# Patient Record
Sex: FEMALE | Race: WHITE | NOT HISPANIC OR LATINO | Employment: OTHER | URBAN - METROPOLITAN AREA
[De-identification: names, ages, dates, MRNs, and addresses within clinical notes are randomized per-mention and may not be internally consistent; named-entity substitution may affect disease eponyms.]

---

## 2017-01-17 ENCOUNTER — ALLSCRIPTS OFFICE VISIT (OUTPATIENT)
Dept: OTHER | Facility: OTHER | Age: 82
End: 2017-01-17

## 2017-05-03 ENCOUNTER — ALLSCRIPTS OFFICE VISIT (OUTPATIENT)
Dept: OTHER | Facility: OTHER | Age: 82
End: 2017-05-03

## 2018-01-14 VITALS
TEMPERATURE: 99.1 F | DIASTOLIC BLOOD PRESSURE: 62 MMHG | BODY MASS INDEX: 27.7 KG/M2 | SYSTOLIC BLOOD PRESSURE: 120 MMHG | WEIGHT: 123.13 LBS | HEIGHT: 56 IN

## 2018-01-14 VITALS
BODY MASS INDEX: 26.85 KG/M2 | OXYGEN SATURATION: 97 % | DIASTOLIC BLOOD PRESSURE: 70 MMHG | WEIGHT: 119.38 LBS | RESPIRATION RATE: 20 BRPM | HEART RATE: 97 BPM | HEIGHT: 56 IN | SYSTOLIC BLOOD PRESSURE: 138 MMHG

## 2018-01-14 NOTE — MISCELLANEOUS
Assessment    1  Gallstone pancreatitis (577 0,574 20) (K85 1)   2  Hypertension (401 9) (I10)   3  Type 2 diabetes mellitus (250 00) (E11 9)   4  Hyperlipidemia (272 4) (E78 5)    Plan  Gallstone pancreatitis    · 1 - Kira Alvarez MD  (General Surgery) Physician Referral  Consult  Status: Active   Requested for: 44TRB7357   Ordered; For: Gallstone pancreatitis; Ordered By: Adiel Smith Performed:  Due: 36INB2850  Care Summary provided  : Yes  Hyperlipidemia    · Atorvastatin Calcium 40 MG Oral Tablet; TAKE 1 TABLET DAILY   Rx By: Adiel Smith; Dispense: 90 Days ; #:90 Tablet; Refill: 1; For: Hyperlipidemia; [place] = N; Verified Transmission to [address] [place] [address]; Last Updated By: System, SureScripts; 3/7/2016 5:26:51 PM  Hyperlipidemia, Hypertension, Type 2 diabetes mellitus, Vitamin D deficiency    · Follow-up visit in 2 months Evaluation and Treatment  Follow-up  Status: Hold For -  Scheduling  Requested for: 66MSK9802   Ordered; For: Hyperlipidemia, Hypertension, Type 2 diabetes mellitus, Vitamin D deficiency; Ordered By: Adiel Smith Performed:  Due: 29YFQ3779  Type 2 diabetes mellitus    · MetFORMIN HCl - 500 MG Oral Tablet; ONE TABLET TWO TIMES DAILY   Rx By: Adiel Smith; Dispense: 30 Days ; #:60 Tablet; Refill: 5; For: Type 2 diabetes mellitus; [place] = N; Verified Transmission to [address] [place] [address]; Last Updated By: System, SureScripts; 3/7/2016 5:26:52 PM    Discussion/Summary  Discussion Summary:   1  Gallstone pancreatitis - status post MRCP - currently asymptomatic - the patient will follow-up with Dr [place] Gregory as above  Call if any problems or concerns  The patient wishes to go to [facility] for short-term rehabilitation as her  must return to work in a week  I feel that this would be appropriate and beneficial in returning the patient to baseline  We will contact them to see what records are needed       2  Hypertension - well-controlled - continue lisinopril/hydrochlorothiazide 20/12 5 mg daily  3  Diabetes mellitus type 2 - appears to be adequately controlled for her age - will check labs as previously ordered prior to next visit in 2 months with Dr Velazquez [place]  Continue metformin 500 mg twice daily for now  4  Hyperlipidemia - continue atorvastatin 40 mg daily  Will reassess with previously ordered labs  Chief Complaint  Chief Complaint Free Text Note Form: Hospital followup  History of Present Illness  General Hospital Follow-Up: The patient is being seen for follow-up after hospitalization  Hospital records were reviewed  She was hospitalized at [address] [place]  The date of admission: 2/22/16, date of discharge: 2/26/16  Diagnosis: pancreatitis, cholecystitis, sepsis  Hospital course: The patient was admitted to the hospital and found to have gallstone pancreatitis  She underwent MRCP to remove the stone and her sepsis and pancreatitis resolved  Cholecystectomy was delayed due to pancreatic cysts that were found on imaging  It was determined that further evaluation of the cyst would be necessary prior to the cholecystectomy  She was discharged to home, has PT coming twice a week  She was discharged on the following medications: no changes  Follow-up appointments: here - no general surgeon follow-up yet but was supposed to see Dr Villaseñor Forward within 2 weeks  Symptoms:  anorexia and swelling in the legs but improving, but no fever, no cough, no shortness of breath, no chest pain, no nausea, no vomiting, no loose stools and no constipation    The patient presents with complaints of fatigue (but improving each day)  The patient is currently experiencing symptoms  Additional history: BP at home range from 128-145/59-81 - glucose ranges from 145-220  [facility] Communication [facility]: The patient is being contacted for follow-up after hospitalization  She was hospitalized at [address] [place]   The dates of hospitalization: 2/22/16-2/26/16, date of admission: 2/22/16, date of discharge: 2/26/16  Diagnosis: PANCREATITIS, CHOLECYSTITIS, SEPSIS  She was discharged to home, with home health services  Medications were not reviewed today  She scheduled a follow up appointment  Follow-up appointments with other specialists: SURGEON  Counseling was provided to patient's family  JEAN,-PETER  Communication performed and completed by BRYCE FLANAGAN [facility] RN      Review of Systems  Complete-Female:   Constitutional: feeling tired, but as noted in HPI, no fever and no chills  Cardiovascular: lower extremity edema, but no chest pain  Respiratory: no shortness of breath, no cough and no wheezing  Gastrointestinal: as noted in HPI, no abdominal pain, no nausea and no vomiting  Active Problems    1  Dehydration (276 51) (E86 0)   2  Gastroenteritis (558 9) (K52 9)   3  Hyperlipidemia (272 4) (E78 5)   4  Hypertension (401 9) (I10)   5  Hypotension (458 9) (I95 9)   6  Need for prophylactic vaccination and inoculation against influenza (V04 81) (Z23)   7  Need for vaccination with 13-polyvalent pneumococcal conjugate vaccine (V03 82) (Z23)   8  Type 2 diabetes mellitus (250 00) (E11 9)   9  Urinary incontinence (788 30) (R32)   10  Vitamin D deficiency (268 9) (E55 9)    Past Medical History    1  Need for prophylactic vaccination and inoculation against influenza (V04 81) (Z23)   2  History of Pain in wrist, unspecified laterality (719 43) (M25 539)    Family History    1  Family history of Diabetes Mellitus (V18 0)    2  Family history of Diabetes Mellitus (V18 0)    3  Family history of Adenocarcinoma Of The Pancreas    4  Family history of myocardial infarction (V17 3) (Z82 49)    Social History    · Never A Smoker   · Never Drank Alcohol    Current Meds   1  Atorvastatin Calcium 40 MG Oral Tablet; TAKE 1 TABLET DAILY; Therapy: 16VNY9729 to (Evaluate:22Pde9186)  Requested for: 97JIM9785; Last   Rx:19Nov2015 Ordered   2  Ergocalciferol 00593 UNIT Oral Capsule; TAKE 1 CAPSULE WEEKLY;    Therapy: 43VKU9481 to (Evaluate:67Dlg5656)  Requested for: 20Jun2013; Last   Rx:20Jun2013 Ordered   3  Lisinopril-Hydrochlorothiazide 20-12 5 MG Oral Tablet; TAKE 1 TAB QD;   Therapy: 82RDV2886 to (Evaluate:68Ejw3968)  Requested for: 85Kag1340; Last   Rx:68Uqf9377 Ordered   4  MetFORMIN HCl - 500 MG Oral Tablet; ONE TABLET TWO TIMES DAILY; Therapy: 29ZAN2587 to ([facility])  Requested for: 56TFM2984; Last   Rx:09Rsg9349 Ordered   5  OneTouch Ultra Blue In [employer]; USE 1 STRIP DAILY; Therapy: 55NHE5278 to ([facility])  Requested for: 69QXD9933; Last   Rx:48Oxz9585 Ordered   6  OneTouch UltraSoft Lancets Miscellaneous; use once daily; Therapy: 41LMB3935 to (Evaluate:28Ixl3479)  Requested for: 51JXY1805; Last   Rx:31Jan2013 Ordered    Allergies    1  No Known Drug Allergies    Vitals  Signs [Data Includes: Current Encounter]   Recorded: 31DAR6854 04:54PM   Temperature: 99 5 F  Heart Rate: 724  Systolic: 651  Diastolic: 78  Height: 4 ft 8 in  Weight: 103 lb 4 00 oz  BMI Calculated: 23 15  BSA Calculated: 1 34    Physical Exam    Constitutional   General appearance: No acute distress, well appearing and well nourished  Ears, Nose, Mouth, and Throat   Oropharynx: Normal with no erythema, edema, exudate or lesions  Oral mucosa was moist    Neck   Neck: Supple, symmetric, trachea midline, no masses  Thyroid: Normal, no thyromegaly  Pulmonary   Respiratory effort: No increased work of breathing or signs of respiratory distress  Auscultation of lungs: Clear to auscultation  Cardiovascular   Auscultation of heart: Normal rate and rhythm, normal S1 and S2, no murmurs  Peripheral vascular exam: Normal   Radial: right 2+ and left 2+  Examination of extremities for edema and/or varicosities: Abnormal   bilateral ankle 2+ pitting edema  Abdomen   Abdomen: Non-tender, no masses  Liver and spleen: No hepatomegaly or splenomegaly     Lymphatic   Palpation of lymph nodes in neck: No lymphadenopathy  Musculoskeletal   Gait and station: Abnormal   uses a rolling walker     Psychiatric   Mood and affect: Normal        Signatures   Electronically signed by : Gabo Adams, ; Feb 29 2016 10:29AM EST                       (Author)    Electronically signed by : Chaparrita Karimi, [facility]; Mar  7 2016 10:10PM EST                       (Author)    Electronically signed by : BAYRON Zamora ; Mar  7 2016 10:52PM EST

## 2018-01-15 NOTE — MISCELLANEOUS
Message   Recorded as Task   Date: 03/17/2016 04:23 PM, Created By: Hung Brown   Task Name: Med Renewal Request   Assigned To: Hung Brown   Regarding Patient: Amie Guillory, Status: Active   Comment:    Hung Brown - 17 Mar 2016 4:23 PM     TASK CREATED  Caller: Self; Renew Medication; [phone] (Home)  Per arnaldo the rozerem is $180 but pt has not met her deductible yet this year  Is there something else to try? Brenda Marie - 17 Mar 2016 4:39 PM     TASK REPLIED TO: Previously Assigned To Brenda Marie  Let's try doxepin 10 mg, 1 at bedtime nightly #30 RF x1    PeaceDahiana blanco - 17 Mar 2016 6:23 PM     TASK EDITED  Rx ready to forward to pharmacy  Active Problems    1  Gallstone pancreatitis (577 0,574 20) (K85 1)   2  Hyperlipidemia (272 4) (E78 5)   3  Hypertension (401 9) (I10)   4  Insomnia (780 52) (G47 00)   5  Need for prophylactic vaccination and inoculation against influenza (V04 81) (Z23)   6  Need for vaccination with 13-polyvalent pneumococcal conjugate vaccine (V03 82) (Z23)   7  Type 2 diabetes mellitus (250 00) (E11 9)   8  Urinary incontinence (788 30) (R32)   9  Vitamin D deficiency (268 9) (E55 9)    Current Meds   1  Atorvastatin Calcium 40 MG Oral Tablet; TAKE 1 TABLET DAILY; Therapy: 34KXQ1476 to (Evaluate:22Xlq3409)  Requested for: 44RPI9715; Last   Rx:07Mar2016 Ordered   2  Ergocalciferol 59417 UNIT Oral Capsule (Drisdol); TAKE 1 CAPSULE WEEKLY; Therapy: 41Wss2662 to (Evaluate:48Vxy9465)  Requested for: 20Jun2013; Last   Rx:20Jun2013 Ordered   3  Lisinopril-Hydrochlorothiazide 20-12 5 MG Oral Tablet; TAKE 1 TAB QD;   Therapy: 42UZG6480 to (Evaluate:09Hua0953)  Requested for: 79Nnl1453; Last   Rx:28Bet4077 Ordered   4  MetFORMIN HCl - 500 MG Oral Tablet; ONE TABLET TWO TIMES DAILY; Therapy: 68XOC3324 to (Evaluate:55Axo8515)  Requested for: 89TEB1052; Last   Rx:07Mar2016 Ordered   5  OneTouch Ultra Blue In [employer]; USE 1 STRIP DAILY;    Therapy: 79WEY7753 to ([employer])  Requested for: 94YLG4072; Last   Rx:16Mbe5815 Ordered   6  OneTouch UltraSoft Lancets Miscellaneous; use once daily; Therapy: 20EJK1372 to ([facility])  Requested for: 15YPX7747; Last   Rx:31Jan2013 Ordered   7  Rozerem 8 MG Oral Tablet; TAKE 1 TABLET AT BEDTIME AS NEEDED FOR SLEEP; Therapy: 10HHZ2602 to (Evaluate:92Fdy0824)  Requested for: 96IPY7083; Last   Rx:17Mar2016 Ordered    Allergies    1   No Known Drug Allergies    Signatures   Electronically signed by : Fazal Sifuentes, ; Mar 17 2016  6:24PM EST                       (Author)

## 2018-01-17 NOTE — RESULT NOTES
Verified Results  (1) MICROALBUMIN CREATININE RATIO, RANDOM URINE 20Apr2016 02:39PM Cristino Kevin     Test Name Result Flag Reference   MICROALBUMIN/ CREAT R 14 mg/g creatinine  0-30   MICROALBUMIN,URINE 25 9 mg/L H 0 0-20 0   CREATININE URINE 190 0 mg/dL       (1) COMPREHENSIVE METABOLIC PANEL 08JFC7225 12:86SH Marie, [facility] Kidney Disease Education Program recommendations are as follows:  GFR calculation is accurate only with a steady state creatinine  Chronic Kidney disease less than 60 ml/min/1 73 sq  meters  Kidney failure less than 15 ml/min/1 73 sq  meters  Test Name Result Flag Reference   GLUCOSE,RANDM 93 mg/dL     If the patient is fasting, the ADA then defines impaired fasting glucose as > 100 mg/dL and diabetes as > or equal to 123 mg/dL     SODIUM 133 mmol/L L 136-145   POTASSIUM 4 2 mmol/L  3 5-5 3   CHLORIDE 95 mmol/L L 100-108   CARBON DIOXIDE 28 mmol/L  21-32   ANION GAP (CALC) 10 mmol/L  4-13   BLOOD UREA NITROGEN 25 mg/dL  5-25   CREATININE 1 03 mg/dL  0 60-1 30   Standardized to IDMS reference method   CALCIUM 8 7 mg/dL  8 3-10 1   BILI, TOTAL 0 52 mg/dL  0 20-1 00   ALK PHOSPHATAS 100 U/L     ALT (SGPT) 15 U/L  12-78   AST(SGOT) 12 U/L  5-45   ALBUMIN 3 7 g/dL  3 5-5 0   TOTAL PROTEIN 7 7 g/dL  6 4-8 2   eGFR Non-African American 51 2 ml/min/1 73sq m       (1) LIPID PANEL FASTING W DIRECT LDL REFLEX 20Apr2016 01:25PM Cristino Kevin   Triglyceride:         Normal              <150 mg/dl       Borderline High    150-199 mg/dl       High               200-499 mg/dl       Very High          >499 mg/dl  Cholesterol:         Desirable        <200 mg/dl      Borderline High  200-239 mg/dl      High             >239 mg/dl  HDL Cholesterol:        High    >59 mg/dL      Low     <41 mg/dL  LDL Cholesterol:        Optimal          <100 mg/dl         Near Optimal     100-129 mg/dl        Above Optimal          Borderline High   130-159 mg/dl          High              160-189 mg/dl          Very High        >189 mg/dl  LDL CALCULATED:    This screening LDL is a calculated result  It does not have the accuracy of the Direct Measured LDL in the monitoring of patients with hyperlipidemia and/or statin therapy  Direct Measure LDL (APR178) must be ordered separately in these patients  Test Name Result Flag Reference   CHOLESTEROL 186 mg/dL     LDL CHOLESTEROL CALCULATED 112 mg/dL H 0-100   TRIGLYCERIDES 144 mg/dL  <=150   Specimen collection should occur prior to N-Acetylcysteine or Metamizole administration due to the potential for falsely depressed results  HDL,DIRECT 45 mg/dL  40-60   Specimen collection should occur prior to Metamizole administration due to the potential for falsely depressed results

## 2018-01-18 NOTE — RESULT NOTES
Verified Results  (1) HEMOGLOBIN A1C 20Apr2016 01:25PM aHnnah Josh   5 7-6 4% impaired fasting glucose  >=6 5% diagnosis of diabetes    Falsely low levels are seen in conditions linked to short RBC life span-  hemolytic anemia, and splenomegaly  Falsely elevated levels are seen in situations where there is an increased production of RBC- receipt of erythropoietin or blood transfusions  Adopted from ADA-Clinical Practice Recommendations     Test Name Result Flag Reference   HEMOGLOBIN A1C 7 4 % H 4 0-5 6   EST  AVG   GLUCOSE 166 mg/dl

## 2018-02-26 DIAGNOSIS — G47.00 INSOMNIA, UNSPECIFIED TYPE: Primary | ICD-10-CM

## 2018-02-27 RX ORDER — DOXEPIN HYDROCHLORIDE 10 MG/1
CAPSULE ORAL
Qty: 90 CAPSULE | Refills: 0 | OUTPATIENT
Start: 2018-02-27

## 2018-02-28 ENCOUNTER — TELEPHONE (OUTPATIENT)
Dept: FAMILY MEDICINE CLINIC | Facility: CLINIC | Age: 83
End: 2018-02-28

## 2018-02-28 NOTE — TELEPHONE ENCOUNTER
Spoke with spouse joby   Patient has been receiving this rx from Dr Raquel Hernandez he will call there to request the refill

## 2018-02-28 NOTE — TELEPHONE ENCOUNTER
Satya Bahena called today saying she needs a refill on her sleeping medication that begins with a D  I do not see what that medication could be   If you find it, can you please call it into [facility] on [address] [facility]

## 2018-03-01 RX ORDER — DOXEPIN HYDROCHLORIDE 10 MG/1
10 CAPSULE ORAL
Qty: 30 CAPSULE | Refills: 0 | Status: SHIPPED | OUTPATIENT
Start: 2018-03-01 | End: 2018-04-05 | Stop reason: SDUPTHER

## 2018-03-01 RX ORDER — DOXEPIN HYDROCHLORIDE 10 MG/1
1 CAPSULE ORAL
COMMUNITY
Start: 2016-03-17 | End: 2018-03-01 | Stop reason: SDUPTHER

## 2018-03-01 NOTE — TELEPHONE ENCOUNTER
Spoke with pt's spouse today, Erika Gonzalez needs a refill for her Doxepin 10 mg QHS  Spouse mention that you are prescribing this for her, I did check Allscripts and yes first RX was done back on 05/09/2016  Can you please approve 30 days supply, pt will  Schedule an appointment

## 2018-03-08 RX ORDER — RAMELTEON 8 MG/1
1 TABLET ORAL
COMMUNITY
Start: 2016-03-17 | End: 2020-02-26 | Stop reason: ALTCHOICE

## 2018-03-08 RX ORDER — BENZONATATE 100 MG/1
CAPSULE ORAL
COMMUNITY
Start: 2017-05-03 | End: 2018-12-04 | Stop reason: ALTCHOICE

## 2018-03-08 RX ORDER — LANCETS
EACH MISCELLANEOUS DAILY
COMMUNITY
Start: 2013-01-31

## 2018-03-13 ENCOUNTER — OFFICE VISIT (OUTPATIENT)
Dept: FAMILY MEDICINE CLINIC | Facility: CLINIC | Age: 83
End: 2018-03-13
Payer: COMMERCIAL

## 2018-03-13 VITALS
DIASTOLIC BLOOD PRESSURE: 70 MMHG | OXYGEN SATURATION: 94 % | RESPIRATION RATE: 16 BRPM | HEART RATE: 92 BPM | SYSTOLIC BLOOD PRESSURE: 138 MMHG | HEIGHT: 60 IN | WEIGHT: 147 LBS | BODY MASS INDEX: 28.86 KG/M2

## 2018-03-13 DIAGNOSIS — E11.8 TYPE 2 DIABETES MELLITUS WITH COMPLICATION, UNSPECIFIED LONG TERM INSULIN USE STATUS: ICD-10-CM

## 2018-03-13 DIAGNOSIS — I10 ESSENTIAL HYPERTENSION: ICD-10-CM

## 2018-03-13 DIAGNOSIS — R29.890 HEIGHT LOSS: ICD-10-CM

## 2018-03-13 DIAGNOSIS — Z23 NEED FOR TETANUS BOOSTER: Primary | ICD-10-CM

## 2018-03-13 LAB — SL AMB POCT HEMOGLOBIN AIC: 7

## 2018-03-13 PROCEDURE — 99214 OFFICE O/P EST MOD 30 MIN: CPT | Performed by: FAMILY MEDICINE

## 2018-03-13 PROCEDURE — 3078F DIAST BP <80 MM HG: CPT | Performed by: FAMILY MEDICINE

## 2018-03-13 PROCEDURE — 83036 HEMOGLOBIN GLYCOSYLATED A1C: CPT | Performed by: FAMILY MEDICINE

## 2018-03-13 PROCEDURE — G0439 PPPS, SUBSEQ VISIT: HCPCS | Performed by: FAMILY MEDICINE

## 2018-03-13 PROCEDURE — 3725F SCREEN DEPRESSION PERFORMED: CPT | Performed by: FAMILY MEDICINE

## 2018-03-13 PROCEDURE — 3075F SYST BP GE 130 - 139MM HG: CPT | Performed by: FAMILY MEDICINE

## 2018-03-13 NOTE — PROGRESS NOTES
Assessment/Plan:    Type 2 diabetes mellitus ([facility] [address] )    Diabetes is stable with HGB A1c of 7 0  Will continue same dose metformin  Hyperlipidemia    Will check fasting lipid profile  Hypertension    Blood pressure is well controlled on current medications  Diagnoses and all orders for this visit:    Need for tetanus booster  -     tetanus-diphtheria-acellular pertussis (BOOSTRIX) injection; Inject 0 5 mL into the shoulder, thigh, or buttocks once for 1 dose    Type 2 diabetes mellitus with complication, unspecified long term insulin use status (HCC)  -     POCT hemoglobin A1c    Essential hypertension    Other orders  -     benzonatate (TESSALON PERLES) 100 mg capsule; Take by mouth  -     glucose blood test strip; by In Vitro route daily  -     Lancets (ONETOUCH ULTRASOFT) lancets; by Does not apply route daily  -     ramelteon (ROZEREM) 8 mg tablet; Take 1 tablet by mouth          Patient's shoes and socks removed  Right Foot/Ankle   Right Foot Inspection  Skin Exam: skin normal, skin intact and dry skin no warmth, no callus, no erythema, no maceration, no abnormal color, no pre-ulcer, no ulcer and no callus                          Toe Exam: ROM and strength within normal limits  Sensory   Vibration: intact  Proprioception: intact   Monofilament testing: absent  Vascular    The right DP pulse is 1+  The right PT pulse is 1+  Left Foot/Ankle  Left Foot Inspection  Skin Exam: skin normal, skin intact and dry skinno warmth, no erythema, no maceration, normal color, no pre-ulcer, no ulcer and no callus                         Toe Exam: ROM and strength within normal limits                   Sensory   Vibration: intact  Proprioception: intact  Monofilament: intact  Vascular    The left DP pulse is 1+  The left PT pulse is 1+  Assign Risk Category:  No deformity present; Loss of protective sensation; No weak pulses       Risk: 1     foot  SUBJECTIVE:  80 y o  female for follow up of diabetes   Diabetic Review of Systems - medication compliance: compliant all of the time  Other symptoms and concerns:   Current Outpatient Prescriptions   Medication Sig Dispense Refill    atorvastatin (LIPITOR) 40 mg tablet Take 40 mg by mouth daily   doxepin (SINEquan) 10 mg capsule Take 1 capsule (10 mg total) by mouth daily at bedtime 30 capsule 0    ergocalciferol (ERGOCALCIFEROL) 44409 UNITS capsule Take 1,000 Units by mouth once a week        glucose blood test strip by In Vitro route daily      Lancets (ONETOUCH ULTRASOFT) lancets by Does not apply route daily      lisinopril-hydrochlorothiazide (PRINZIDE,ZESTORETIC) 20-12 5 MG per tablet Take 1 tablet by mouth daily   metFORMIN (GLUCOPHAGE) 500 mg tablet Take 500 mg by mouth 2 (two) times a day with meals   ramelteon (ROZEREM) 8 mg tablet Take 1 tablet by mouth      benzonatate (TESSALON PERLES) 100 mg capsule Take by mouth      tetanus-diphtheria-acellular pertussis (BOOSTRIX) injection Inject 0 5 mL into the shoulder, thigh, or buttocks once for 1 dose 0 5 mL 0     No current facility-administered medications for this visit  OBJECTIVE:  /70   Pulse 92   Resp 16   Ht 5' (1 524 m)   Wt 66 7 kg (147 lb)   SpO2 94%   BMI 28 71 kg/m²        Patient ID: Claudia Small is a 80 y o  female  Patient is here with her  for annual Wellness visit and follow-up of chronic problems  She denies any recent illness  She denies any problem with chest pain or breathing problems  Her diabetes is under very good control on metformin  Fingerstick hemoglobin A1c today was 7 0  She denies any dizziness or headaches or neurological symptoms  She tries to eat a well-balanced diet and does at least a 0 5 hour walking daily around her apartment building  She uses her cane for support          Procedures  The following portions of the patient's history were reviewed and updated as appropriate: allergies, current medications, past family history, past medical history, past social history, past surgical history and problem list     Review of Systems   Constitutional: Negative for activity change, chills, fatigue and fever  HENT: Negative for congestion, ear pain, sinus pressure and sore throat  Eyes: Negative for pain and visual disturbance  Respiratory: Negative for cough, chest tightness, shortness of breath and wheezing  Cardiovascular: Negative for chest pain, palpitations and leg swelling  Gastrointestinal: Negative for abdominal pain, blood in stool, constipation, diarrhea, nausea and vomiting  Endocrine: Negative for polydipsia and polyuria  Genitourinary: Negative for difficulty urinating, dysuria, frequency and urgency  Musculoskeletal: Negative for arthralgias, joint swelling and myalgias  Skin: Negative for rash  Neurological: Negative for dizziness, weakness, numbness and headaches  Hematological: Negative for adenopathy  Does not bruise/bleed easily  Psychiatric/Behavioral: Negative for dysphoric mood  The patient is not nervous/anxious  Objective:      /70   Pulse 92   Resp 16   Ht 5' (1 524 m)   Wt 66 7 kg (147 lb)   SpO2 94%   BMI 28 71 kg/m²          Physical Exam   Constitutional: She is oriented to person, place, and time  She appears well-developed and well-nourished  HENT:   Head: Normocephalic and atraumatic  Right Ear: External ear normal    Left Ear: External ear normal    Mouth/Throat: Oropharynx is clear and moist    edentulous   Eyes: Conjunctivae and EOM are normal  Pupils are equal, round, and reactive to light  Neck: Normal range of motion  Neck supple  No thyromegaly present  Cardiovascular: Normal rate, regular rhythm and normal heart sounds  Pulses are no weak pulses  Pulses:       Dorsalis pedis pulses are 1+ on the right side, and 1+ on the left side  Posterior tibial pulses are 1+ on the right side, and 1+ on the left side     Pulmonary/Chest: Effort normal and breath sounds normal    Abdominal: Soft  Bowel sounds are normal    Musculoskeletal: Normal range of motion  Feet:    Feet:   Right Foot:   Skin Integrity: Positive for dry skin  Negative for ulcer, skin breakdown, erythema, warmth or callus  Left Foot:   Skin Integrity: Positive for dry skin  Negative for ulcer, skin breakdown, erythema, warmth or callus  Lymphadenopathy:     She has no cervical adenopathy  Neurological: She is alert and oriented to person, place, and time  Skin: Skin is warm and dry

## 2018-04-05 DIAGNOSIS — G47.00 INSOMNIA, UNSPECIFIED TYPE: ICD-10-CM

## 2018-04-05 RX ORDER — DOXEPIN HYDROCHLORIDE 10 MG/1
10 CAPSULE ORAL
Qty: 30 CAPSULE | Refills: 2 | OUTPATIENT
Start: 2018-04-05 | End: 2018-06-26 | Stop reason: SDUPTHER

## 2018-04-09 DIAGNOSIS — E11.9 TYPE 2 DIABETES MELLITUS WITHOUT COMPLICATION, WITHOUT LONG-TERM CURRENT USE OF INSULIN (HCC): Primary | ICD-10-CM

## 2018-04-10 DIAGNOSIS — E11.9 TYPE 2 DIABETES MELLITUS WITHOUT COMPLICATION, WITHOUT LONG-TERM CURRENT USE OF INSULIN (HCC): ICD-10-CM

## 2018-04-11 DIAGNOSIS — E11.8 TYPE 2 DIABETES MELLITUS WITH COMPLICATION, UNSPECIFIED WHETHER LONG TERM INSULIN USE: Primary | ICD-10-CM

## 2018-06-25 ENCOUNTER — APPOINTMENT (OUTPATIENT)
Dept: LAB | Facility: CLINIC | Age: 83
End: 2018-06-25
Payer: COMMERCIAL

## 2018-06-25 DIAGNOSIS — I10 ESSENTIAL HYPERTENSION: ICD-10-CM

## 2018-06-25 DIAGNOSIS — E11.8 TYPE 2 DIABETES MELLITUS WITH COMPLICATION (HCC): ICD-10-CM

## 2018-06-25 LAB
ALBUMIN SERPL BCP-MCNC: 4 G/DL (ref 3.5–5)
ALP SERPL-CCNC: 69 U/L (ref 46–116)
ALT SERPL W P-5'-P-CCNC: 18 U/L (ref 12–78)
ANION GAP SERPL CALCULATED.3IONS-SCNC: 6 MMOL/L (ref 4–13)
AST SERPL W P-5'-P-CCNC: 13 U/L (ref 5–45)
BILIRUB SERPL-MCNC: 0.44 MG/DL (ref 0.2–1)
BUN SERPL-MCNC: 23 MG/DL (ref 5–25)
CALCIUM SERPL-MCNC: 9.4 MG/DL (ref 8.3–10.1)
CHLORIDE SERPL-SCNC: 98 MMOL/L (ref 100–108)
CHOLEST SERPL-MCNC: 192 MG/DL (ref 50–200)
CO2 SERPL-SCNC: 27 MMOL/L (ref 21–32)
CREAT SERPL-MCNC: 1.12 MG/DL (ref 0.6–1.3)
GFR SERPL CREATININE-BSD FRML MDRD: 45 ML/MIN/1.73SQ M
GLUCOSE P FAST SERPL-MCNC: 103 MG/DL (ref 65–99)
HDLC SERPL-MCNC: 57 MG/DL (ref 40–60)
LDLC SERPL CALC-MCNC: 112 MG/DL (ref 0–100)
NONHDLC SERPL-MCNC: 135 MG/DL
POTASSIUM SERPL-SCNC: 4.5 MMOL/L (ref 3.5–5.3)
PROT SERPL-MCNC: 8.4 G/DL (ref 6.4–8.2)
SODIUM SERPL-SCNC: 131 MMOL/L (ref 136–145)
TRIGL SERPL-MCNC: 114 MG/DL

## 2018-06-25 PROCEDURE — 80053 COMPREHEN METABOLIC PANEL: CPT

## 2018-06-25 PROCEDURE — 80061 LIPID PANEL: CPT

## 2018-06-25 PROCEDURE — 36415 COLL VENOUS BLD VENIPUNCTURE: CPT

## 2018-06-26 DIAGNOSIS — G47.00 INSOMNIA, UNSPECIFIED TYPE: ICD-10-CM

## 2018-06-26 RX ORDER — DOXEPIN HYDROCHLORIDE 10 MG/1
10 CAPSULE ORAL
Qty: 30 CAPSULE | Refills: 0 | OUTPATIENT
Start: 2018-06-26 | End: 2018-07-24 | Stop reason: SDUPTHER

## 2018-07-12 DIAGNOSIS — E11.9 TYPE 2 DIABETES MELLITUS WITHOUT COMPLICATION, WITHOUT LONG-TERM CURRENT USE OF INSULIN (HCC): Primary | ICD-10-CM

## 2018-07-13 DIAGNOSIS — E11.8 TYPE 2 DIABETES MELLITUS WITH COMPLICATION, UNSPECIFIED WHETHER LONG TERM INSULIN USE: ICD-10-CM

## 2018-07-24 DIAGNOSIS — E11.8 TYPE 2 DIABETES MELLITUS WITH COMPLICATION, UNSPECIFIED WHETHER LONG TERM INSULIN USE: ICD-10-CM

## 2018-07-24 DIAGNOSIS — G47.00 INSOMNIA, UNSPECIFIED TYPE: ICD-10-CM

## 2018-07-24 RX ORDER — DOXEPIN HYDROCHLORIDE 10 MG/1
10 CAPSULE ORAL
Qty: 30 CAPSULE | Refills: 5 | Status: SHIPPED | OUTPATIENT
Start: 2018-07-24 | End: 2019-01-24 | Stop reason: SDUPTHER

## 2018-07-31 ENCOUNTER — TELEPHONE (OUTPATIENT)
Dept: FAMILY MEDICINE CLINIC | Facility: CLINIC | Age: 83
End: 2018-07-31

## 2018-07-31 DIAGNOSIS — E87.1 HYPONATREMIA: Primary | ICD-10-CM

## 2018-07-31 NOTE — TELEPHONE ENCOUNTER
She could have a basic metabolic profile which does not have to be fasting  All the other labs were done recently

## 2018-08-15 ENCOUNTER — APPOINTMENT (OUTPATIENT)
Dept: LAB | Facility: CLINIC | Age: 83
End: 2018-08-15
Payer: COMMERCIAL

## 2018-08-15 DIAGNOSIS — E87.1 HYPONATREMIA: ICD-10-CM

## 2018-08-15 LAB
ANION GAP SERPL CALCULATED.3IONS-SCNC: 7 MMOL/L (ref 4–13)
BUN SERPL-MCNC: 23 MG/DL (ref 5–25)
CALCIUM SERPL-MCNC: 9.2 MG/DL (ref 8.3–10.1)
CHLORIDE SERPL-SCNC: 97 MMOL/L (ref 100–108)
CO2 SERPL-SCNC: 28 MMOL/L (ref 21–32)
CREAT SERPL-MCNC: 1.1 MG/DL (ref 0.6–1.3)
GFR SERPL CREATININE-BSD FRML MDRD: 46 ML/MIN/1.73SQ M
GLUCOSE P FAST SERPL-MCNC: 119 MG/DL (ref 65–99)
POTASSIUM SERPL-SCNC: 4.4 MMOL/L (ref 3.5–5.3)
SODIUM SERPL-SCNC: 132 MMOL/L (ref 136–145)

## 2018-08-15 PROCEDURE — 80048 BASIC METABOLIC PNL TOTAL CA: CPT

## 2018-08-15 PROCEDURE — 36415 COLL VENOUS BLD VENIPUNCTURE: CPT

## 2018-09-12 ENCOUNTER — OFFICE VISIT (OUTPATIENT)
Dept: FAMILY MEDICINE CLINIC | Facility: CLINIC | Age: 83
End: 2018-09-12
Payer: COMMERCIAL

## 2018-09-12 VITALS
OXYGEN SATURATION: 99 % | HEART RATE: 71 BPM | SYSTOLIC BLOOD PRESSURE: 140 MMHG | WEIGHT: 121 LBS | HEIGHT: 55 IN | BODY MASS INDEX: 28 KG/M2 | DIASTOLIC BLOOD PRESSURE: 62 MMHG

## 2018-09-12 DIAGNOSIS — I10 HYPERTENSION, UNSPECIFIED TYPE: ICD-10-CM

## 2018-09-12 DIAGNOSIS — Z23 FLU VACCINE NEED: ICD-10-CM

## 2018-09-12 DIAGNOSIS — E11.9 TYPE 2 DIABETES MELLITUS WITHOUT COMPLICATION, WITHOUT LONG-TERM CURRENT USE OF INSULIN (HCC): ICD-10-CM

## 2018-09-12 DIAGNOSIS — E78.5 HYPERLIPIDEMIA, UNSPECIFIED HYPERLIPIDEMIA TYPE: Primary | ICD-10-CM

## 2018-09-12 LAB — SL AMB POCT HEMOGLOBIN AIC: 7.3

## 2018-09-12 PROCEDURE — 83036 HEMOGLOBIN GLYCOSYLATED A1C: CPT | Performed by: FAMILY MEDICINE

## 2018-09-12 PROCEDURE — 90662 IIV NO PRSV INCREASED AG IM: CPT

## 2018-09-12 PROCEDURE — 1036F TOBACCO NON-USER: CPT | Performed by: FAMILY MEDICINE

## 2018-09-12 PROCEDURE — 1160F RVW MEDS BY RX/DR IN RCRD: CPT | Performed by: FAMILY MEDICINE

## 2018-09-12 PROCEDURE — 1160F RVW MEDS BY RX/DR IN RCRD: CPT

## 2018-09-12 PROCEDURE — 99214 OFFICE O/P EST MOD 30 MIN: CPT | Performed by: FAMILY MEDICINE

## 2018-09-12 PROCEDURE — G0008 ADMIN INFLUENZA VIRUS VAC: HCPCS

## 2018-09-12 RX ORDER — LISINOPRIL AND HYDROCHLOROTHIAZIDE 20; 12.5 MG/1; MG/1
1 TABLET ORAL DAILY
Qty: 90 TABLET | Refills: 5 | Status: SHIPPED | OUTPATIENT
Start: 2018-09-12 | End: 2020-01-28 | Stop reason: HOSPADM

## 2018-09-12 RX ORDER — ATORVASTATIN CALCIUM 40 MG/1
40 TABLET, FILM COATED ORAL DAILY
Qty: 90 TABLET | Refills: 5 | Status: SHIPPED | OUTPATIENT
Start: 2018-09-12 | End: 2020-05-08 | Stop reason: SDUPTHER

## 2018-09-12 NOTE — PROGRESS NOTES
Assessment/Plan:    Type 2 diabetes mellitus ([facility] [address] )  Lab Results   Component Value Date    HGBA1C 7 0 03/13/2018     Her diabetes has been very stable  Today's hemoglobin A1c was 7 3   will continue metformin 500 mg b i d     No results for input(s): POCGLU in the last 72 hours  Blood Sugar Average: Last 72 hrs:      Hypertension    Blood pressure is stable  Will continue lisinopril/hydrochlorothiazide 20/12 5  Other insomnia    She has been sleeping very well with the doxepin 10 mg at bedtime  Diagnoses and all orders for this visit:    Hyperlipidemia, unspecified hyperlipidemia type  -     atorvastatin (LIPITOR) 40 mg tablet; Take 1 tablet (40 mg total) by mouth daily    Hypertension, unspecified type  -     lisinopril-hydrochlorothiazide (PRINZIDE,ZESTORETIC) 20-12 5 MG per tablet; Take 1 tablet by mouth daily    Type 2 diabetes mellitus without complication, without long-term current use of insulin ([facility])  -     POCT hemoglobin A1c    Flu vaccine need  -     influenza vaccine, [id number], high-dose, PF 0 5 mL, for patients 65 yr+ (FLUZONE HIGH-DOSE)          Subjective:      Patient ID: Carina Perez is a 80 y o  female  She is here for follow-up of diabetes along with blood pressure and elevated cholesterol  She denies any problems recently  No chest pain or shortness of breath  No headaches or dizziness  She has been checking her fingerstick blood sugar once per day  The following portions of the patient's history were reviewed and updated as appropriate: allergies, current medications, past family history, past medical history, past social history, past surgical history and problem list     Review of Systems   Constitutional: Negative for activity change, chills, fatigue and fever  HENT: Negative for congestion, ear pain, sinus pressure and sore throat  Eyes: Negative for pain and visual disturbance     Respiratory: Negative for cough, chest tightness, shortness of breath and wheezing  Cardiovascular: Negative for chest pain, palpitations and leg swelling  Gastrointestinal: Negative for abdominal pain, blood in stool, constipation, diarrhea, nausea and vomiting  Endocrine: Negative for polydipsia and polyuria  Genitourinary: Negative for difficulty urinating, dysuria, frequency and urgency  Musculoskeletal: Negative for arthralgias, joint swelling and myalgias  Skin: Negative for rash  Neurological: Negative for dizziness, weakness, numbness and headaches  Hematological: Negative for adenopathy  Does not bruise/bleed easily  Psychiatric/Behavioral: Negative for dysphoric mood  The patient is not nervous/anxious  Objective:      /62   Pulse 71   Ht 4' 7" (1 397 m)   Wt 54 9 kg (121 lb)   SpO2 99%   BMI 28 12 kg/m²          Physical Exam   Constitutional: She is oriented to person, place, and time  She appears well-developed and well-nourished  HENT:   Head: Normocephalic and atraumatic  Eyes: EOM are normal  Pupils are equal, round, and reactive to light  Neck: Normal range of motion  Neck supple  No JVD present  No thyromegaly present  Carotid pulses 2+ bilaterally without bruit   Cardiovascular: Normal rate, regular rhythm and normal heart sounds  Pulmonary/Chest: Effort normal and breath sounds normal    Lymphadenopathy:     She has no cervical adenopathy  Neurological: She is alert and oriented to person, place, and time  Skin: Skin is warm and dry  Psychiatric: She has a normal mood and affect  Her behavior is normal    Nursing note and vitals reviewed

## 2018-12-04 ENCOUNTER — OFFICE VISIT (OUTPATIENT)
Dept: FAMILY MEDICINE CLINIC | Facility: CLINIC | Age: 83
End: 2018-12-04
Payer: COMMERCIAL

## 2018-12-04 VITALS
HEART RATE: 66 BPM | SYSTOLIC BLOOD PRESSURE: 130 MMHG | TEMPERATURE: 99 F | HEIGHT: 55 IN | OXYGEN SATURATION: 98 % | BODY MASS INDEX: 27.72 KG/M2 | DIASTOLIC BLOOD PRESSURE: 60 MMHG | WEIGHT: 119.8 LBS

## 2018-12-04 DIAGNOSIS — J06.9 VIRAL UPPER RESPIRATORY TRACT INFECTION: Primary | ICD-10-CM

## 2018-12-04 PROCEDURE — 4040F PNEUMOC VAC/ADMIN/RCVD: CPT | Performed by: FAMILY MEDICINE

## 2018-12-04 PROCEDURE — 1160F RVW MEDS BY RX/DR IN RCRD: CPT | Performed by: FAMILY MEDICINE

## 2018-12-04 PROCEDURE — 99213 OFFICE O/P EST LOW 20 MIN: CPT | Performed by: FAMILY MEDICINE

## 2018-12-04 RX ORDER — BENZONATATE 100 MG/1
CAPSULE ORAL
Qty: 30 CAPSULE | Refills: 0 | Status: SHIPPED | OUTPATIENT
Start: 2018-12-04 | End: 2019-04-30

## 2018-12-04 NOTE — PROGRESS NOTES
Assessment/Plan:    She appears to have upper respiratory infection which is likely viral   Will treat symptomatically with increased water intake and Tessalon Perles as needed for coughing  She should recheck if symptoms are not resolving  Diagnoses and all orders for this visit:    Viral upper respiratory tract infection  -     benzonatate (TESSALON PERLES) 100 mg capsule; Take 1-2 po tid prn cough          Subjective:      Patient ID: Suzanne Singh is a 80 y o  female  She has been fighting cold symptoms for about a week  She was exposed to her grandchildren who are coughing over the Thanksgiving holiday  She complains of nasal congestion and nonproductive cough  She denies fever or chills  She denies abdominal pain, diarrhea or nausea or vomiting  She gets a little relief with Vicks Vaporub and vaporizer  Cough   Pertinent negatives include no headaches  The following portions of the patient's history were reviewed and updated as appropriate: allergies, current medications, past family history, past medical history, past social history, past surgical history and problem list     Review of Systems   Constitutional: Positive for fatigue  HENT: Positive for congestion  Respiratory: Positive for cough  Gastrointestinal: Negative for abdominal pain, diarrhea, nausea and vomiting  Genitourinary: Negative for difficulty urinating  Neurological: Negative for dizziness and headaches  Objective:      /60   Pulse 66   Temp 99 °F (37 2 °C) (Tympanic)   Ht 4' 7" (1 397 m)   Wt 54 3 kg (119 lb 12 8 oz)   SpO2 98%   BMI 27 84 kg/m²          Physical Exam   Constitutional: She is oriented to person, place, and time  She appears well-developed and well-nourished  HENT:   Head: Normocephalic and atraumatic  Mouth/Throat: Oropharynx is clear and moist    TMs are partially obscured by cerumen bilaterally  Mild mucosal edema     Eyes: Pupils are equal, round, and reactive to light  Neck: Normal range of motion  Neck supple  Cardiovascular: Normal rate, regular rhythm and normal heart sounds  Pulmonary/Chest: Effort normal and breath sounds normal  No respiratory distress  She has no wheezes  She has no rales  Occasional cough   Lymphadenopathy:     She has no cervical adenopathy  Neurological: She is alert and oriented to person, place, and time  Skin: Skin is warm and dry  Nursing note and vitals reviewed

## 2019-01-24 DIAGNOSIS — G47.00 INSOMNIA, UNSPECIFIED TYPE: ICD-10-CM

## 2019-01-24 RX ORDER — DOXEPIN HYDROCHLORIDE 10 MG/1
10 CAPSULE ORAL
Qty: 30 CAPSULE | Refills: 5 | OUTPATIENT
Start: 2019-01-24

## 2019-01-24 RX ORDER — DOXEPIN HYDROCHLORIDE 10 MG/1
CAPSULE ORAL
Qty: 30 CAPSULE | Refills: 4 | Status: SHIPPED | OUTPATIENT
Start: 2019-01-24 | End: 2019-04-30 | Stop reason: SDUPTHER

## 2019-01-24 NOTE — TELEPHONE ENCOUNTER
Pt's  is asking if we can please fill it ASAP before he goes in for hernia repair surgery on 1/31/19 at [facility]

## 2019-03-22 DIAGNOSIS — E11.9 TYPE 2 DIABETES MELLITUS WITHOUT COMPLICATION, WITHOUT LONG-TERM CURRENT USE OF INSULIN (HCC): ICD-10-CM

## 2019-03-29 ENCOUNTER — TELEPHONE (OUTPATIENT)
Dept: FAMILY MEDICINE CLINIC | Facility: CLINIC | Age: 84
End: 2019-03-29

## 2019-03-29 NOTE — TELEPHONE ENCOUNTER
[place] called from [facility] stating pt was "out of adherence with meds and has not refilled Atorvastatin since September 15th"  Asked if pt is taking her meds      Called pt and was informed pt is currently taking Atorvastatin regularly and getting it filled at [facility]

## 2019-04-30 ENCOUNTER — OFFICE VISIT (OUTPATIENT)
Dept: FAMILY MEDICINE CLINIC | Facility: CLINIC | Age: 84
End: 2019-04-30
Payer: COMMERCIAL

## 2019-04-30 VITALS
TEMPERATURE: 98.5 F | BODY MASS INDEX: 28.46 KG/M2 | WEIGHT: 123 LBS | HEART RATE: 82 BPM | SYSTOLIC BLOOD PRESSURE: 148 MMHG | OXYGEN SATURATION: 98 % | HEIGHT: 55 IN | DIASTOLIC BLOOD PRESSURE: 72 MMHG

## 2019-04-30 DIAGNOSIS — Z23 NEED FOR TETANUS BOOSTER: ICD-10-CM

## 2019-04-30 DIAGNOSIS — E11.9 TYPE 2 DIABETES MELLITUS WITHOUT COMPLICATION, WITHOUT LONG-TERM CURRENT USE OF INSULIN (HCC): ICD-10-CM

## 2019-04-30 DIAGNOSIS — G47.00 INSOMNIA, UNSPECIFIED TYPE: ICD-10-CM

## 2019-04-30 DIAGNOSIS — I10 ESSENTIAL HYPERTENSION: ICD-10-CM

## 2019-04-30 DIAGNOSIS — E78.5 HYPERLIPIDEMIA, UNSPECIFIED HYPERLIPIDEMIA TYPE: ICD-10-CM

## 2019-04-30 DIAGNOSIS — G47.09 OTHER INSOMNIA: Primary | ICD-10-CM

## 2019-04-30 LAB — SL AMB POCT HEMOGLOBIN AIC: 8.5 (ref ?–6.5)

## 2019-04-30 PROCEDURE — 83036 HEMOGLOBIN GLYCOSYLATED A1C: CPT | Performed by: FAMILY MEDICINE

## 2019-04-30 PROCEDURE — 99214 OFFICE O/P EST MOD 30 MIN: CPT | Performed by: FAMILY MEDICINE

## 2019-04-30 PROCEDURE — 1036F TOBACCO NON-USER: CPT | Performed by: FAMILY MEDICINE

## 2019-04-30 PROCEDURE — 1101F PT FALLS ASSESS-DOCD LE1/YR: CPT | Performed by: FAMILY MEDICINE

## 2019-04-30 PROCEDURE — 1160F RVW MEDS BY RX/DR IN RCRD: CPT | Performed by: FAMILY MEDICINE

## 2019-04-30 PROCEDURE — 3725F SCREEN DEPRESSION PERFORMED: CPT | Performed by: FAMILY MEDICINE

## 2019-04-30 RX ORDER — DOXEPIN HYDROCHLORIDE 10 MG/1
10 CAPSULE ORAL
Qty: 30 CAPSULE | Refills: 4 | Status: SHIPPED | OUTPATIENT
Start: 2019-04-30 | End: 2019-06-04 | Stop reason: SDUPTHER

## 2019-05-15 ENCOUNTER — APPOINTMENT (OUTPATIENT)
Dept: LAB | Facility: CLINIC | Age: 84
End: 2019-05-15
Payer: COMMERCIAL

## 2019-05-15 DIAGNOSIS — E78.5 HYPERLIPIDEMIA, UNSPECIFIED HYPERLIPIDEMIA TYPE: ICD-10-CM

## 2019-05-15 LAB
ALBUMIN SERPL BCP-MCNC: 4.1 G/DL (ref 3.5–5)
ALP SERPL-CCNC: 74 U/L (ref 46–116)
ALT SERPL W P-5'-P-CCNC: 19 U/L (ref 12–78)
ANION GAP SERPL CALCULATED.3IONS-SCNC: 5 MMOL/L (ref 4–13)
AST SERPL W P-5'-P-CCNC: 11 U/L (ref 5–45)
BILIRUB SERPL-MCNC: 0.5 MG/DL (ref 0.2–1)
BUN SERPL-MCNC: 24 MG/DL (ref 5–25)
CALCIUM SERPL-MCNC: 8.8 MG/DL (ref 8.3–10.1)
CHLORIDE SERPL-SCNC: 98 MMOL/L (ref 100–108)
CHOLEST SERPL-MCNC: 229 MG/DL (ref 50–200)
CO2 SERPL-SCNC: 29 MMOL/L (ref 21–32)
CREAT SERPL-MCNC: 1.15 MG/DL (ref 0.6–1.3)
GFR SERPL CREATININE-BSD FRML MDRD: 43 ML/MIN/1.73SQ M
GLUCOSE P FAST SERPL-MCNC: 107 MG/DL (ref 65–99)
HDLC SERPL-MCNC: 58 MG/DL (ref 40–60)
LDLC SERPL CALC-MCNC: 146 MG/DL (ref 0–100)
NONHDLC SERPL-MCNC: 171 MG/DL
POTASSIUM SERPL-SCNC: 4.1 MMOL/L (ref 3.5–5.3)
PROT SERPL-MCNC: 8.3 G/DL (ref 6.4–8.2)
SODIUM SERPL-SCNC: 132 MMOL/L (ref 136–145)
TRIGL SERPL-MCNC: 126 MG/DL

## 2019-05-15 PROCEDURE — 80061 LIPID PANEL: CPT

## 2019-05-15 PROCEDURE — 36415 COLL VENOUS BLD VENIPUNCTURE: CPT

## 2019-05-15 PROCEDURE — 80053 COMPREHEN METABOLIC PANEL: CPT

## 2019-05-24 DIAGNOSIS — Z23 NEED FOR TDAP VACCINATION: Primary | ICD-10-CM

## 2019-06-04 DIAGNOSIS — G47.00 INSOMNIA, UNSPECIFIED TYPE: ICD-10-CM

## 2019-06-04 RX ORDER — DOXEPIN HYDROCHLORIDE 10 MG/1
10 CAPSULE ORAL
Qty: 30 CAPSULE | Refills: 4 | Status: SHIPPED | OUTPATIENT
Start: 2019-06-04 | End: 2019-07-31 | Stop reason: SDUPTHER

## 2019-07-31 ENCOUNTER — OFFICE VISIT (OUTPATIENT)
Dept: FAMILY MEDICINE CLINIC | Facility: CLINIC | Age: 84
End: 2019-07-31
Payer: COMMERCIAL

## 2019-07-31 VITALS
BODY MASS INDEX: 28.82 KG/M2 | OXYGEN SATURATION: 97 % | HEART RATE: 86 BPM | SYSTOLIC BLOOD PRESSURE: 138 MMHG | WEIGHT: 124 LBS | TEMPERATURE: 98.3 F | DIASTOLIC BLOOD PRESSURE: 80 MMHG

## 2019-07-31 DIAGNOSIS — G47.09 OTHER INSOMNIA: ICD-10-CM

## 2019-07-31 DIAGNOSIS — I10 ESSENTIAL HYPERTENSION: ICD-10-CM

## 2019-07-31 DIAGNOSIS — E55.9 VITAMIN D DEFICIENCY: ICD-10-CM

## 2019-07-31 DIAGNOSIS — E11.9 TYPE 2 DIABETES MELLITUS WITHOUT COMPLICATION, WITHOUT LONG-TERM CURRENT USE OF INSULIN (HCC): Primary | ICD-10-CM

## 2019-07-31 DIAGNOSIS — G47.00 INSOMNIA, UNSPECIFIED TYPE: ICD-10-CM

## 2019-07-31 DIAGNOSIS — E78.5 HYPERLIPIDEMIA, UNSPECIFIED HYPERLIPIDEMIA TYPE: ICD-10-CM

## 2019-07-31 PROCEDURE — 1036F TOBACCO NON-USER: CPT | Performed by: FAMILY MEDICINE

## 2019-07-31 PROCEDURE — 1160F RVW MEDS BY RX/DR IN RCRD: CPT | Performed by: FAMILY MEDICINE

## 2019-07-31 PROCEDURE — 99214 OFFICE O/P EST MOD 30 MIN: CPT | Performed by: FAMILY MEDICINE

## 2019-07-31 RX ORDER — DOXEPIN HYDROCHLORIDE 10 MG/1
10 CAPSULE ORAL
Qty: 90 CAPSULE | Refills: 1 | Status: SHIPPED | OUTPATIENT
Start: 2019-07-31 | End: 2020-02-18 | Stop reason: DRUGHIGH

## 2019-07-31 RX ORDER — ERGOCALCIFEROL 1.25 MG/1
50000 CAPSULE ORAL WEEKLY
Qty: 12 CAPSULE | Refills: 0
Start: 2019-07-31 | End: 2020-04-23 | Stop reason: HOSPADM

## 2019-07-31 NOTE — ASSESSMENT & PLAN NOTE
LDL cholesterol had been running at 112, but recently it increased to  146  Stressed need to take her atorvastatin 40 mg daily

## 2019-07-31 NOTE — PROGRESS NOTES
Assessment/Plan:    Type 2 diabetes mellitus ([facility] [address] )  Lab Results   Component Value Date    HGBA1C 8 5 (A) 04/30/2019     She had increase in hemoglobin A1c in the spring  Urged her to try to watch diet  We will repeat A1c at next visit  No results for input(s): POCGLU in the last 72 hours  Blood Sugar Average: Last 72 hrs:      Hypertension  Blood pressure is stable  Continue lisinopril hydrochlorothiazide, 20-12 5 mg  Other insomnia  She does very well with doxepin 10 mg daily  I just changed prescription to a 90 day supply  Hyperlipidemia  LDL cholesterol had been running at 112, but recently it increased to  146  Stressed need to take her atorvastatin 40 mg daily  Diagnoses and all orders for this visit:    Type 2 diabetes mellitus without complication, without long-term current use of insulin ([facility])    Insomnia, unspecified type  -     doxepin (SINEquan) 10 mg capsule; Take 1 capsule (10 mg total) by mouth daily at bedtime    Essential hypertension    Other insomnia    Hyperlipidemia, unspecified hyperlipidemia type    Vitamin D deficiency  -     ergocalciferol (VITAMIN D2) 50,000 units; Take 1 capsule (50,000 Units total) by mouth once a week          Subjective:      Patient ID: Ted Stubbs is a 80 y o  female  She is here with her  for follow-up  She has been feeling well in general   She denies any chest pain or shortness of breath or abdominal pain  She denies headaches or dizziness  Her  has been checking her blood sugars on a daily basis and they are usually under 150  Slight tickle cough which she thinks is allergic       The following portions of the patient's history were reviewed and updated as appropriate: allergies, current medications, past family history, past medical history, past social history, past surgical history and problem list     Review of Systems   Constitutional: Negative for activity change, chills, fatigue and fever     HENT: Negative for congestion, ear pain, sinus pressure and sore throat  Eyes: Negative for pain and visual disturbance  Respiratory: Positive for cough  Negative for chest tightness, shortness of breath and wheezing  Cardiovascular: Negative for chest pain, palpitations and leg swelling  Gastrointestinal: Negative for abdominal pain, blood in stool, constipation, diarrhea, nausea and vomiting  Endocrine: Negative for polydipsia and polyuria  Genitourinary: Negative for difficulty urinating, dysuria, frequency and urgency  Musculoskeletal: Negative for arthralgias, joint swelling and myalgias  Skin: Negative for rash  Neurological: Negative for dizziness, weakness, numbness and headaches  Hematological: Negative for adenopathy  Does not bruise/bleed easily  Psychiatric/Behavioral: Negative for dysphoric mood  The patient is not nervous/anxious  Objective:      /80 (BP Location: Left arm, Patient Position: Sitting, Cuff Size: Standard)   Pulse 86   Temp 98 3 °F (36 8 °C) (Tympanic)   Wt 56 2 kg (124 lb)   SpO2 97%   BMI 28 82 kg/m²          Physical Exam   Constitutional: She appears well-developed and well-nourished  HENT:   Head: Normocephalic and atraumatic  Eyes: Pupils are equal, round, and reactive to light  EOM are normal    Neck: Normal range of motion  Neck supple  No thyromegaly present  Cardiovascular: Normal rate  Pulmonary/Chest: Effort normal and breath sounds normal    Musculoskeletal: She exhibits deformity  She exhibits no edema  Great toes overlapping bilaterally  Lymphadenopathy:     She has no cervical adenopathy  Neurological: She is alert  Skin: Skin is warm and dry  No erythema  No pallor  Very thickened great toenails bilaterally with fungal changes  Psychiatric: She has a normal mood and affect  Nursing note and vitals reviewed

## 2019-07-31 NOTE — PROGRESS NOTES
Patient's shoes and socks removed  Right Foot/Ankle   Right Foot Inspection  Skin Exam: skin normal and skin intact no dry skin, no warmth, no callus, no erythema, no maceration, no abnormal color, no pre-ulcer, no ulcer and no callus                            Sensory       Monofilament testing: intact  Vascular  Capillary refills: < 3 seconds    Right Toe  - Comprehensive Exam  Ecchymosis: none  Arch: normal  Hammertoes: absent  Claw Toes: absent  Swelling: none   Tenderness: none         Left Foot/Ankle  Left Foot Inspection  Skin Exam: skin normal and skin intactno dry skin, no warmth, no erythema, no maceration, normal color, no pre-ulcer, no ulcer and no callus                                         Sensory       Monofilament: intact  Vascular  Capillary refills: < 3 seconds    Left Toe  - Comprehensive Exam  Ecchymosis: none  Arch: normal  Hammertoes: absent  Claw toes: absent  Swelling: none   Tenderness: none       Assign Risk Category:  Deformity present;  No loss of protective sensation;        Risk: 1

## 2019-07-31 NOTE — ASSESSMENT & PLAN NOTE
Lab Results   Component Value Date    HGBA1C 8 5 (A) 04/30/2019     She had increase in hemoglobin A1c in the spring  Urged her to try to watch diet  We will repeat A1c at next visit  No results for input(s): POCGLU in the last 72 hours      Blood Sugar Average: Last 72 hrs:

## 2019-08-06 NOTE — ASSESSMENT & PLAN NOTE
Blood pressure is stable  Will continue lisinopril/hydrochlorothiazide 20/12  5 
Lab Results   Component Value Date    HGBA1C 7 0 03/13/2018     Her diabetes has been very stable  Today's hemoglobin A1c was 7 3   will continue metformin 500 mg b i d     No results for input(s): POCGLU in the last 72 hours      Blood Sugar Average: Last 72 hrs:
She has been sleeping very well with the doxepin 10 mg at bedtime 
pain/decreased ROM/decreased strength/impaired balance

## 2019-10-09 ENCOUNTER — OFFICE VISIT (OUTPATIENT)
Dept: FAMILY MEDICINE CLINIC | Facility: CLINIC | Age: 84
End: 2019-10-09
Payer: COMMERCIAL

## 2019-10-09 VITALS
RESPIRATION RATE: 20 BRPM | BODY MASS INDEX: 27.95 KG/M2 | HEART RATE: 78 BPM | SYSTOLIC BLOOD PRESSURE: 144 MMHG | HEIGHT: 55 IN | OXYGEN SATURATION: 88 % | DIASTOLIC BLOOD PRESSURE: 82 MMHG | WEIGHT: 120.8 LBS

## 2019-10-09 DIAGNOSIS — J20.9 ACUTE BRONCHITIS, UNSPECIFIED ORGANISM: Primary | ICD-10-CM

## 2019-10-09 PROCEDURE — 99213 OFFICE O/P EST LOW 20 MIN: CPT | Performed by: INTERNAL MEDICINE

## 2019-10-09 RX ORDER — AZITHROMYCIN 250 MG/1
TABLET, FILM COATED ORAL
Qty: 6 TABLET | Refills: 0 | Status: SHIPPED | OUTPATIENT
Start: 2019-10-09 | End: 2019-10-14

## 2019-10-09 RX ORDER — BENZONATATE 100 MG/1
100 CAPSULE ORAL 3 TIMES DAILY PRN
Qty: 20 CAPSULE | Refills: 0 | Status: SHIPPED | OUTPATIENT
Start: 2019-10-09 | End: 2020-02-18 | Stop reason: SDUPTHER

## 2019-10-09 NOTE — PROGRESS NOTES
Assessment/Plan:     Diagnoses and all orders for this visit:    Acute bronchitis, unspecified organism  -     benzonatate (TESSALON PERLES) 100 mg capsule; Take 1 capsule (100 mg total) by mouth 3 (three) times a day as needed for cough  -     azithromycin (ZITHROMAX) 250 mg tablet; Take 2 tablets (500 mg total) by mouth daily for 1 day, THEN 1 tablet (250 mg total) daily for 4 days  We discussed that symptoms may be viral in nature  I would like her to try and treat symptomatically first  If no improvement, she will take the prescribed abx  Subjective:      Patient ID: José Miguel Hansen is a 80 y o  female  Rob Mcdonald is here today for a sick visit  Symptoms started last Tuesday with a cough  She noted a slight sore throat but that resolved  Denies fevers, chills, or other related complaints  See below  URI    This is a new problem  The current episode started 1 to 4 weeks ago  The problem has been unchanged  There has been no fever  Associated symptoms include coughing  Pertinent negatives include no congestion, diarrhea, ear pain, headaches, plugged ear sensation, sinus pain, sore throat or vomiting  Treatments tried: Vicks  The treatment provided no relief  The following portions of the patient's history were reviewed and updated as appropriate: allergies, current medications, past family history, past medical history, past social history, past surgical history and problem list     Review of Systems   HENT: Negative for congestion, ear pain, sinus pain and sore throat  Respiratory: Positive for cough  Gastrointestinal: Negative for diarrhea and vomiting  Neurological: Negative for headaches  Objective:      /82   Pulse 78   Resp 20   Ht 4' 7" (1 397 m)   Wt 54 8 kg (120 lb 12 8 oz)   SpO2 (!) 88%   BMI 28 08 kg/m²          Physical Exam   Constitutional: She is oriented to person, place, and time  She appears well-developed and well-nourished  No distress  HENT:   Head: Normocephalic  Right Ear: External ear normal    Left Ear: External ear normal    Eyes: Conjunctivae are normal    Cardiovascular: Normal rate  Irregular at times    Pulmonary/Chest: Effort normal    Coarse breath sounds    Lymphadenopathy:     She has no cervical adenopathy  Neurological: She is alert and oriented to person, place, and time  Skin: She is not diaphoretic  Psychiatric: She has a normal mood and affect   Her behavior is normal  Judgment and thought content normal

## 2019-10-30 ENCOUNTER — OFFICE VISIT (OUTPATIENT)
Dept: FAMILY MEDICINE CLINIC | Facility: CLINIC | Age: 84
End: 2019-10-30
Payer: COMMERCIAL

## 2019-10-30 VITALS
BODY MASS INDEX: 27.54 KG/M2 | OXYGEN SATURATION: 97 % | HEART RATE: 82 BPM | WEIGHT: 119 LBS | DIASTOLIC BLOOD PRESSURE: 80 MMHG | HEIGHT: 55 IN | SYSTOLIC BLOOD PRESSURE: 128 MMHG | TEMPERATURE: 98.7 F

## 2019-10-30 DIAGNOSIS — G47.00 INSOMNIA, UNSPECIFIED TYPE: ICD-10-CM

## 2019-10-30 DIAGNOSIS — Z00.00 MEDICARE ANNUAL WELLNESS VISIT, SUBSEQUENT: Primary | ICD-10-CM

## 2019-10-30 DIAGNOSIS — E11.9 TYPE 2 DIABETES MELLITUS WITHOUT COMPLICATION, WITHOUT LONG-TERM CURRENT USE OF INSULIN (HCC): ICD-10-CM

## 2019-10-30 DIAGNOSIS — I10 ESSENTIAL HYPERTENSION: ICD-10-CM

## 2019-10-30 DIAGNOSIS — Z23 NEEDS FLU SHOT: ICD-10-CM

## 2019-10-30 DIAGNOSIS — G47.09 OTHER INSOMNIA: ICD-10-CM

## 2019-10-30 DIAGNOSIS — E55.9 VITAMIN D DEFICIENCY: ICD-10-CM

## 2019-10-30 DIAGNOSIS — E11.8 TYPE 2 DIABETES MELLITUS WITH COMPLICATION (HCC): ICD-10-CM

## 2019-10-30 DIAGNOSIS — H61.23 BILATERAL IMPACTED CERUMEN: ICD-10-CM

## 2019-10-30 LAB — SL AMB POCT HEMOGLOBIN AIC: 7.6 (ref ?–6.5)

## 2019-10-30 PROCEDURE — 1170F FXNL STATUS ASSESSED: CPT | Performed by: FAMILY MEDICINE

## 2019-10-30 PROCEDURE — 99214 OFFICE O/P EST MOD 30 MIN: CPT | Performed by: FAMILY MEDICINE

## 2019-10-30 PROCEDURE — 1125F AMNT PAIN NOTED PAIN PRSNT: CPT | Performed by: FAMILY MEDICINE

## 2019-10-30 PROCEDURE — 90662 IIV NO PRSV INCREASED AG IM: CPT | Performed by: FAMILY MEDICINE

## 2019-10-30 PROCEDURE — 1101F PT FALLS ASSESS-DOCD LE1/YR: CPT | Performed by: FAMILY MEDICINE

## 2019-10-30 PROCEDURE — G0008 ADMIN INFLUENZA VIRUS VAC: HCPCS | Performed by: FAMILY MEDICINE

## 2019-10-30 PROCEDURE — G0439 PPPS, SUBSEQ VISIT: HCPCS | Performed by: FAMILY MEDICINE

## 2019-10-30 PROCEDURE — 83036 HEMOGLOBIN GLYCOSYLATED A1C: CPT | Performed by: FAMILY MEDICINE

## 2019-10-30 RX ORDER — DOXEPIN HYDROCHLORIDE 10 MG/1
10 CAPSULE ORAL
Qty: 90 CAPSULE | Refills: 3 | Status: CANCELLED | OUTPATIENT
Start: 2019-10-30

## 2019-10-30 NOTE — PATIENT INSTRUCTIONS
Medicare Preventive Visit Patient Instructions  Thank you for completing your Welcome to Medicare Visit or Medicare Annual Wellness Visit today  Your next wellness visit will be due in one year (10/30/2020)  The screening/preventive services that you may require over the next 5-10 years are detailed below  Some tests may not apply to you based off risk factors and/or age  Screening tests ordered at today's visit but not completed yet may show as past due  Also, please note that scanned in results may not display below  Preventive Screenings:  Service Recommendations Previous Testing/Comments   Colorectal Cancer Screening  * Colonoscopy    * Fecal Occult Blood Test (FOBT)/Fecal Immunochemical Test (FIT)  * Fecal DNA/Cologuard Test  * Flexible Sigmoidoscopy Age: 54-65 years old   Colonoscopy: every 10 years (may be performed more frequently if at higher risk)  OR  FOBT/FIT: every 1 year  OR  Cologuard: every 3 years  OR  Sigmoidoscopy: every 5 years  Screening may be recommended earlier than age 48 if at higher risk for colorectal cancer  Also, an individualized decision between you and your healthcare provider will decide whether screening between the ages of 74-80 would be appropriate  Colonoscopy: Not on file  FOBT/FIT: Not on file  Cologuard: Not on file  Sigmoidoscopy: Not on file    Screening Not Indicated     Breast Cancer Screening Age: 36 years old  Frequency: every 1-2 years  Not required if history of left and right mastectomy Mammogram: Not on file       Cervical Cancer Screening Between the ages of 21-29, pap smear recommended once every 3 years  Between the ages of 33-67, can perform pap smear with HPV co-testing every 5 years     Recommendations may differ for women with a history of total hysterectomy, cervical cancer, or abnormal pap smears in past  Pap Smear: Not on file    Screening Not Indicated   Hepatitis C Screening Once for adults born between 1945 and 1965  More frequently in patients at high risk for Hepatitis C Hep C Antibody: Not on file       Diabetes Screening 1-2 times per year if you're at risk for diabetes or have pre-diabetes Fasting glucose: 107 mg/dL   A1C: 8 5    Screening Not Indicated  History Diabetes   Cholesterol Screening Once every 5 years if you don't have a lipid disorder  May order more often based on risk factors  Lipid panel: 05/15/2019    Screening Not Indicated  History Lipid Disorder     Other Preventive Screenings Covered by Medicare:  1  Abdominal Aortic Aneurysm (AAA) Screening: covered once if your at risk  You're considered to be at risk if you have a family history of AAA  2  Lung Cancer Screening: covers low dose CT scan once per year if you meet all of the following conditions: (1) Age 50-69; (2) No signs or symptoms of lung cancer; (3) Current smoker or have quit smoking within the last 15 years; (4) You have a tobacco smoking history of at least 30 pack years (packs per day multiplied by number of years you smoked); (5) You get a written order from a healthcare provider  3  Glaucoma Screening: covered annually if you're considered high risk: (1) You have diabetes OR (2) Family history of glaucoma OR (3)  aged 48 and older OR (3)  American aged 72 and older  3  Osteoporosis Screening: covered every 2 years if you meet one of the following conditions: (1) You're estrogen deficient and at risk for osteoporosis based off medical history and other findings; (2) Have a vertebral abnormality; (3) On glucocorticoid therapy for more than 3 months; (4) Have primary hyperparathyroidism; (5) On osteoporosis medications and need to assess response to drug therapy  · Last bone density test (DXA Scan): Not on file  5  HIV Screening: covered annually if you're between the age of 12-76  Also covered annually if you are younger than 13 and older than 72 with risk factors for HIV infection   For pregnant patients, it is covered up to 3 times per pregnancy  Immunizations:  Immunization Recommendations   Influenza Vaccine Annual influenza vaccination during flu season is recommended for all persons aged >= 6 months who do not have contraindications   Pneumococcal Vaccine (Prevnar and Pneumovax)  * Prevnar = PCV13  * Pneumovax = PPSV23   Adults 25-60 years old: 1-3 doses may be recommended based on certain risk factors  Adults 72 years old: Prevnar (PCV13) vaccine recommended followed by Pneumovax (PPSV23) vaccine  If already received PPSV23 since turning 65, then PCV13 recommended at least one year after PPSV23 dose  Hepatitis B Vaccine 3 dose series if at intermediate or high risk (ex: diabetes, end stage renal disease, liver disease)   Tetanus (Td) Vaccine - COST NOT COVERED BY MEDICARE PART B Following completion of primary series, a booster dose should be given every 10 years to maintain immunity against tetanus  Td may also be given as tetanus wound prophylaxis  Tdap Vaccine - COST NOT COVERED BY MEDICARE PART B Recommended at least once for all adults  For pregnant patients, recommended with each pregnancy  Shingles Vaccine (Shingrix) - COST NOT COVERED BY MEDICARE PART B  2 shot series recommended in those aged 48 and above     Health Maintenance Due:      Topic Date Due    DXA SCAN  1933     Immunizations Due:      Topic Date Due    HEPATITIS B VACCINES (1 of 3 - Risk 3-dose series) 01/31/1952    DTaP,Tdap,and Td Vaccines (1 - Tdap) 01/31/1954    INFLUENZA VACCINE  07/01/2019     Advance Directives   What are advance directives? Advance directives are legal documents that state your wishes and plans for medical care  These plans are made ahead of time in case you lose your ability to make decisions for yourself  Advance directives can apply to any medical decision, such as the treatments you want, and if you want to donate organs  What are the types of advance directives?   There are many types of advance directives, and each state has rules about how to use them  You may choose a combination of any of the following:  · Living will: This is a written record of the treatment you want  You can also choose which treatments you do not want, which to limit, and which to stop at a certain time  This includes surgery, medicine, IV fluid, and tube feedings  · Durable power of  for healthcare [place] [facility]): This is a written record that states who you want to make healthcare choices for you when you are unable to make them for yourself  This person, called a proxy, is usually a family member or a friend  You may choose more than 1 proxy  · Do not resuscitate (DNR) order:  A DNR order is used in case your heart stops beating or you stop breathing  It is a request not to have certain forms of treatment, such as CPR  A DNR order may be included in other types of advance directives  · Medical directive: This covers the care that you want if you are in a coma, near death, or unable to make decisions for yourself  You can list the treatments you want for each condition  Treatment may include pain medicine, surgery, blood transfusions, dialysis, IV or tube feedings, and a ventilator (breathing machine)  · Values history: This document has questions about your views, beliefs, and how you feel and think about life  This information can help others choose the care that you would choose  Why are advance directives important? An advance directive helps you control your care  Although spoken wishes may be used, it is better to have your wishes written down  Spoken wishes can be misunderstood, or not followed  Treatments may be given even if you do not want them  An advance directive may make it easier for your family to make difficult choices about your care     Weight Management   Why it is important to manage your weight:  Being overweight increases your risk of health conditions such as heart disease, high blood pressure, type 2 diabetes, and certain types of cancer  It can also increase your risk for osteoarthritis, sleep apnea, and other respiratory problems  Aim for a slow, steady weight loss  Even a small amount of weight loss can lower your risk of health problems  How to lose weight safely:  A safe and healthy way to lose weight is to eat fewer calories and get regular exercise  You can lose up about 1 pound a week by decreasing the number of calories you eat by 500 calories each day  Healthy meal plan for weight management:  A healthy meal plan includes a variety of foods, contains fewer calories, and helps you stay healthy  A healthy meal plan includes the following:  · Eat whole-grain foods more often  A healthy meal plan should contain fiber  Fiber is the part of grains, fruits, and vegetables that is not broken down by your body  Whole-grain foods are healthy and provide extra fiber in your diet  Some examples of whole-grain foods are whole-wheat breads and pastas, oatmeal, brown rice, and bulgur  · Eat a variety of vegetables every day  Include dark, leafy greens such as spinach, kale, daniel greens, and mustard greens  Eat yellow and orange vegetables such as carrots, sweet potatoes, and winter squash  · Eat a variety of fruits every day  Choose fresh or canned fruit (canned in its own juice or light syrup) instead of juice  Fruit juice has very little or no fiber  · Eat low-fat dairy foods  Drink fat-free (skim) milk or 1% milk  Eat fat-free yogurt and low-fat cottage cheese  Try low-fat cheeses such as mozzarella and other reduced-fat cheeses  · Choose meat and other protein foods that are low in fat  Choose beans or other legumes such as split peas or lentils  Choose fish, skinless poultry (chicken or turkey), or lean cuts of red meat (beef or pork)  Before you cook meat or poultry, cut off any visible fat  · Use less fat and oil  Try baking foods instead of frying them   Add less fat, such as margarine, sour cream, regular salad dressing and mayonnaise to foods  Eat fewer high-fat foods  Some examples of high-fat foods include french fries, doughnuts, ice cream, and cakes  · Eat fewer sweets  Limit foods and drinks that are high in sugar  This includes candy, cookies, regular soda, and sweetened drinks  Exercise:  Exercise at least 30 minutes per day on most days of the week  Some examples of exercise include walking, biking, dancing, and swimming  You can also fit in more physical activity by taking the stairs instead of the elevator or parking farther away from stores  Ask your healthcare provider about the best exercise plan for you  © Copyright [employer] 2018 Information is for End User's use only and may not be sold, redistributed or otherwise used for commercial purposes   All illustrations and images included in CareNotes® are the copyrighted property of A D A M , Inc  or [address] [place] [address]

## 2019-10-30 NOTE — PROGRESS NOTES
Assessment/Plan:    Type 2 diabetes mellitus ([facility] [address] )    Lab Results   Component Value Date    HGBA1C 7 6 (A) 10/30/2019    Diabetes has been stable with hemoglobin A1c today of 7 6  Will have her continue the current dose of metformin and low carbohydrate diet  Vitamin D deficiency  She has been treated with prescription vitamin-D for quite some time  Will check a level with next set of blood work  Other insomnia  She has been sleeping well  Will continue the doxepin 10 mg p o  Q h s  Hypertension  BP under excellent control on lisinopril-HCTZ    Bilateral impacted cerumen  Recommended use of Debrox drops for a few nights followed by office visit to clean the ears  Diagnoses and all orders for this visit:    Medicare annual wellness visit, subsequent    Insomnia, unspecified type    Type 2 diabetes mellitus without complication, without long-term current use of insulin ([facility])  -     POCT hemoglobin A1c    Bilateral impacted cerumen    Vitamin D deficiency    Other insomnia    Essential hypertension    Other orders  -     Cancel: doxepin (SINEquan) 10 mg capsule; Take 1 capsule (10 mg total) by mouth daily at bedtime          Subjective:      Patient ID: Inder Barbosa is a 80 y o  female  She is here with her  for annual wellness visit and follow-up of chronic problems  She walks with a cane  She and her  have a caretaker who comes to the house twice a week  to help with bathing and household activities  She has been feeling okay in general, but she did have a head cold recently and still has residual coughing  She denies fever or chills  She denies any symptoms of hyper or hypoglycemia  No headaches or dizziness        The following portions of the patient's history were reviewed and updated as appropriate: allergies, current medications, past family history, past medical history, past social history, past surgical history and problem list     Review of Systems Constitutional: Negative for activity change, chills, fatigue and fever  HENT: Positive for hearing loss  Negative for congestion, ear pain, sinus pressure and sore throat  Eyes: Negative for pain and visual disturbance  Respiratory: Positive for cough  Negative for chest tightness, shortness of breath and wheezing  Cardiovascular: Negative for chest pain, palpitations and leg swelling  Gastrointestinal: Negative for abdominal pain, blood in stool, constipation, diarrhea, nausea and vomiting  Endocrine: Negative for polydipsia and polyuria  Genitourinary: Negative for difficulty urinating, dysuria, frequency and urgency  Musculoskeletal: Negative for arthralgias, joint swelling and myalgias  Skin: Negative for rash  Neurological: Negative for dizziness, weakness, numbness and headaches  Hematological: Negative for adenopathy  Does not bruise/bleed easily  Psychiatric/Behavioral: Negative for dysphoric mood  The patient is not nervous/anxious  Objective:      /80 (BP Location: Left arm, Patient Position: Sitting, Cuff Size: Standard)   Pulse 82   Temp 98 7 °F (37 1 °C) (Tympanic)   Ht 4' 6 6" (1 387 m)   Wt 54 kg (119 lb)   SpO2 97%   BMI 28 06 kg/m²          Physical Exam   Constitutional: She appears well-developed and well-nourished  HENT:   Head: Normocephalic and atraumatic  Edentulous  Both TMs obscured by cerumen   Neck: Normal range of motion  Neck supple  Cardiovascular: Normal rate, regular rhythm and normal heart sounds  Occasional ectopic beat   Pulmonary/Chest: Effort normal and breath sounds normal    Abdominal: Soft  Bowel sounds are normal    Musculoskeletal: She exhibits no edema or deformity  Neurological: She is alert  Skin: Skin is warm and dry  No erythema  No pallor  Psychiatric: She has a normal mood and affect  Her behavior is normal    Nursing note and vitals reviewed

## 2019-10-30 NOTE — PROGRESS NOTES
Assessment and Plan:     Problem List Items Addressed This Visit     None        BMI Counseling: Body mass index is 28 06 kg/m²  The BMI is above normal  Nutrition recommendations include encouraging healthy choices of fruits and vegetables and moderation in carbohydrate intake  Exercise recommendations include exercising 3-5 times per week  No pharmacotherapy was ordered  Preventive health issues were discussed with patient, and age appropriate screening tests were ordered as noted in patient's After Visit Summary  Personalized health advice and appropriate referrals for health education or preventive services given if needed, as noted in patient's After Visit Summary  History of Present Illness:     Patient presents for Medicare Annual Wellness visit    Patient Care Team:  Lashonda Doshi MD as PCP - General     Problem List:     Patient Active Problem List   Diagnosis    Calculus of bile duct with acute cholangitis with obstruction    Gallstone pancreatitis    Type 2 diabetes mellitus ([facility] [address] )    Hyperlipidemia    Hypertension    Other insomnia    Pancreatic cyst    Urinary incontinence    Vitamin D deficiency      Past Medical and Surgical History:     Past Medical History:   Diagnosis Date    Dehydration     Last assessed - 2/22/16    Diabetes mellitus ([facility] [address] )     Hyperlipidemia     Hypertension     Hypotension     Last assessed - 2/22/16    Obstructive jaundice     Last assessed - 3/25/16    Sepsis ([facility] )      Past Surgical History:   Procedure Laterality Date    CYSTOSCOPY W/ STONE MANIPULATION N/A 2/23/2016    Procedure: Priscilla Low;  Surgeon: Idania Atkinson MD;  Location: [facility]; Service:     ERCP W/ SPHICTEROTOMY N/A 2/23/2016    Procedure: ENDOSCOPIC RETROGRADE CHOLANGIOPANCREATOGRAPHY (ERCP) W/ SPHINCTEROTOMY;  Surgeon: Idania Atkinson MD;  Location: [facility];   Service:     TUBAL LIGATION        Family History:     Family History   Problem Relation Age of Onset    Diabetes Mother     Diabetes Father     Heart attack Sister     Pancreatic cancer Son         Adenocarcinoma      Social History:     Social History     Socioeconomic History    Marital status: /Civil Union     Spouse name: Not on file    Number of children: Not on file    Years of education: Not on file    Highest education level: Not on file   Occupational History    Not on file   Social Needs    Financial resource strain: Not on file    Food insecurity:     Worry: Not on file     Inability: Not on file    Transportation needs:     Medical: Not on file     Non-medical: Not on file   Tobacco Use    Smoking status: Never Smoker    Smokeless tobacco: Never Used   Substance and Sexual Activity    Alcohol use: No    Drug use: No    Sexual activity: Not on file   Lifestyle    Physical activity:     Days per week: Not on file     Minutes per session: Not on file    Stress: Not on file   Relationships    Social connections:     Talks on phone: Not on file     Gets together: Not on file     Attends Islam service: Not on file     Active member of club or organization: Not on file     Attends meetings of clubs or organizations: Not on file     Relationship status: Not on file    Intimate partner violence:     Fear of current or ex partner: Not on file     Emotionally abused: Not on file     Physically abused: Not on file     Forced sexual activity: Not on file   Other Topics Concern    Not on file   Social History Narrative    Not on file       Medications and Allergies:     Current Outpatient Medications   Medication Sig Dispense Refill    atorvastatin (LIPITOR) 40 mg tablet Take 1 tablet (40 mg total) by mouth daily 90 tablet 5    benzonatate (TESSALON PERLES) 100 mg capsule Take 1 capsule (100 mg total) by mouth 3 (three) times a day as needed for cough 20 capsule 0    doxepin (SINEquan) 10 mg capsule Take 1 capsule (10 mg total) by mouth daily at bedtime 90 capsule 1    ergocalciferol (VITAMIN D2) 50,000 units Take 1 capsule (50,000 Units total) by mouth once a week 12 capsule 0    glucose blood (ONE TOUCH ULTRA TEST) test strip Test once daily E11 9 100 each 3    Lancets (ONETOUCH ULTRASOFT) lancets by Does not apply route daily      lisinopril-hydrochlorothiazide (PRINZIDE,ZESTORETIC) 20-12 5 MG per tablet Take 1 tablet by mouth daily 90 tablet 5    metFORMIN (GLUCOPHAGE) 500 mg tablet TAKE ONE TABLET BY MOUTH TWICE DAILY WITH MEALS  60 tablet 3    ramelteon (ROZEREM) 8 mg tablet Take 1 tablet by mouth       No current facility-administered medications for this visit  No Known Allergies   Immunizations:     Immunization History   Administered Date(s) Administered    Influenza Split High Dose Preservative Free IM 10/10/2012, 12/16/2013, 11/19/2015, 11/03/2016, 09/14/2017    Influenza, high dose seasonal 0 5 mL 09/12/2018    Pneumococcal Conjugate 13-Valent 11/19/2015    Pneumococcal Polysaccharide PPV23 09/29/2011      Health Maintenance:         Topic Date Due    DXA SCAN  1933         Topic Date Due    HEPATITIS B VACCINES (1 of 3 - Risk 3-dose series) 01/31/1952    DTaP,Tdap,and Td Vaccines (1 - Tdap) 01/31/1954    INFLUENZA VACCINE  07/01/2019      Medicare Health Risk Assessment:     There were no vitals taken for this visit  Julienne Peacock is here for her Subsequent Wellness visit  Health Risk Assessment:   Patient rates overall health as good  Patient feels that their physical health rating is same  Eyesight was rated as same  Hearing was rated as same  Patient feels that their emotional and mental health rating is same  Pain experienced in the last 7 days has been none  Patient states that she has experienced no weight loss or gain in last 6 months  Fall Risk Screening:    In the past year, patient has experienced: no history of falling in past year      Urinary Incontinence Screening:   Patient has not leaked urine accidently in the last six months  Home Safety:  Patient has trouble with stairs inside or outside of their home  Patient has working smoke alarms and has working carbon monoxide detector  Home safety hazards include: none  Nutrition:   Current diet is Diabetic and Frequent junk food  Medications:   Patient is currently taking over-the-counter supplements  OTC medications include: see medication list  Patient is able to manage medications  Activities of Daily Living (ADLs)/Instrumental Activities of Daily Living (IADLs):   Walk and transfer into and out of bed and chair?: Yes  Dress and groom yourself?: Yes    Bathe or shower yourself?: No    Feed yourself?  Yes  Do your laundry/housekeeping?: Yes  Manage your money, pay your bills and track your expenses?: No  Make your own meals?: Yes    Do your own shopping?: No    Previous Hospitalizations:   Any hospitalizations or ED visits within the last 12 months?: No      Advance Care Planning:   Living will: Yes    Advanced directive: Yes      PREVENTIVE SCREENINGS      Cardiovascular Screening:    General: Screening Not Indicated and History Lipid Disorder      Diabetes Screening:     General: Screening Not Indicated and History Diabetes      Colorectal Cancer Screening:     General: Screening Not Indicated      Breast Cancer Screening:     General: Screening Not Indicated      Cervical Cancer Screening:    General: Screening Not Rajesh Walls MD

## 2019-10-30 NOTE — ASSESSMENT & PLAN NOTE
She has been treated with prescription vitamin-D for quite some time  Will check a level with next set of blood work

## 2019-10-30 NOTE — ASSESSMENT & PLAN NOTE
Lab Results   Component Value Date    HGBA1C 7 6 (A) 10/30/2019    Diabetes has been stable with hemoglobin A1c today of 7 6  Will have her continue the current dose of metformin and low carbohydrate diet

## 2020-01-22 ENCOUNTER — APPOINTMENT (EMERGENCY)
Dept: CT IMAGING | Facility: HOSPITAL | Age: 85
DRG: 683 | End: 2020-01-22
Payer: COMMERCIAL

## 2020-01-22 ENCOUNTER — APPOINTMENT (EMERGENCY)
Dept: RADIOLOGY | Facility: HOSPITAL | Age: 85
DRG: 683 | End: 2020-01-22
Payer: COMMERCIAL

## 2020-01-22 ENCOUNTER — HOSPITAL ENCOUNTER (INPATIENT)
Facility: HOSPITAL | Age: 85
LOS: 5 days | Discharge: NURSING, REHAB OR LONG-TERM CARE | DRG: 683 | End: 2020-01-28
Attending: EMERGENCY MEDICINE | Admitting: HOSPITALIST
Payer: COMMERCIAL

## 2020-01-22 DIAGNOSIS — J10.1 INFLUENZA A: ICD-10-CM

## 2020-01-22 DIAGNOSIS — S00.81XA ABRASION, FACE W/O INFECTION: ICD-10-CM

## 2020-01-22 DIAGNOSIS — M25.531 RIGHT WRIST PAIN: ICD-10-CM

## 2020-01-22 DIAGNOSIS — S09.90XA INJURY OF HEAD, INITIAL ENCOUNTER: ICD-10-CM

## 2020-01-22 DIAGNOSIS — E87.1 ACUTE HYPONATREMIA: ICD-10-CM

## 2020-01-22 DIAGNOSIS — N17.9 AKI (ACUTE KIDNEY INJURY) (HCC): Primary | ICD-10-CM

## 2020-01-22 DIAGNOSIS — I44.0 FIRST DEGREE AV BLOCK: ICD-10-CM

## 2020-01-22 DIAGNOSIS — M79.601 RIGHT ARM PAIN: ICD-10-CM

## 2020-01-22 DIAGNOSIS — M25.561 RIGHT KNEE PAIN: ICD-10-CM

## 2020-01-22 DIAGNOSIS — E86.0 DEHYDRATION: ICD-10-CM

## 2020-01-22 DIAGNOSIS — I10 ESSENTIAL HYPERTENSION: ICD-10-CM

## 2020-01-22 PROBLEM — S00.10XA PERIORBITAL ECCHYMOSIS: Status: ACTIVE | Noted: 2020-01-22
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
## 2020-01-22 LAB
ANION GAP SERPL CALCULATED.3IONS-SCNC: 12 MMOL/L (ref 4–13)
APTT PPP: 33 SECONDS (ref 23–37)
ATRIAL RATE: 85 BPM
BASOPHILS # BLD AUTO: 0.01 THOUSANDS/ΜL (ref 0–0.1)
BASOPHILS NFR BLD AUTO: 0 % (ref 0–1)
BUN SERPL-MCNC: 19 MG/DL (ref 5–25)
CALCIUM SERPL-MCNC: 8.9 MG/DL (ref 8.3–10.1)
CHLORIDE SERPL-SCNC: 90 MMOL/L (ref 100–108)
CO2 SERPL-SCNC: 25 MMOL/L (ref 21–32)
CREAT SERPL-MCNC: 1.64 MG/DL (ref 0.6–1.3)
EOSINOPHIL # BLD AUTO: 0.04 THOUSAND/ΜL (ref 0–0.61)
EOSINOPHIL NFR BLD AUTO: 1 % (ref 0–6)
ERYTHROCYTE [DISTWIDTH] IN BLOOD BY AUTOMATED COUNT: 12.7 % (ref 11.6–15.1)
GFR SERPL CREATININE-BSD FRML MDRD: 28 ML/MIN/1.73SQ M
GLUCOSE SERPL-MCNC: 108 MG/DL (ref 65–140)
GLUCOSE SERPL-MCNC: 144 MG/DL (ref 65–140)
GLUCOSE SERPL-MCNC: 195 MG/DL (ref 65–140)
HCT VFR BLD AUTO: 34.7 % (ref 34.8–46.1)
HGB BLD-MCNC: 11.6 G/DL (ref 11.5–15.4)
IMM GRANULOCYTES # BLD AUTO: 0.02 THOUSAND/UL (ref 0–0.2)
IMM GRANULOCYTES NFR BLD AUTO: 1 % (ref 0–2)
INR PPP: 1.01 (ref 0.84–1.19)
LYMPHOCYTES # BLD AUTO: 0.89 THOUSANDS/ΜL (ref 0.6–4.47)
LYMPHOCYTES NFR BLD AUTO: 22 % (ref 14–44)
MCH RBC QN AUTO: 28.8 PG (ref 26.8–34.3)
MCHC RBC AUTO-ENTMCNC: 33.4 G/DL (ref 31.4–37.4)
MCV RBC AUTO: 86 FL (ref 82–98)
MONOCYTES # BLD AUTO: 0.41 THOUSAND/ΜL (ref 0.17–1.22)
MONOCYTES NFR BLD AUTO: 10 % (ref 4–12)
NEUTROPHILS # BLD AUTO: 2.76 THOUSANDS/ΜL (ref 1.85–7.62)
NEUTS SEG NFR BLD AUTO: 66 % (ref 43–75)
NRBC BLD AUTO-RTO: 0 /100 WBCS
NT-PROBNP SERPL-MCNC: 1000 PG/ML
P AXIS: 90 DEGREES
PLATELET # BLD AUTO: 200 THOUSANDS/UL (ref 149–390)
PMV BLD AUTO: 8.9 FL (ref 8.9–12.7)
POTASSIUM SERPL-SCNC: 3.4 MMOL/L (ref 3.5–5.3)
PR INTERVAL: 278 MS
PROTHROMBIN TIME: 13.4 SECONDS (ref 11.6–14.5)
QRS AXIS: 46 DEGREES
QRSD INTERVAL: 88 MS
QT INTERVAL: 354 MS
QTC INTERVAL: 421 MS
RBC # BLD AUTO: 4.03 MILLION/UL (ref 3.81–5.12)
SODIUM SERPL-SCNC: 127 MMOL/L (ref 136–145)
T WAVE AXIS: 94 DEGREES
TROPONIN I SERPL-MCNC: <0.02 NG/ML
VENTRICULAR RATE: 85 BPM
WBC # BLD AUTO: 4.13 THOUSAND/UL (ref 4.31–10.16)

## 2020-01-22 PROCEDURE — 99220 PR INITIAL OBSERVATION CARE/DAY 70 MINUTES: CPT | Performed by: INTERNAL MEDICINE

## 2020-01-22 PROCEDURE — 36415 COLL VENOUS BLD VENIPUNCTURE: CPT | Performed by: EMERGENCY MEDICINE

## 2020-01-22 PROCEDURE — 73090 X-RAY EXAM OF FOREARM: CPT

## 2020-01-22 PROCEDURE — 85025 COMPLETE CBC W/AUTO DIFF WBC: CPT | Performed by: EMERGENCY MEDICINE

## 2020-01-22 PROCEDURE — 71046 X-RAY EXAM CHEST 2 VIEWS: CPT

## 2020-01-22 PROCEDURE — 99285 EMERGENCY DEPT VISIT HI MDM: CPT | Performed by: EMERGENCY MEDICINE

## 2020-01-22 PROCEDURE — 80048 BASIC METABOLIC PNL TOTAL CA: CPT | Performed by: EMERGENCY MEDICINE

## 2020-01-22 PROCEDURE — 72125 CT NECK SPINE W/O DYE: CPT

## 2020-01-22 PROCEDURE — 85610 PROTHROMBIN TIME: CPT | Performed by: EMERGENCY MEDICINE

## 2020-01-22 PROCEDURE — 73564 X-RAY EXAM KNEE 4 OR MORE: CPT

## 2020-01-22 PROCEDURE — 93010 ELECTROCARDIOGRAM REPORT: CPT | Performed by: INTERNAL MEDICINE

## 2020-01-22 PROCEDURE — 84484 ASSAY OF TROPONIN QUANT: CPT | Performed by: EMERGENCY MEDICINE

## 2020-01-22 PROCEDURE — 99285 EMERGENCY DEPT VISIT HI MDM: CPT

## 2020-01-22 PROCEDURE — 73130 X-RAY EXAM OF HAND: CPT

## 2020-01-22 PROCEDURE — 90471 IMMUNIZATION ADMIN: CPT

## 2020-01-22 PROCEDURE — 82948 REAGENT STRIP/BLOOD GLUCOSE: CPT

## 2020-01-22 PROCEDURE — 90715 TDAP VACCINE 7 YRS/> IM: CPT | Performed by: EMERGENCY MEDICINE

## 2020-01-22 PROCEDURE — 70450 CT HEAD/BRAIN W/O DYE: CPT

## 2020-01-22 PROCEDURE — 93005 ELECTROCARDIOGRAM TRACING: CPT

## 2020-01-22 PROCEDURE — 85730 THROMBOPLASTIN TIME PARTIAL: CPT | Performed by: EMERGENCY MEDICINE

## 2020-01-22 PROCEDURE — 83880 ASSAY OF NATRIURETIC PEPTIDE: CPT | Performed by: EMERGENCY MEDICINE

## 2020-01-22 RX ORDER — ATORVASTATIN CALCIUM 40 MG/1
40 TABLET, FILM COATED ORAL
Status: DISCONTINUED | OUTPATIENT
Start: 2020-01-23 | End: 2020-01-28 | Stop reason: HOSPADM

## 2020-01-22 RX ORDER — HEPARIN SODIUM 5000 [USP'U]/ML
5000 INJECTION, SOLUTION INTRAVENOUS; SUBCUTANEOUS EVERY 8 HOURS SCHEDULED
Status: DISCONTINUED | OUTPATIENT
Start: 2020-01-22 | End: 2020-01-28 | Stop reason: HOSPADM

## 2020-01-22 RX ORDER — DOXEPIN HYDROCHLORIDE 10 MG/1
CAPSULE ORAL
COMMUNITY
Start: 2016-03-17 | End: 2020-01-28 | Stop reason: HOSPADM

## 2020-01-22 RX ORDER — ACETAMINOPHEN 325 MG/1
650 TABLET ORAL EVERY 6 HOURS PRN
Status: DISCONTINUED | OUTPATIENT
Start: 2020-01-22 | End: 2020-01-28 | Stop reason: HOSPADM

## 2020-01-22 RX ORDER — BENZONATATE 100 MG/1
100 CAPSULE ORAL 3 TIMES DAILY PRN
Status: DISCONTINUED | OUTPATIENT
Start: 2020-01-22 | End: 2020-01-28 | Stop reason: HOSPADM

## 2020-01-22 RX ORDER — SODIUM CHLORIDE 9 MG/ML
75 INJECTION, SOLUTION INTRAVENOUS CONTINUOUS
Status: DISCONTINUED | OUTPATIENT
Start: 2020-01-22 | End: 2020-01-25

## 2020-01-22 RX ADMIN — ACETAMINOPHEN 650 MG: 325 TABLET ORAL at 18:09

## 2020-01-22 RX ADMIN — SODIUM CHLORIDE 500 ML: 0.9 INJECTION, SOLUTION INTRAVENOUS at 12:26

## 2020-01-22 RX ADMIN — SODIUM CHLORIDE 75 ML/HR: 0.9 INJECTION, SOLUTION INTRAVENOUS at 17:48

## 2020-01-22 RX ADMIN — INSULIN LISPRO 1 UNITS: 100 INJECTION, SOLUTION INTRAVENOUS; SUBCUTANEOUS at 18:10

## 2020-01-22 RX ADMIN — TETANUS TOXOID, REDUCED DIPHTHERIA TOXOID AND ACELLULAR PERTUSSIS VACCINE, ADSORBED 0.5 ML: 5; 2.5; 8; 8; 2.5 SUSPENSION INTRAMUSCULAR at 10:10

## 2020-01-22 RX ADMIN — BENZONATATE 100 MG: 100 CAPSULE ORAL at 22:10

## 2020-01-22 RX ADMIN — HEPARIN SODIUM 5000 UNITS: 5000 INJECTION INTRAVENOUS; SUBCUTANEOUS at 22:10

## 2020-01-22 NOTE — PLAN OF CARE
Problem: Potential for Falls  Goal: Patient will remain free of falls  Description  INTERVENTIONS:  - Assess patient frequently for physical needs  -  Identify cognitive and physical deficits and behaviors that affect risk of falls    -  [place] fall precautions as indicated by assessment   - Educate patient/family on patient safety including physical limitations  - Instruct patient to call for assistance with activity based on assessment  - Modify environment to reduce risk of injury  - Consider OT/PT consult to assist with strengthening/mobility  Outcome: Progressing     Problem: INFECTION - ADULT  Goal: Absence or prevention of progression during hospitalization  Description  INTERVENTIONS:  - Assess and monitor for signs and symptoms of infection  - Monitor lab/diagnostic results  - Monitor all insertion sites, i e  indwelling lines, tubes, and drains  - Monitor endotracheal if appropriate and nasal secretions for changes in amount and color  - [place] appropriate cooling/warming therapies per order  - Administer medications as ordered  - Instruct and encourage patient and family to use good hand hygiene technique  - Identify and instruct in appropriate isolation precautions for identified infection/condition  Outcome: Progressing     Problem: CARDIOVASCULAR - ADULT  Goal: Maintains optimal cardiac output and hemodynamic stability  Description  INTERVENTIONS:  - Monitor I/O, vital signs and rhythm  - Monitor for S/S and trends of decreased cardiac output  - Administer and titrate ordered vasoactive medications to optimize hemodynamic stability  - Assess quality of pulses, skin color and temperature  - Assess for signs of decreased coronary artery perfusion  - Instruct patient to report change in severity of symptoms  Outcome: Progressing  Goal: Absence of cardiac dysrhythmias or at baseline rhythm  Description  INTERVENTIONS:  - Continuous cardiac monitoring, vital signs, obtain 12 lead EKG if ordered  - Administer antiarrhythmic and heart rate control medications as ordered  - Monitor electrolytes and administer replacement therapy as ordered  Outcome: Progressing     Problem: METABOLIC, FLUID AND ELECTROLYTES - ADULT  Goal: Electrolytes maintained within normal limits  Description  INTERVENTIONS:  - Monitor labs and assess patient for signs and symptoms of electrolyte imbalances  - Administer electrolyte replacement as ordered  - Monitor response to electrolyte replacements, including repeat lab results as appropriate  - Instruct patient on fluid and nutrition as appropriate  Outcome: Progressing  Goal: Fluid balance maintained  Description  INTERVENTIONS:  - Monitor labs   - Monitor I/O and WT  - Instruct patient on fluid and nutrition as appropriate  - Assess for signs & symptoms of volume excess or deficit  Outcome: Progressing

## 2020-01-22 NOTE — ASSESSMENT & PLAN NOTE
· With syncope due to dehydration versus vasovagal in the setting of coughing r/o valvular heart disease  · Check Echo r/o severe AS  · Hydrate with NS due to EMILY  · Place on telemetry for 24 hours  · Consult PT OT for balance and ambulation

## 2020-01-22 NOTE — ED PROVIDER NOTES
History  Chief Complaint   Patient presents with   [place] Fall     pt arrives via ems per report pt bend over to grab paper, got dizzy, fell face forward  laceration to bridge of nose  no thinners  no loc  pt also c/o right arm/hand pain      19-year-old female presents for evaluation status post fall  Patient states that she felt lightheaded when she went to bend over to pick something up in the hallway and fell forward landing on her right knee and striking her face on the ground  She denies loss of consciousness  She developed describes a severe throbbing pain that started suddenly in her head which is constant unchanged and nonradiating  She complains of sharp throbbing severe pain in her right forearm into her hand in her right knee  She states this pains are constant worse with using her arms or legs  She denies other traumatic injuries, focal neuro deficits or weakness  History provided by:  Patient      Prior to Admission Medications   Prescriptions Last Dose Informant Patient Reported? Taking?    Lancets (ONETOUCH ULTRASOFT) lancets   Yes No   Sig: by Does not apply route daily   atorvastatin (LIPITOR) 40 mg tablet   No No   Sig: Take 1 tablet (40 mg total) by mouth daily   benzonatate (TESSALON PERLES) 100 mg capsule   No No   Sig: Take 1 capsule (100 mg total) by mouth 3 (three) times a day as needed for cough   doxepin (SINEquan) 10 mg capsule   No No   Sig: Take 1 capsule (10 mg total) by mouth daily at bedtime   ergocalciferol (VITAMIN D2) 50,000 units   No No   Sig: Take 1 capsule (50,000 Units total) by mouth once a week   glucose blood (ONE TOUCH ULTRA TEST) test strip   No No   Sig: Test once daily E11 9   lisinopril-hydrochlorothiazide (PRINZIDE,ZESTORETIC) 20-12 5 MG per tablet   No No   Sig: Take 1 tablet by mouth daily   metFORMIN (GLUCOPHAGE) 500 mg tablet   No No   Sig: TAKE ONE TABLET BY MOUTH TWICE DAILY WITH MEALS    ramelteon (ROZEREM) 8 mg tablet   Yes No   Sig: Take 1 tablet by mouth      Facility-Administered Medications: None       Past Medical History:   Diagnosis Date    Dehydration     Last assessed - 2/22/16    Diabetes mellitus ([facility] [address] )     Hyperlipidemia     Hypertension     Hypotension     Last assessed - 2/22/16    Obstructive jaundice     Last assessed - 3/25/16    Sepsis [facility])        Past Surgical History:   Procedure Laterality Date    CYSTOSCOPY W/ STONE MANIPULATION N/A 2/23/2016    Procedure: STONE MANIPULATION;  Surgeon: Lissette Salinas MD;  Location: [facility]; Service:     ERCP W/ SPHICTEROTOMY N/A 2/23/2016    Procedure: ENDOSCOPIC RETROGRADE CHOLANGIOPANCREATOGRAPHY (ERCP) W/ SPHINCTEROTOMY;  Surgeon: Lissette Salinas MD;  Location: [facility]; Service:     TUBAL LIGATION         Family History   Problem Relation Age of Onset    Diabetes Mother     Diabetes Father     Heart attack Sister     Pancreatic cancer Son         Adenocarcinoma     I have reviewed and agree with the history as documented  Social History     Tobacco Use    Smoking status: Never Smoker    Smokeless tobacco: Never Used   Substance Use Topics    Alcohol use: No    Drug use: No        Review of Systems   Constitutional: Negative for activity change, appetite change, fatigue and fever  HENT: Positive for nosebleeds  Negative for congestion, dental problem, ear pain, rhinorrhea and sore throat  Eyes: Negative for pain and redness  Respiratory: Positive for cough  Negative for chest tightness, shortness of breath and wheezing  Cardiovascular: Negative for chest pain and palpitations  Gastrointestinal: Negative for abdominal pain, blood in stool, constipation, diarrhea, nausea and vomiting  Endocrine: Negative for cold intolerance and heat intolerance  Genitourinary: Negative for dysuria, frequency and hematuria  Musculoskeletal: Negative for arthralgias and myalgias  Skin: Positive for wound  Negative for color change, pallor and rash     Neurological: Positive for light-headedness and headaches  Negative for weakness and numbness  Hematological: Does not bruise/bleed easily  Psychiatric/Behavioral: Negative for agitation, hallucinations and suicidal ideas  Physical Exam  Physical Exam   Constitutional: She appears well-developed and well-nourished  HENT:   Normocephalic/atraumatic  There is no facial bone tenderness palpation  No facial bone tenderness  No funes sign, no raccoon eyes, no hemotympanum  Nose is abrasion on bridge of nose  evidence of L epistaxis  No nasal septal hematoma   Eyes:   Patient has painless full range of motion in both her eyes  Normal visual fields  No hyphema noted  Neck: No tracheal deviation present  Patient is nontender palpation over her cervical, thoracic, lumbar spines  There is no step-offs, no deformities  Cardiovascular:   No JVD noted  Heart sounds are normal  Regular rate rate and rhythm  Symmetric strong distal pulses  Pulmonary/Chest:   Chest wall is stable and nontender palpation  There is no crepitus noted  Patient has symmetric chest wall expansion  No flail segment noted clear and equal breath sounds bilateral    Abdominal:   No external signs of trauma noted on the abdomen/pelvis  Patient is nontender palpation of the abdomen  There is no rebound, guarding, CVA tenderness  Abdomen is nondistended  Pelvis stable, nttp  Musculoskeletal:   Right upper extremity has full range of motion without pain  There is no tenderness palpation noted except R forearm and hand  Patient has no external signs of trauma  Patient is neurovascularly intact in this extremity  Left upper extremity has full range of motion without pain  There is no tenderness palpation noted  Patient has no external signs of trauma  Patient is neurovascularly intact in this extremity  Right lower extremity has full range of motion without pain  There is no tenderness palpation noted  Patient has no external signs of trauma   Patient is neurovascularly intact in this extremity  Left Lower  extremity has full range of motion without pain  There is no tenderness palpation noted  Patient has no external signs of trauma  Patient is neurovascularly intact in this extremity  Neurological:   Alert and oriented ×3  Normal mental status exam  Normal cranial nerves II through XII  Normal sensation and strength throughout  Normal cerebellar exam  GCS 15  Skin:   No external signs of trauma  Psychiatric: She has a normal mood and affect  Her behavior is normal  Judgment normal    Nursing note and vitals reviewed        Vital Signs  ED Triage Vitals [01/22/20 0911]   Temperature Pulse Respirations Blood Pressure SpO2   97 7 °F (36 5 °C) -- 16 (!) 187/89 100 %      Temp Source Heart Rate Source Patient Position - Orthostatic VS BP Location FiO2 (%)   Temporal Monitor Sitting Right arm --      Pain Score       5           Vitals:    01/22/20 0911   BP: (!) 187/89   Patient Position - Orthostatic VS: Sitting         Visual Acuity      ED Medications  Medications   sodium chloride 0 9 % bolus 500 mL (has no administration in time range)   tetanus-diphtheria-acellular pertussis (BOOSTRIX) IM injection 0 5 mL (0 5 mL Intramuscular Given 1/22/20 1010)       Diagnostic Studies  Results Reviewed     Procedure Component Value Units Date/Time    Basic metabolic panel [[id number]]  (Abnormal) Collected:  01/22/20 0940    Lab Status:  Final result Specimen:  Blood from Arm, Left Updated:  01/22/20 1018     Sodium 127 mmol/L      Potassium 3 4 mmol/L      Chloride 90 mmol/L      CO2 25 mmol/L      ANION GAP 12 mmol/L      BUN 19 mg/dL      Creatinine 1 64 mg/dL      Glucose 144 mg/dL      Calcium 8 9 mg/dL      eGFR 28 ml/min/1 73sq m     Narrative:       [place] guidelines for Chronic Kidney Disease (CKD):     Stage 1 with normal or high GFR (GFR > 90 mL/min/1 73 square meters)    Stage 2 Mild CKD (GFR = 60-89 mL/min/1 73 square meters)   Stage 3A Moderate CKD (GFR = 45-59 mL/min/1 73 square meters)    Stage 3B Moderate CKD (GFR = 30-44 mL/min/1 73 square meters)    Stage 4 Severe CKD (GFR = 15-29 mL/min/1 73 square meters)    Stage 5 End Stage CKD (GFR <15 mL/min/1 73 square meters)  Note: GFR calculation is accurate only with a steady state creatinine    NT-BNP PRO [[id number]]  (Abnormal) Collected:  01/22/20 0940    Lab Status:  Final result Specimen:  Blood from Arm, Left Updated:  01/22/20 1018     NT-proBNP 1,000 pg/mL     Troponin I [[id number]]  (Normal) Collected:  01/22/20 0940    Lab Status:  Final result Specimen:  Blood from Arm, Left Updated:  01/22/20 1010     Troponin I <0 02 ng/mL     Protime-INR [[id number]]  (Normal) Collected:  01/22/20 0940    Lab Status:  Final result Specimen:  Blood from Arm, Left Updated:  01/22/20 1008     Protime 13 4 seconds      INR 1 01    APTT [[id number]]  (Normal) Collected:  01/22/20 0940    Lab Status:  Final result Specimen:  Blood from Arm, Left Updated:  01/22/20 1008     PTT 33 seconds     CBC and differential [[id number]]  (Abnormal) Collected:  01/22/20 0940    Lab Status:  Final result Specimen:  Blood from Arm, Left Updated:  01/22/20 0946     WBC 4 13 Thousand/uL      RBC 4 03 Million/uL      Hemoglobin 11 6 g/dL      Hematocrit 34 7 %      MCV 86 fL      MCH 28 8 pg      MCHC 33 4 g/dL      RDW 12 7 %      MPV 8 9 fL      Platelets 280 Thousands/uL      nRBC 0 /100 WBCs      Neutrophils Relative 66 %      Immat GRANS % 1 %      Lymphocytes Relative 22 %      Monocytes Relative 10 %      Eosinophils Relative 1 %      Basophils Relative 0 %      Neutrophils Absolute 2 76 Thousands/µL      Immature Grans Absolute 0 02 Thousand/uL      Lymphocytes Absolute 0 89 Thousands/µL      Monocytes Absolute 0 41 Thousand/µL      Eosinophils Absolute 0 04 Thousand/µL      Basophils Absolute 0 01 Thousands/µL                  XR knee 4+ views Right injury   ED Interpretation by Katelyn Estrada MD (01/22 1136)   brian reviewed: no acute abnormality      XR hand 3+ views RIGHT   ED Interpretation by Inna Antoine MD (01/22 1136)   brian reviewed: no acute abnormality      XR forearm 2 views RIGHT   ED Interpretation by Inna Antoine MD (01/22 1136)   brian reviewed: no acute abnormality      XR chest 2 views   ED Interpretation by Inna Antoine MD (01/22 1139)   brian reviewed: no acute abnormality      CT head without contrast   Final Result by Ranjana Kent MD (01/22 1020)      No acute intracranial abnormality  Workstation performed: [id number]         CT cervical spine without contrast   Final Result by Ranjana Kent MD (01/22 1022)      No cervical spine fracture or traumatic malalignment  Workstation performed: [id number]                    Procedures  ECG 12 Lead Documentation Only  Date/Time: 1/22/2020 11:00 AM  Performed by: Inna Antoine MD  Authorized by: Inna Antoine MD     ECG reviewed by me, the ED Provider: yes    Patient location:  ED  Rate:     ECG rate:  85    ECG rate assessment: normal    Rhythm:     Rhythm: sinus rhythm    Ectopy:     Ectopy: PAC    QRS:     QRS axis:  Normal    QRS intervals:  Normal  Conduction:     Conduction: abnormal      Abnormal conduction: 1st degree    ST segments:     ST segments:  Normal  T waves:     T waves: normal               ED Course  ED Course as of Jan 22 1158 Wed Jan 22, 2020   1041 Creatinine(!): 1 64                               MDM  Number of Diagnoses or Management Options  Diagnosis management comments: Lightheadedness and fall-will do cardiac workup for lightheadedness  Will do CT head rule out acute traumatic pathology, CT C-spine rule out acute traumatic pathology, CT humerus, hand, right knee to rule out acute traumatic pathology          Disposition  Final diagnoses:   EMILY (acute kidney injury) ([facility] [address] )   Acute hyponatremia   Dehydration   Abrasion, face w/o infection   Injury of head, initial encounter Right knee pain   Right arm pain   First degree AV block     Time reflects when diagnosis was documented in both MDM as applicable and the Disposition within this note     Time User Action Codes Description Comment    1/22/2020 11:54 AM Albaklaus Aquino J Add [N17 9] EMILY (acute kidney injury) ([facility] [address] )     1/22/2020 11:54 AM Alba Millet J Add [E87 1] Acute hyponatremia     1/22/2020 11:55 AM Alba Millet J Add [E86 0] Dehydration     1/22/2020 11:55 AM Mackenzie Swain Add [S00 81XA] Abrasion, face w/o infection     1/22/2020 11:55 AM Reggie Swain Add [S09 90XA] Injury of head, initial encounter     1/22/2020 11:55 AM Abby Sadler Add [M25 561] Right knee pain     1/22/2020 11:56 AM Abby Sadler Add [M79 601] Right arm pain     1/22/2020 11:58 AM Abby Sadler Add [I44 0] First degree AV block       ED Disposition     ED Disposition Condition Date/Time Comment    Admit Stable Wed Jan 22, 2020 11:54 AM Case was discussed with Dr Telly Nguyen and the patient's admission status was agreed to be Admission Status: observation status to the service of Dr Telly Nguyen   Follow-up Information    None         Patient's Medications   Discharge Prescriptions    No medications on file     No discharge procedures on file      ED Provider  Electronically Signed by           Frances Esposito MD  01/22/20 1995

## 2020-01-22 NOTE — H&P
H&P- Saloni Yung 1933, 80 y o  female MRN: [id number]    Unit/Bed#: [facility] [address] [id number] Encounter: [id number]    Primary Care Provider: Yadira Hendrix MD   Date and time admitted to hospital: 1/22/2020  9:07 AM        Fall from standing  Assessment & Plan  · With syncope due to dehydration versus vasovagal in the setting of coughing r/o valvular heart disease  · Check Echo r/o severe AS  · Hydrate with NS due to EMILY  · Place on telemetry for 24 hours  · Consult PT OT for balance and ambulation    Periorbital ecchymosis  Assessment & Plan  · Due to facial soft tissue trauma  · CT head negative for fracture  · Watch for persistent clear nasal discharge/fever/headache/change in mental status  · Neuro check Q4  · Low threshold for repeat CT if any neurologic change occurs    EMILY (acute kidney injury) ([facility] [address] )  Assessment & Plan  · Unclear etiology  · Probably from un noticed poor po intake on len of lisinopril HCTZ  · Hold both meds  · Gentle hydration with NSS at 75 ml/hr    Essential hypertension  Assessment & Plan  · Hold lisinopril and hctz  · Avoid hypotension    Type 2 diabetes mellitus with complication [facility])  Assessment & Plan  Lab Results   Component Value Date    HGBA1C 7 6 (A) 10/30/2019       No results for input(s): POCGLU in the last 72 hours  Blood Sugar Average: Last 72 hrs:    A1C acceptable   Hold metformin  ADA diet  Start SSI      VTE Prophylaxis: Heparin  / sequential compression device   Code Status: full code  POLST: There is no POLST form on file for this patient (pre-hospital)  Discussion with family: yes    Total Time for Visit, including Counseling / Coordination of Care: 45 minutes  Greater than 50% of this total time spent on direct patient counseling and coordination of care  Chief Complaint:   fell    History of Present Illness:    Saloni Yung is a 80 y o  female who presented to the hospital due to a fall    She was at her usual state of health until this morning when she bent down to reach for the newspaper  While bending down, she suddenly felt dizzy and lost consciousness  She fell down hitting her face on the floor  When she woke up she complained of right hand and arm pain, localized without radiation to the shoulder, not worsened by movement  This was an unusual occurrence for her since she has been doing this every day without any problems  She did notice that she is developing a dry cough for the past 2 days  She had no new medications or recent illness  At the time of my examination she is aware of the discoloration of her face  However she denied any headache, dizziness or lightheadedness  She did have mild pain of her right wrist which she developed after falling  She may have tried to brace her fall using her right hand  She denied weakness of 1 arm or leg  She had no chest pain or shortness of breath  Review of Systems:    Review of Systems   Constitutional: Negative for chills, fever and unexpected weight change  HENT:        Facial discoloration   Eyes: Negative  He denied pain with moving her eyes left right and up and down   Respiratory: Negative  Dry cough   Cardiovascular: Negative  Gastrointestinal: Negative  Genitourinary: Negative  Musculoskeletal:        See HPI   Skin: Positive for color change  Neurological: Positive for syncope  Psychiatric/Behavioral: Negative  All other systems reviewed and are negative        Past Medical and Surgical History:     Past Medical History:   Diagnosis Date    Dehydration     Last assessed - 2/22/16    Diabetes mellitus ([facility] [address] )     Hyperlipidemia     Hypertension     Hypotension     Last assessed - 2/22/16    Obstructive jaundice     Last assessed - 3/25/16    Sepsis [facility])        Past Surgical History:   Procedure Laterality Date    CYSTOSCOPY W/ STONE MANIPULATION N/A 2/23/2016    Procedure: STONE MANIPULATION;  Surgeon: Michele Hill MD; Location: [facility]; Service:     ERCP W/ SPHICTEROTOMY N/A 2/23/2016    Procedure: ENDOSCOPIC RETROGRADE CHOLANGIOPANCREATOGRAPHY (ERCP) W/ SPHINCTEROTOMY;  Surgeon: Lissette Salinas MD;  Location: [facility]; Service:     TUBAL LIGATION         Meds/Allergies:    Prior to Admission medications    Medication Sig Start Date End Date Taking? Authorizing Provider   atorvastatin (LIPITOR) 40 mg tablet Take 1 tablet (40 mg total) by mouth daily 9/12/18  Yes Eladia Wilson MD   doxepin (SINEquan) 10 mg capsule Take by mouth 3/17/16  Yes Historical Provider, MD   lisinopril-hydrochlorothiazide (PRINZIDE,ZESTORETIC) 20-12 5 MG per tablet Take 1 tablet by mouth daily 9/12/18  Yes Eladia Wilson MD   metFORMIN (GLUCOPHAGE) 500 mg tablet TAKE ONE TABLET BY MOUTH TWICE DAILY WITH MEALS  3/23/19  Yes Eladia Wilson MD   ramelteon (ROZEREM) 8 mg tablet Take 1 tablet by mouth 3/17/16  Yes Historical Provider, MD   benzonatate (TESSALON PERLES) 100 mg capsule Take 1 capsule (100 mg total) by mouth 3 (three) times a day as needed for cough 10/9/19   Charly Han,    doxepin (SINEquan) 10 mg capsule Take 1 capsule (10 mg total) by mouth daily at bedtime 7/31/19   Eladia Wilson MD   ergocalciferol (VITAMIN D2) 50,000 units Take 1 capsule (50,000 Units total) by mouth once a week 7/31/19   Eladia Wilson MD   glucose blood (ONE TOUCH ULTRA TEST) test strip Test once daily E11 9 7/24/18   Eladia Wilson MD   Lancets [facility] ULTRASOFT) lancets by Does not apply route daily 1/31/13   Historical Provider, MD     I have reviewed home medications with a medical source (PCP, Pharmacy, other)      Allergies: No Known Allergies    Social History:     Marital Status: /Civil Union   Occupation:  None  Patient Pre-hospital Living Situation:  Lives with family  Patient Pre-hospital Level of Mobility:  Independent  Patient Pre-hospital Diet Restrictions:  Diabetic  Substance Use History:   Social History     Substance and Sexual Activity   Alcohol Use No     Social History     Tobacco Use   Smoking Status Never Smoker   Smokeless Tobacco Never Used     Social History     Substance and Sexual Activity   Drug Use No       Family History:    Family History   Problem Relation Age of Onset    Diabetes Mother     Diabetes Father     Heart attack Sister     Pancreatic cancer Son         Adenocarcinoma       Physical Exam:     Vitals:   Blood Pressure: (!) 171/73 (01/22/20 1528)  Pulse: 84 (01/22/20 1528)  Temperature: 97 7 °F (36 5 °C) (01/22/20 1328)  Temp Source: Oral (01/22/20 1328)  Respirations: 16 (01/22/20 1528)  Weight - Scale: 54 8 kg (120 lb 13 oz) (01/22/20 1328)  SpO2: 95 % (01/22/20 1528)    Physical Exam   Constitutional: She is oriented to person, place, and time  She appears well-developed  No distress  HENT:   Head: Normocephalic  Eyes: No scleral icterus  Neck: No JVD present  Cardiovascular: Regular rhythm  Could not appreciate loud murmur   Pulmonary/Chest: Effort normal  No stridor  No respiratory distress  Dry coughing spells   Abdominal: Soft  She exhibits no distension  There is no tenderness  There is no guarding  Musculoskeletal: She exhibits no edema or deformity  Neurological: She is alert and oriented to person, place, and time  Skin: Skin is warm and dry  She is not diaphoretic  Psychiatric: She has a normal mood and affect  Her behavior is normal        Additional Data:     Lab Results: I have personally reviewed pertinent reports        Results from last 7 days   Lab Units 01/22/20  0940   WBC Thousand/uL 4 13*   HEMOGLOBIN g/dL 11 6   HEMATOCRIT % 34 7*   PLATELETS Thousands/uL 200   NEUTROS PCT % 66   LYMPHS PCT % 22   MONOS PCT % 10   EOS PCT % 1     Results from last 7 days   Lab Units 01/22/20  0940   SODIUM mmol/L 127*   POTASSIUM mmol/L 3 4*   CHLORIDE mmol/L 90*   CO2 mmol/L 25   BUN mg/dL 19   CREATININE mg/dL 1 64*   ANION GAP mmol/L 12   CALCIUM mg/dL 8 9   GLUCOSE RANDOM mg/dL 144*     Results from last 7 days   Lab Units 01/22/20  0940   INR  1 01                   Imaging: I have personally reviewed pertinent reports  XR knee 4+ views Right injury   ED Interpretation by Jo Ann Yang MD (01/22 1136)   brian reviewed: no acute abnormality      Final Result by Diana Valdivia MD (01/22 1421)      No acute osseous abnormality  Degenerative changes as described  Workstation performed: [id number]         XR hand 3+ views RIGHT   ED Interpretation by Jo Ann Yang MD (01/22 1136)   brian reviewed: no acute abnormality      Final Result by Diana Valdivia MD (01/22 1430)      No acute osseous abnormality  Degenerative changes as described  Workstation performed: [id number]         XR forearm 2 views RIGHT   ED Interpretation by Jo Ann Yang MD (01/22 1136)   brian reviewed: no acute abnormality      Final Result by Diana Valdivia MD (01/22 1423)      No acute osseous abnormality  Workstation performed: [id number]         XR chest 2 views   ED Interpretation by Jo Ann Yang MD (01/22 1139)   brian reviewed: no acute abnormality      Final Result by Zhane Cespedes MD (01/22 1445)      No acute cardiopulmonary disease  Workstation performed: [id number]         CT head without contrast   Final Result by Hailee Moncada MD (01/22 1020)      No acute intracranial abnormality  Workstation performed: [id number]         CT cervical spine without contrast   Final Result by Hailee Moncada MD (01/22 1022)      No cervical spine fracture or traumatic malalignment  Workstation performed: [id number]             EKG, Pathology, and Other Studies Reviewed on Admission:   · EKG:  Sinus rhythm with PVC    Allscripts / Epic Records Reviewed: Yes     ** Please Note: This note has been constructed using a voice recognition system   **

## 2020-01-22 NOTE — ASSESSMENT & PLAN NOTE
Lab Results   Component Value Date    HGBA1C 7 6 (A) 10/30/2019       No results for input(s): POCGLU in the last 72 hours      Blood Sugar Average: Last 72 hrs:    A1C acceptable   Hold metformin  ADA diet  Start SSI

## 2020-01-22 NOTE — ASSESSMENT & PLAN NOTE
· Due to facial soft tissue trauma  · CT head negative for fracture  · Watch for persistent clear nasal discharge/fever/headache/change in mental status  · Neuro check Q4  · Low threshold for repeat CT if any neurologic change occurs

## 2020-01-22 NOTE — ASSESSMENT & PLAN NOTE
· Unclear etiology  · Probably from un noticed poor po intake on len of lisinopril HCTZ  · Hold both meds  · Gentle hydration with NSS at 75 ml/hr

## 2020-01-23 ENCOUNTER — APPOINTMENT (INPATIENT)
Dept: NON INVASIVE DIAGNOSTICS | Facility: HOSPITAL | Age: 85
DRG: 683 | End: 2020-01-23
Payer: COMMERCIAL

## 2020-01-23 PROBLEM — M25.531 RIGHT WRIST PAIN: Status: ACTIVE | Noted: 2020-01-23

## 2020-01-23 PROBLEM — E87.1 HYPONATREMIA: Status: ACTIVE | Noted: 2020-01-23

## 2020-01-23 LAB
ANION GAP SERPL CALCULATED.3IONS-SCNC: 13 MMOL/L (ref 4–13)
BASOPHILS # BLD AUTO: 0.02 THOUSANDS/ΜL (ref 0–0.1)
BASOPHILS NFR BLD AUTO: 1 % (ref 0–1)
BUN SERPL-MCNC: 19 MG/DL (ref 5–25)
CALCIUM SERPL-MCNC: 8 MG/DL (ref 8.3–10.1)
CHLORIDE SERPL-SCNC: 93 MMOL/L (ref 100–108)
CO2 SERPL-SCNC: 24 MMOL/L (ref 21–32)
CREAT SERPL-MCNC: 1.43 MG/DL (ref 0.6–1.3)
EOSINOPHIL # BLD AUTO: 0.04 THOUSAND/ΜL (ref 0–0.61)
EOSINOPHIL NFR BLD AUTO: 1 % (ref 0–6)
ERYTHROCYTE [DISTWIDTH] IN BLOOD BY AUTOMATED COUNT: 12.8 % (ref 11.6–15.1)
GFR SERPL CREATININE-BSD FRML MDRD: 33 ML/MIN/1.73SQ M
GLUCOSE P FAST SERPL-MCNC: 99 MG/DL (ref 65–99)
GLUCOSE SERPL-MCNC: 132 MG/DL (ref 65–140)
GLUCOSE SERPL-MCNC: 139 MG/DL (ref 65–140)
GLUCOSE SERPL-MCNC: 140 MG/DL (ref 65–140)
GLUCOSE SERPL-MCNC: 96 MG/DL (ref 65–140)
GLUCOSE SERPL-MCNC: 99 MG/DL (ref 65–140)
HCT VFR BLD AUTO: 31.1 % (ref 34.8–46.1)
HGB BLD-MCNC: 10.1 G/DL (ref 11.5–15.4)
IMM GRANULOCYTES # BLD AUTO: 0.01 THOUSAND/UL (ref 0–0.2)
IMM GRANULOCYTES NFR BLD AUTO: 0 % (ref 0–2)
LYMPHOCYTES # BLD AUTO: 0.76 THOUSANDS/ΜL (ref 0.6–4.47)
LYMPHOCYTES NFR BLD AUTO: 22 % (ref 14–44)
MCH RBC QN AUTO: 28.2 PG (ref 26.8–34.3)
MCHC RBC AUTO-ENTMCNC: 32.5 G/DL (ref 31.4–37.4)
MCV RBC AUTO: 87 FL (ref 82–98)
MONOCYTES # BLD AUTO: 0.38 THOUSAND/ΜL (ref 0.17–1.22)
MONOCYTES NFR BLD AUTO: 11 % (ref 4–12)
NEUTROPHILS # BLD AUTO: 2.18 THOUSANDS/ΜL (ref 1.85–7.62)
NEUTS SEG NFR BLD AUTO: 65 % (ref 43–75)
NRBC BLD AUTO-RTO: 0 /100 WBCS
PLATELET # BLD AUTO: 179 THOUSANDS/UL (ref 149–390)
PMV BLD AUTO: 9.4 FL (ref 8.9–12.7)
POTASSIUM SERPL-SCNC: 3.3 MMOL/L (ref 3.5–5.3)
RBC # BLD AUTO: 3.58 MILLION/UL (ref 3.81–5.12)
SODIUM SERPL-SCNC: 130 MMOL/L (ref 136–145)
WBC # BLD AUTO: 3.39 THOUSAND/UL (ref 4.31–10.16)

## 2020-01-23 PROCEDURE — 97163 PT EVAL HIGH COMPLEX 45 MIN: CPT

## 2020-01-23 PROCEDURE — 93306 TTE W/DOPPLER COMPLETE: CPT

## 2020-01-23 PROCEDURE — 80048 BASIC METABOLIC PNL TOTAL CA: CPT | Performed by: INTERNAL MEDICINE

## 2020-01-23 PROCEDURE — 93306 TTE W/DOPPLER COMPLETE: CPT | Performed by: INTERNAL MEDICINE

## 2020-01-23 PROCEDURE — 85025 COMPLETE CBC W/AUTO DIFF WBC: CPT | Performed by: INTERNAL MEDICINE

## 2020-01-23 PROCEDURE — 82948 REAGENT STRIP/BLOOD GLUCOSE: CPT

## 2020-01-23 PROCEDURE — 99232 SBSQ HOSP IP/OBS MODERATE 35: CPT | Performed by: INTERNAL MEDICINE

## 2020-01-23 PROCEDURE — 97167 OT EVAL HIGH COMPLEX 60 MIN: CPT

## 2020-01-23 RX ORDER — OXYCODONE HYDROCHLORIDE 5 MG/1
2.5 TABLET ORAL EVERY 4 HOURS PRN
Status: DISCONTINUED | OUTPATIENT
Start: 2020-01-23 | End: 2020-01-28 | Stop reason: HOSPADM

## 2020-01-23 RX ADMIN — ACETAMINOPHEN 650 MG: 325 TABLET ORAL at 05:16

## 2020-01-23 RX ADMIN — HEPARIN SODIUM 5000 UNITS: 5000 INJECTION INTRAVENOUS; SUBCUTANEOUS at 13:37

## 2020-01-23 RX ADMIN — ATORVASTATIN CALCIUM 40 MG: 40 TABLET, FILM COATED ORAL at 17:29

## 2020-01-23 RX ADMIN — SODIUM CHLORIDE 75 ML/HR: 0.9 INJECTION, SOLUTION INTRAVENOUS at 22:18

## 2020-01-23 RX ADMIN — HEPARIN SODIUM 5000 UNITS: 5000 INJECTION INTRAVENOUS; SUBCUTANEOUS at 22:19

## 2020-01-23 RX ADMIN — SODIUM CHLORIDE 75 ML/HR: 0.9 INJECTION, SOLUTION INTRAVENOUS at 06:39

## 2020-01-23 RX ADMIN — OXYCODONE HYDROCHLORIDE 2.5 MG: 5 TABLET ORAL at 08:11

## 2020-01-23 RX ADMIN — HEPARIN SODIUM 5000 UNITS: 5000 INJECTION INTRAVENOUS; SUBCUTANEOUS at 05:16

## 2020-01-23 NOTE — SOCIAL WORK
Pt admitted as observation being changed to IP this day with hyponatremia, Dehydration w/ syncope  Pt lives with  in a 1st floor apartment having 4STE- states her son and D-I-L currently staying with them  She is independent with ADL's having a HHA 2hrs 2x/wk to assist with showering  She ambulates within the home without an AD using a SPC in community  DME Available:  Rw; SPC; Shower chair; BSC; Grab Bars  Denies  Hx, D&A, legal issues, current home skilled services  Pt's daughter Wendie Booker is her POA however she is in process of moving [place]- doesn't recall her phone number at present- informed should have POA brought to hospital or to her PCP to be scanned into EHR- understanding of same  PCP is Dr Gracie Townsend and she uses [facility] for prescriptions  A post acute care recommendation was made by your care team for STR  Discussed Freedom of Choice with patient  List of facilities given to patient via in person  patient aware the list is custom filtered for them by insurance and that [facility] post acute providers are designated  Pt wishes for referrals to [facility] and [facility] (choices in that order)  Referrals made awaiting determinations  No further questions/concerns voiced at present  Will continue to follow to assist with dc poc

## 2020-01-23 NOTE — ASSESSMENT & PLAN NOTE
Lab Results   Component Value Date    HGBA1C 7 6 (A) 10/30/2019     Recent Labs     01/22/20  1729 01/22/20  2103 01/23/20  0737   POCGLU 195* 108 96     Blood Sugar Average: Last 72 hrs:  A1C acceptable   Hold metformin  ADA diet  Start SSI  (P) 133

## 2020-01-23 NOTE — ASSESSMENT & PLAN NOTE
· Unclear etiology  · Probably from unnoticed poor po intake on top of lisinopril and HCTZ  · Lisinopril and HCTZ on hold  · Gentle hydration with NSS at 75 ml/hr  · Presenting creatinine 1 6  Creatinine today 1 4    · Baseline appears 1 0 -1 1

## 2020-01-23 NOTE — PLAN OF CARE
Problem: PHYSICAL THERAPY ADULT  Goal: Performs mobility at highest level of function for planned discharge setting  See evaluation for individualized goals  Description  Treatment/Interventions: Functional transfer training, LE strengthening/ROM, Elevations, Therapeutic exercise, Endurance training, Patient/family training, Equipment eval/education, Bed mobility, Gait training, Compensatory technique education, Continued evaluation, Spoke to nursing, OT  Equipment Recommended: Micaela Bautista       See flowsheet documentation for full assessment, interventions and recommendations  Note:   Prognosis: Good  Problem List: Decreased strength, Decreased range of motion, Decreased endurance, Impaired balance, Decreased mobility, Decreased safety awareness, Impaired hearing, Pain  Assessment: Pt is 81 y/o female presents to hospital with fall at home  Dizziness precipitating fall while bending forward  ? Syncopal 2* dehydration v vasovagal   + EMILY  All imaging negative for fx but with increased R wrist/hand pain and edema limiting functional use at present  Prior to admission resides with spouse in apt with 4 MITCH  Ambulates without AD in apt, but use of cane in community  Home aide to assist with showering twice weekly, otherwise reporting independence and good support from spouse  Hx of one fall reported leading to admission  Currently presents with functional limitations related to impairments in strength, balance, activity tolerance and overall mobility  Derek needed with increased time to complete transitions from supine to sit, sit to stand and to ambulate short distances with more restrictive AD of RW at this time  Gait excessively slowed with decreased foot clearance, forward flexion and short stride  Cues to remain inside DIANA of RW  VSS and orthostatic vitals negative with transitions supine/sit and standing  125/55 supine, 146/64 sitting and 141/63 standing     Given impairments and functional decline from baseline with need for more assistance, use of RW and increased risk for falls will need skilled PT in order to progress and optimize outcomes  AT this time STR is recommended  PT will follow and progress  Barriers to Discharge Comments: MITCH  Recommendation: Short-term skilled PT     PT - OK to Discharge: (yes to rhb, No to home)    See flowsheet documentation for full assessment

## 2020-01-23 NOTE — ASSESSMENT & PLAN NOTE
· Unclear etiology  · Probably from unnoticed poor po intake on top of lisinopril and HCTZ  · Lisinopril and HCTZ on hold  · Gentle hydration with NSS at 75 ml/hr  · Presenting creatinine 1 6    · Baseline appears 1 0 -1 1  · Avoid nephrotoxins / hypotension  Lab Results   Component Value Date    CREATININE 1 31 (H) 01/24/2020    CREATININE 1 43 (H) 01/23/2020    CREATININE 1 64 (H) 01/22/2020

## 2020-01-23 NOTE — ASSESSMENT & PLAN NOTE
Edematous right wrist status post fall  Imaging from admission reviewed-right forearm and right hand  No acute osseous abnormality, degenerative changes present    Continue pain control

## 2020-01-23 NOTE — ASSESSMENT & PLAN NOTE
· Presenting with a sodium of 127  · Received IV fluid hydration with normal saline  · Sodium improved to 130 today    · Will continue  Lab Results   Component Value Date    SODIUM 131 (L) 01/24/2020    SODIUM 130 (L) 01/23/2020    SODIUM 127 (L) 01/22/2020

## 2020-01-23 NOTE — OCCUPATIONAL THERAPY NOTE
[address]  Evaluation     Patient Name: Margarita Garcia  PWUMV'Y Date: 1/23/2020  Problem List  Principal Problem:    Fall from standing  Active Problems:    Type 2 diabetes mellitus with complication ([place] [address] )    Essential hypertension    Periorbital ecchymosis    EMILY (acute kidney injury) [facility])    Past Medical History  Past Medical History:   Diagnosis Date    Dehydration     Last assessed - 2/22/16    Diabetes mellitus ([place] [address] )     Hyperlipidemia     Hypertension     Hypotension     Last assessed - 2/22/16    Obstructive jaundice     Last assessed - 3/25/16    Sepsis ([place] [address] )      Past Surgical History  Past Surgical History:   Procedure Laterality Date    CYSTOSCOPY W/ STONE MANIPULATION N/A 2/23/2016    Procedure: STONE MANIPULATION;  Surgeon: Rosales Hess MD;  [facility]; Service:     ERCP W/ SPHICTEROTOMY N/A 2/23/2016    Procedure: ENDOSCOPIC RETROGRADE CHOLANGIOPANCREATOGRAPHY (ERCP) W/ SPHINCTEROTOMY;  Surgeon: Rosales Hess MD;  [facility]; Service:     TUBAL LIGATION             01/23/20 0919   Note Type   Note type Eval only   Restrictions/Precautions   Weight Bearing Precautions Per Order No   Other Precautions Chair Alarm;Cognitive; Bed Alarm;Multiple lines; Fall Risk   Pain Assessment   Pain Assessment 0-10   Pain Score Worst Possible Pain   Pain Type Acute pain   Pain Location Hand;Wrist   Pain Orientation Right   Home Living   Type of Home Apartment   Home Layout Stairs to enter with rails; Performs ADLs on one level  (4 MITCH)   Bathroom Shower/Tub Tub/shower unit   [employer] Raised   Bathroom Equipment Grab bars in shower; Tub transfer bench;Grab bars around toilet   [address] Walker;Cane   Additional Comments Pt states not using AD in the home, cane for community mobility   Prior Function   Level of [place] Needs assistance with ADLs and functional mobility; Needs assistance with IADLs   Lives With Spouse Receives Help From Personal care attendant; Family   ADL Assistance Needs assistance  (A for socks and shoes, A for showers)   IADLs Needs assistance   Falls in the last 6 months 1 to 4  (reason for admission)   Vocational Retired   Comments PTA pt living with  in a 1st floor apt, 4 MITCH  Pt needs A for showering and LB dressing, A for IADLs  Pt states that HHA comes in 2x/wk for 2 hrs to A with bathing, on other days pt completes sponge bathing at the sink  (-)drives, (+)fall    Lifestyle   Autonomy PTA pt living with  in a 1st floor apt, 4 MITCH  Pt needs A for showering and LB dressing, A for IADLs  Pt states that HHA comes in 2x/wk for 2 hrs to A with bathing, on other days pt completes sponge bathing at the sink  (-)drives, (+)fall    Reciprocal Relationships    Service to Others retired   Intrinsic Gratification being with family   Psychosocial   Psychosocial (WDL) WDL   Subjective   Subjective "My wrist really hurts"   ADL   Where Assessed Chair   Eating Assistance 5  Supervision/Setup   Eating Deficit Setup   Grooming Assistance 4  Minimal Assistance   [address] [address] 4  Minimal Assistance   LB Bathing Assistance 2  Maximal [address] 4  [address] 2  Maximal [address]  3  Moderate Assistance   Bed Mobility   Supine to Sit 4  Minimal assistance   Additional items Assist x 1; Increased time required;Verbal cues; Bedrails   Sit to Supine 4  Minimal assistance   Additional items Assist x 1;Verbal cues; Increased time required; Bedrails   Additional Comments c RW   Transfers   Sit to Stand 4  Minimal assistance   Additional items Assist x 1; Increased time required;Verbal cues   Stand to Sit 4  Minimal assistance   Additional items Assist x 1; Increased time required;Verbal cues   Stand pivot 4  Minimal assistance   Additional items Assist x 1; Increased time required;Verbal cues   Functional Mobility   Functional Mobility 4  Minimal assistance   Additional Comments x1   Additional items Rolling walker   Balance   Static Sitting Fair +   Dynamic Sitting Fair   Static Standing Fair -   Dynamic Standing Poor +   Ambulatory Poor +   Activity Tolerance   Activity Tolerance Patient limited by fatigue;Patient limited by pain   RUE Assessment   RUE Assessment X   RUE Strength   RUE Overall Strength Deficits  (3/5 strength throughout 2* wrist pain)   Edema   RUE Edema Non-pitting   LUE Assessment   LUE Assessment WFL  (3+/5 sterngth throughout)   Hand Function   Gross Motor Coordination Impaired  (2* pain and swelling in RUE)   Fine Motor Coordination Impaired  (2* pain and swelling in RUE)   Sensation   Light Touch Partial deficits in the RLE;Partial deficits in the LLE   Sharp/Dull No apparent deficits   Cognition   Overall Cognitive Status [facility]   Arousal/Participation Alert; Cooperative   Attention Within functional limits   Orientation Level Oriented X4   Memory Within functional limits   Following Commands Follows one step commands with increased time or repetition   Comments Delayed responses to questions, and misinterpretations to questions   Assessment   Limitation Decreased ADL status; Decreased Safe judgement during ADL;Decreased UE strength;Decreased UE ROM; Decreased endurance;Decreased cognition;Decreased self-care trans;Decreased high-level ADLs   Prognosis Good   Assessment Patient is a 80 y o  female admitted to [facility] on 1/22/2020 due to Fall from standing  Pt fell and hit her face and wrist, no wrist fx, and CT (-)  Comorbidities affecting pt's physical performance at time of assessment include DM, HTN and periorbital echymosis  Patient has active OT orders and activity orders for Activity as tolerated  PTA pt living with  in a 1st floor apt, 4 MITCH  Pt needs A for showering and LB dressing, A for IADLs   Pt states that HHA comes in 2x/wk for 2 hrs to A with bathing, on other days pt completes sponge bathing at the sink  (-)drives, (+)fall  At the time of evaluation patient currently requires min A for UB ADLs, and max A for LB ADLs and min A for functional mobility  The following deficits affected patient's occupational performance weakness, decreased ROM, decreased functional strength, decreased functional balance, decreased activity tolerance, decreased safety awareness, impaired attention, impaired memory, impaired problem solving and increased pain  Patient would benefit from skilled OT services while in the hospital to address above deficits  Occupational performance areas to be addressed include ADL retraining, bed mobility, functional transfer training, endurance training, cognitive reorientation, patient/family training, equipment evaluation/education and compensatory technique education in order to maximize patient's level of function  From OT standpoint recommend Short term rehab upon D/C  OT continue to follow pt 3-5x/week to address the following goals  Goals   Long Term Goal see goals written below   Plan   Treatment Interventions ADL retraining;Functional transfer training;UE strengthening/ROM; Endurance training;Cognitive reorientation;Patient/family training;Equipment evaluation/education; Compensatory technique education   Goal Expiration Date 02/02/20   OT Treatment Day 0   OT Frequency 3-5x/wk   Recommendation   OT Discharge Recommendation Short Term Rehab   OT - OK to Discharge   (to rehab when medically cleared)   Barthel Index   Feeding 5   Bathing 0   Grooming Score 0   Dressing Score 5   Bladder Score 10   Bowels Score 10   Toilet Use Score 5   Transfers (Bed/Chair) Score 10   Mobility (Level Surface) Score 0   Stairs Score 0   Barthel Index Score 45           01/23/20 0859 01/23/20 0905 01/23/20 0908   Vital Signs   Pulse 76 78 86   Blood Pressure 125/55 146/64 141/63   BP Location Right arm Right arm Right arm   BP Method Automatic Automatic Automatic   Patient Position - Orthostatic VS Lying Sitting Standing     Goals    1) Patient will complete UB ADLs w/ mod I using AE and AD as needed    2) Patient will complete LB ADLs w/ min A using AE and AD as needed    3) Patient will complete toileting w/ mod I w/ G hygiene/thoroughness    4) Patient will complete bed mobility with Mod I without use of bed rails    5) Patient will tolerate therapeutic activities for greater than 15 min, in order to increase tolerance for functional activities  6) Patient will perform functional transfers with Mod I to/from all surfaces using DME as needed    7) Patient will complete functional mobility during ADL/IADL/leisure tasks with Mod I     8) Patient will increase BUE strength by 1MM grade via AROM/AAROM/PROM exercises to increase independence in ADLs and transfers    Pt will benefit from continued OT services in order to maximize independence with ADL performance, functional mobility with RW, and improve overall endurance/strength required to complete functional tasks in preparation for discharge      At the end of the session, all needs met and pt seated in bedside chair, chair alarm activated, SCDs on BLE and turned on and Call bell within reach    Theotis [place], OTR/L        Theotis [place], OTR/L

## 2020-01-23 NOTE — PHYSICAL THERAPY NOTE
PT EVALUATION    80 y o     [id number]    Dehydration [E86 0]  Acute hyponatremia [E87 1]  First degree AV block [I44 0]  Fall [W19  XXXA]  Abrasion, face w/o infection [S00 81XA]  Right arm pain [M79 601]  Right knee pain [M25 561]  EMILY (acute kidney injury) ([facility] [address] ) [N17 9]  Injury of head, initial encounter [S09 90XA]    Past Medical History:   Diagnosis Date    Dehydration     Last assessed - 2/22/16    Diabetes mellitus ([place] [address] )     Hyperlipidemia     Hypertension     Hypotension     Last assessed - 2/22/16    Obstructive jaundice     Last assessed - 3/25/16    Sepsis ([place] [address] )          Past Surgical History:   Procedure Laterality Date    CYSTOSCOPY W/ STONE MANIPULATION N/A 2/23/2016    Procedure: STONE MANIPULATION;  Surgeon: Kevin Hanna MD;  [facility]; Service:     ERCP W/ SPHICTEROTOMY N/A 2/23/2016    Procedure: ENDOSCOPIC RETROGRADE CHOLANGIOPANCREATOGRAPHY (ERCP) W/ SPHINCTEROTOMY;  Surgeon: Kevin Hanna MD;  [facility]; Service:     TUBAL LIGATION          01/23/20 0920   Note Type   Note type Eval only   Pain Assessment   Pain Type Acute pain   Pain Location Hand;Wrist   Pain Orientation Right   Hospital Pain Intervention(s) Repositioned; Ambulation/increased activity; Emotional support   Response to Interventions tolerated   Pain Rating: FLACC (Rest) - Face 0   Pain Rating: FLACC (Rest) - Legs 0   Pain Rating: FLACC (Rest) - Activity 0   Pain Rating: FLACC (Rest) - Cry 0   Pain Rating: FLACC (Rest) - Consolability 0   Score: FLACC (Rest) 0   Pain Rating: FLACC (Activity) - Face 1   Pain Rating: FLACC (Activity) - Legs 0   Pain Rating: FLACC (Activity) - Activity 0   Pain Rating: FLACC (Activity) - Cry 1   Pain Rating: FLACC (Activity) - Consolability 1   Score: FLACC (Activity) 3   Home Living   Type of Home Apartment   Home Layout One level;Stairs to enter with rails  (4 MITCH with rail)   Bathroom Shower/Tub Tub/shower unit   Bathroom Toilet Raised   Bathroom Equipment Grab bars in shower; Tub transfer bench;Grab bars around toilet   [address] Walker;Cane   Additional Comments resides with spouse  He drives  Reports he is independent and able to provide assistance to her   5 grown children  Spouse drives  Home aides BIW x 2 hours to assist with showering pt  Prior Function   Level of [place] Independent with ADLs and functional mobility   Lives With Spouse   Receives Help From Family   ADL Assistance Independent  (needs assist to shower and for socks/shoes )   IADLs Needs assistance   Falls in the last 6 months 1 to 4   Vocational Retired  (homemaker)   Comments Reports independence without AD in apt, outside of home using cane for community distances  Restrictions/Precautions   Weight Bearing Precautions Per Order No   Other Precautions Fall Risk;Pain;Multiple lines;Telemetry; Chair Alarm; Bed Alarm;Hard of hearing  (R hand pain/edema  [place])   General   Additional Pertinent History Pt is 81 y/o female admitted p fall at home   + dizziness  EMILY  PT consulted  Activity as tolerated  Family/Caregiver Present No   Cognition   Overall Cognitive Status WFL   Arousal/Participation Cooperative   Orientation Level Oriented X4   Following Commands Follows one step commands with increased time or repetition  ([place])   Comments Pleasant  RUE Assessment   RUE Assessment X  (see OT notes  limitations R hand/wrist with pain/edema)   LUE Assessment   LUE Assessment   (see OT notes  groslsy at least observed with function )   RLE Assessment   RLE Assessment WFL  (grossly 4-/5 except DF 3-/5, PF 3/5)   LLE Assessment   LLE Assessment WFL  (grossly 4-/5 except DF 3-/5 and PF 3/5)   Light Touch   RLE Light Touch Impaired   RLE Light Touch Comments difficulty with discrimination   LLE Light Touch Impaired   LLE Light Touch Comments difficulty with discrimination     Bed Mobility   Supine to Sit 4  Minimal assistance   Additional items Assist x 1;Increased time required;Verbal cues;LE management; Bedrails;HOB elevated   Additional Comments Increased time to complete transitions supine to sit  Difficulty using RUE to assist to EOB  Transfers   Sit to Stand 4  Minimal assistance   Additional items Assist x 1; Increased time required;Verbal cues  (cues for hand placement  Increased time to acheive upright )   Stand to Sit 4  Minimal assistance   Additional items Assist x 1; Increased time required;Verbal cues;Armrests   Additional Comments cues for hand placement  Increased time to complete transitions needed  Ambulation/Elevation   Gait pattern Improper Weight shift;Decreased foot clearance; Forward Flexion; Inconsistent emeli; Short stride; Excessively slow   Gait Assistance 4  Minimal assist   Additional items Assist x 1;Verbal cues; Tactile cues  (2nd person for line management )   Assistive Device Rolling walker   Distance Amb with RW 15'x1  Increased time to complete distances  Balance   Static Sitting Fair +   Dynamic Sitting Fair   Static Standing Fair -   Dynamic Standing Poor +   Ambulatory Poor +   Endurance Deficit   Endurance Deficit Yes   Endurance Deficit Description fatigue, weakness, pain   Activity Tolerance   Activity Tolerance Patient limited by fatigue;Patient limited by pain   Medical Staff Made Aware NurseJosefina  Nurse Made Aware yes   Assessment   Prognosis Good   Problem List Decreased strength;Decreased range of motion;Decreased endurance; Impaired balance;Decreased mobility; Decreased safety awareness; Impaired hearing;Pain   Assessment Pt is 79 y/o female presents to hospital with fall at home  Dizziness precipitating fall while bending forward  ? Syncopal 2* dehydration v vasovagal   + EMILY  All imaging negative for fx but with increased R wrist/hand pain and edema limiting functional use at present  Prior to admission resides with spouse in apt with 4 MITCH    Ambulates without AD in apt, but use of cane in community  Home aide to assist with showering twice weekly, otherwise reporting independence and good support from spouse  Hx of one fall reported leading to admission  Currently presents with functional limitations related to impairments in strength, balance, activity tolerance and overall mobility  Derek needed with increased time to complete transitions from supine to sit, sit to stand and to ambulate short distances with more restrictive AD of RW at this time  Gait excessively slowed with decreased foot clearance, forward flexion and short stride  Cues to remain inside DIANA of RW  VSS and orthostatic vitals negative with transitions supine/sit and standing  125/55 supine, 146/64 sitting and 141/63 standing  Given impairments and functional decline from baseline with need for more assistance, use of RW and increased risk for falls will need skilled PT in order to progress and optimize outcomes  AT this time STR is recommended  PT will follow and progress  Barriers to Discharge Comments MITCH   Goals   Patient Goals go home   STG Expiration Date 02/02/20   Short Term Goal #1 10 days: 1)  Pt will perform bed mobility with Kenneth demonstrating appropriate technique 100% of the time in order to improve function  2)  Perform all transfers with Kenneth demonstrating safe and appropriate technique 100% of the time in order to improve ability to negotiate safely in home environment  3) Amb with least restrictive AD > 150'x1 with mod I in order to demonstrate ability to negotiate in home environment  4)  Improve overall strength and balance 1/2 grade in order to optimize ability to perform functional tasks and reduce fall risk  5) Increase activity tolerance to 45 minutes in order to improve endurance to functional tasks  6)  Negotiate stairs using most appropriate technique and S in order to be able to negotiate safely in home environment   7) PT for ongoing patient and family/caregiver education, DME needs and d/c planning in order to promote highest level of function in least restrictive environment  Plan   Treatment/Interventions Functional transfer training;LE strengthening/ROM; Elevations; Therapeutic exercise; Endurance training;Patient/family training;Equipment eval/education; Bed mobility;Gait training; Compensatory technique education;Continued evaluation;Spoke to nursing;OT   PT Frequency Other (Comment)  (3-5x/wk)   Recommendation   Recommendation Short-term skilled PT   Equipment Recommended Walker   PT - OK to Discharge   (yes to rhb, No to home)   Barthel Index   Feeding 5   Bathing 0   Grooming Score 0   Dressing Score 5   Bladder Score 10   Bowels Score 10   Toilet Use Score 5   Transfers (Bed/Chair) Score 10   Mobility (Level Surface) Score 0   Stairs Score 0   Barthel Index Score 45     History: co - morbidities, fall risk, use of assistive device, assist for adl's, multiple lines, Jude  Exam: impairments in locomotion, musculoskeletal, balance,posture, joint integrity, skin integrity, cardiac, barthel 45  Clinical: unstable/unpredictable (f all risk, pain, more restrictive AD use, trending labs)  Complexity:high    Bao Duck, PT

## 2020-01-23 NOTE — PROGRESS NOTES
Progress Note - Saloni Yung 1933, 80 y o  female MRN: [id number]    Unit/Bed#: [place] [address] 223-01 Encounter: [id number]    Primary Care Provider: Yadira Hendrix MD   Date and time admitted to hospital: 1/22/2020  9:07 AM        * Fall from standing  Assessment & Plan  · With syncope due to dehydration versus vasovagal in the setting of coughing r/o valvular heart disease  · Check Echo r/o severe AS- echo pending  · Continue IV fluid hydration with NS due to EMILY  · Place on telemetry for 24 hours  · Consult PT OT for balance and ambulation- she likely will need rehab    EMILY (acute kidney injury) ([facility] [address] )  Assessment & Plan  · Unclear etiology  · Probably from unnoticed poor po intake on top of lisinopril and HCTZ  · Lisinopril and HCTZ on hold  · Gentle hydration with NSS at 75 ml/hr  · Presenting creatinine 1 6  Creatinine today 1 4  · Baseline appears 1 0 -1 1    Hyponatremia  Assessment & Plan  Presenting with a sodium of 127  Received IV fluid hydration with normal saline  Sodium improved to 130 today  Will continue    Right wrist pain  Assessment & Plan  Edematous right wrist status post fall  Imaging from admission reviewed-right forearm and right hand  No acute osseous abnormality, degenerative changes present    Continue pain control    Periorbital ecchymosis  Assessment & Plan  · Due to facial soft tissue trauma  · CT head negative for fracture  · Watch for persistent clear nasal discharge/fever/headache/change in mental status  · No new changes in neurologic status since admission-confirmed with nursing  · Low threshold for repeat CT if any neurologic change occurs    Essential hypertension  Assessment & Plan  · Hold lisinopril-HCTZ 20-12 5 mg daily  · Avoid hypotension  · BP stable 141/63    Type 2 diabetes mellitus with complication [facility])  Assessment & Plan  Lab Results   Component Value Date    HGBA1C 7 6 (A) 10/30/2019     Recent Labs     01/22/20  1729 01/22/20  2103 01/23/20  4692 POCGLU 195* 108 96     Blood Sugar Average: Last 72 hrs:  A1C acceptable   Hold metformin  ADA diet  Start SSI  (P) 133        VTE Pharmacologic Prophylaxis:   Pharmacologic: Heparin  Mechanical VTE Prophylaxis in Place: Yes    Discharge Plan: With need for continued inpatient hospitalization and need for short-term rehab as well as EMILY with need for IV fluids    Discussions with Specialists or Other Care Team Provider:  Dr Herve Munoz, nursing    Education and Discussions with Family / Patient:  Patient, offered to update has been-patient declined    Time Spent for Care: 20 minutes  More than 50% of total time spent on counseling and coordination of care as described above  Current Length of Stay: 0 day(s)  Current Patient Status: Inpatient   Code Status: Level 1 - Full Code    Subjective:   Patient resting in chair at bedside  She dry cough  She complains of right-sided wrist pain  She did have difficulty with PT today  Agreeable to rehab  Lives at home with  and uses a cane or walker to ambulate at baseline  Objective:     Vitals:   Temp (24hrs), Av 7 °F (37 6 °C), Min:97 7 °F (36 5 °C), Max:100 9 °F (38 3 °C)    Temp:  [97 7 °F (36 5 °C)-100 9 °F (38 3 °C)] 98 8 °F (37 1 °C)  HR:  [67-86] 86  Resp:  [16-22] 22  BP: (125-171)/(54-73) 141/63  SpO2:  [90 %-100 %] 96 %  Body mass index is 28 49 kg/m²  Input and Output Summary (last 24 hours): Intake/Output Summary (Last 24 hours) at 2020 0955  Last data filed at 2020 7849  Gross per 24 hour   Intake 1702 5 ml   Output    Net 1702 5 ml       Physical Exam:     Physical Exam   Constitutional: She is oriented to person, place, and time  She appears well-developed and well-nourished  HENT:   Head: Normocephalic and atraumatic  Eyes:   Ecchymosis surrounding eyes bilaterally   Cardiovascular: Normal rate, regular rhythm and normal heart sounds  Pulmonary/Chest: Effort normal and breath sounds normal  No stridor   No respiratory distress  She has no wheezes  She has no rales  She exhibits no tenderness  Abdominal: Soft  Bowel sounds are normal  She exhibits no distension and no mass  There is no tenderness  There is no rebound and no guarding  No hernia  Neurological: She is alert and oriented to person, place, and time  Psychiatric: She has a normal mood and affect  Her behavior is normal  Judgment and thought content normal    Nursing note and vitals reviewed  Additional Data:     Labs:    Results from last 7 days   Lab Units 01/23/20  0522   WBC Thousand/uL 3 39*   HEMOGLOBIN g/dL 10 1*   HEMATOCRIT % 31 1*   PLATELETS Thousands/uL 179   NEUTROS PCT % 65   LYMPHS PCT % 22   MONOS PCT % 11   EOS PCT % 1     Results from last 7 days   Lab Units 01/23/20  0522   POTASSIUM mmol/L 3 3*   CHLORIDE mmol/L 93*   CO2 mmol/L 24   BUN mg/dL 19   CREATININE mg/dL 1 43*   CALCIUM mg/dL 8 0*     Results from last 7 days   Lab Units 01/22/20  0940   INR  1 01       * I Have Reviewed All Lab Data Listed Above  * Additional Pertinent Lab Tests Reviewed: [address] 66 Admission Reviewed    Imaging:    Imaging Reports Reviewed Today Include:   Imaging Personally Reviewed by Myself Includes:      Recent Cultures (last 7 days):           Last 24 Hours Medication List:     Current Facility-Administered Medications:  acetaminophen 650 mg Oral Q6H PRN Sarah Valdes MD    atorvastatin 40 mg Oral Daily With Damian Rondon MD    benzonatate 100 mg Oral TID PRN Sarah Valdes MD    heparin (porcine) 5,000 Units Subcutaneous [facility] Sarah Valdes MD    insulin lispro 1-5 Units Subcutaneous TID [facility] Sarah Valdes MD    oxyCODONE 2 5 mg Oral Q4H PRN JORDIN Rivera    sodium chloride 75 mL/hr Intravenous Continuous Sarah Valdes MD Last Rate: 75 mL/hr (01/23/20 7217)        Today, Patient Was Seen By: Kelsea Musa PA-C    ** Please Note: This note has been constructed using a voice recognition system  **

## 2020-01-23 NOTE — PROGRESS NOTES
Patient's temp is 100 8F orally  Tylenol administered  Patient is c/o severe 10/10 pain on R hand  No additional PRN pain meds ordered  Paged Dr Marisel Almendarez  No response yet  Will cont  to monitor patient

## 2020-01-23 NOTE — ASSESSMENT & PLAN NOTE
· With syncope due to dehydration versus vasovagal in the setting of coughing r/o valvular heart disease  · Echo performed, EF 60%, poor study  · Continue IV fluid hydration with NS due to EMILY  · Monitored on telemetry  · Consult PT / OT for balance and ambulation, recommending rehab

## 2020-01-23 NOTE — ASSESSMENT & PLAN NOTE
· Due to facial soft tissue trauma  · CT head negative for fracture  · Watch for persistent clear nasal discharge/fever/headache/change in mental status  · No new changes in neurologic status since admission-confirmed with nursing  · Low threshold for repeat CT if any neurologic change occurs

## 2020-01-23 NOTE — ASSESSMENT & PLAN NOTE
Lab Results   Component Value Date    HGBA1C 7 6 (A) 10/30/2019     Recent Labs     01/22/20  1729 01/22/20  2103 01/23/20  0737   POCGLU 195* 108 96     Blood Sugar Average: Last 72 hrs:  · A1C acceptable   · Hold metformin  · ADA diet  · Start SSI  (P) 133

## 2020-01-23 NOTE — PLAN OF CARE
Problem: OCCUPATIONAL THERAPY ADULT  Goal: Performs self-care activities at highest level of function for planned discharge setting  See evaluation for individualized goals  Description  Treatment Interventions: ADL retraining, Functional transfer training, UE strengthening/ROM, Endurance training, Cognitive reorientation, Patient/family training, Equipment evaluation/education, Compensatory technique education          See flowsheet documentation for full assessment, interventions and recommendations  Note:   Limitation: Decreased ADL status, Decreased Safe judgement during ADL, Decreased UE strength, Decreased UE ROM, Decreased endurance, Decreased cognition, Decreased self-care trans, Decreased high-level ADLs  Prognosis: Good  Assessment: Patient is a 80 y o  female admitted to [email or web address] on 1/22/2020 due to Fall from standing  Pt fell and hit her face and wrist, no wrist fx, and CT (-)  Comorbidities affecting pt's physical performance at time of assessment include DM, HTN and periorbital echymosis  Patient has active OT orders and activity orders for Activity as tolerated  PTA pt living with  in a 1st floor apt, 4 MITCH  Pt needs A for showering and LB dressing, A for IADLs  Pt states that HHA comes in 2x/wk for 2 hrs to A with bathing, on other days pt completes sponge bathing at the sink  (-)drives, (+)fall  At the time of evaluation patient currently requires min A for UB ADLs, and max A for LB ADLs and min A for functional mobility  The following deficits affected patient's occupational performance weakness, decreased ROM, decreased functional strength, decreased functional balance, decreased activity tolerance, decreased safety awareness, impaired attention, impaired memory, impaired problem solving and increased pain  Patient would benefit from skilled OT services while in the hospital to address above deficits   Occupational performance areas to be addressed include ADL retraining, bed mobility, functional transfer training, endurance training, cognitive reorientation, patient/family training, equipment evaluation/education and compensatory technique education in order to maximize patient's level of function  From OT standpoint recommend Short term rehab upon D/C  OT continue to follow pt 3-5x/week to address the following goals       OT Discharge Recommendation: Short Term Rehab  OT - OK to Discharge: (to rehab when medically cleared)

## 2020-01-23 NOTE — PROGRESS NOTES
Patient requested daily bedtime medication Doxepin  Paged Dr Hawkins Shed about medication at 2200 hrs  No new orders from provider  Will continue to monitor patient

## 2020-01-23 NOTE — UTILIZATION REVIEW
Initial Clinical Review    OBSERVATION 11-22-20 1154 CHANGED TO INPATIENT FOR CONTINUED TREATMENT OF FALL AND UNSAFE DC PLAN TO HOME, EKG ABNORMALITY AND FEVER   Admission: Date/Time/Statement: Inpatient Admission Orders (From admission, onward)     Ordered        01/23/20 0953  Inpatient Admission  Once                    Inpatient Admission     Standing Status:   Standing     Number of Occurrences:   1     Order Specific Question:   Admitting Physician     Answer:   Cristina Saleh [[id number]]     Order Specific Question:   Level of Care     Answer:   Med Surg [[id number]]     Order Specific Question:   Estimated length of stay     Answer:   More than 2 Midnights     Order Specific Question:   Certification     Answer:   I certify that inpatient services are medically necessary for this patient for a duration of greater than two midnights  See H&P and MD Progress Notes for additional information about the patient's course of treatment  ED Arrival Information     Expected Arrival Acuity Means of Arrival Escorted By Service Admission Type    - 1/22/2020 09:07 [facility] ([address]) Hospitalist Urgent    Arrival Complaint    fall        Chief Complaint   Patient presents with   Roanna Kugel Fall     pt arrives via ems per report pt bend over to grab paper, got dizzy, fell face forward  laceration to bridge of nose  no thinners  no loc  pt also c/o right arm/hand pain      Assessment/Plan:    80YEAR OLD female presents to ed via ems from home for evaluation and treatment of injury from fall which occurred when she was bending over to  a piece of paper  She struck her face on the floor and her right knee  ON arrival se reports constant non radiating headache  She also reports right forearm and right knee pain  PMHX   DM, HTN   Physical examination demonstrates no obvious acute injury,  Sodium 127, potassium 134,  Creatinine 1 64, hypertension  And temp 100 9     Ekg  shows first degree av block, non specific ST and T wave abnormality,  Pac  Treated in ed with tetanus im and iv 9% ns 500 ml bolus  Admit to observation for fall from standing  Plan to check echo, continue iv ns hydration, monitor on telemetry, PT/OT evaluations, neuro checks q4 hr  Addendum    PT/OT evaluations completed and inpatient rehab recommended  Temp spike overnight to 100 9  Plan to obtain orthostatic blood pressure  Continue to monitor on telemetry  Patient reports acute hand and rodriguez pain on the right this am 10/10  Echo results pending         ED Triage Vitals   01/22/20 0911 01/22/20 1229 01/22/20 0911 01/22/20 0911 01/22/20 0911   97 7 °F (36 5 °C) 85 16 (!) 187/89 100 %      Temporal Monitor           Pain Score       5       01/22/20 54 8 kg (120 lb 13 oz)     Additional Vital Signs:      Date/Time  Temp  Pulse  Resp  BP  MAP (mmHg)  SpO2  O2 Device   01/23/20 09:08:16    86    141/63  89  96 %     01/23/20 09:05:39    78    146/64  91  94 %     01/23/20 08:59:18    76    125/55  78  94 %     01/23/20 07:34:41  98 8 °F (37 1 °C)  71  22  126/54  78  92 %     01/23/20 06:36:46  99 2 °F (37 3 °C)  78        90 %     01/23/20 05:12:14  100 8 °F (38 2 °C)  Abnormal   80        96 %     01/22/20 22:21:24  99 9 °F (37 7 °C)  79  18  129/55  80  95 %     01/22/20 20:40:52  99 5 °F (37 5 °C)  81        96 %     01/22/20 1900  100 9 °F (38 3 °C)  Abnormal                01/22/20 17:10:48  100 9 °F (38 3 °C)  Abnormal   74  20  166/73  104  97 %  None (Room air)   01/22/20 1528    84  16  171/73  Abnormal     95 %  None (Room air)   01/22/20 1328  97 7 °F (36 5 °C)  67  16  141/60    100 %  None (Room air)   01/22/20 1243    67  16  141/60    100 %                 Pertinent Labs/Diagnostic Test Results:     Collection Time Result Time Vent R Atrial R NE Int  QRSD Int  QT Int  QTC Int   P Axis QRS Axis T Wave Ax    01/22/20 10:22:46 01/22/20 10:44:03 85 85 278 88 354 421 90 46 94     Sinus rhythm with 1st degree A-V block with occasional Possible Premature atrial complexes with Aberrant conduction  Nonspecific ST and T wave abnormality  Abnormal ECG  When compared with ECG of 10-OCT-2016 15:05,  Premature atrial complexes are now Present  Aberrant conduction is now Present  Nonspecific T wave abnormality now evident in Anterolateral leads      XR knee 4+ views Right injury   ED I (01/22 1136)   [facility] reviewed: no acute abnormality      Final  (01/22 1421)      No acute osseous abnormality  Degenerative changes as described  XR hand 3+ views RIGHT   ED (01/22 1136)   [facility]      Final (01/22 1430)      No acute osseous abnormality  Degenerative changes as described  XR forearm 2 views RIGHT   ED I (01/22 1136)   [facility] reviewed: no acute abnormality      Final (01/22 1423)      No acute osseous abnormality  XR chest 2 views   ED (01/22 1139)   [facility] reviewed: no acute abnormality      Final(01/22 1445)      No acute cardiopulmonary disease  CT head without contrast   Final (01/22 1020)      No acute intracranial abnormality  CT cervical spine without contrast   Final (01/22 1022)      No cervical spine fracture or traumatic malalignment             Results from last 7 days   Lab Units 01/23/20  0522 01/22/20  0940   WBC Thousand/uL 3 39* 4 13*   HEMOGLOBIN g/dL 10 1* 11 6   HEMATOCRIT % 31 1* 34 7*   PLATELETS Thousands/uL 179 200   NEUTROS ABS Thousands/µL 2 18 2 76         Results from last 7 days   Lab Units 01/23/20  0522 01/22/20  0940   SODIUM mmol/L 130* 127*   POTASSIUM mmol/L 3 3* 3 4*   CHLORIDE mmol/L 93* 90*   CO2 mmol/L 24 25   ANION GAP mmol/L 13 12   BUN mg/dL 19 19   CREATININE mg/dL 1 43* 1 64*   EGFR ml/min/1 73sq m 33 28   CALCIUM mg/dL 8 0* 8 9         Results from last 7 days   Lab Units 01/23/20  1100 01/23/20  0737 01/22/20  2103 01/22/20  1729   POC GLUCOSE mg/dl 139 96 108 195*     Results from last 7 days   Lab Units 01/23/20  0522 01/22/20  0940   GLUCOSE RANDOM mg/dL 99 144*         Results from last 7 days   Lab Units 01/22/20  0940   TROPONIN I ng/mL <0 02         Results from last 7 days   Lab Units 01/22/20  0940   PROTIME seconds 13 4   INR  1 01   PTT seconds 33         Results from last 7 days   Lab Units 01/22/20  0940   NT-PRO BNP pg/mL 1,000*       ED Treatment:   Medication Administration from 01/22/2020 0907 to 01/22/2020 1708       Date/Time Order Dose Route Action     01/22/2020 1010 tetanus-diphtheria-acellular pertussis (BOOSTRIX) IM injection 0 5 mL 0 5 mL Intramuscular Given     01/22/2020 1226 sodium chloride 0 9 % bolus 500 mL 500 mL Intravenous New Bag        Past Medical History:   Diagnosis    Dehydration    Last assessed - 2/22/16    Diabetes mellitus ([facility] )    Hyperlipidemia    Hypertension    Hypotension    Last assessed - 2/22/16    Obstructive jaundice    Last assessed - 3/25/16    Sepsis ([facility] )     Present on Admission:     Periorbital ecchymosis   Fall from standing   EMILY (acute kidney injury) ([facility] )   Essential hypertension   Type 2 diabetes mellitus with complication (HCC)   Right wrist pain   Hyponatremia      Admitting Diagnosis:     Dehydration [E86 0]  Acute hyponatremia [E87 1]  First degree AV block [I44 0]  Abrasion, face w/o infection [S00 81XA]  Right arm pain [M79 601]  Right knee pain [M25 561]  EMILY (acute kidney injury) ([facility] ) [N17 9]  Injury of head, initial encounter [S09 90XA]    Age/Sex: 80 y o  female     Admission Orders:    Scheduled Medications:    Medications:  atorvastatin 40 mg Oral Daily With Dinner   heparin (porcine) 5,000 Units Subcutaneous Q8H [facility]   insulin lispro 1-5 Units Subcutaneous TID AC     Continuous IV Infusions:    sodium chloride 75 mL/hr Intravenous Continuous     PRN Meds:    acetaminophen 650 mg Oral Q6H PRN   benzonatate 100 mg Oral TID PRN   oxyCODONE 2 5 mg Oral Q4H PRN       None    Network Utilization Review Department  [email or web address]  ATTENTION: Please call with any questions or concerns to [phone] and carefully listen to the prompts so that you are directed to the right person  All voicemails are confidential   Ayad Fuller all requests for admission clinical reviews, approved or denied determinations and any other requests to dedicated fax number below belonging to the campus where the patient is receiving treatment   List of dedicated fax numbers for the Facilities:  [address] DENIALS (Administrative/Medical Necessity) [phone]   [address] (Maternity/NICU/Pediatrics) [phone]   Calvin Ortiz [phone]   [facility] [phone]   [address] [place] [address] [phone]   [address] [place] [address]  [phone]   [address] [place], [place] [phone]   [facility]  [phone]   [address] [place] [address], [address]  [address]01 [facility] [address] [facility] [phone]

## 2020-01-23 NOTE — PLAN OF CARE
Problem: Potential for Falls  Goal: Patient will remain free of falls  Description  INTERVENTIONS:  - Assess patient frequently for physical needs  -  Identify cognitive and physical deficits and behaviors that affect risk of falls    -  [place] fall precautions as indicated by assessment   - Educate patient/family on patient safety including physical limitations  - Instruct patient to call for assistance with activity based on assessment  - Modify environment to reduce risk of injury  - Consider OT/PT consult to assist with strengthening/mobility  Outcome: Progressing     Problem: PAIN - ADULT  Goal: Verbalizes/displays adequate comfort level or baseline comfort level  Description  Interventions:  - Encourage patient to monitor pain and request assistance  - Assess pain using appropriate pain scale  - Administer analgesics based on type and severity of pain and evaluate response  - Implement non-pharmacological measures as appropriate and evaluate response  - Consider cultural and social influences on pain and pain management  - Notify physician/advanced practitioner if interventions unsuccessful or patient reports new pain  Outcome: Progressing     Problem: INFECTION - ADULT  Goal: Absence or prevention of progression during hospitalization  Description  INTERVENTIONS:  - Assess and monitor for signs and symptoms of infection  - Monitor lab/diagnostic results  - Monitor all insertion sites, i e  indwelling lines, tubes, and drains  - Monitor endotracheal if appropriate and nasal secretions for changes in amount and color  - [place] appropriate cooling/warming therapies per order  - Administer medications as ordered  - Instruct and encourage patient and family to use good hand hygiene technique  - Identify and instruct in appropriate isolation precautions for identified infection/condition  Outcome: Progressing     Problem: CARDIOVASCULAR - ADULT  Goal: Maintains optimal cardiac output and hemodynamic stability  Description  INTERVENTIONS:  - Monitor I/O, vital signs and rhythm  - Monitor for S/S and trends of decreased cardiac output  - Administer and titrate ordered vasoactive medications to optimize hemodynamic stability  - Assess quality of pulses, skin color and temperature  - Assess for signs of decreased coronary artery perfusion  - Instruct patient to report change in severity of symptoms  Outcome: Progressing  Goal: Absence of cardiac dysrhythmias or at baseline rhythm  Description  INTERVENTIONS:  - Continuous cardiac monitoring, vital signs, obtain 12 lead EKG if ordered  - Administer antiarrhythmic and heart rate control medications as ordered  - Monitor electrolytes and administer replacement therapy as ordered  Outcome: Progressing     Problem: METABOLIC, FLUID AND ELECTROLYTES - ADULT  Goal: Electrolytes maintained within normal limits  Description  INTERVENTIONS:  - Monitor labs and assess patient for signs and symptoms of electrolyte imbalances  - Administer electrolyte replacement as ordered  - Monitor response to electrolyte replacements, including repeat lab results as appropriate  - Instruct patient on fluid and nutrition as appropriate  Outcome: Progressing  Goal: Fluid balance maintained  Description  INTERVENTIONS:  - Monitor labs   - Monitor I/O and WT  - Instruct patient on fluid and nutrition as appropriate  - Assess for signs & symptoms of volume excess or deficit  Outcome: Progressing

## 2020-01-23 NOTE — PLAN OF CARE
Problem: Potential for Falls  Goal: Patient will remain free of falls  Description  INTERVENTIONS:  - Assess patient frequently for physical needs  -  Identify cognitive and physical deficits and behaviors that affect risk of falls    -  [place] fall precautions as indicated by assessment   - Educate patient/family on patient safety including physical limitations  - Instruct patient to call for assistance with activity based on assessment  - Modify environment to reduce risk of injury  - Consider OT/PT consult to assist with strengthening/mobility  Outcome: Progressing     Problem: PAIN - ADULT  Goal: Verbalizes/displays adequate comfort level or baseline comfort level  Description  Interventions:  - Encourage patient to monitor pain and request assistance  - Assess pain using appropriate pain scale  - Administer analgesics based on type and severity of pain and evaluate response  - Implement non-pharmacological measures as appropriate and evaluate response  - Consider cultural and social influences on pain and pain management  - Notify physician/advanced practitioner if interventions unsuccessful or patient reports new pain  Outcome: Progressing     Problem: INFECTION - ADULT  Goal: Absence or prevention of progression during hospitalization  Description  INTERVENTIONS:  - Assess and monitor for signs and symptoms of infection  - Monitor lab/diagnostic results  - Monitor all insertion sites, i e  indwelling lines, tubes, and drains  - Monitor endotracheal if appropriate and nasal secretions for changes in amount and color  - [place] appropriate cooling/warming therapies per order  - Administer medications as ordered  - Instruct and encourage patient and family to use good hand hygiene technique  - Identify and instruct in appropriate isolation precautions for identified infection/condition  Outcome: Progressing     Problem: CARDIOVASCULAR - ADULT  Goal: Maintains optimal cardiac output and hemodynamic stability  Description  INTERVENTIONS:  - Monitor I/O, vital signs and rhythm  - Monitor for S/S and trends of decreased cardiac output  - Administer and titrate ordered vasoactive medications to optimize hemodynamic stability  - Assess quality of pulses, skin color and temperature  - Assess for signs of decreased coronary artery perfusion  - Instruct patient to report change in severity of symptoms  Outcome: Progressing  Goal: Absence of cardiac dysrhythmias or at baseline rhythm  Description  INTERVENTIONS:  - Continuous cardiac monitoring, vital signs, obtain 12 lead EKG if ordered  - Administer antiarrhythmic and heart rate control medications as ordered  - Monitor electrolytes and administer replacement therapy as ordered  Outcome: Progressing     Problem: METABOLIC, FLUID AND ELECTROLYTES - ADULT  Goal: Electrolytes maintained within normal limits  Description  INTERVENTIONS:  - Monitor labs and assess patient for signs and symptoms of electrolyte imbalances  - Administer electrolyte replacement as ordered  - Monitor response to electrolyte replacements, including repeat lab results as appropriate  - Instruct patient on fluid and nutrition as appropriate  Outcome: Progressing  Goal: Fluid balance maintained  Description  INTERVENTIONS:  - Monitor labs   - Monitor I/O and WT  - Instruct patient on fluid and nutrition as appropriate  - Assess for signs & symptoms of volume excess or deficit  Outcome: Progressing

## 2020-01-24 PROBLEM — J10.1 INFLUENZA A: Status: ACTIVE | Noted: 2020-01-24

## 2020-01-24 LAB
ANION GAP SERPL CALCULATED.3IONS-SCNC: 13 MMOL/L (ref 4–13)
BUN SERPL-MCNC: 22 MG/DL (ref 5–25)
CALCIUM SERPL-MCNC: 8 MG/DL (ref 8.3–10.1)
CHLORIDE SERPL-SCNC: 96 MMOL/L (ref 100–108)
CO2 SERPL-SCNC: 22 MMOL/L (ref 21–32)
CREAT SERPL-MCNC: 1.31 MG/DL (ref 0.6–1.3)
ERYTHROCYTE [DISTWIDTH] IN BLOOD BY AUTOMATED COUNT: 12.8 % (ref 11.6–15.1)
FLUAV RNA NPH QL NAA+PROBE: DETECTED
FLUBV RNA NPH QL NAA+PROBE: ABNORMAL
GFR SERPL CREATININE-BSD FRML MDRD: 37 ML/MIN/1.73SQ M
GLUCOSE SERPL-MCNC: 114 MG/DL (ref 65–140)
GLUCOSE SERPL-MCNC: 125 MG/DL (ref 65–140)
GLUCOSE SERPL-MCNC: 170 MG/DL (ref 65–140)
GLUCOSE SERPL-MCNC: 84 MG/DL (ref 65–140)
GLUCOSE SERPL-MCNC: 95 MG/DL (ref 65–140)
HCT VFR BLD AUTO: 31.6 % (ref 34.8–46.1)
HGB BLD-MCNC: 10.3 G/DL (ref 11.5–15.4)
MCH RBC QN AUTO: 28.2 PG (ref 26.8–34.3)
MCHC RBC AUTO-ENTMCNC: 32.6 G/DL (ref 31.4–37.4)
MCV RBC AUTO: 87 FL (ref 82–98)
PLATELET # BLD AUTO: 188 THOUSANDS/UL (ref 149–390)
PMV BLD AUTO: 9.3 FL (ref 8.9–12.7)
POTASSIUM SERPL-SCNC: 3.4 MMOL/L (ref 3.5–5.3)
RBC # BLD AUTO: 3.65 MILLION/UL (ref 3.81–5.12)
RSV RNA NPH QL NAA+PROBE: ABNORMAL
SODIUM SERPL-SCNC: 131 MMOL/L (ref 136–145)
WBC # BLD AUTO: 3.96 THOUSAND/UL (ref 4.31–10.16)

## 2020-01-24 PROCEDURE — 85027 COMPLETE CBC AUTOMATED: CPT | Performed by: PHYSICIAN ASSISTANT

## 2020-01-24 PROCEDURE — 87631 RESP VIRUS 3-5 TARGETS: CPT | Performed by: INTERNAL MEDICINE

## 2020-01-24 PROCEDURE — 99233 SBSQ HOSP IP/OBS HIGH 50: CPT | Performed by: NURSE PRACTITIONER

## 2020-01-24 PROCEDURE — 80048 BASIC METABOLIC PNL TOTAL CA: CPT | Performed by: PHYSICIAN ASSISTANT

## 2020-01-24 PROCEDURE — 82948 REAGENT STRIP/BLOOD GLUCOSE: CPT

## 2020-01-24 RX ORDER — DOXEPIN HYDROCHLORIDE 10 MG/1
10 CAPSULE ORAL
Status: DISCONTINUED | OUTPATIENT
Start: 2020-01-24 | End: 2020-01-28 | Stop reason: HOSPADM

## 2020-01-24 RX ORDER — OSELTAMIVIR PHOSPHATE 30 MG/1
30 CAPSULE ORAL EVERY 24 HOURS
Status: DISCONTINUED | OUTPATIENT
Start: 2020-01-24 | End: 2020-01-28 | Stop reason: HOSPADM

## 2020-01-24 RX ADMIN — HEPARIN SODIUM 5000 UNITS: 5000 INJECTION INTRAVENOUS; SUBCUTANEOUS at 13:20

## 2020-01-24 RX ADMIN — ATORVASTATIN CALCIUM 40 MG: 40 TABLET, FILM COATED ORAL at 17:52

## 2020-01-24 RX ADMIN — ACETAMINOPHEN 650 MG: 325 TABLET ORAL at 08:28

## 2020-01-24 RX ADMIN — HEPARIN SODIUM 5000 UNITS: 5000 INJECTION INTRAVENOUS; SUBCUTANEOUS at 23:16

## 2020-01-24 RX ADMIN — HEPARIN SODIUM 5000 UNITS: 5000 INJECTION INTRAVENOUS; SUBCUTANEOUS at 05:02

## 2020-01-24 RX ADMIN — DOXEPIN HYDROCHLORIDE 10 MG: 10 CAPSULE ORAL at 23:42

## 2020-01-24 RX ADMIN — OSELTAMIVIR PHOSPHATE 30 MG: 30 CAPSULE ORAL at 10:19

## 2020-01-24 NOTE — PLAN OF CARE
Problem: Potential for Falls  Goal: Patient will remain free of falls  Description  INTERVENTIONS:  - Assess patient frequently for physical needs  -  Identify cognitive and physical deficits and behaviors that affect risk of falls    -  [place] fall precautions as indicated by assessment   - Educate patient/family on patient safety including physical limitations  - Instruct patient to call for assistance with activity based on assessment  - Modify environment to reduce risk of injury  - Consider OT/PT consult to assist with strengthening/mobility  Outcome: Progressing     Problem: PAIN - ADULT  Goal: Verbalizes/displays adequate comfort level or baseline comfort level  Description  Interventions:  - Encourage patient to monitor pain and request assistance  - Assess pain using appropriate pain scale  - Administer analgesics based on type and severity of pain and evaluate response  - Implement non-pharmacological measures as appropriate and evaluate response  - Consider cultural and social influences on pain and pain management  - Notify physician/advanced practitioner if interventions unsuccessful or patient reports new pain  Outcome: Progressing     Problem: INFECTION - ADULT  Goal: Absence or prevention of progression during hospitalization  Description  INTERVENTIONS:  - Assess and monitor for signs and symptoms of infection  - Monitor lab/diagnostic results  - Monitor all insertion sites, i e  indwelling lines, tubes, and drains  - Monitor endotracheal if appropriate and nasal secretions for changes in amount and color  - [place] appropriate cooling/warming therapies per order  - Administer medications as ordered  - Instruct and encourage patient and family to use good hand hygiene technique  - Identify and instruct in appropriate isolation precautions for identified infection/condition  Outcome: Progressing     Problem: CARDIOVASCULAR - ADULT  Goal: Maintains optimal cardiac output and hemodynamic stability  Description  INTERVENTIONS:  - Monitor I/O, vital signs and rhythm  - Monitor for S/S and trends of decreased cardiac output  - Administer and titrate ordered vasoactive medications to optimize hemodynamic stability  - Assess quality of pulses, skin color and temperature  - Assess for signs of decreased coronary artery perfusion  - Instruct patient to report change in severity of symptoms  Outcome: Progressing  Goal: Absence of cardiac dysrhythmias or at baseline rhythm  Description  INTERVENTIONS:  - Continuous cardiac monitoring, vital signs, obtain 12 lead EKG if ordered  - Administer antiarrhythmic and heart rate control medications as ordered  - Monitor electrolytes and administer replacement therapy as ordered  Outcome: Progressing     Problem: METABOLIC, FLUID AND ELECTROLYTES - ADULT  Goal: Electrolytes maintained within normal limits  Description  INTERVENTIONS:  - Monitor labs and assess patient for signs and symptoms of electrolyte imbalances  - Administer electrolyte replacement as ordered  - Monitor response to electrolyte replacements, including repeat lab results as appropriate  - Instruct patient on fluid and nutrition as appropriate  Outcome: Progressing  Goal: Fluid balance maintained  Description  INTERVENTIONS:  - Monitor labs   - Monitor I/O and WT  - Instruct patient on fluid and nutrition as appropriate  - Assess for signs & symptoms of volume excess or deficit  Outcome: Progressing

## 2020-01-24 NOTE — PLAN OF CARE
Problem: Potential for Falls  Goal: Patient will remain free of falls  Description  INTERVENTIONS:  - Assess patient frequently for physical needs  -  Identify cognitive and physical deficits and behaviors that affect risk of falls    -  [place] fall precautions as indicated by assessment   - Educate patient/family on patient safety including physical limitations  - Instruct patient to call for assistance with activity based on assessment  - Modify environment to reduce risk of injury  - Consider OT/PT consult to assist with strengthening/mobility  Outcome: Progressing     Problem: PAIN - ADULT  Goal: Verbalizes/displays adequate comfort level or baseline comfort level  Description  Interventions:  - Encourage patient to monitor pain and request assistance  - Assess pain using appropriate pain scale  - Administer analgesics based on type and severity of pain and evaluate response  - Implement non-pharmacological measures as appropriate and evaluate response  - Consider cultural and social influences on pain and pain management  - Notify physician/advanced practitioner if interventions unsuccessful or patient reports new pain  Outcome: Progressing     Problem: INFECTION - ADULT  Goal: Absence or prevention of progression during hospitalization  Description  INTERVENTIONS:  - Assess and monitor for signs and symptoms of infection  - Monitor lab/diagnostic results  - Monitor all insertion sites, i e  indwelling lines, tubes, and drains  - Monitor endotracheal if appropriate and nasal secretions for changes in amount and color  - [place] appropriate cooling/warming therapies per order  - Administer medications as ordered  - Instruct and encourage patient and family to use good hand hygiene technique  - Identify and instruct in appropriate isolation precautions for identified infection/condition  Outcome: Progressing     Problem: CARDIOVASCULAR - ADULT  Goal: Maintains optimal cardiac output and hemodynamic stability  Description  INTERVENTIONS:  - Monitor I/O, vital signs and rhythm  - Monitor for S/S and trends of decreased cardiac output  - Administer and titrate ordered vasoactive medications to optimize hemodynamic stability  - Assess quality of pulses, skin color and temperature  - Assess for signs of decreased coronary artery perfusion  - Instruct patient to report change in severity of symptoms  Outcome: Progressing  Goal: Absence of cardiac dysrhythmias or at baseline rhythm  Description  INTERVENTIONS:  - Continuous cardiac monitoring, vital signs, obtain 12 lead EKG if ordered  - Administer antiarrhythmic and heart rate control medications as ordered  - Monitor electrolytes and administer replacement therapy as ordered  Outcome: Progressing     Problem: METABOLIC, FLUID AND ELECTROLYTES - ADULT  Goal: Electrolytes maintained within normal limits  Description  INTERVENTIONS:  - Monitor labs and assess patient for signs and symptoms of electrolyte imbalances  - Administer electrolyte replacement as ordered  - Monitor response to electrolyte replacements, including repeat lab results as appropriate  - Instruct patient on fluid and nutrition as appropriate  Outcome: Progressing  Goal: Fluid balance maintained  Description  INTERVENTIONS:  - Monitor labs   - Monitor I/O and WT  - Instruct patient on fluid and nutrition as appropriate  - Assess for signs & symptoms of volume excess or deficit  Outcome: Progressing

## 2020-01-24 NOTE — PROGRESS NOTES
[facility]  Progress Note - Jen Wilder 1933, 80 y o  female MRN: [id number]    [facility] [id number] Encounter: [id number]    Primary Care Provider: Giancarlo Brown MD   Date and time admitted to hospital: 1/22/2020  9:07 AM        * Fall from standing  Assessment & Plan  · With syncope due to dehydration versus vasovagal in the setting of coughing r/o valvular heart disease  · Echo performed, EF 60%, poor study  · Continue IV fluid hydration with NS due to EMILY  · Monitored on telemetry  · Consult PT / OT for balance and ambulation, recommending rehab    Influenza A  Assessment & Plan  · With 2 day history of cough  Spouse with same  · Influenza screen (+) A  · Tamiflu initiated  · Supportive care    Hyponatremia  Assessment & Plan  · Presenting with a sodium of 127  · Received IV fluid hydration with normal saline  · Sodium improved to 130 today  · Will continue  Lab Results   Component Value Date    SODIUM 131 (L) 01/24/2020    SODIUM 130 (L) 01/23/2020    SODIUM 127 (L) 01/22/2020         Right wrist pain  Assessment & Plan  · Edematous right wrist status post fall  · Imaging from admission reviewed-right forearm and right hand  · No acute osseous abnormality, degenerative changes present  · Continue pain control  · Ice / elevate    EMILY (acute kidney injury) ([facility] [address] )  Assessment & Plan  · Unclear etiology  · Probably from unnoticed poor po intake on top of lisinopril and HCTZ  · Lisinopril and HCTZ on hold  · Gentle hydration with NSS at 75 ml/hr  · Presenting creatinine 1 6    · Baseline appears 1 0 -1 1  · Avoid nephrotoxins / hypotension  Lab Results   Component Value Date    CREATININE 1 31 (H) 01/24/2020    CREATININE 1 43 (H) 01/23/2020    CREATININE 1 64 (H) 01/22/2020         Periorbital ecchymosis  Assessment & Plan  · Due to facial soft tissue trauma  · CT head negative for fracture  · Watch for persistent clear nasal discharge/fever/headache/change in mental status  · No new changes in neurologic status since admission-confirmed with nursing  · Low threshold for repeat CT if any neurologic change occurs    Essential hypertension  Assessment & Plan  · Hold lisinopril-HCTZ 20-12 5 mg daily  · Avoid hypotension  · BP stable  · Monitor VS    Type 2 diabetes mellitus with complication [facility])  Assessment & Plan  Lab Results   Component Value Date    HGBA1C 7 6 (A) 10/30/2019     Recent Labs     20  1729 20  2103 20  0737   POCGLU 195* 108 96     Blood Sugar Average: Last 72 hrs:  · A1C acceptable   · Hold metformin  · ADA diet  · Start SSI  (P) 133        VTE Pharmacologic Prophylaxis:   Pharmacologic: Heparin  Mechanical VTE Prophylaxis in Place: Yes    Patient Centered Rounds: I have performed bedside rounds with nursing staff today  Discussions with Specialists or Other Care Team Provider: Provider notes reviewed    Education and Discussions with Family / Patient: Plan of care with patient    Time Spent for Care: 30 minutes  More than 50% of total time spent on counseling and coordination of care as described above  Current Length of Stay: 1 day(s)    Current Patient Status: Inpatient   Certification Statement: The patient will continue to require additional inpatient hospital stay due to supportive care / discharge arrangements / monitoring of electrolytes    Discharge Plan: Pending    Code Status: Level 1 - Full Code      Subjective:   Patient has continued cough  She has pain in her right wrist / hand  Objective:     Vitals:   Temp (24hrs), Av 3 °F (37 4 °C), Min:98 6 °F (37 °C), Max:100 4 °F (38 °C)    Temp:  [98 6 °F (37 °C)-100 4 °F (38 °C)] 98 6 °F (37 °C)  HR:  [46-75] 75  Resp:  [20-22] 22  BP: (133-136)/(74-78) 136/74  SpO2:  [95 %-98 %] 95 %  Body mass index is 28 49 kg/m²  Input and Output Summary (last 24 hours):        Intake/Output Summary (Last 24 hours) at 2020 1714  Last data filed at 2020 1123  Gross per 24 hour   Intake 2155 ml   Output    Net 2155 ml       Physical Exam:     Physical Exam   Constitutional: She is oriented to person, place, and time  She appears well-developed and well-nourished  No distress  HENT:   Head: Normocephalic  Head is with raccoon's eyes  Eyes: Conjunctivae are normal  No scleral icterus  Cardiovascular: Normal rate, regular rhythm and normal heart sounds  No murmur heard  Pulmonary/Chest: Effort normal and breath sounds normal  No respiratory distress  She has no wheezes  She has no rales  + cough, on room air   Abdominal: Soft  Bowel sounds are normal  She exhibits no distension  There is no tenderness  Musculoskeletal: She exhibits no edema  Right wrist: She exhibits decreased range of motion, tenderness and swelling  Right hand: She exhibits decreased range of motion, tenderness and swelling  Neurological: She is alert and oriented to person, place, and time  Skin: Skin is warm and dry  She is not diaphoretic  Psychiatric: She has a normal mood and affect  Her behavior is normal    Nursing note and vitals reviewed          Additional Data:     Labs:    Results from last 7 days   Lab Units 01/24/20  0507 01/23/20  0522   WBC Thousand/uL 3 96* 3 39*   HEMOGLOBIN g/dL 10 3* 10 1*   HEMATOCRIT % 31 6* 31 1*   PLATELETS Thousands/uL 188 179   NEUTROS PCT %  --  65   LYMPHS PCT %  --  22   MONOS PCT %  --  11   EOS PCT %  --  1     Results from last 7 days   Lab Units 01/24/20  0507   SODIUM mmol/L 131*   POTASSIUM mmol/L 3 4*   CHLORIDE mmol/L 96*   CO2 mmol/L 22   BUN mg/dL 22   CREATININE mg/dL 1 31*   ANION GAP mmol/L 13   CALCIUM mg/dL 8 0*   GLUCOSE RANDOM mg/dL 95     Results from last 7 days   Lab Units 01/22/20  0940   INR  1 01     Results from last 7 days   Lab Units 01/24/20  1610 01/24/20  1110 01/24/20  0752 01/23/20  2057 01/23/20  1601 01/23/20  1100 01/23/20  0737 01/22/20  2103 01/22/20  1729   POC GLUCOSE mg/dl 114 125 84 132 140 139 96 108 195*                   * I Have Reviewed All Lab Data Listed Above  * Additional Pertinent Lab Tests Reviewed: [facility] Admission Reviewed    Imaging:    Imaging Reports Reviewed Today Include: XR right forearm / hand / knee / chest / CT head / C-spine 1/23  Imaging Personally Reviewed by Myself Includes:  None    Recent Cultures (last 7 days):           Last 24 Hours Medication List:     Current Facility-Administered Medications:  acetaminophen 650 mg Oral Q6H PRN Rony Peguero MD    atorvastatin 40 mg Oral Daily With Preet Jaimes MD    benzonatate 100 mg Oral TID PRN Rony Peguero MD    heparin (porcine) 5,000 Units Subcutaneous [facility] Rony Peguero MD    insulin lispro 1-5 Units Subcutaneous TID [facility] Rony Peguero MD    oseltamivir 30 mg Oral Q24H Rony Peguero MD    oxyCODONE 2 5 mg Oral Q4H PRN JORDIN Chung    sodium chloride 75 mL/hr Intravenous Continuous Rony Peguero MD Last Rate: 75 mL/hr (01/24/20 1123)        Today, Patient Was Seen By: JORDIN Tate    ** Please Note: Dictation voice to text software may have been used in the creation of this document   **

## 2020-01-24 NOTE — SOCIAL WORK
Pt found to be (+) Flu- because of this, while pt is in need of STR with accepting facilities giving various dates of admission due to new findings- Pt will need to be afebrile for 24 hrs and complete 2-5 days of Tamiflu  Will continue to follow to assist with d/c plan of care

## 2020-01-24 NOTE — ASSESSMENT & PLAN NOTE
· With 2 day history of cough  Spouse with same    · Influenza screen (+) A  · Tamiflu initiated  · Supportive care

## 2020-01-25 LAB
ANION GAP SERPL CALCULATED.3IONS-SCNC: 15 MMOL/L (ref 4–13)
BUN SERPL-MCNC: 21 MG/DL (ref 5–25)
CALCIUM SERPL-MCNC: 7.8 MG/DL (ref 8.3–10.1)
CHLORIDE SERPL-SCNC: 95 MMOL/L (ref 100–108)
CO2 SERPL-SCNC: 22 MMOL/L (ref 21–32)
CREAT SERPL-MCNC: 1.19 MG/DL (ref 0.6–1.3)
GFR SERPL CREATININE-BSD FRML MDRD: 41 ML/MIN/1.73SQ M
GLUCOSE SERPL-MCNC: 101 MG/DL (ref 65–140)
GLUCOSE SERPL-MCNC: 118 MG/DL (ref 65–140)
GLUCOSE SERPL-MCNC: 170 MG/DL (ref 65–140)
GLUCOSE SERPL-MCNC: 79 MG/DL (ref 65–140)
GLUCOSE SERPL-MCNC: 99 MG/DL (ref 65–140)
POTASSIUM SERPL-SCNC: 3.3 MMOL/L (ref 3.5–5.3)
SODIUM 24H UR-SCNC: 116 MOL/L
SODIUM SERPL-SCNC: 132 MMOL/L (ref 136–145)

## 2020-01-25 PROCEDURE — 84300 ASSAY OF URINE SODIUM: CPT | Performed by: NURSE PRACTITIONER

## 2020-01-25 PROCEDURE — 82948 REAGENT STRIP/BLOOD GLUCOSE: CPT

## 2020-01-25 PROCEDURE — 99232 SBSQ HOSP IP/OBS MODERATE 35: CPT | Performed by: NURSE PRACTITIONER

## 2020-01-25 PROCEDURE — 80048 BASIC METABOLIC PNL TOTAL CA: CPT | Performed by: NURSE PRACTITIONER

## 2020-01-25 RX ORDER — POTASSIUM CHLORIDE 20MEQ/15ML
40 LIQUID (ML) ORAL ONCE
Status: COMPLETED | OUTPATIENT
Start: 2020-01-25 | End: 2020-01-25

## 2020-01-25 RX ORDER — POTASSIUM CHLORIDE 20 MEQ/1
40 TABLET, EXTENDED RELEASE ORAL ONCE
Status: DISCONTINUED | OUTPATIENT
Start: 2020-01-25 | End: 2020-01-25 | Stop reason: CLARIF

## 2020-01-25 RX ADMIN — DOXEPIN HYDROCHLORIDE 10 MG: 10 CAPSULE ORAL at 23:03

## 2020-01-25 RX ADMIN — HEPARIN SODIUM 5000 UNITS: 5000 INJECTION INTRAVENOUS; SUBCUTANEOUS at 05:06

## 2020-01-25 RX ADMIN — POTASSIUM CHLORIDE 40 MEQ: 20 SOLUTION ORAL at 14:39

## 2020-01-25 RX ADMIN — OSELTAMIVIR PHOSPHATE 30 MG: 30 CAPSULE ORAL at 09:30

## 2020-01-25 RX ADMIN — INSULIN LISPRO 1 UNITS: 100 INJECTION, SOLUTION INTRAVENOUS; SUBCUTANEOUS at 11:34

## 2020-01-25 RX ADMIN — HEPARIN SODIUM 5000 UNITS: 5000 INJECTION INTRAVENOUS; SUBCUTANEOUS at 22:24

## 2020-01-25 RX ADMIN — HEPARIN SODIUM 5000 UNITS: 5000 INJECTION INTRAVENOUS; SUBCUTANEOUS at 14:36

## 2020-01-25 RX ADMIN — ATORVASTATIN CALCIUM 40 MG: 40 TABLET, FILM COATED ORAL at 15:57

## 2020-01-25 NOTE — ASSESSMENT & PLAN NOTE
· Unclear etiology  · Probably from unnoticed poor po intake on top of lisinopril and HCTZ  · Lisinopril and HCTZ held  · Gentle hydration with NSS at 75 ml/hr given, will discontinue with adequate oral intake  · Presenting creatinine 1 6    · Baseline appears 1 0 -1 1  · Avoid nephrotoxins / hypotension  Lab Results   Component Value Date    CREATININE 1 18 01/26/2020    CREATININE 1 19 01/25/2020    CREATININE 1 31 (H) 01/24/2020

## 2020-01-25 NOTE — ASSESSMENT & PLAN NOTE
· With 2 day history of cough  Spouse with same    · Influenza screen (+) A  · Tamiflu initiated  · Supportive care  · Cough improving

## 2020-01-25 NOTE — ASSESSMENT & PLAN NOTE
· With syncope due to dehydration versus vasovagal in the setting of coughing r/o valvular heart disease  When bending over to pick something up, she became dizzy and fell forward    · Echo performed, EF 60%, poor study  · Given IV fluid hydration for EMILY  · Monitored on telemetry  · Consult PT / OT for balance and ambulation, recommending rehab

## 2020-01-25 NOTE — ASSESSMENT & PLAN NOTE
Lab Results   Component Value Date    HGBA1C 7 6 (A) 10/30/2019     Recent Labs     01/24/20  1610 01/24/20  2056 01/25/20  0718 01/25/20  1118   POCGLU 114 170* 99 170*     Blood Sugar Average: Last 72 hrs:  · A1C acceptable   · Hold metformin  · ADA diet  · Start SSI  (P) 131

## 2020-01-25 NOTE — ASSESSMENT & PLAN NOTE
· Edematous right wrist status post fall  Attempted to brace her fall with her right hand  · Imaging from admission reviewed-right forearm and right hand, no acute osseous abnormality, degenerative changes present  · Continue pain control  · Ice / elevate  · With limited mobility of wrist, continued swelling of hand / wrist, will request consultation with Ortho for further recommendations  Concern for her ability to use walker

## 2020-01-25 NOTE — PLAN OF CARE
Problem: Potential for Falls  Goal: Patient will remain free of falls  Description  INTERVENTIONS:  - Assess patient frequently for physical needs  -  Identify cognitive and physical deficits and behaviors that affect risk of falls    -  [place] fall precautions as indicated by assessment   - Educate patient/family on patient safety including physical limitations  - Instruct patient to call for assistance with activity based on assessment  - Modify environment to reduce risk of injury  - Consider OT/PT consult to assist with strengthening/mobility  Outcome: Progressing     Problem: PAIN - ADULT  Goal: Verbalizes/displays adequate comfort level or baseline comfort level  Description  Interventions:  - Encourage patient to monitor pain and request assistance  - Assess pain using appropriate pain scale  - Administer analgesics based on type and severity of pain and evaluate response  - Implement non-pharmacological measures as appropriate and evaluate response  - Consider cultural and social influences on pain and pain management  - Notify physician/advanced practitioner if interventions unsuccessful or patient reports new pain  Outcome: Progressing     Problem: INFECTION - ADULT  Goal: Absence or prevention of progression during hospitalization  Description  INTERVENTIONS:  - Assess and monitor for signs and symptoms of infection  - Monitor lab/diagnostic results  - Monitor all insertion sites, i e  indwelling lines, tubes, and drains  - Monitor endotracheal if appropriate and nasal secretions for changes in amount and color  - [place] appropriate cooling/warming therapies per order  - Administer medications as ordered  - Instruct and encourage patient and family to use good hand hygiene technique  - Identify and instruct in appropriate isolation precautions for identified infection/condition  Outcome: Progressing     Problem: CARDIOVASCULAR - ADULT  Goal: Maintains optimal cardiac output and hemodynamic stability  Description  INTERVENTIONS:  - Monitor I/O, vital signs and rhythm  - Monitor for S/S and trends of decreased cardiac output  - Administer and titrate ordered vasoactive medications to optimize hemodynamic stability  - Assess quality of pulses, skin color and temperature  - Assess for signs of decreased coronary artery perfusion  - Instruct patient to report change in severity of symptoms  Outcome: Progressing  Goal: Absence of cardiac dysrhythmias or at baseline rhythm  Description  INTERVENTIONS:  - Continuous cardiac monitoring, vital signs, obtain 12 lead EKG if ordered  - Administer antiarrhythmic and heart rate control medications as ordered  - Monitor electrolytes and administer replacement therapy as ordered  Outcome: Progressing     Problem: METABOLIC, FLUID AND ELECTROLYTES - ADULT  Goal: Electrolytes maintained within normal limits  Description  INTERVENTIONS:  - Monitor labs and assess patient for signs and symptoms of electrolyte imbalances  - Administer electrolyte replacement as ordered  - Monitor response to electrolyte replacements, including repeat lab results as appropriate  - Instruct patient on fluid and nutrition as appropriate  Outcome: Progressing  Goal: Fluid balance maintained  Description  INTERVENTIONS:  - Monitor labs   - Monitor I/O and WT  - Instruct patient on fluid and nutrition as appropriate  - Assess for signs & symptoms of volume excess or deficit  Outcome: Progressing

## 2020-01-25 NOTE — PROGRESS NOTES
[facility]  Progress Note - Hebr Sole 1933, 80 y o  female MRN: [id number]    Unit/Bed#: [facility] [id number] Encounter: [id number]    Primary Care Provider: Lashonda Doshi MD   Date and time admitted to hospital: 1/22/2020  9:07 AM        * Fall from standing  Assessment & Plan  · With syncope due to dehydration versus vasovagal in the setting of coughing r/o valvular heart disease  When bending over to pick something up, she became dizzy and fell forward  · Echo performed, EF 60%, poor study  · Given IV fluid hydration for EMILY  · Monitored on telemetry  · Consult PT / OT for balance and ambulation, recommending rehab    Influenza A  Assessment & Plan  · With 2 day history of cough  Spouse with same  · Influenza screen (+) A  · Tamiflu initiated  · Supportive care  · Cough improving    Hyponatremia  Assessment & Plan  · Presenting with a sodium of 127, may have contributed to her dizziness  · Received IV fluid hydration with normal saline  · Sodium trending up  · Continue to monitor  Lab Results   Component Value Date    SODIUM 132 (L) 01/25/2020    SODIUM 131 (L) 01/24/2020    SODIUM 130 (L) 01/23/2020         Right wrist pain  Assessment & Plan  · Edematous right wrist status post fall  Attempted to brace her fall with her right hand  · Imaging from admission reviewed-right forearm and right hand, no acute osseous abnormality, degenerative changes present  · Continue pain control  · Ice / elevate  · With limited mobility of wrist, continued swelling of hand / wrist, will request consultation with Ortho for further recommendations  Concern for her ability to use walker  EMILY (acute kidney injury) ([facility] [address] )  Assessment & Plan  · Unclear etiology  · Probably from unnoticed poor po intake on top of lisinopril and HCTZ  · Lisinopril and HCTZ on hold  · Gentle hydration with NSS at 75 ml/hr given, will discontinue with adequate oral intake  · Presenting creatinine 1 6    · Baseline appears 1 0 -1 1  · Avoid nephrotoxins / hypotension  Lab Results   Component Value Date    CREATININE 1 19 01/25/2020    CREATININE 1 31 (H) 01/24/2020    CREATININE 1 43 (H) 01/23/2020         Periorbital ecchymosis  Assessment & Plan  · Due to facial soft tissue trauma  · CT head negative for fracture  · Watch for persistent clear nasal discharge/fever/headache/change in mental status  · No new changes in neurologic status since admission-confirmed with nursing  · Low threshold for repeat CT if any neurologic change occurs    Essential hypertension  Assessment & Plan  · Hold lisinopril-HCTZ 20-12 5 mg daily  · Avoid hypotension  · BP stable  · Monitor VS    Type 2 diabetes mellitus with complication [facility])  Assessment & Plan  Lab Results   Component Value Date    HGBA1C 7 6 (A) 10/30/2019     Recent Labs     01/24/20  1610 01/24/20  2056 01/25/20  0718 01/25/20  1118   POCGLU 114 170* 99 170*     Blood Sugar Average: Last 72 hrs:  · A1C acceptable   · Hold metformin  · ADA diet  · Start SSI  (P) 131        VTE Pharmacologic Prophylaxis:   Pharmacologic: Heparin  Mechanical VTE Prophylaxis in Place: Yes    Patient Centered Rounds: I have performed bedside rounds with nursing staff today  Discussions with Specialists or Other Care Team Provider: Provider notes reviewed    Education and Discussions with Family / Patient: Plan of care with patient    Time Spent for Care: 30 minutes  More than 50% of total time spent on counseling and coordination of care as described above  Current Length of Stay: 2 day(s)    Current Patient Status: Inpatient   Certification Statement: The patient will continue to require additional inpatient hospital stay due to discharge arrangements / Ortho evaluation / supportive care for influenza    Discharge Plan: To rehab    Code Status: Level 1 - Full Code      Subjective:   Patient feels weak  Her cough is improved    She has continued pain in her right hand / wrist with limited mobility  Objective:     Vitals:   Temp (24hrs), Av 4 °F (36 9 °C), Min:97 3 °F (36 3 °C), Max:99 3 °F (37 4 °C)    Temp:  [97 3 °F (36 3 °C)-99 3 °F (37 4 °C)] 97 3 °F (36 3 °C)  HR:  [63-75] 64  Resp:  [18-22] 18  BP: (136-155)/(74-95) 155/85  SpO2:  [95 %-96 %] 96 %  Body mass index is 28 49 kg/m²  Input and Output Summary (last 24 hours):     No intake or output data in the 24 hours ending 20 1245    Physical Exam:     Physical Exam   Constitutional: She is oriented to person, place, and time  She appears well-developed and well-nourished  No distress  HENT:   Head: Normocephalic  Head is with raccoon's eyes  Eyes: Conjunctivae are normal  No scleral icterus  Cardiovascular: Normal rate, regular rhythm and normal heart sounds  No murmur heard  Pulmonary/Chest: Effort normal and breath sounds normal  No respiratory distress  She has no wheezes  She has no rales  + cough  Abdominal: Soft  Bowel sounds are normal  She exhibits no distension  There is no tenderness  Musculoskeletal:        Right wrist: She exhibits decreased range of motion, tenderness and swelling  Right hand: She exhibits decreased range of motion, tenderness and swelling  Neurological: She is alert and oriented to person, place, and time  Skin: Skin is warm and dry  Ecchymosis noted  She is not diaphoretic  Psychiatric: She has a normal mood and affect  Her behavior is normal    Nursing note and vitals reviewed          Additional Data:     Labs:    Results from last 7 days   Lab Units 20  0507 20  0522   WBC Thousand/uL 3 96* 3 39*   HEMOGLOBIN g/dL 10 3* 10 1*   HEMATOCRIT % 31 6* 31 1*   PLATELETS Thousands/uL 188 179   NEUTROS PCT %  --  65   LYMPHS PCT %  --  22   MONOS PCT %  --  11   EOS PCT %  --  1     Results from last 7 days   Lab Units 20  0523   SODIUM mmol/L 132*   POTASSIUM mmol/L 3 3*   CHLORIDE mmol/L 95*   CO2 mmol/L 22   BUN mg/dL 21   CREATININE mg/dL 1 19 ANION GAP mmol/L 15*   CALCIUM mg/dL 7 8*   GLUCOSE RANDOM mg/dL 101     Results from last 7 days   Lab Units 01/22/20  0940   INR  1 01     Results from last 7 days   Lab Units 01/25/20  1118 01/25/20  0718 01/24/20  2056 01/24/20  1610 01/24/20  1110 01/24/20  0752 01/23/20  2057 01/23/20  1601 01/23/20  1100 01/23/20  0737 01/22/20  2103 01/22/20  1729   POC GLUCOSE mg/dl 170* 99 170* 114 125 84 132 140 139 96 108 195*                   * I Have Reviewed All Lab Data Listed Above  * Additional Pertinent Lab Tests Reviewed: [facility] Admission Reviewed    Imaging:    Imaging Reports Reviewed Today Include: None  Imaging Personally Reviewed by Myself Includes:  None    Recent Cultures (last 7 days):           Last 24 Hours Medication List:     Current Facility-Administered Medications:  acetaminophen 650 mg Oral Q6H PRN Kathy Ramesh MD   atorvastatin 40 mg Oral Daily With Leela Singh MD   benzonatate 100 mg Oral TID PRN Kathy Ramesh MD   doxepin 10 mg Oral HS PRN JORDIN Moon   heparin (porcine) 5,000 Units Subcutaneous [facility] Kathy Ramesh MD   insulin lispro 1-5 Units Subcutaneous TID [facility] Kathy Ramesh MD   oseltamivir 30 mg Oral Q24H Kathy Ramesh MD   oxyCODONE 2 5 mg Oral Q4H PRN JORDIN Serna        Today, Patient Was Seen By: JORDIN Dee    ** Please Note: Dictation voice to text software may have been used in the creation of this document   **

## 2020-01-25 NOTE — ASSESSMENT & PLAN NOTE
· Presenting with a sodium of 127, may have contributed to her dizziness  · Received IV fluid hydration with normal saline    · Sodium trending up  · Continue to monitor  Lab Results   Component Value Date    SODIUM 132 (L) 01/25/2020    SODIUM 131 (L) 01/24/2020    SODIUM 130 (L) 01/23/2020

## 2020-01-25 NOTE — ASSESSMENT & PLAN NOTE
· With syncope due to dehydration versus vasovagal in the setting of coughing r/o valvular heart disease  When bending over to pick something up, she became dizzy and fell forward  · Echo performed, EF 60%, poor study  · Given IV fluid hydration for EMILY  · Monitored on telemetry  · Consult PT / OT for balance and ambulation, recommending rehab  Case management aware and patient will need to be treated for influenza prior to being admitted to rehab

## 2020-01-26 LAB
ANION GAP SERPL CALCULATED.3IONS-SCNC: 10 MMOL/L (ref 4–13)
BUN SERPL-MCNC: 14 MG/DL (ref 5–25)
CALCIUM SERPL-MCNC: 8.2 MG/DL (ref 8.3–10.1)
CHLORIDE SERPL-SCNC: 98 MMOL/L (ref 100–108)
CO2 SERPL-SCNC: 24 MMOL/L (ref 21–32)
CREAT SERPL-MCNC: 1.18 MG/DL (ref 0.6–1.3)
GFR SERPL CREATININE-BSD FRML MDRD: 42 ML/MIN/1.73SQ M
GLUCOSE SERPL-MCNC: 129 MG/DL (ref 65–140)
GLUCOSE SERPL-MCNC: 189 MG/DL (ref 65–140)
GLUCOSE SERPL-MCNC: 252 MG/DL (ref 65–140)
GLUCOSE SERPL-MCNC: 52 MG/DL (ref 65–140)
GLUCOSE SERPL-MCNC: 64 MG/DL (ref 65–140)
GLUCOSE SERPL-MCNC: 72 MG/DL (ref 65–140)
GLUCOSE SERPL-MCNC: 85 MG/DL (ref 65–140)
GLUCOSE SERPL-MCNC: 92 MG/DL (ref 65–140)
POTASSIUM SERPL-SCNC: 3.7 MMOL/L (ref 3.5–5.3)
SODIUM SERPL-SCNC: 132 MMOL/L (ref 136–145)

## 2020-01-26 PROCEDURE — 80048 BASIC METABOLIC PNL TOTAL CA: CPT | Performed by: NURSE PRACTITIONER

## 2020-01-26 PROCEDURE — 2W38X1Z IMMOBILIZATION OF RIGHT UPPER EXTREMITY USING SPLINT: ICD-10-PCS | Performed by: HOSPITALIST

## 2020-01-26 PROCEDURE — 99221 1ST HOSP IP/OBS SF/LOW 40: CPT | Performed by: PHYSICIAN ASSISTANT

## 2020-01-26 PROCEDURE — 99232 SBSQ HOSP IP/OBS MODERATE 35: CPT | Performed by: NURSE PRACTITIONER

## 2020-01-26 PROCEDURE — 82948 REAGENT STRIP/BLOOD GLUCOSE: CPT

## 2020-01-26 RX ORDER — DEXTROSE MONOHYDRATE 25 G/50ML
INJECTION, SOLUTION INTRAVENOUS
Status: DISCONTINUED
Start: 2020-01-26 | End: 2020-01-26 | Stop reason: WASHOUT

## 2020-01-26 RX ORDER — LISINOPRIL 20 MG/1
20 TABLET ORAL DAILY
Status: DISCONTINUED | OUTPATIENT
Start: 2020-01-26 | End: 2020-01-28 | Stop reason: HOSPADM

## 2020-01-26 RX ADMIN — ATORVASTATIN CALCIUM 40 MG: 40 TABLET, FILM COATED ORAL at 17:18

## 2020-01-26 RX ADMIN — LISINOPRIL 20 MG: 20 TABLET ORAL at 10:33

## 2020-01-26 RX ADMIN — HEPARIN SODIUM 5000 UNITS: 5000 INJECTION INTRAVENOUS; SUBCUTANEOUS at 23:23

## 2020-01-26 RX ADMIN — INSULIN LISPRO 2 UNITS: 100 INJECTION, SOLUTION INTRAVENOUS; SUBCUTANEOUS at 17:18

## 2020-01-26 RX ADMIN — HEPARIN SODIUM 5000 UNITS: 5000 INJECTION INTRAVENOUS; SUBCUTANEOUS at 04:50

## 2020-01-26 RX ADMIN — HEPARIN SODIUM 5000 UNITS: 5000 INJECTION INTRAVENOUS; SUBCUTANEOUS at 14:59

## 2020-01-26 RX ADMIN — OSELTAMIVIR PHOSPHATE 30 MG: 30 CAPSULE ORAL at 08:52

## 2020-01-26 NOTE — ASSESSMENT & PLAN NOTE
· Due to facial soft tissue trauma  · CT head negative for fracture  · No new changes in neurologic status since admission

## 2020-01-26 NOTE — PLAN OF CARE
Problem: Potential for Falls  Goal: Patient will remain free of falls  Description  INTERVENTIONS:  - Assess patient frequently for physical needs  -  Identify cognitive and physical deficits and behaviors that affect risk of falls    -  [place] fall precautions as indicated by assessment   - Educate patient/family on patient safety including physical limitations  - Instruct patient to call for assistance with activity based on assessment  - Modify environment to reduce risk of injury  - Consider OT/PT consult to assist with strengthening/mobility  Outcome: Progressing     Problem: PAIN - ADULT  Goal: Verbalizes/displays adequate comfort level or baseline comfort level  Description  Interventions:  - Encourage patient to monitor pain and request assistance  - Assess pain using appropriate pain scale  - Administer analgesics based on type and severity of pain and evaluate response  - Implement non-pharmacological measures as appropriate and evaluate response  - Consider cultural and social influences on pain and pain management  - Notify physician/advanced practitioner if interventions unsuccessful or patient reports new pain  Outcome: Progressing     Problem: INFECTION - ADULT  Goal: Absence or prevention of progression during hospitalization  Description  INTERVENTIONS:  - Assess and monitor for signs and symptoms of infection  - Monitor lab/diagnostic results  - Monitor all insertion sites, i e  indwelling lines, tubes, and drains  - Monitor endotracheal if appropriate and nasal secretions for changes in amount and color  - [place] appropriate cooling/warming therapies per order  - Administer medications as ordered  - Instruct and encourage patient and family to use good hand hygiene technique  - Identify and instruct in appropriate isolation precautions for identified infection/condition  Outcome: Progressing     Problem: CARDIOVASCULAR - ADULT  Goal: Maintains optimal cardiac output and hemodynamic stability  Description  INTERVENTIONS:  - Monitor I/O, vital signs and rhythm  - Monitor for S/S and trends of decreased cardiac output  - Administer and titrate ordered vasoactive medications to optimize hemodynamic stability  - Assess quality of pulses, skin color and temperature  - Assess for signs of decreased coronary artery perfusion  - Instruct patient to report change in severity of symptoms  Outcome: Progressing  Goal: Absence of cardiac dysrhythmias or at baseline rhythm  Description  INTERVENTIONS:  - Continuous cardiac monitoring, vital signs, obtain 12 lead EKG if ordered  - Administer antiarrhythmic and heart rate control medications as ordered  - Monitor electrolytes and administer replacement therapy as ordered  Outcome: Progressing     Problem: METABOLIC, FLUID AND ELECTROLYTES - ADULT  Goal: Electrolytes maintained within normal limits  Description  INTERVENTIONS:  - Monitor labs and assess patient for signs and symptoms of electrolyte imbalances  - Administer electrolyte replacement as ordered  - Monitor response to electrolyte replacements, including repeat lab results as appropriate  - Instruct patient on fluid and nutrition as appropriate  Outcome: Progressing  Goal: Fluid balance maintained  Description  INTERVENTIONS:  - Monitor labs   - Monitor I/O and WT  - Instruct patient on fluid and nutrition as appropriate  - Assess for signs & symptoms of volume excess or deficit  Outcome: Progressing

## 2020-01-26 NOTE — CONSULTS
Orthopaedic Surgery - Consultation  Juanita Dobson (89 y o  female)   : 1933   MRN: [id number]  Date: 2020   Encounter: [id number]   Unit/Bed#: [facility] 87 223-01    Consulting Physician:  Lloyd Valderrama PA-C  Requesting Physician: Azul Chavez MD  Reason for Consult / Principal Problem:  Right wrist pain and swelling    Assessment / Plan  Right wrist sprain    · Currently the patient has good function of the right hand with a large amount of swelling and ecchymosis  X-rays not concerning for acute fracture at this time  · Continue with ice and analgesics as needed for the pain  · Patient can continue activity as tolerated we will provide the patient with a wrist cock-up splint to use as needed at this time  · Patient can use platform walker if needed for the right arm  We will ask physical therapy to try this with the patient  · Plan outpatient follow-up in 2 weeks with Dr Tonny Curiel or Hand surgery if the symptoms have not resolved  History of Present Illness  Juanita Dobson is a 80 y o  female who presented to [facility] after a fall from syncope 2020  Initially she had x-rays in the emergency room of her right hand and forearm which were negative for fracture  The patient has had continued pain and swelling while being hospitalized the have affected her ability to use a walker  When asked where the pain is the patient points to the lateral aspect of her hand over the area of the 1st metacarpal, snuffbox and proximal wrist   This is also where she has a large amount of ecchymosis and swelling  She states that she can move her hand and wrist though it is stiff from the swelling  Most of her pain comes from pressure over the area or trying to use a walker with the hand  She states that she also uses a cane in her right hand and has difficulty functioning with it in her left  She has been using the walker for a while due to some balance issues    She denies any distal numbness or tingling in the right upper extremity  Review of Systems  Negative for chest pain and shortness of breath    Medical, Surgical, Family, and Social History    Past Medical History:   Diagnosis Date    Dehydration     Last assessed - 2/22/16    Diabetes mellitus ([facility] )     Hyperlipidemia     Hypertension     Hypotension     Last assessed - 2/22/16    Obstructive jaundice     Last assessed - 3/25/16    Sepsis ([facility] )        Past Surgical History:   Procedure Laterality Date    CYSTOSCOPY W/ STONE MANIPULATION N/A 2/23/2016    Procedure: STONE MANIPULATION;  Surgeon: Rhiannon Alvarado MD;  Location: [facility]; Service:     ERCP W/ SPHICTEROTOMY N/A 2/23/2016    Procedure: ENDOSCOPIC RETROGRADE CHOLANGIOPANCREATOGRAPHY (ERCP) W/ SPHINCTEROTOMY;  Surgeon: Rhiannon Alvarado MD;  Location: [facility];   Service:     TUBAL LIGATION         Family History   Problem Relation Age of Onset    Diabetes Mother     Diabetes Father     Heart attack Sister     Pancreatic cancer Son         Adenocarcinoma       Social History     Occupational History    Not on file   Tobacco Use    Smoking status: Never Smoker    Smokeless tobacco: Never Used   Substance and Sexual Activity    Alcohol use: No     Frequency: Never     Binge frequency: Never    Drug use: No    Sexual activity: Not on file       No Known Allergies    Current Facility-Administered Medications   Medication Dose Route Frequency    acetaminophen (TYLENOL) tablet 650 mg  650 mg Oral Q6H PRN    atorvastatin (LIPITOR) tablet 40 mg  40 mg Oral Daily With Dinner    benzonatate (TESSALON PERLES) capsule 100 mg  100 mg Oral TID PRN    doxepin (SINEquan) capsule 10 mg  10 mg Oral HS PRN    heparin (porcine) subcutaneous injection 5,000 Units  5,000 Units Subcutaneous Q8H [address]    insulin lispro (HumaLOG) 100 units/mL subcutaneous injection 1-5 Units  1-5 Units Subcutaneous TID AC    oseltamivir (TAMIFLU) capsule 30 mg  30 mg Oral Q24H    oxyCODONE (ROXICODONE) IR tablet 2 5 mg  2 5 mg Oral Q4H PRN     Medications Prior to Admission   Medication    atorvastatin (LIPITOR) 40 mg tablet    doxepin (SINEquan) 10 mg capsule    lisinopril-hydrochlorothiazide (PRINZIDE,ZESTORETIC) 20-12 5 MG per tablet    metFORMIN (GLUCOPHAGE) 500 mg tablet    ramelteon (ROZEREM) 8 mg tablet    benzonatate (TESSALON PERLES) 100 mg capsule    doxepin (SINEquan) 10 mg capsule    ergocalciferol (VITAMIN D2) 50,000 units    glucose blood (ONE TOUCH ULTRA TEST) test strip    Lancets (ONETOUCH ULTRASOFT) lancets       Vitals  Temp:  [98 1 °F (36 7 °C)-99 1 °F (37 3 °C)] 98 1 °F (36 7 °C)  HR:  [61-64] 61  Resp:  [18-22] 22  BP: (127-180)/(74-84) 180/74  Body mass index is 28 49 kg/m²  I/O last 24 hours: In: -   Out: 800 [Urine:800]    Physical Exam  General:  Alert & oriented x3, no distress, appears stated age  HEENT:  EOMI, eyes clear, hearing intact, mucous membranes moist  Neck:  Supple, non-tender, trachea midline, no lymphadenopathy  Cardiovascular:  Regular rate, no discernable arrhythmia  Pulmonary:  Regular rate, respirations non-labored   Abdominal:  Soft, non-tender    Right Hand & Wrist Exam  Alignment:  Normal elbow alignment and carrying angle  Normal resting hand posture  Inspection:  Patient does have a large amount of ecchymosis and swelling over mostly the lateral aspect of the wrist and hand, but this does go into the palmar aspect of the hand around the base of the thumb  Palpation:  Moderate tenderness at Proximal aspect of the 1st metacarpal and anatomic snuffbox  No pain with palpation of the wrist or forearm at this time     ROM:  Patient is able to approximate the thumb to all fingers though is limited when trying to make a fist due to swelling  She does have a just slight limitation due to swelling and wrist flexion and extension and step in a shin and pronation  These movements do not cause much pain though    Strength:  Able to actively flex & extend elbow  Able to actively dorsiflex & volarflex wrist   4+/5  Pinch 4+/5  Stability:  No objective hand or wrist instability  Tests:  No pertinent positive or negative tests  Neurovascular:  Sensation intact in Ax/R/M/U nerve distributions  Sensation intact in all digital nerve distributions  Fingers warm and perfused  Imaging  I have personally reviewed pertinent films in PACS  XR of right wrist, hand and forearm - From 01/22/2020 show no obvious fracture dislocation at this time with some degenerative changes seen throughout the hand  Lab Results  (I have personally reviewed pertinent lab results )  Results from last 7 days   Lab Units 01/24/20  0507 01/23/20  0522 01/22/20  0940   WBC Thousand/uL 3 96* 3 39* 4 13*   HEMOGLOBIN g/dL 10 3* 10 1* 11 6   HEMATOCRIT % 31 6* 31 1* 34 7*   PLATELETS Thousands/uL 188 179 200   RBC Million/uL 3 65* 3 58* 4 03   MCV fL 87 87 86   MCH pg 28 2 28 2 28 8   MCHC g/dL 32 6 32 5 33 4   RDW % 12 8 12 8 12 7   MPV fL 9 3 9 4 8 9   NRBC AUTO /100 WBCs  --  0 0     Results from last 7 days   Lab Units 01/22/20  0940   PTT seconds 33   INR  1 01     Results from last 7 days   Lab Units 01/26/20  0617 01/25/20  0523 01/24/20  0507 01/23/20  0522 01/22/20  0940   POTASSIUM mmol/L 3 7 3 3* 3 4* 3 3* 3 4*   CHLORIDE mmol/L 98* 95* 96* 93* 90*   CO2 mmol/L 24 22 22 24 25   BUN mg/dL 14 21 22 19 19   CREATININE mg/dL 1 18 1 19 1 31* 1 43* 1 64*   EGFR ml/min/1 73sq m 42 41 37 33 28   CALCIUM mg/dL 8 2* 7 8* 8 0* 8 0* 8 9                 Code Status  Level 1 - Full Code    Counseling / Coordination of Care  Total floor / unit time spent today 20 minutes  Greater than 50% of total time was spent with the patient and / or family counseling and / or coordination of care      Amauri Estrada PA-C

## 2020-01-26 NOTE — ASSESSMENT & PLAN NOTE
· Presenting with a sodium of 127, may have contributed to her dizziness  · Received IV fluid hydration with normal saline    · Sodium trending up  · Continue to monitor  Lab Results   Component Value Date    SODIUM 132 (L) 01/26/2020    SODIUM 132 (L) 01/25/2020    SODIUM 131 (L) 01/24/2020

## 2020-01-26 NOTE — ASSESSMENT & PLAN NOTE
Lab Results   Component Value Date    HGBA1C 7 6 (A) 10/30/2019     Recent Labs     01/25/20  1538 01/25/20  2056 01/26/20  0711 01/26/20  1102   POCGLU 79 118 85 129     Blood Sugar Average: Last 72 hrs:  · A1C acceptable   · Hold metformin  · ADA diet  · Start SSI  (P) 120

## 2020-01-26 NOTE — ASSESSMENT & PLAN NOTE
· With syncope due to dehydration versus vasovagal in the setting of coughing in setting of positive flu A  · When bending over to pick something up, she became dizzy and fell forward    · Echo performed, EF 60%, poor study  · Given IV fluid hydration for EMILY with resolved EMILY   · Treated for flu A as below   · PT/OT recommending rehab, discharge pending placement to rehab

## 2020-01-26 NOTE — ASSESSMENT & PLAN NOTE
· Held lisinopril-HCTZ 20-12 5 mg daily  · BP's trending up, restart Lisinopril 20 mg daily  · Monitor VS

## 2020-01-26 NOTE — SOCIAL WORK
W/E CM spoke to [facility], [facility]  Still holding bed for her pt; waiting for pt to be afebrile for 24 hrs and completion of her 2-5 days of Tamiflu       SHANTEL Gotti  1/26/2020  2133

## 2020-01-26 NOTE — PROGRESS NOTES
[facility] Internal Medicine  Progress Note - Malina Holiday 1933, 80 y o  female MRN: [id number]    [facility] Encounter: [id number]    Primary Care Provider: Lazarus Barton, MD   Date and time admitted to hospital: 1/22/2020  9:07 AM        * Fall from standing  Assessment & Plan  · With syncope due to dehydration versus vasovagal in the setting of coughing r/o valvular heart disease  When bending over to pick something up, she became dizzy and fell forward  · Echo performed, EF 60%, poor study  · Given IV fluid hydration for EMILY  · Monitored on telemetry  · Consult PT / OT for balance and ambulation, recommending rehab  Case management aware and patient will need to be treated for influenza prior to being admitted to rehab  Patient will go to [place] upon discharge  Influenza A  Assessment & Plan  · With 2 day history of cough  Spouse with same  · Influenza screen (+) A  · Tamiflu initiated, 3/5 days  · Supportive care  · Cough improving    Hyponatremia  Assessment & Plan  · Presenting with a sodium of 127, may have contributed to her dizziness  · Received IV fluid hydration with normal saline  · Sodium trending up  · Continue to monitor  Lab Results   Component Value Date    SODIUM 132 (L) 01/26/2020    SODIUM 132 (L) 01/25/2020    SODIUM 131 (L) 01/24/2020         Right wrist pain  Assessment & Plan  · Edematous right wrist status post fall  Attempted to brace her fall with her right hand  · Imaging from admission reviewed-right forearm and right hand, no acute osseous abnormality, degenerative changes present  · Continue pain control  · Ice / elevate  · With limited mobility of wrist, continued swelling of hand / wrist, will request consultation with Ortho for further recommendations  Concern for her ability to use walker  Recommend platform walker and wrist splint      EMILY (acute kidney injury) ([facility] )  Assessment & Plan  · Unclear etiology  · Probably from unnoticed poor po intake on top of lisinopril and HCTZ  · Lisinopril and HCTZ held  · Gentle hydration with NSS at 75 ml/hr given, will discontinue with adequate oral intake  · Presenting creatinine 1 6  · Baseline appears 1 0 -1 1  · Avoid nephrotoxins / hypotension  Lab Results   Component Value Date    CREATININE 1 18 01/26/2020    CREATININE 1 19 01/25/2020    CREATININE 1 31 (H) 01/24/2020         Periorbital ecchymosis  Assessment & Plan  · Due to facial soft tissue trauma  · CT head negative for fracture  · No new changes in neurologic status since admission    Essential hypertension  Assessment & Plan  · Held lisinopril-HCTZ 20-12 5 mg daily  · BP's trending up, restart Lisinopril 20 mg daily  · Monitor VS    Type 2 diabetes mellitus with complication [facility])  Assessment & Plan  Lab Results   Component Value Date    HGBA1C 7 6 (A) 10/30/2019     Recent Labs     01/25/20  1538 01/25/20  2056 01/26/20  0711 01/26/20  1102   POCGLU 79 118 85 129     Blood Sugar Average: Last 72 hrs:  · A1C acceptable   · Hold metformin  · ADA diet  · Start SSI  (P) 120    VTE Pharmacologic Prophylaxis:   Pharmacologic: Heparin  Mechanical VTE Prophylaxis in Place: Yes    Patient Centered Rounds: I have performed bedside rounds with nursing staff today  Discussions with Specialists or Other Care Team Provider:  Provider notes reviewed  Discussed with case management  Education and Discussions with Family / Patient:  Plan of care with patient    Time Spent for Care: 30 minutes  More than 50% of total time spent on counseling and coordination of care as described above  Current Length of Stay: 3 day(s)    Current Patient Status: Inpatient   Certification Statement: The patient will continue to require additional inpatient hospital stay due to Treatment for influenza/discharge arrangements    Discharge Plan:  Pending discharge arrangements    Code Status: Level 1 - Full Code      Subjective:   Patient reports improved cough  Tolerating her diet  Objective:     Vitals:   Temp (24hrs), Av 6 °F (37 °C), Min:98 1 °F (36 7 °C), Max:99 1 °F (37 3 °C)    Temp:  [98 1 °F (36 7 °C)-99 1 °F (37 3 °C)] 98 1 °F (36 7 °C)  HR:  [61-64] 62  Resp:  [18-22] 22  BP: (127-180)/(74-84) 178/77  SpO2:  [94 %-98 %] 96 %  Body mass index is 28 49 kg/m²  Input and Output Summary (last 24 hours): Intake/Output Summary (Last 24 hours) at 2020 1157  Last data filed at 2020 1016  Gross per 24 hour   Intake 160 ml   Output 950 ml   Net -790 ml       Physical Exam:     Physical Exam   Constitutional: She is oriented to person, place, and time  She appears well-developed and well-nourished  No distress  HENT:   Head: Normocephalic  Head is with raccoon's eyes  Eyes: Conjunctivae are normal  No scleral icterus  Cardiovascular: Normal rate, regular rhythm, normal heart sounds and intact distal pulses  Pulmonary/Chest: Effort normal and breath sounds normal  No respiratory distress  She has no wheezes  She has no rhonchi  She has no rales  + cough   Abdominal: Soft  Bowel sounds are normal  She exhibits no distension  There is no tenderness  Musculoskeletal:        Right wrist: She exhibits decreased range of motion, tenderness and swelling  Right hand: She exhibits decreased range of motion, tenderness and swelling  Neurological: She is alert and oriented to person, place, and time  Skin: Skin is warm and dry  Ecchymosis noted  She is not diaphoretic  Psychiatric: She has a normal mood and affect  Her behavior is normal  She exhibits abnormal recent memory  Nursing note and vitals reviewed          Additional Data:     Labs:    Results from last 7 days   Lab Units 20  0507 20  0522   WBC Thousand/uL 3 96* 3 39*   HEMOGLOBIN g/dL 10 3* 10 1*   HEMATOCRIT % 31 6* 31 1*   PLATELETS Thousands/uL 188 179   NEUTROS PCT %  --  65   LYMPHS PCT %  --  22   MONOS PCT %  --  11   EOS PCT %  --  1     Results from last 7 days   Lab Units 01/26/20  0617   SODIUM mmol/L 132*   POTASSIUM mmol/L 3 7   CHLORIDE mmol/L 98*   CO2 mmol/L 24   BUN mg/dL 14   CREATININE mg/dL 1 18   ANION GAP mmol/L 10   CALCIUM mg/dL 8 2*   GLUCOSE RANDOM mg/dL 92     Results from last 7 days   Lab Units 01/22/20  0940   INR  1 01     Results from last 7 days   Lab Units 01/26/20  1102 01/26/20  0711 01/25/20 2056 01/25/20  1538 01/25/20  1118 01/25/20  0718 01/24/20 2056 01/24/20  1610 01/24/20  1110 01/24/20  0752 01/23/20 2057 01/23/20  1601   POC GLUCOSE mg/dl 129 85 118 79 170* 99 170* 114 125 84 132 140                   * I Have Reviewed All Lab Data Listed Above  * Additional Pertinent Lab Tests Reviewed: [facility] Admission Reviewed    Imaging:    Imaging Reports Reviewed Today Include:  None  Imaging Personally Reviewed by Myself Includes:  None    Recent Cultures (last 7 days):           Last 24 Hours Medication List:     Current Facility-Administered Medications:  acetaminophen 650 mg Oral Q6H PRN Basilia Orellana MD   atorvastatin 40 mg Oral Daily With Dayday Mayes MD   benzonatate 100 mg Oral TID PRN Basilia Orellana MD   doxepin 10 mg Oral HS PRN JORDIN Eisenberg   heparin (porcine) 5,000 Units Subcutaneous [facility] Basilia Orellana MD   insulin lispro 1-5 Units Subcutaneous TID [facility] Basilia Orellana MD   lisinopril 20 mg Oral Daily JORDIN Guerra   oseltamivir 30 mg Oral Q24H Basilia Orellana MD   oxyCODONE 2 5 mg Oral Q4H PRN JORDIN Allison        Today, Patient Was Seen By: JORDIN Guerra    ** Please Note: Dictation voice to text software may have been used in the creation of this document   **

## 2020-01-26 NOTE — ASSESSMENT & PLAN NOTE
· With 2 day history of cough  Spouse with same    · Influenza screen (+) A  · Tamiflu initiated, 3/5 days  · Supportive care  · Cough improving

## 2020-01-26 NOTE — ASSESSMENT & PLAN NOTE
· Edematous right wrist status post fall  Attempted to brace her fall with her right hand  · Imaging from admission reviewed-right forearm and right hand, no acute osseous abnormality, degenerative changes present  · Continue pain control  · Ice / elevate  · With limited mobility of wrist, continued swelling of hand / wrist, will request consultation with Ortho for further recommendations  Concern for her ability to use walker  Recommend platform walker and wrist splint

## 2020-01-27 PROBLEM — E87.1 HYPONATREMIA: Status: RESOLVED | Noted: 2020-01-23 | Resolved: 2020-01-27

## 2020-01-27 PROBLEM — N17.9 AKI (ACUTE KIDNEY INJURY) (HCC): Status: RESOLVED | Noted: 2020-01-22 | Resolved: 2020-01-27

## 2020-01-27 LAB
GLUCOSE SERPL-MCNC: 129 MG/DL (ref 65–140)
GLUCOSE SERPL-MCNC: 130 MG/DL (ref 65–140)
GLUCOSE SERPL-MCNC: 211 MG/DL (ref 65–140)
GLUCOSE SERPL-MCNC: 99 MG/DL (ref 65–140)

## 2020-01-27 PROCEDURE — 97535 SELF CARE MNGMENT TRAINING: CPT

## 2020-01-27 PROCEDURE — 97530 THERAPEUTIC ACTIVITIES: CPT

## 2020-01-27 PROCEDURE — 97116 GAIT TRAINING THERAPY: CPT

## 2020-01-27 PROCEDURE — 97110 THERAPEUTIC EXERCISES: CPT

## 2020-01-27 PROCEDURE — 99232 SBSQ HOSP IP/OBS MODERATE 35: CPT | Performed by: PHYSICIAN ASSISTANT

## 2020-01-27 PROCEDURE — 82948 REAGENT STRIP/BLOOD GLUCOSE: CPT

## 2020-01-27 RX ORDER — HYDROCHLOROTHIAZIDE 12.5 MG/1
12.5 TABLET ORAL DAILY
Status: DISCONTINUED | OUTPATIENT
Start: 2020-01-27 | End: 2020-01-27

## 2020-01-27 RX ORDER — AMLODIPINE BESYLATE 2.5 MG/1
2.5 TABLET ORAL DAILY
Status: DISCONTINUED | OUTPATIENT
Start: 2020-01-28 | End: 2020-01-28

## 2020-01-27 RX ORDER — HYDRALAZINE HYDROCHLORIDE 20 MG/ML
5 INJECTION INTRAMUSCULAR; INTRAVENOUS ONCE
Status: COMPLETED | OUTPATIENT
Start: 2020-01-27 | End: 2020-01-27

## 2020-01-27 RX ORDER — AMLODIPINE BESYLATE 2.5 MG/1
2.5 TABLET ORAL DAILY
Qty: 30 TABLET | Refills: 0 | Status: SHIPPED | OUTPATIENT
Start: 2020-01-27 | End: 2020-01-28 | Stop reason: SDUPTHER

## 2020-01-27 RX ORDER — LISINOPRIL 20 MG/1
20 TABLET ORAL DAILY
Qty: 30 TABLET | Refills: 0 | Status: ON HOLD | OUTPATIENT
Start: 2020-01-28 | End: 2020-04-22 | Stop reason: SDUPTHER

## 2020-01-27 RX ORDER — OSELTAMIVIR PHOSPHATE 30 MG/1
30 CAPSULE ORAL EVERY 24 HOURS
Qty: 1 CAPSULE | Refills: 0 | Status: SHIPPED | OUTPATIENT
Start: 2020-01-28 | End: 2020-01-28 | Stop reason: HOSPADM

## 2020-01-27 RX ADMIN — HYDRALAZINE HYDROCHLORIDE 5 MG: 20 INJECTION INTRAMUSCULAR; INTRAVENOUS at 23:10

## 2020-01-27 RX ADMIN — INSULIN LISPRO 1 UNITS: 100 INJECTION, SOLUTION INTRAVENOUS; SUBCUTANEOUS at 12:37

## 2020-01-27 RX ADMIN — LISINOPRIL 20 MG: 20 TABLET ORAL at 09:30

## 2020-01-27 RX ADMIN — HEPARIN SODIUM 5000 UNITS: 5000 INJECTION INTRAVENOUS; SUBCUTANEOUS at 05:39

## 2020-01-27 RX ADMIN — HEPARIN SODIUM 5000 UNITS: 5000 INJECTION INTRAVENOUS; SUBCUTANEOUS at 13:05

## 2020-01-27 RX ADMIN — HEPARIN SODIUM 5000 UNITS: 5000 INJECTION INTRAVENOUS; SUBCUTANEOUS at 21:50

## 2020-01-27 RX ADMIN — OSELTAMIVIR PHOSPHATE 30 MG: 30 CAPSULE ORAL at 09:31

## 2020-01-27 RX ADMIN — DOXEPIN HYDROCHLORIDE 10 MG: 10 CAPSULE ORAL at 23:10

## 2020-01-27 RX ADMIN — HYDROCHLOROTHIAZIDE 12.5 MG: 12.5 TABLET ORAL at 13:05

## 2020-01-27 RX ADMIN — DOXEPIN HYDROCHLORIDE 10 MG: 10 CAPSULE ORAL at 01:01

## 2020-01-27 RX ADMIN — ATORVASTATIN CALCIUM 40 MG: 40 TABLET, FILM COATED ORAL at 17:18

## 2020-01-27 RX ADMIN — BENZONATATE 100 MG: 100 CAPSULE ORAL at 23:10

## 2020-01-27 NOTE — PLAN OF CARE
Problem: Potential for Falls  Goal: Patient will remain free of falls  Description  INTERVENTIONS:  - Assess patient frequently for physical needs  -  Identify cognitive and physical deficits and behaviors that affect risk of falls    -  [place] fall precautions as indicated by assessment   - Educate patient/family on patient safety including physical limitations  - Instruct patient to call for assistance with activity based on assessment  - Modify environment to reduce risk of injury  - Consider OT/PT consult to assist with strengthening/mobility  Outcome: Progressing     Problem: PAIN - ADULT  Goal: Verbalizes/displays adequate comfort level or baseline comfort level  Description  Interventions:  - Encourage patient to monitor pain and request assistance  - Assess pain using appropriate pain scale  - Administer analgesics based on type and severity of pain and evaluate response  - Implement non-pharmacological measures as appropriate and evaluate response  - Consider cultural and social influences on pain and pain management  - Notify physician/advanced practitioner if interventions unsuccessful or patient reports new pain  Outcome: Progressing     Problem: INFECTION - ADULT  Goal: Absence or prevention of progression during hospitalization  Description  INTERVENTIONS:  - Assess and monitor for signs and symptoms of infection  - Monitor lab/diagnostic results  - Monitor all insertion sites, i e  indwelling lines, tubes, and drains  - Monitor endotracheal if appropriate and nasal secretions for changes in amount and color  - [place] appropriate cooling/warming therapies per order  - Administer medications as ordered  - Instruct and encourage patient and family to use good hand hygiene technique  - Identify and instruct in appropriate isolation precautions for identified infection/condition  Outcome: Progressing     Problem: CARDIOVASCULAR - ADULT  Goal: Maintains optimal cardiac output and hemodynamic stability  Description  INTERVENTIONS:  - Monitor I/O, vital signs and rhythm  - Monitor for S/S and trends of decreased cardiac output  - Administer and titrate ordered vasoactive medications to optimize hemodynamic stability  - Assess quality of pulses, skin color and temperature  - Assess for signs of decreased coronary artery perfusion  - Instruct patient to report change in severity of symptoms  Outcome: Progressing  Goal: Absence of cardiac dysrhythmias or at baseline rhythm  Description  INTERVENTIONS:  - Continuous cardiac monitoring, vital signs, obtain 12 lead EKG if ordered  - Administer antiarrhythmic and heart rate control medications as ordered  - Monitor electrolytes and administer replacement therapy as ordered  Outcome: Progressing     Problem: METABOLIC, FLUID AND ELECTROLYTES - ADULT  Goal: Electrolytes maintained within normal limits  Description  INTERVENTIONS:  - Monitor labs and assess patient for signs and symptoms of electrolyte imbalances  - Administer electrolyte replacement as ordered  - Monitor response to electrolyte replacements, including repeat lab results as appropriate  - Instruct patient on fluid and nutrition as appropriate  Outcome: Progressing  Goal: Fluid balance maintained  Description  INTERVENTIONS:  - Monitor labs   - Monitor I/O and WT  - Instruct patient on fluid and nutrition as appropriate  - Assess for signs & symptoms of volume excess or deficit  Outcome: Progressing

## 2020-01-27 NOTE — PROGRESS NOTES
Progress Note - Bonnie Number 1933, 80 y o  female MRN: [id number]  Unit/Bed#: [facility] [address] 223-01 Encounter: [id number]  Primary Care Provider: Minesh Page MD   Date and time admitted to hospital: 1/22/2020  9:07 AM    * Fall from standing  Assessment & Plan  · With syncope due to dehydration versus vasovagal in the setting of coughing in setting of positive flu A  · When bending over to pick something up, she became dizzy and fell forward  · Echo performed, EF 60%, poor study  · Given IV fluid hydration for EMILY with resolved EMILY   · Treated for flu A as below   · PT/OT recommending rehab, discharge pending placement to rehab       EMILY (acute kidney injury) (HCC)resolved as of 1/27/2020  Assessment & Plan  · Probably from unnoticed poor po intake on top of lisinopril and HCTZ  · Lisinopril and HCTZ held on admission   · Improved with IVF back to baseline   · Restarted lisniopril   · D/c HCTZ due to hyponatremia   · Avoid nephrotoxins / hypotension  Lab Results   Component Value Date    CREATININE 1 18 01/26/2020    CREATININE 1 19 01/25/2020    CREATININE 1 31 (H) 01/24/2020         Influenza A  Assessment & Plan  · With 2 day history of cough  Spouse with same    · Influenza screen (+) A  · Tamiflu initiated, 4/5 days  · Supportive care      Type 2 diabetes mellitus with complication [facility])  Assessment & Plan  Lab Results   Component Value Date    HGBA1C 7 6 (A) 10/30/2019     Recent Labs     01/26/20  2153 01/26/20  2248 01/27/20  0742 01/27/20  1148   POCGLU 72 189* 99 211*     Blood Sugar Average: Last 72 hrs:  · Resume metformin upon discharge     (P) 124 [id number]    Essential hypertension  Assessment & Plan  · Held lisinopril-HCTZ 20-12 5 mg daily initially with BP trending up restarted lisinopril   · Will d/c HCTZ due to hyponatremia  · Started on low dose amlodipine 2 5 mg daily for better BP control       Periorbital ecchymosis  Assessment & Plan  · Due to facial soft tissue trauma  · CT head negative for fracture  · No new changes in neurologic status since admission    Right wrist pain  Assessment & Plan  · Edematous right wrist status post fall  Attempted to brace her fall with her right hand  · Imaging from admission reviewed-right forearm and right hand, no acute osseous abnormality, degenerative changes present  · Continue pain control  · Ice / elevate  · Seen by Ortho   · Per ortho continue activity as tolerated  with a wrist cock-up splint to use as needed at this time  · Follow up Dr Monroe Church outpatient     Hyponatremiaresolved as of 2020  Assessment & Plan  · Presenting with a sodium of 127, may have contributed to her dizziness  · Received IV fluid hydration with normal saline  · Sodium trending up closer to patient baseline   · Will d/c HCTZ moving forward to prevent hyponatremia   Lab Results   Component Value Date    SODIUM 132 (L) 2020    SODIUM 132 (L) 2020    SODIUM 131 (L) 2020             VTE Pharmacologic Prophylaxis:   Pharmacologic: Heparin  Mechanical VTE Prophylaxis in Place: Yes    Patient Centered Rounds: I have performed bedside rounds with nursing staff today  Discussions with Specialists or Other Care Team Provider: CM     Education and Discussions with Family / Patient: patient     Time Spent for Care: 20 minutes  More than 50% of total time spent on counseling and coordination of care as described above  Current Length of Stay: 4 day(s)    Current Patient Status: Inpatient   Certification Statement: The patient will continue to require additional inpatient hospital stay due to fall, flu     Discharge Plan: pending rehab     Code Status: Level 1 - Full Code      Subjective:   Patient doing well today  No acute complaints  rigth wrist pain controlled       Objective:     Vitals:   Temp (24hrs), Av 2 °F (37 3 °C), Min:99 1 °F (37 3 °C), Max:99 4 °F (37 4 °C)    Temp:  [99 1 °F (37 3 °C)-99 4 °F (37 4 °C)] 99 1 °F (37 3 °C)  HR:  [73-96] 73  Resp:  [18-20] 18  BP: (158-181)/(77-93) 180/80  SpO2:  [92 %-98 %] 95 %  Body mass index is 28 49 kg/m²  Input and Output Summary (last 24 hours): Intake/Output Summary (Last 24 hours) at 1/27/2020 1527  Last data filed at 1/27/2020 0100  Gross per 24 hour   Intake    Output 250 ml   Net -250 ml       Physical Exam:     Physical Exam   Constitutional: No distress  Neck: Neck supple  Cardiovascular: Normal rate and regular rhythm  Pulmonary/Chest: Effort normal and breath sounds normal    Abdominal: Soft  Bowel sounds are normal    Musculoskeletal: She exhibits deformity (wrist splint present )  Neurological: She is alert  Skin:   Ecchymosis over eyes bilaterally    Psychiatric: She has a normal mood and affect  Nursing note and vitals reviewed  Additional Data:     Labs:    Results from last 7 days   Lab Units 01/24/20  0507 01/23/20  0522   WBC Thousand/uL 3 96* 3 39*   HEMOGLOBIN g/dL 10 3* 10 1*   HEMATOCRIT % 31 6* 31 1*   PLATELETS Thousands/uL 188 179   NEUTROS PCT %  --  65   LYMPHS PCT %  --  22   MONOS PCT %  --  11   EOS PCT %  --  1     Results from last 7 days   Lab Units 01/26/20  0617   SODIUM mmol/L 132*   POTASSIUM mmol/L 3 7   CHLORIDE mmol/L 98*   CO2 mmol/L 24   BUN mg/dL 14   CREATININE mg/dL 1 18   ANION GAP mmol/L 10   CALCIUM mg/dL 8 2*   GLUCOSE RANDOM mg/dL 92     Results from last 7 days   Lab Units 01/22/20  0940   INR  1 01     Results from last 7 days   Lab Units 01/27/20  1148 01/27/20  0742 01/26/20  2248 01/26/20  2153 01/26/20  2137 01/26/20  2114 01/26/20  1551 01/26/20  1102 01/26/20  0711 01/25/20  2056 01/25/20  1538 01/25/20  1118   POC GLUCOSE mg/dl 211* 99 189* 72 52* 64* 252* 129 85 118 79 170*                   * I Have Reviewed All Lab Data Listed Above    * Additional Pertinent Lab Tests Reviewed: No New Labs Available For Today    Imaging:    Imaging Reports Reviewed Today Include: [email or web address]  Imaging Personally Reviewed by Myself Includes:  none    Recent Cultures (last 7 days):           Last 24 Hours Medication List:     Current Facility-Administered Medications:  acetaminophen 650 mg Oral Q6H PRN Julieta Hill MD   [START ON 1/28/2020] amLODIPine 2 5 mg Oral Daily Tiffanie Martínez PA-C   atorvastatin 40 mg Oral Daily With Lavonia Gosselin, MD   benzonatate 100 mg Oral TID PRN Julieta Hill MD   doxepin 10 mg Oral HS PRN JORDIN Pfeiffer   heparin (porcine) 5,000 Units Subcutaneous [facility] Julieta Hill MD   insulin lispro 1-5 Units Subcutaneous TID [facility] Julieta Hill MD   lisinopril 20 mg Oral Daily JORDIN Crain   oseltamivir 30 mg Oral Q24H Julieta Hill MD   oxyCODONE 2 5 mg Oral Q4H PRN JORDIN Ferraro        Today, Patient Was Seen By: Bruno Banks PA-C    ** Please Note: Dictation voice to text software may have been used in the creation of this document   **

## 2020-01-27 NOTE — ASSESSMENT & PLAN NOTE
· Held lisinopril-HCTZ 20-12 5 mg daily initially with BP trending up restarted lisinopril   · Will d/c HCTZ due to hyponatremia  · Started on low dose amlodipine 2 5 mg daily for better BP control

## 2020-01-27 NOTE — PLAN OF CARE
Problem: PHYSICAL THERAPY ADULT  Goal: Performs mobility at highest level of function for planned discharge setting  See evaluation for individualized goals  Description  Treatment/Interventions: Functional transfer training, LE strengthening/ROM, Elevations, Therapeutic exercise, Endurance training, Patient/family training, Equipment eval/education, Bed mobility, Gait training, Compensatory technique education, Continued evaluation, Spoke to nursing, OT  Equipment Recommended: Yvette Sandhu       See flowsheet documentation for full assessment, interventions and recommendations  Outcome: Progressing  Note:   Prognosis: Fair  Problem List: Decreased strength, Decreased range of motion, Decreased endurance, Impaired balance, Decreased mobility, Decreased safety awareness, Decreased cognition, Impaired judgement, Decreased skin integrity, Orthopedic restrictions, Pain  Assessment: Pt  supine in bed upon my arrival  Pt  reporting fatigue, however agreeable to therapeutic intervention  Pt  requested to use br, female LARSON present for A with pericare  Applied R wrist brace prior to therapeutic intervention  Trial of amb  trials x 2 one performed with RW, one with R platform attachment applied  Noted pt  at increased risk for falls with R platform attachment, not appropriate for use at this time  Recommend use of RW without platform attachment at this time  Progressed to stair training, requiring A of therapist with cues for proper completion  Pt  returned to room and repositioned seated in bedside chair with alarm active at end of treatment session  PT will continue to recommend d/c to rehab when medically stable for continued improvement of noted impairments above  However, if pt  refusing rehab upon d/c, would recommend HHPT and family support with all functional mobility when medically stable     Barriers to Discharge: Inaccessible home environment, Decreased caregiver support  Barriers to Discharge Comments: MITCH, level of support at home  Recommendation: Short-term skilled PT, Home PT, 24 hour supervision/assist, Home with family support(Pt  refusing rehab at this time )     PT - OK to Discharge: Yes(if d/c to rehab when medically stable )    See flowsheet documentation for full assessment

## 2020-01-27 NOTE — PLAN OF CARE
Problem: OCCUPATIONAL THERAPY ADULT  Goal: Performs self-care activities at highest level of function for planned discharge setting  See evaluation for individualized goals  Description  Treatment Interventions: ADL retraining, Functional transfer training, UE strengthening/ROM, Endurance training, Cognitive reorientation, Patient/family training, Equipment evaluation/education, Compensatory technique education          See flowsheet documentation for full assessment, interventions and recommendations  Outcome: Progressing  Note:   Limitation: Decreased ADL status, Decreased Safe judgement during ADL, Decreased UE strength, Decreased UE ROM, Decreased endurance, Decreased cognition, Decreased self-care trans, Decreased high-level ADLs  Prognosis: Good  Assessment: Pt was seen for skilled OT with focus on completion of self toileting, LE dressing activity, review of RW safety, OTR supervisory visit with focus on application and removal of wrist cock up splint to affected RUE  Pt reports having difficulty with ezekiel hygiene following application of wrist cock up splint  Educated Pt on wearing schedule of wrist cock up splint with episodes of increased pain  Pt reports having pain with flexion, "bending", unable to provided pain scale number  Pt scored 4 on FLACC scale with functional tasks without support of cock up splint  See above levels of A required for all functional tasks  Min A x1 required due to inconsistent use of RW and impulsive movements noted  Pt may benefit from further rehab with focus on achieving optimal performance levels with all functional tasks  Pt prefers to go directly home  If returning directly home Pt will require 24 hr supervision/assistsance, home therapies and a home safety evaluation due to noted deficits with safety awareness functional balance to promote optimal safety        OT Discharge Recommendation: Short Term Rehab  OT - OK to Discharge: Yes(when medically cleared  )

## 2020-01-27 NOTE — ASSESSMENT & PLAN NOTE
· Edematous right wrist status post fall  Attempted to brace her fall with her right hand  · Imaging from admission reviewed-right forearm and right hand, no acute osseous abnormality, degenerative changes present  · Continue pain control  · Ice / elevate  · Seen by Ortho   · Per ortho continue activity as tolerated  with a wrist cock-up splint to use as needed at this time    · Follow up Dr Jose R Alegre outpatient

## 2020-01-27 NOTE — DISCHARGE SUMMARY
Discharge- Lazarus Piano 1933, 80 y o  female MRN: [id number]  [facility] [address] 223-01 Encounter: [id number]  Primary Care Provider: Kerri Gabriel MD   Date and time admitted to hospital: 1/22/2020  9:07 AM    * Fall from standing  Assessment & Plan  · With syncope due to dehydration versus vasovagal in the setting of coughing in setting of positive flu A  · When bending over to pick something up, she became dizzy and fell forward  · Echo performed, EF 60%, poor study  · Given IV fluid hydration for EMILY with resolved EMILY   · Treated for flu A as below   · PT/OT recommending rehab , discharged to [address] [place] [address]      EMILY (acute kidney injury) (HCC)resolved as of 1/27/2020  Assessment & Plan  · Probably from unnoticed poor po intake on top of lisinopril and HCTZ  · Lisinopril and HCTZ held on admission   · Improved with IVF back to baseline   · Restarted lisniopril   · D/c HCTZ due to hyponatremia   · Avoid nephrotoxins / hypotension  Lab Results   Component Value Date    CREATININE 1 18 01/26/2020    CREATININE 1 19 01/25/2020    CREATININE 1 31 (H) 01/24/2020         Influenza A  Assessment & Plan  · With 2 day history of cough  Spouse with same    · Influenza screen (+) A  · Tamiflu initiated, completed 5 day course of Tamiflu  · Supportive care      Type 2 diabetes mellitus with complication [facility])  Assessment & Plan  Lab Results   Component Value Date    HGBA1C 7 6 (A) 10/30/2019     Recent Labs     01/26/20  2153 01/26/20  2248 01/27/20  0742 01/27/20  1148   POCGLU 72 189* 99 211*     Blood Sugar Average: Last 72 hrs:  · Resume metformin upon discharge     (P) 124 [id number]    Essential hypertension  Assessment & Plan  · Held lisinopril-HCTZ 20-12 5 mg daily initially with BP trending up restarted lisinopril   · Will d/c HCTZ due to hyponatremia  · Started on  amlodipine 5 mg daily for better BP control which can be uptitrated outpatient with PCP for blood pressure control  · Low-sodium diet      Periorbital ecchymosis  Assessment & Plan  · Due to facial soft tissue trauma  · CT head negative for fracture  · No new changes in neurologic status since admission    Right wrist pain  Assessment & Plan  · Edematous right wrist status post fall  Attempted to brace her fall with her right hand  · Imaging from admission reviewed-right forearm and right hand, no acute osseous abnormality, degenerative changes present  · Continue pain control  · Ice / elevate  · Seen by Ortho   · Per ortho continue activity as tolerated  with a wrist cock-up splint to use as needed at this time  · Follow up Dr Mariam Montilla outpatient     Hyponatremiaresolved as of 1/27/2020  Assessment & Plan  · Presenting with a sodium of 127, may have contributed to her dizziness  · Received IV fluid hydration with normal saline    · Sodium trending up closer to patient baseline   · Will d/c HCTZ moving forward to prevent hyponatremia   Lab Results   Component Value Date    SODIUM 132 (L) 01/26/2020    SODIUM 132 (L) 01/25/2020    SODIUM 131 (L) 01/24/2020           Discharging Physician / Practitioner: Augustine Tiwari PA-C  PCP: Kaz Jha MD  Admission Date:   Admission Orders (From admission, onward)     Ordered        01/23/20 0953  Inpatient Admission  Once         01/22/20 1154  Place in Observation (expected length of stay for this patient is less than two midnights)  Once                   Discharge Date: 01/28/20    Resolved Problems  Date Reviewed: 1/27/2020          Resolved    EMILY (acute kidney injury) ([facility] [address] ) 1/27/2020     Resolved by  Augustine Tiwari PA-C    Hyponatremia 1/27/2020     Resolved by  Augustine Tiwari PA-C          Consultations During Hospital Stay:  · Orthopedics     Procedures Performed:   · X-ray right forearm:  No acute osseous abnormality  · X-ray right hand:  No acute osseous abnormality  · X-ray right knee:  No acute osseous abnormality  · Chest x-ray:  No acute cardiopulmonary disease  · CT cervical spine:  No cervical spine fracture or traumatic malalignment  · CT head:  No acute intracranial abnormality  · Echocardiogram:  Ejection fraction 60%, trace mitral regurgitation, trace tricuspid regurgitation, small concentric pericardial effusion of no hemodynamic significance  · Positive influenza a    Significant Findings / Test Results:   · See above    Incidental Findings:   · none    Test Results Pending at Discharge (will require follow up):   · none     Outpatient Tests Requested:  · Follow-up with PCP in 1 week for post hospital follow-up  · Follow-up with orthopedics outpatient    Complications: EMILY resolved, influenza a, right wrist pain, periorbital ecchymosis    Reason for Admission:  Fall from standing    Hospital Course:     Kay Joy is a 80 y o  female patient who originally presented to the hospital on 1/22/2020 due to fall from standing after bending down to reach for the newspaper  She fell down hitting her face on the floor  She woke up and complained of right hand and arm pain  Suspect fall from standing was due to dehydration with a KI present on admission versus vasovagal in the setting of coughing and patient testing positive for influenza A on admission  She exhibited periorbital ecchymosis due to facial soft tissue trauma on admission was CT head negative for any fracture  She was also seen in consultation with orthopedics for right wrist pain  X-rays revealed no acute osseous abnormality  Patient can continue activity as tolerated of the right wrist with wrist cocked up in splint as needed  She will follow-up with orthopedics outpatient in 2 weeks with Dr Dmitriy Dang  She was treated 5 day course of Tamiflu for influenza  In regards to patient's acute kidney injury her renal function returned back to baseline with IV fluids and withholding blood pressure medications        Patient was previously maintained on lisinopril hydrochlorothiazide combination pill for  essential hypertension  Due to ongoing hyponatremia hydrochlorothiazide was discontinued and she was started on amlodipine 5 mg p o  Once daily which can be uptitrated as needed outpatient for blood pressure control  She will continue lisinopril 20 mg once daily  She should continue low-sodium diet and close follow-up with PCP outpatient for blood pressure monitoring  [place] [facility] [place]  Please see above list of diagnoses and related plan for additional information  Condition at Discharge: stable     Discharge Day Visit / Exam:     Subjective:  Patient feels well today without any acute complaints  No fevers, chills, abdominal pain, chest pain, shortness of breath  Pain of right wrist is controlled  No pain on face or eyes  Vitals: Blood Pressure: 168/84 01/28/20   Pulse:71  Temperature: 97 7 °F   Temp Source: Oral (01/25/20 1430)  Respirations: 18 (01/27/20 0716)  Weight - Scale: 54 8 kg (120 lb 13 oz) (01/22/20 1328)  SpO2: 98 % (01/27/20 1236)  Exam:   Physical Exam   Constitutional: No distress  HENT:   Ecchymosis around eyes bilaterally, improving    Eyes: Pupils are equal, round, and reactive to light  Neck: Neck supple  Cardiovascular: Normal rate and regular rhythm  Pulmonary/Chest: Effort normal and breath sounds normal    Abdominal: Soft  Bowel sounds are normal    Musculoskeletal: She exhibits deformity (right wrist brace present )  She exhibits no edema  Neurological: She is alert  Skin: Skin is warm  Psychiatric: She has a normal mood and affect  Nursing note and vitals reviewed  Discussion with Family:  on the phone     Discharge instructions/Information to patient and family:   See after visit summary for information provided to patient and family  Provisions for Follow-Up Care:  See after visit summary for information related to follow-up care and any pertinent home health orders        Disposition: [facility] [id number] (see below)    For Discharges to [facility]:   · [facility]  - Not Applicable to this Patient    Planned Readmission: none     Discharge Statement:  I spent 45 minutes discharging the patient  This time was spent on the day of discharge  I had direct contact with the patient on the day of discharge  Greater than 50% of the total time was spent examining patient, answering all patient questions, arranging and discussing plan of care with patient as well as directly providing post-discharge instructions  Additional time then spent on discharge activities  Discharge Medications:  See after visit summary for reconciled discharge medications provided to patient and family        ** Please Note: This note has been constructed using a voice recognition system **

## 2020-01-27 NOTE — UTILIZATION REVIEW
Continued Stay Review    Date:   1/27                           Initially admitted OBS  Status on  1/22  @  1154    CHANGED to INPT status on 1/23  @  0953,  [facility]    Current Patient Class:  IP  Current Level of Care: [facility]     HPI:86 y o  female initially admitted    after a fall from syncope 01/22/2020 and  Influenza Screen POS type A - tamiflu initiated  Presenting with a sodium of 127, may have contributed to her dizziness  ECHO performed, EF 60%  Given IVF for EMILY, monitored on telemetry  xrays of her right hand and forearm negative for fracture  1/26/2020  · Currently the patient has good function of the right hand with a large amount of swelling and ecchymosis  X-rays not concerning for acute fracture at this time  · Continue with ice and analgesics as needed for the pain  · Patient can continue activity as tolerated we will provide the patient with a wrist cock-up splint to use as needed at this time  · Patient can use platform walker if needed for the right arm  We will ask physical therapy to try this with the patient  · Cough improving      1/27/2020   Ongoing hyponatremia   · Received IV fluid hydration with normal saline    · Sodium trending up  · Continue to monitor  Cough 2/2 influenza improving  HTN -  Initially Held lisinopril (d/t EMILY)  Will restart now as bp's are trending up     Pertinent Labs/Diagnostic Results:   Results from last 7 days   Lab Units 01/24/20  0507 01/23/20  0522 01/22/20  0940   WBC Thousand/uL 3 96* 3 39* 4 13*   HEMOGLOBIN g/dL 10 3* 10 1* 11 6   HEMATOCRIT % 31 6* 31 1* 34 7*   PLATELETS Thousands/uL 188 179 200   NEUTROS ABS Thousands/µL  --  2 18 2 76         Results from last 7 days   Lab Units 01/26/20  0617 01/25/20  0523 01/24/20  0507 01/23/20  0522 01/22/20  0940   SODIUM mmol/L 132* 132* 131* 130* 127*   POTASSIUM mmol/L 3 7 3 3* 3 4* 3 3* 3 4*   CHLORIDE mmol/L 98* 95* 96* 93* 90*   CO2 mmol/L 24 22 22 24 25   ANION GAP mmol/L 10 15* 13 13 12   BUN mg/dL 14 21 22 19 19   CREATININE mg/dL 1 18 1 19 1 31* 1 43* 1 64*   EGFR ml/min/1 73sq m 42 41 37 33 28   CALCIUM mg/dL 8 2* 7 8* 8 0* 8 0* 8 9         Results from last 7 days   Lab Units 01/27/20  1148 01/27/20  0742 01/26/20  2248 01/26/20  2153 01/26/20  2137 01/26/20  2114 01/26/20  1551 01/26/20  1102 01/26/20  0711 01/25/20  2056   POC GLUCOSE mg/dl 211* 99 189* 72 52* 64* 252* 129 85 118     Results from last 7 days   Lab Units 01/26/20  0617 01/25/20  0523 01/24/20  0507 01/23/20  0522 01/22/20  0940   GLUCOSE RANDOM mg/dL 92 101 95 99 144*     Results from last 7 days   Lab Units 01/22/20  0940   TROPONIN I ng/mL <0 02         Results from last 7 days   Lab Units 01/22/20  0940   PROTIME seconds 13 4   INR  1 01   PTT seconds 33     Results from last 7 days   Lab Units 01/22/20  0940   NT-PRO BNP pg/mL 1,000*     Results from last 7 days   Lab Units 01/25/20  1357   SODIUM UR  116     Results from last 7 days   Lab Units 01/24/20  0115   INFLUENZA A PCR  Detected*   RSV PCR  None Detected         Vital Signs:   01/27/20 15:09:47  99 1 °F (37 3 °C)  73  18  180/80Abnormal   113  95 %       01/27/20 12:36:19    96    181/80Abnormal   114  98 %       01/27/20 0800              None (Room air)     01/27/20 07:16:06  99 2 °F (37 3 °C)  74  18  168/77  107  92 %       01/27/20 0027        158/86           01/26/20 22:47:28  99 4 °F (37 4 °C)  78  20  173/93Abnormal   120  95 %         Medications:   Scheduled Medications:    Medications:  [START ON 1/28/2020] amLODIPine 2 5 mg Oral Daily   atorvastatin 40 mg Oral Daily With Dinner   heparin (porcine) 5,000 Units Subcutaneous Q8H [address]   insulin lispro 1-5 Units Subcutaneous TID AC   lisinopril 20 mg Oral Daily   oseltamivir 30 mg Oral Q24H     Continuous IV Infusions:     PRN Meds:    acetaminophen 650 mg Oral Q6H PRN   benzonatate 100 mg Oral TID PRN   doxepin 10 mg Oral HS PRN   oxyCODONE 2 5 mg Oral Q4H PRN Discharge Plan: d    Network Utilization Review Department  [email or web address]  ATTENTION: Please call with any questions or concerns to [phone] and carefully listen to the prompts so that you are directed to the right person  All voicemails are confidential   Esha Trujillo all requests for admission clinical reviews, approved or denied determinations and any other requests to dedicated fax number below belonging to the campus where the patient is receiving treatment   List of dedicated fax numbers for the Facilities:  [address] DENIALS (Administrative/Medical Necessity) [phone]   [address] (Maternity/NICU/Pediatrics) [phone]   Lorena Crews [phone]   Adenike Fuller [phone]   [facility] [phone]   [place] [phone]   [address] [place] [address] [phone]   [facility]  [phone]   [address] [facility]  [address] [facility] [address] [facility] [phone]

## 2020-01-27 NOTE — OCCUPATIONAL THERAPY NOTE
Occupational Therapy         Patient Name: Manuel CHAU Date: 1/27/2020    Supervisory visit performed w/ NEO Milligan and provided pt w/ wrist brace and pt w/ increased time and cues to don/doff wrist brace and informed [facility] staff and patient to monitor skin integrity on wrist brace  Pt signed for wrist brace and reports "it feels better with it on"  Pt to wear brace as needed and can remove brace for toileting hygiene and then re-apply      Marcus Brewer MS, OTR/L

## 2020-01-27 NOTE — ASSESSMENT & PLAN NOTE
· With 2 day history of cough  Spouse with same    · Influenza screen (+) A  · Tamiflu initiated, 4/5 days  · Supportive care

## 2020-01-27 NOTE — PROGRESS NOTES
Patient's HS blood glucose was 64  Hypoglycemia protocol followed and blood glucose sherice to 189  Provider notified

## 2020-01-27 NOTE — PLAN OF CARE
Problem: Potential for Falls  Goal: Patient will remain free of falls  Description  INTERVENTIONS:  - Assess patient frequently for physical needs  -  Identify cognitive and physical deficits and behaviors that affect risk of falls    -  [place] fall precautions as indicated by assessment   - Educate patient/family on patient safety including physical limitations  - Instruct patient to call for assistance with activity based on assessment  - Modify environment to reduce risk of injury  - Consider OT/PT consult to assist with strengthening/mobility  Outcome: Progressing     Problem: PAIN - ADULT  Goal: Verbalizes/displays adequate comfort level or baseline comfort level  Description  Interventions:  - Encourage patient to monitor pain and request assistance  - Assess pain using appropriate pain scale  - Administer analgesics based on type and severity of pain and evaluate response  - Implement non-pharmacological measures as appropriate and evaluate response  - Consider cultural and social influences on pain and pain management  - Notify physician/advanced practitioner if interventions unsuccessful or patient reports new pain  Outcome: Progressing     Problem: INFECTION - ADULT  Goal: Absence or prevention of progression during hospitalization  Description  INTERVENTIONS:  - Assess and monitor for signs and symptoms of infection  - Monitor lab/diagnostic results  - Monitor all insertion sites, i e  indwelling lines, tubes, and drains  - Monitor endotracheal if appropriate and nasal secretions for changes in amount and color  - [place] appropriate cooling/warming therapies per order  - Administer medications as ordered  - Instruct and encourage patient and family to use good hand hygiene technique  - Identify and instruct in appropriate isolation precautions for identified infection/condition  Outcome: Progressing     Problem: CARDIOVASCULAR - ADULT  Goal: Maintains optimal cardiac output and hemodynamic stability  Description  INTERVENTIONS:  - Monitor I/O, vital signs and rhythm  - Monitor for S/S and trends of decreased cardiac output  - Administer and titrate ordered vasoactive medications to optimize hemodynamic stability  - Assess quality of pulses, skin color and temperature  - Assess for signs of decreased coronary artery perfusion  - Instruct patient to report change in severity of symptoms  Outcome: Progressing  Goal: Absence of cardiac dysrhythmias or at baseline rhythm  Description  INTERVENTIONS:  - Continuous cardiac monitoring, vital signs, obtain 12 lead EKG if ordered  - Administer antiarrhythmic and heart rate control medications as ordered  - Monitor electrolytes and administer replacement therapy as ordered  Outcome: Progressing     Problem: METABOLIC, FLUID AND ELECTROLYTES - ADULT  Goal: Electrolytes maintained within normal limits  Description  INTERVENTIONS:  - Monitor labs and assess patient for signs and symptoms of electrolyte imbalances  - Administer electrolyte replacement as ordered  - Monitor response to electrolyte replacements, including repeat lab results as appropriate  - Instruct patient on fluid and nutrition as appropriate  Outcome: Progressing  Goal: Fluid balance maintained  Description  INTERVENTIONS:  - Monitor labs   - Monitor I/O and WT  - Instruct patient on fluid and nutrition as appropriate  - Assess for signs & symptoms of volume excess or deficit  Outcome: Progressing

## 2020-01-27 NOTE — SOCIAL WORK
Pt/pt's  agreeable to [facility] made aware of same    Prior authorization submitted to [address] [place] [address]- will need [facility] transportation arranged

## 2020-01-27 NOTE — ASSESSMENT & PLAN NOTE
Lab Results   Component Value Date    HGBA1C 7 6 (A) 10/30/2019     Recent Labs     01/26/20  2153 01/26/20  2248 01/27/20  0742 01/27/20  1148   POCGLU 72 189* 99 211*     Blood Sugar Average: Last 72 hrs:  · Resume metformin upon discharge     (P) [id number]

## 2020-01-27 NOTE — OCCUPATIONAL THERAPY NOTE
Occupational Therapy Treatment Note:         01/27/20 8429   Restrictions/Precautions   Weight Bearing Precautions Per Order No   Other Precautions Pain; Fall Risk;Telemetry;Cognitive; Bed Alarm; Chair Alarm;Hard of hearing  (Wrist cock up splint ot RUE for comfort only )   Pain Assessment   Pain Assessment FLACC   Pain Score 4   Pain Type Acute pain   Pain Location Hand   Pain Orientation Right   Pain Frequency Intermittent   Hospital Pain Intervention(s)   (wrist cock up splint provided for comfort)   ADL   Where Assessed Chair   Eating Assistance 5  Supervision/Setup   Grooming Assistance 5  Supervision/Setup   Grooming Deficit Setup   UB Bathing Assistance 5  Supervision/Setup   UB Bathing Deficit Setup   LB Bathing Assistance 4  Minimal Assistance   LB Bathing Deficit Setup;Steadying;Verbal cueing;Supervision/safety; Increased time to complete;Perineal area; Buttocks;Right lower leg including foot; Left lower leg including foot   UB Dressing Assistance 5  Supervision/Setup   UB Dressing Deficit Setup   LB Dressing Assistance 4  Minimal Assistance   LB Dressing Deficit Steadying;Setup; Requires assistive device for steadying;Verbal cueing;Supervision/safety; Increased time to complete; Don/doff R sock; Don/doff L sock; Thread RLE into underwear; Thread LLE into underwear;Pull up over hips   LB Dressing Comments cues for safety required  Toileting Assistance  4  Minimal Assistance   Toileting Deficit Setup;Steadying;Supervison/safety;Verbal cueing; Increased time to complete;Clothing management up;Clothing management down;Perineal hygiene   Toileting Comments increased A required for ezekiel care  recommended removal of splint with self toileting if desired  Functional Standing Tolerance   Time 5 mins   Activity dynamic stand balance activity  Comments Pt with need for cues for RW safety  Transfers   Sit to Stand 4  Minimal assistance   Additional items Assist x 1; Armrests; Increased time required;Verbal cues   Stand to Sit 4  Minimal assistance   Additional items Assist x 1; Increased time required;Verbal cues;Armrests   Stand pivot 4  Minimal assistance   Additional items Assist x 1; Armrests; Increased time required;Verbal cues   Toilet transfer 4  Minimal assistance   Additional items Assist x 1; Armrests; Increased time required;Verbal cues;Standard toilet   Additional Comments cues for safety required with activities instance  Functional Mobility   Functional Mobility 4  Minimal assistance   Additional Comments x1   Additional items Rolling walker  (Impulsive movements noted with activities  )   Toilet Transfers   Toilet Transfer From Repunch Type To   Toilet Transfer to [employer] Transfer Technique Stand pivot; Ambulating   Toilet Transfers Verbal cues; Minimal assistance   Toilet Transfers Comments cues for safety required  Cognition   Overall Cognitive Status WFL   Arousal/Participation Cooperative;Responsive   Attention Attends with cues to redirect   Orientation Level Oriented X4   Memory Decreased recall of precautions   Following Commands Follows multistep commands with increased time or repetition   Additional Activities   Additional Activities Other (Comment)  (reviewed current plan of care  )   Additional Activities Comments redirection required to promote optimal safety with functional tasks  Activity Tolerance   Activity Tolerance Patient limited by pain; Patient limited by fatigue   Medical Staff Made Aware Reported all findings to nursing staff  Recommended masimo monitor be repositioned due to current application being at affected wrist area  Masimo repositioned and in place  Assessment   Assessment Pt was seen for skilled OT with focus on completion of self toileting, LE dressing activity, review of RW safety, OTR supervisory visit with focus on application and removal of wrist cock up splint to affected RUE   Pt reports having difficulty with ezekiel hygiene following application of wrist cock up splint  Educated Pt on wearing schedule of wrist cock up splint with episodes of increased pain  Pt reports having pain with flexion, "bending", unable to provided pain scale number  Pt scored 4 on FLACC scale with functional tasks without support of cock up splint  See above levels of A required for all functional tasks  Min A x1 required due to inconsistent use of RW and impulsive movements noted  Pt may benefit from further rehab with focus on achieving optimal performance levels with all functional tasks  Pt prefers to go directly home  If returning directly home Pt will require 24 hr supervision/assistsance, home therapies and a home safety evaluation due to noted deficits with safety awareness functional balance to promote optimal safety  Plan   Treatment Interventions ADL retraining;Functional transfer training; Endurance training;Patient/family training;Cognitive reorientation;UE strengthening/ROM   Goal Expiration Date 02/02/20   OT Treatment Day 1   OT Frequency 3-5x/wk   Recommendation   OT Discharge Recommendation Short Term Rehab   OT - OK to Discharge Yes  (when medically cleared  )   Barthel Index   Feeding 5   Bathing 0   Grooming Score 0   Dressing Score 5   Bladder Score 10   Bowels Score 10   Toilet Use Score 5   Transfers (Bed/Chair) Score 10   Mobility (Level Surface) Score 0   Stairs Score 0   Barthel Index Score 45   Zheng Fernandez, [address]

## 2020-01-27 NOTE — ASSESSMENT & PLAN NOTE
· Probably from unnoticed poor po intake on top of lisinopril and HCTZ  · Lisinopril and HCTZ held on admission   · Improved with IVF back to baseline   · Restarted lisniopril   · D/c HCTZ due to hyponatremia   · Avoid nephrotoxins / hypotension  Lab Results   Component Value Date    CREATININE 1 18 01/26/2020    CREATININE 1 19 01/25/2020    CREATININE 1 31 (H) 01/24/2020

## 2020-01-27 NOTE — ASSESSMENT & PLAN NOTE
· Presenting with a sodium of 127, may have contributed to her dizziness  · Received IV fluid hydration with normal saline    · Sodium trending up closer to patient baseline   · Will d/c HCTZ moving forward to prevent hyponatremia   Lab Results   Component Value Date    SODIUM 132 (L) 01/26/2020    SODIUM 132 (L) 01/25/2020    SODIUM 131 (L) 01/24/2020

## 2020-01-27 NOTE — PHYSICAL THERAPY NOTE
Physical Therapy Progress Note     01/27/20 4369   Pain Assessment   Pain Assessment 0-10   Pain Score 4   Pain Type Acute pain   Pain Location Hand   Pain Orientation [facility]   Hospital Pain Intervention(s) Ambulation/increased activity;Repositioned   Response to Interventions Tolerated  Restrictions/Precautions   Weight Bearing Precautions Per Order No   Braces or Orthoses Splint  (R wrist cock-up splint)   Other Precautions Fall Risk;Pain;Cognitive; Chair Alarm; Bed Alarm;Droplet precautions;Multiple lines   General   Chart Reviewed Yes   Response to Previous Treatment Patient reporting fatigue but able to participate  Family/Caregiver Present No   Subjective   Subjective Willing to participate in therapy this AM    Bed Mobility   Supine to Sit 4  Minimal assistance   Additional items Assist x 1;HOB elevated; Bedrails; Increased time required;Leg ;LE management;Verbal cues   Additional Comments Pt  remained seated in bedside chair at end of treatment session  Transfers   Sit to Stand 4  Minimal assistance   Additional items Assist x 1;Bedrails; Increased time required;Verbal cues   Stand to Sit 4  Minimal assistance   Additional items Assist x 1; Armrests; Increased time required;Verbal cues   Toilet transfer 4  Minimal assistance   Additional items Assist x 1; Armrests; Increased time required;Verbal cues;Standard toilet; Other  (use of grab bar)   Ambulation/Elevation   Gait pattern Decreased foot clearance; Forward Flexion; Short stride; Excessively slow; Inconsistent emeli; Shuffling   Gait Assistance 4  Minimal assist   Additional items Assist x 1;Verbal cues; Tactile cues   Assistive Device Rolling walker;R platform   Distance 60' with RW, [de-identified]' with platform RW attachment   Stair Management Assistance 4  Minimal assist   Additional items Assist x 1;Verbal cues; Tactile cues; Increased time required   Stair Management Technique Two rails; Step to pattern; Foreward;Nonreciprocal   Number of Stairs 2   Balance Static Sitting Fair +   Dynamic Sitting Fair   Static Standing Fair -   Dynamic Standing Poor +   Ambulatory Poor +   Endurance Deficit   Endurance Deficit Yes   Endurance Deficit Description fatigue/weakness   Activity Tolerance   Activity Tolerance Patient limited by fatigue   Medical Staff Made Aware Miriam Bone,  Brittaney Hernandez Yes   Exercises   TKR Sitting;10 reps;AAROM; Bilateral   Assessment   Prognosis Fair   Problem List Decreased strength;Decreased range of motion;Decreased endurance; Impaired balance;Decreased mobility; Decreased safety awareness;Decreased cognition; Impaired judgement;Decreased skin integrity;Orthopedic restrictions;Pain   Assessment Pt  supine in bed upon my arrival  Pt  reporting fatigue, however agreeable to therapeutic intervention  Pt  requested to use br, female LARSON present for A with pericare  Applied R wrist brace prior to therapeutic intervention  Trial of amb  trials x 2 one performed with RW, one with R platform attachment applied  Noted pt  at increased risk for falls with R platform attachment, not appropriate for use at this time  Recommend use of RW without platform attachment at this time  Progressed to stair training, requiring A of therapist with cues for proper completion  Pt  returned to room and repositioned seated in bedside chair with alarm active at end of treatment session  PT will continue to recommend d/c to rehab when medically stable for continued improvement of noted impairments above  However, if pt  refusing rehab upon d/c, would recommend HHPT and family support with all functional mobility when medically stable  Barriers to Discharge Inaccessible home environment;Decreased caregiver support   Barriers to Discharge Comments MITCH, level of support at home  Goals   Patient Goals To go home  STG Expiration Date 02/02/20   PT Treatment Day 1   Plan   Treatment/Interventions Functional transfer training;LE strengthening/ROM; Therapeutic exercise; Endurance training;Bed mobility;Gait training;Spoke to case management;Spoke to nursing;OT   Progress Slow progress, decreased activity tolerance   PT Frequency Other (Comment)  (3-5x/wk)   Recommendation   Recommendation Short-term skilled PT; Home PT;24 hour supervision/assist;Home with family support  (Pt  refusing rehab at this time )   Equipment Recommended Walker  (RW)   PT - OK to Discharge Yes  (if d/c to rehab when medically stable )     Jennifer Vee, PTA

## 2020-01-27 NOTE — SOCIAL WORK
[facility] and [facility] unable to accept until Wednesday (needs to have received 5 days total of Tamiflu)  [facility] Liaison to evaluate ability to be d/c to their facility today  Will follow to assist with dc poc

## 2020-01-27 NOTE — DISCHARGE INSTRUCTIONS
Influenza   WHAT YOU NEED TO KNOW:   Influenza (the flu) is an infection caused by the influenza virus  The flu is easily spread when an infected person coughs, sneezes, or has close contact with others  You may be able to spread the flu to others for 1 week or longer after signs or symptoms appear  DISCHARGE INSTRUCTIONS:   Call 911 for any of the following:   · You have trouble breathing, and your lips look purple or blue  · You have a seizure  Seek care immediately if:   · You are dizzy, or you are urinating less or not at all  · You have a headache with a stiff neck, and you feel tired or confused  · You have new pain or pressure in your chest     · Your symptoms, such as shortness of breath, vomiting, or diarrhea, get worse  · Your symptoms, such as fever and coughing, seem to get better, but then get worse  Contact your healthcare provider if:   · You have new muscle pain or weakness  · You have questions or concerns about your condition or care  Medicines: You may need any of the following:  · Acetaminophen  decreases pain and fever  It is available without a doctor's order  Ask how much to take and how often to take it  Follow directions  Acetaminophen can cause liver damage if not taken correctly  · NSAIDs , such as ibuprofen, help decrease swelling, pain, and fever  This medicine is available with or without a doctor's order  NSAIDs can cause stomach bleeding or kidney problems in certain people  If you take blood thinner medicine, always ask your healthcare provider if NSAIDs are safe for you  Always read the medicine label and follow directions  · Antivirals  help fight a viral infection  · Take your medicine as directed  Contact your healthcare provider if you think your medicine is not helping or if you have side effects  Tell him or her if you are allergic to any medicine  Keep a list of the medicines, vitamins, and herbs you take   Include the amounts, and when and why you take them  Bring the list or the pill bottles to follow-up visits  Carry your medicine list with you in case of an emergency  Rest  as much as you can to help you recover  Drink liquids as directed  to help prevent dehydration  Ask how much liquid to drink each day and which liquids are best for you  Prevent the spread of influenza:   · Wash your hands often  Use soap and water  Wash your hands after you use the bathroom, change a child's diapers, or sneeze  Wash your hands before you prepare or eat food  Use gel hand cleanser when soap and water are not available  Do not touch your eyes, nose, or mouth unless you have washed your hands first            · Cover your mouth when you sneeze or cough  Cough into a tissue or the bend of your arm  · Clean shared items with a germ-killing   Clean table surfaces, doorknobs, and light switches  Do not share towels, silverware, and dishes with people who are sick  Wash bed sheets, towels, silverware, and dishes with soap and water  · Wear a mask  over your mouth and nose if you are sick or are near anyone who is sick  · Stay away from others  if you are sick  · Influenza vaccine  helps prevent influenza (flu)  Everyone older than 6 months should get a yearly influenza vaccine  Get the vaccine as soon as it is available, usually in September or October each year  Follow up with your healthcare provider as directed:  Write down your questions so you remember to ask them during your visits  © 2017 [employer] Information is for End User's use only and may not be sold, redistributed or otherwise used for commercial purposes  All illustrations and images included in CareNotes® are the copyrighted property of [employer] , [employer]  or Moises Aguilar  The above information is an  only  It is not intended as medical advice for individual conditions or treatments   Talk to your doctor, nurse or pharmacist before following any medical regimen to see if it is safe and effective for you

## 2020-01-28 ENCOUNTER — TRANSITIONAL CARE MANAGEMENT (OUTPATIENT)
Dept: FAMILY MEDICINE CLINIC | Facility: CLINIC | Age: 85
End: 2020-01-28

## 2020-01-28 VITALS
TEMPERATURE: 97.7 F | SYSTOLIC BLOOD PRESSURE: 168 MMHG | HEART RATE: 71 BPM | BODY MASS INDEX: 28.49 KG/M2 | RESPIRATION RATE: 20 BRPM | WEIGHT: 120.81 LBS | OXYGEN SATURATION: 97 % | DIASTOLIC BLOOD PRESSURE: 84 MMHG

## 2020-01-28 LAB
GLUCOSE SERPL-MCNC: 131 MG/DL (ref 65–140)
GLUCOSE SERPL-MCNC: 244 MG/DL (ref 65–140)

## 2020-01-28 PROCEDURE — 99239 HOSP IP/OBS DSCHRG MGMT >30: CPT | Performed by: PHYSICIAN ASSISTANT

## 2020-01-28 PROCEDURE — 82948 REAGENT STRIP/BLOOD GLUCOSE: CPT

## 2020-01-28 RX ORDER — AMLODIPINE BESYLATE 2.5 MG/1
5 TABLET ORAL DAILY
Qty: 30 TABLET | Refills: 0 | Status: ON HOLD | OUTPATIENT
Start: 2020-01-28 | End: 2020-04-22 | Stop reason: SDUPTHER

## 2020-01-28 RX ORDER — AMLODIPINE BESYLATE 5 MG/1
5 TABLET ORAL DAILY
Status: DISCONTINUED | OUTPATIENT
Start: 2020-01-29 | End: 2020-01-28 | Stop reason: HOSPADM

## 2020-01-28 RX ADMIN — HEPARIN SODIUM 5000 UNITS: 5000 INJECTION INTRAVENOUS; SUBCUTANEOUS at 05:22

## 2020-01-28 RX ADMIN — OSELTAMIVIR PHOSPHATE 30 MG: 30 CAPSULE ORAL at 09:00

## 2020-01-28 RX ADMIN — LISINOPRIL 20 MG: 20 TABLET ORAL at 08:59

## 2020-01-28 RX ADMIN — AMLODIPINE BESYLATE 2.5 MG: 2.5 TABLET ORAL at 09:00

## 2020-01-28 NOTE — SOCIAL WORK
Pt approved per Ammon Ríos to go to [facility] with [facility] 1/28/2020 under [facility] [id number] RUG level RVB recommended 540 minutes therapy/wk  Transportation arranged via [facility] (as previously discussed with pt/pt's  and agreed upon cost)- to be picked up at 1200  Attending/Facility/RN/Pt and pt's  made aware of same  No further d/c needs identified

## 2020-01-28 NOTE — PLAN OF CARE
Problem: Potential for Falls  Goal: Patient will remain free of falls  Description  INTERVENTIONS:  - Assess patient frequently for physical needs  -  Identify cognitive and physical deficits and behaviors that affect risk of falls    -  [place] fall precautions as indicated by assessment   - Educate patient/family on patient safety including physical limitations  - Instruct patient to call for assistance with activity based on assessment  - Modify environment to reduce risk of injury  - Consider OT/PT consult to assist with strengthening/mobility  1/28/2020 1040 by John Oshea RN  Outcome: Adequate for Discharge  1/28/2020 0743 by John Oshea RN  Outcome: Progressing     Problem: PAIN - ADULT  Goal: Verbalizes/displays adequate comfort level or baseline comfort level  Description  Interventions:  - Encourage patient to monitor pain and request assistance  - Assess pain using appropriate pain scale  - Administer analgesics based on type and severity of pain and evaluate response  - Implement non-pharmacological measures as appropriate and evaluate response  - Consider cultural and social influences on pain and pain management  - Notify physician/advanced practitioner if interventions unsuccessful or patient reports new pain  1/28/2020 1040 by John Oshea RN  Outcome: Adequate for Discharge  1/28/2020 0743 by John Oshea RN  Outcome: Progressing     Problem: INFECTION - ADULT  Goal: Absence or prevention of progression during hospitalization  Description  INTERVENTIONS:  - Assess and monitor for signs and symptoms of infection  - Monitor lab/diagnostic results  - Monitor all insertion sites, i e  indwelling lines, tubes, and drains  - Monitor endotracheal if appropriate and nasal secretions for changes in amount and color  - [place] appropriate cooling/warming therapies per order  - Administer medications as ordered  - Instruct and encourage patient and family to use good hand hygiene technique  - Identify and instruct in appropriate isolation precautions for identified infection/condition  1/28/2020 1040 by Katarzyna Balderas RN  Outcome: Adequate for Discharge  1/28/2020 0743 by Katarzyna Balderas RN  Outcome: Progressing     Problem: CARDIOVASCULAR - ADULT  Goal: Maintains optimal cardiac output and hemodynamic stability  Description  INTERVENTIONS:  - Monitor I/O, vital signs and rhythm  - Monitor for S/S and trends of decreased cardiac output  - Administer and titrate ordered vasoactive medications to optimize hemodynamic stability  - Assess quality of pulses, skin color and temperature  - Assess for signs of decreased coronary artery perfusion  - Instruct patient to report change in severity of symptoms  1/28/2020 1040 by Katarzyna Balderas RN  Outcome: Adequate for Discharge  1/28/2020 0743 by Katarzyna Balderas RN  Outcome: Progressing  Goal: Absence of cardiac dysrhythmias or at baseline rhythm  Description  INTERVENTIONS:  - Continuous cardiac monitoring, vital signs, obtain 12 lead EKG if ordered  - Administer antiarrhythmic and heart rate control medications as ordered  - Monitor electrolytes and administer replacement therapy as ordered  1/28/2020 1040 by Katarzyna Balderas RN  Outcome: Adequate for Discharge  1/28/2020 0743 by Katarzyna Balderas RN  Outcome: Progressing     Problem: METABOLIC, FLUID AND ELECTROLYTES - ADULT  Goal: Electrolytes maintained within normal limits  Description  INTERVENTIONS:  - Monitor labs and assess patient for signs and symptoms of electrolyte imbalances  - Administer electrolyte replacement as ordered  - Monitor response to electrolyte replacements, including repeat lab results as appropriate  - Instruct patient on fluid and nutrition as appropriate  1/28/2020 1040 by Katarzyna Balderas RN  Outcome: Adequate for Discharge  1/28/2020 0743 by Katarzyna Balderas RN  Outcome: Progressing  Goal: Fluid balance maintained  Description  INTERVENTIONS:  - Monitor labs   - Monitor I/O and WT  - Instruct patient on fluid and nutrition as appropriate  - Assess for signs & symptoms of volume excess or deficit  1/28/2020 1040 by Felix Clement RN  Outcome: Adequate for Discharge  1/28/2020 0743 by Felix Clement RN  Outcome: Progressing     Problem: Prexisting or High Potential for Compromised Skin Integrity  Goal: Skin integrity is maintained or improved  Description  INTERVENTIONS:  - Identify patients at risk for skin breakdown  - Assess and monitor skin integrity  - Assess and monitor nutrition and hydration status  - Monitor labs   - Assess for incontinence   - Turn and reposition patient  - Assist with mobility/ambulation  - Relieve pressure over bony prominences  - Avoid friction and shearing  - Provide appropriate hygiene as needed including keeping skin clean and dry  - Evaluate need for skin moisturizer/barrier cream  - Collaborate with interdisciplinary team   - Patient/family teaching  - Consider wound care consult   1/28/2020 1040 by Felix Clement RN  Outcome: Adequate for Discharge  1/28/2020 0743 by Felix Clement RN  Outcome: Progressing

## 2020-01-28 NOTE — PLAN OF CARE
Problem: Potential for Falls  Goal: Patient will remain free of falls  Description  INTERVENTIONS:  - Assess patient frequently for physical needs  -  Identify cognitive and physical deficits and behaviors that affect risk of falls    -  [employer] fall precautions as indicated by assessment   - Educate patient/family on patient safety including physical limitations  - Instruct patient to call for assistance with activity based on assessment  - Modify environment to reduce risk of injury  - Consider OT/PT consult to assist with strengthening/mobility  Outcome: Progressing     Problem: PAIN - ADULT  Goal: Verbalizes/displays adequate comfort level or baseline comfort level  Description  Interventions:  - Encourage patient to monitor pain and request assistance  - Assess pain using appropriate pain scale  - Administer analgesics based on type and severity of pain and evaluate response  - Implement non-pharmacological measures as appropriate and evaluate response  - Consider cultural and social influences on pain and pain management  - Notify physician/advanced practitioner if interventions unsuccessful or patient reports new pain  Outcome: Progressing     Problem: INFECTION - ADULT  Goal: Absence or prevention of progression during hospitalization  Description  INTERVENTIONS:  - Assess and monitor for signs and symptoms of infection  - Monitor lab/diagnostic results  - Monitor all insertion sites, i e  indwelling lines, tubes, and drains  - Monitor endotracheal if appropriate and nasal secretions for changes in amount and color  - [employer] appropriate cooling/warming therapies per order  - Administer medications as ordered  - Instruct and encourage patient and family to use good hand hygiene technique  - Identify and instruct in appropriate isolation precautions for identified infection/condition  Outcome: Progressing     Problem: CARDIOVASCULAR - ADULT  Goal: Maintains optimal cardiac output and hemodynamic stability  Description  INTERVENTIONS:  - Monitor I/O, vital signs and rhythm  - Monitor for S/S and trends of decreased cardiac output  - Administer and titrate ordered vasoactive medications to optimize hemodynamic stability  - Assess quality of pulses, skin color and temperature  - Assess for signs of decreased coronary artery perfusion  - Instruct patient to report change in severity of symptoms  Outcome: Progressing  Goal: Absence of cardiac dysrhythmias or at baseline rhythm  Description  INTERVENTIONS:  - Continuous cardiac monitoring, vital signs, obtain 12 lead EKG if ordered  - Administer antiarrhythmic and heart rate control medications as ordered  - Monitor electrolytes and administer replacement therapy as ordered  Outcome: Progressing     Problem: METABOLIC, FLUID AND ELECTROLYTES - ADULT  Goal: Electrolytes maintained within normal limits  Description  INTERVENTIONS:  - Monitor labs and assess patient for signs and symptoms of electrolyte imbalances  - Administer electrolyte replacement as ordered  - Monitor response to electrolyte replacements, including repeat lab results as appropriate  - Instruct patient on fluid and nutrition as appropriate  Outcome: Progressing  Goal: Fluid balance maintained  Description  INTERVENTIONS:  - Monitor labs   - Monitor I/O and WT  - Instruct patient on fluid and nutrition as appropriate  - Assess for signs & symptoms of volume excess or deficit  Outcome: Progressing

## 2020-01-28 NOTE — PLAN OF CARE
Problem: Potential for Falls  Goal: Patient will remain free of falls  Description  INTERVENTIONS:  - Assess patient frequently for physical needs  -  Identify cognitive and physical deficits and behaviors that affect risk of falls    -  [place] fall precautions as indicated by assessment   - Educate patient/family on patient safety including physical limitations  - Instruct patient to call for assistance with activity based on assessment  - Modify environment to reduce risk of injury  - Consider OT/PT consult to assist with strengthening/mobility  Outcome: Progressing     Problem: PAIN - ADULT  Goal: Verbalizes/displays adequate comfort level or baseline comfort level  Description  Interventions:  - Encourage patient to monitor pain and request assistance  - Assess pain using appropriate pain scale  - Administer analgesics based on type and severity of pain and evaluate response  - Implement non-pharmacological measures as appropriate and evaluate response  - Consider cultural and social influences on pain and pain management  - Notify physician/advanced practitioner if interventions unsuccessful or patient reports new pain  Outcome: Progressing     Problem: INFECTION - ADULT  Goal: Absence or prevention of progression during hospitalization  Description  INTERVENTIONS:  - Assess and monitor for signs and symptoms of infection  - Monitor lab/diagnostic results  - Monitor all insertion sites, i e  indwelling lines, tubes, and drains  - Monitor endotracheal if appropriate and nasal secretions for changes in amount and color  - [place] appropriate cooling/warming therapies per order  - Administer medications as ordered  - Instruct and encourage patient and family to use good hand hygiene technique  - Identify and instruct in appropriate isolation precautions for identified infection/condition  Outcome: Progressing     Problem: CARDIOVASCULAR - ADULT  Goal: Maintains optimal cardiac output and hemodynamic stability  Description  INTERVENTIONS:  - Monitor I/O, vital signs and rhythm  - Monitor for S/S and trends of decreased cardiac output  - Administer and titrate ordered vasoactive medications to optimize hemodynamic stability  - Assess quality of pulses, skin color and temperature  - Assess for signs of decreased coronary artery perfusion  - Instruct patient to report change in severity of symptoms  Outcome: Progressing  Goal: Absence of cardiac dysrhythmias or at baseline rhythm  Description  INTERVENTIONS:  - Continuous cardiac monitoring, vital signs, obtain 12 lead EKG if ordered  - Administer antiarrhythmic and heart rate control medications as ordered  - Monitor electrolytes and administer replacement therapy as ordered  Outcome: Progressing     Problem: METABOLIC, FLUID AND ELECTROLYTES - ADULT  Goal: Electrolytes maintained within normal limits  Description  INTERVENTIONS:  - Monitor labs and assess patient for signs and symptoms of electrolyte imbalances  - Administer electrolyte replacement as ordered  - Monitor response to electrolyte replacements, including repeat lab results as appropriate  - Instruct patient on fluid and nutrition as appropriate  Outcome: Progressing  Goal: Fluid balance maintained  Description  INTERVENTIONS:  - Monitor labs   - Monitor I/O and WT  - Instruct patient on fluid and nutrition as appropriate  - Assess for signs & symptoms of volume excess or deficit  Outcome: Progressing     Problem: Prexisting or High Potential for Compromised Skin Integrity  Goal: Skin integrity is maintained or improved  Description  INTERVENTIONS:  - Identify patients at risk for skin breakdown  - Assess and monitor skin integrity  - Assess and monitor nutrition and hydration status  - Monitor labs   - Assess for incontinence   - Turn and reposition patient  - Assist with mobility/ambulation  - Relieve pressure over bony prominences  - Avoid friction and shearing  - Provide appropriate hygiene as needed including keeping skin clean and dry  - Evaluate need for skin moisturizer/barrier cream  - Collaborate with interdisciplinary team   - Patient/family teaching  - Consider wound care consult   Outcome: Progressing

## 2020-01-29 ENCOUNTER — PATIENT OUTREACH (OUTPATIENT)
Dept: CASE MANAGEMENT | Facility: OTHER | Age: 85
End: 2020-01-29

## 2020-01-29 DIAGNOSIS — Z71.89 COMPLEX CARE COORDINATION: Primary | ICD-10-CM

## 2020-02-07 DIAGNOSIS — E11.8 TYPE 2 DIABETES MELLITUS WITH COMPLICATION (HCC): Primary | ICD-10-CM

## 2020-02-10 ENCOUNTER — PATIENT OUTREACH (OUTPATIENT)
Dept: FAMILY MEDICINE CLINIC | Facility: CLINIC | Age: 85
End: 2020-02-10

## 2020-02-10 ENCOUNTER — TRANSITIONAL CARE MANAGEMENT (OUTPATIENT)
Dept: FAMILY MEDICINE CLINIC | Facility: CLINIC | Age: 85
End: 2020-02-10

## 2020-02-10 NOTE — PROGRESS NOTES
Contacted patient post hospitalization and rehab stay  She is now home and lives with   She has PCP f/u on 2/13  She is very hard of hearing and it was difficult to communicate with her on the phone  I asked to speak to her  and she hung up the phone  Will plan to meet with her at her PCP appointment

## 2020-02-17 ENCOUNTER — PATIENT OUTREACH (OUTPATIENT)
Dept: FAMILY MEDICINE CLINIC | Facility: CLINIC | Age: 85
End: 2020-02-17

## 2020-02-18 ENCOUNTER — OFFICE VISIT (OUTPATIENT)
Dept: FAMILY MEDICINE CLINIC | Facility: CLINIC | Age: 85
End: 2020-02-18
Payer: COMMERCIAL

## 2020-02-18 VITALS
WEIGHT: 110 LBS | DIASTOLIC BLOOD PRESSURE: 80 MMHG | BODY MASS INDEX: 25.46 KG/M2 | SYSTOLIC BLOOD PRESSURE: 150 MMHG | HEIGHT: 55 IN | HEART RATE: 80 BPM | RESPIRATION RATE: 18 BRPM | OXYGEN SATURATION: 95 %

## 2020-02-18 DIAGNOSIS — E11.8 TYPE 2 DIABETES MELLITUS WITH COMPLICATION (HCC): ICD-10-CM

## 2020-02-18 DIAGNOSIS — M25.531 RIGHT WRIST PAIN: ICD-10-CM

## 2020-02-18 DIAGNOSIS — I10 ESSENTIAL HYPERTENSION: ICD-10-CM

## 2020-02-18 DIAGNOSIS — J20.9 ACUTE BRONCHITIS, UNSPECIFIED ORGANISM: ICD-10-CM
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
## 2020-02-18 DIAGNOSIS — G47.09 OTHER INSOMNIA: ICD-10-CM

## 2020-02-18 DIAGNOSIS — N17.9 ACUTE KIDNEY INJURY (HCC): ICD-10-CM

## 2020-02-18 DIAGNOSIS — J10.1 INFLUENZA A: Primary | ICD-10-CM

## 2020-02-18 LAB — SL AMB POCT HEMOGLOBIN AIC: 7.2 (ref ?–6.5)

## 2020-02-18 PROCEDURE — 83036 HEMOGLOBIN GLYCOSYLATED A1C: CPT | Performed by: FAMILY MEDICINE

## 2020-02-18 PROCEDURE — 99214 OFFICE O/P EST MOD 30 MIN: CPT | Performed by: FAMILY MEDICINE

## 2020-02-18 PROCEDURE — 1160F RVW MEDS BY RX/DR IN RCRD: CPT | Performed by: FAMILY MEDICINE

## 2020-02-18 RX ORDER — BENZONATATE 100 MG/1
100 CAPSULE ORAL 3 TIMES DAILY PRN
Qty: 30 CAPSULE | Refills: 0 | Status: SHIPPED | OUTPATIENT
Start: 2020-02-18 | End: 2020-03-09

## 2020-02-18 RX ORDER — DOXEPIN HYDROCHLORIDE 6 MG/1
6 TABLET ORAL
Qty: 30 TABLET | Refills: 2 | Status: SHIPPED | OUTPATIENT
Start: 2020-02-18 | End: 2020-04-23 | Stop reason: HOSPADM

## 2020-02-18 RX ORDER — SODIUM CHLORIDE 1000 MG
2 TABLET, SOLUBLE MISCELLANEOUS 2 TIMES DAILY
COMMUNITY
End: 2020-04-23 | Stop reason: HOSPADM

## 2020-02-18 NOTE — ASSESSMENT & PLAN NOTE
She had a fall on January 22nd  In the emergency room a diagnosed influenza type a  Thankfully her symptoms have essentially resolved other than residual cough  She will continue with Berl Mings as needed

## 2020-02-18 NOTE — PROGRESS NOTES
Assessment/Plan:     Influenza A  She had a fall on January 22nd  In the emergency room a diagnosed influenza type a  Thankfully her symptoms have essentially resolved other than residual cough  She will continue with Lui Rinard as needed  Essential hypertension  Blood pressure appears to be well controlled on current regimen  She will continue amlodipine 5 mg daily and lisinopril 20 mg daily  The only change in the hospital was discontinuation of hydrochlorothiazide  Fall from standing  She had fall in January 22nd probably due to syncope and dehydration due to influenza A  She still has residual bruising on her face and mild pain in the right wrist   X-rays were negative, and she has good mobility of the right wrist status post physical therapy  Right wrist pain  Improved with PT    Type 2 diabetes mellitus with complication (HCC)    Lab Results   Component Value Date    HGBA1C 7 6 (A) 10/30/2019       She is stable with today's hemoglobin A1c of 7 2  We will continue current regimen  Other insomnia  She is continuing to have some sleep disturbance  She has been taking doxepin 10 mg q h s  For several years  We will try to take her down to 6 mg q h s  Which is the maximum recommended dose in the elderly  In addition I recommended starting melatonin 3 mg at bedtime  Acute kidney injury ([facility] [address] )  This resolved in the hospital with IV fluids  We will repeat a basic metabolic profile in the near future  Diagnoses and all orders for this visit:    Influenza A    Acute bronchitis, unspecified organism  -     benzonatate (TESSALON PERLES) 100 mg capsule;  Take 1 capsule (100 mg total) by mouth 3 (three) times a day as needed for cough    Essential hypertension    Fall from standing, initial encounter    Right wrist pain    Type 2 diabetes mellitus with complication (HCC)  -     POCT hemoglobin A1c    Other insomnia  -     Doxepin HCl 6 MG TABS; Take 1 tablet (6 mg total) by mouth daily at bedtime    Acute kidney injury [facility])  -     Basic metabolic panel; Future    Other orders  -     sodium chloride 1 g tablet; Take 2 g by mouth 2 (two) times a day         Subjective:     Patient ID: Krunal Jewell is a 80 y o  female  She is here with her  for transitional care management following hospitalization  She was in [address] from January 22nd to 28 after she had a fall  This was likely due to coughing and vasovagal reaction  She was diagnosed with the influenza type a  She had acute kidney injury which resolved with IV fluids  Her blood pressure medication was changed from lisinopril/HCTZ to plain lisinopril after kidney function returned  She also continues amlodipine 5 mg daily  She suffered facial bruising and pain in her right wrist   X-rays were negative for fractures  Her wrist is feeling much better after therapy  She has had some difficulty sleeping recently  Their son and daughter-in-law live with them  Their son gets up very early in the morning to go to work and she usually wakes up due to the noise and then cannot fall back asleep  She has been using doxepin 10 mg for the past several years  She has not tried melatonin for sleep  Review of Systems   Constitutional: Negative for activity change, chills, fatigue and fever  HENT: Positive for congestion  Negative for ear pain, sinus pressure and sore throat  Eyes: Negative for pain and visual disturbance  Respiratory: Positive for cough  Negative for chest tightness, shortness of breath and wheezing  Cardiovascular: Negative for chest pain, palpitations and leg swelling  Gastrointestinal: Negative for abdominal pain, blood in stool, constipation, diarrhea, nausea and vomiting  Endocrine: Negative for polydipsia and polyuria  Genitourinary: Negative for difficulty urinating, dysuria, frequency and urgency  Musculoskeletal: Positive for arthralgias   Negative for joint swelling and myalgias  Skin: Negative for rash  Neurological: Negative for dizziness, weakness, numbness and headaches  Hematological: Negative for adenopathy  Does not bruise/bleed easily  Psychiatric/Behavioral: Negative for dysphoric mood  The patient is not nervous/anxious  Objective:     Physical Exam   Constitutional: She appears well-developed and well-nourished  HENT:   Head: Normocephalic and atraumatic  Mouth/Throat: Oropharynx is clear and moist    TMs obscured by cerumen   Neck: Normal range of motion  Neck supple  No thyromegaly present  Cardiovascular: Normal rate, regular rhythm and normal heart sounds  Pulmonary/Chest: Effort normal and breath sounds normal    Musculoskeletal: She exhibits no edema  Mild tenderness radial aspect right wrist    Lymphadenopathy:     She has no cervical adenopathy  Skin: Skin is warm and dry  Psychiatric: She has a normal mood and affect  Her behavior is normal    Nursing note and vitals reviewed  Vitals:    02/18/20 0859   BP: 150/80   Pulse: 80   Resp: 18   SpO2: 95%   Weight: 49 9 kg (110 lb)   Height: 4' 6 6" (1 387 m)       Transitional Care Management Review:  Eve Castro is a 80 y o  female here for TCM follow up  During the TCM phone call patient stated:    TCM Call (since 1/18/2020)     Date and time call was made  2/10/2020  1:31 PM    Hospital care reviewed  Records not available    Patient was hospitialized at  Other (comment)    Comment  [facility]    Date of Admission  01/28/20    Date of discharge  02/07/20    Diagnosis  FALL, SYNCOPE, DEHYDRATION    Disposition  Home    Were the patients medications reviewed and updated  No    Current Symptoms  -- <img src='C:FILES (X86)      TCM Call (since 1/18/2020)     Post hospital issues  None    Should patient be enrolled in anticoag monitoring? No    Scheduled for follow up?   Yes    Not clinically warranted  rehab    Did you obtain your prescribed medications  Yes    Do you need help managing your prescriptions or medications  No    Is transportation to your appointment needed  No    I have advised the patient to call PCP with any new or worsening symptoms  JIM KAUR    Living Arrangements  Spouse or Significiant other    Are you recieving any outpatient services  Yes    What type of services  rehab    Comments  will call when discharged from rehab          Manjit Francois MD

## 2020-02-18 NOTE — ASSESSMENT & PLAN NOTE
This resolved in the hospital with IV fluids  We will repeat a basic metabolic profile in the near future

## 2020-02-18 NOTE — ASSESSMENT & PLAN NOTE
Lab Results   Component Value Date    HGBA1C 7 6 (A) 10/30/2019       She is stable with today's hemoglobin A1c of 7 2  We will continue current regimen

## 2020-02-18 NOTE — PROGRESS NOTES
[address] [facility] [place], PharmD, BCACP      The following is per review of patient's pertinent medical/medication history:    Patient's insurance coverage: Payor: [address] [place] [address] REP / Plan: [employer] PPO [address] REP / Product Type: Medicare PPO /     Findings/Recommendations:  · Doxepin: per BEERS criteria, not recommend to exceed >6mg/dose d/t increased risk for anticholinergic effects  Patient was started on doxepin 10mg/dose in 3/2016 for insomnia, no documented trial with mirtazapine or trazodone  Discussion with PCP on reducing dose vs trial with alternative agent; will defer to PCP  · Metformin: noted last A1c < 8% in 10/2019  If A1c remains below goal on metformin 1g/day, may consider trial off if patient has difficulty swallowing tablets  Will defer to PCP      Demographics  Interaction Method: Virtual    Topic(s) Addressed  Medication Management  Diabetes    Intervention(s) Made  Pharmacologic: Medication Discontinuation and Medication Adjustment - Dose or Frequency    Tool(s) Used  Not Applicable    Time Spent in Direct Patient Care Activities and Care Coordination: 0-15 Minutes    Recommendation(s) Accepted by the Patient/Caregiver: Not Applicable

## 2020-02-18 NOTE — ASSESSMENT & PLAN NOTE
She had fall in January 22nd probably due to syncope and dehydration due to influenza A  She still has residual bruising on her face and mild pain in the right wrist   X-rays were negative, and she has good mobility of the right wrist status post physical therapy

## 2020-02-18 NOTE — ASSESSMENT & PLAN NOTE
She is continuing to have some sleep disturbance  She has been taking doxepin 10 mg q h s  For several years  We will try to take her down to 6 mg q h s  Which is the maximum recommended dose in the elderly  In addition I recommended starting melatonin 3 mg at bedtime

## 2020-02-24 ENCOUNTER — PATIENT OUTREACH (OUTPATIENT)
Dept: FAMILY MEDICINE CLINIC | Facility: CLINIC | Age: 85
End: 2020-02-24

## 2020-02-24 ENCOUNTER — APPOINTMENT (OUTPATIENT)
Dept: LAB | Facility: CLINIC | Age: 85
End: 2020-02-24
Payer: COMMERCIAL

## 2020-02-24 DIAGNOSIS — N17.9 ACUTE KIDNEY INJURY (HCC): ICD-10-CM

## 2020-02-24 DIAGNOSIS — E55.9 VITAMIN D DEFICIENCY: ICD-10-CM

## 2020-02-24 LAB
25(OH)D3 SERPL-MCNC: 48.3 NG/ML (ref 30–100)
ANION GAP SERPL CALCULATED.3IONS-SCNC: 8 MMOL/L (ref 4–13)
BUN SERPL-MCNC: 14 MG/DL (ref 5–25)
CALCIUM SERPL-MCNC: 9.3 MG/DL (ref 8.3–10.1)
CHLORIDE SERPL-SCNC: 96 MMOL/L (ref 100–108)
CO2 SERPL-SCNC: 26 MMOL/L (ref 21–32)
CREAT SERPL-MCNC: 1.12 MG/DL (ref 0.6–1.3)
CREAT UR-MCNC: 74.6 MG/DL
GFR SERPL CREATININE-BSD FRML MDRD: 44 ML/MIN/1.73SQ M
GLUCOSE P FAST SERPL-MCNC: 111 MG/DL (ref 65–99)
MICROALBUMIN UR-MCNC: 110 MG/L (ref 0–20)
MICROALBUMIN/CREAT 24H UR: 147 MG/G CREATININE (ref 0–30)
POTASSIUM SERPL-SCNC: 3.9 MMOL/L (ref 3.5–5.3)
SODIUM SERPL-SCNC: 130 MMOL/L (ref 136–145)

## 2020-02-24 PROCEDURE — 36415 COLL VENOUS BLD VENIPUNCTURE: CPT

## 2020-02-24 PROCEDURE — 80048 BASIC METABOLIC PNL TOTAL CA: CPT

## 2020-02-24 PROCEDURE — 82306 VITAMIN D 25 HYDROXY: CPT

## 2020-02-24 PROCEDURE — 82570 ASSAY OF URINE CREATININE: CPT | Performed by: FAMILY MEDICINE

## 2020-02-24 PROCEDURE — 82043 UR ALBUMIN QUANTITATIVE: CPT | Performed by: FAMILY MEDICINE

## 2020-02-24 NOTE — PROGRESS NOTES
Spoke with  Kerri Tesfayeton  Louann Batres is managing well post hospitalization for influenza  She is not having any respiratory issues at present  She is very hard of hearing and difficult to communicate with on the phone  Louann Batres does receive caregiver services through the [facility] twice weekly  She is ambulatory with walker  Nutritional intake adequate  Kerri Hartmann performs glucometer checks and her blood sugars are usually less than 150  No hypoglycemic episodes noted  She was recently seen by PCP and BP was stable  No edema present  Annettenishant Hartmann drives her to her appointments, all follow up appointments scheduled  She is taking all medications as prescribed  He feels they are managing well at home with no needs at this time  He declined further outreach

## 2020-02-26 ENCOUNTER — DOCUMENTATION (OUTPATIENT)
Dept: FAMILY MEDICINE CLINIC | Facility: CLINIC | Age: 85
End: 2020-02-26

## 2020-02-26 RX ORDER — LANOLIN ALCOHOL/MO/W.PET/CERES
3 CREAM (GRAM) TOPICAL
COMMUNITY

## 2020-02-26 NOTE — PROGRESS NOTES
Bonita, PharmD, Karolina Morocho      The following is per review of patient's pertinent medical/medication history and phone call with patient's :    Reason for documentation: Per PCP request, patient's  contacted to assess for changes in sleep since doxepin dose was reduced   reports wife has started taking doxepin 6mg/dose and has not had any issues with sleep, reports patient has also started taking melatonin as PCP suggested  Reports patient had slight constipation but resolved after taking Miralax; reports patient is no longer experiencing constipation problems  Denies further questions and agrees to contact PCP if wife begins to have sleep issues  PCP: no further action required, medication list has been updated    Demographics  Interaction Method: Phone  Type of Intervention: Follow-Up    Topic(s) Addressed  Medication Management    Intervention(s) Made      Non-Pharmacologic: Other    Tool(s) Used  Not Applicable    Time Spent in Direct Patient Care Activities and Care Coordination: 0-15 Minutes    Recommendation(s) Accepted by the Patient/Caregiver:  All Accepted

## 2020-03-04 ENCOUNTER — OFFICE VISIT (OUTPATIENT)
Dept: FAMILY MEDICINE CLINIC | Facility: CLINIC | Age: 85
End: 2020-03-04
Payer: COMMERCIAL

## 2020-03-04 VITALS
HEART RATE: 82 BPM | SYSTOLIC BLOOD PRESSURE: 102 MMHG | OXYGEN SATURATION: 97 % | HEIGHT: 55 IN | WEIGHT: 110.25 LBS | BODY MASS INDEX: 25.52 KG/M2 | DIASTOLIC BLOOD PRESSURE: 80 MMHG

## 2020-03-04 DIAGNOSIS — Z78.0 POST-MENOPAUSE: ICD-10-CM

## 2020-03-04 DIAGNOSIS — G47.09 OTHER INSOMNIA: ICD-10-CM

## 2020-03-04 DIAGNOSIS — J10.1 INFLUENZA A: ICD-10-CM

## 2020-03-04 DIAGNOSIS — E11.8 TYPE 2 DIABETES MELLITUS WITH COMPLICATION (HCC): Primary | ICD-10-CM

## 2020-03-04 PROCEDURE — 3066F NEPHROPATHY DOC TX: CPT | Performed by: FAMILY MEDICINE

## 2020-03-04 PROCEDURE — 3008F BODY MASS INDEX DOCD: CPT | Performed by: FAMILY MEDICINE

## 2020-03-04 PROCEDURE — 3060F POS MICROALBUMINURIA REV: CPT | Performed by: FAMILY MEDICINE

## 2020-03-04 PROCEDURE — 3079F DIAST BP 80-89 MM HG: CPT | Performed by: FAMILY MEDICINE

## 2020-03-04 PROCEDURE — 3074F SYST BP LT 130 MM HG: CPT | Performed by: FAMILY MEDICINE

## 2020-03-04 PROCEDURE — 1111F DSCHRG MED/CURRENT MED MERGE: CPT | Performed by: FAMILY MEDICINE

## 2020-03-04 PROCEDURE — 99214 OFFICE O/P EST MOD 30 MIN: CPT | Performed by: FAMILY MEDICINE

## 2020-03-04 PROCEDURE — 4040F PNEUMOC VAC/ADMIN/RCVD: CPT | Performed by: FAMILY MEDICINE

## 2020-03-04 PROCEDURE — 3051F HG A1C>EQUAL 7.0%<8.0%: CPT | Performed by: FAMILY MEDICINE

## 2020-03-04 PROCEDURE — 1036F TOBACCO NON-USER: CPT | Performed by: FAMILY MEDICINE

## 2020-03-04 PROCEDURE — 1160F RVW MEDS BY RX/DR IN RCRD: CPT | Performed by: FAMILY MEDICINE

## 2020-03-04 NOTE — ASSESSMENT & PLAN NOTE
She is sleeping well with recent medication change  Doxepin was decreased from 10 mg to 6 mg  And melatonin 3 mg was added

## 2020-03-04 NOTE — ASSESSMENT & PLAN NOTE
Lab Results   Component Value Date    HGBA1C 7 2 (A) 02/18/2020      diabetes has been stable    Urged them to schedule for her eye exam

## 2020-03-04 NOTE — PROGRESS NOTES
Assessment/Plan:    Influenza A    She has improved but she still has residual coughing  I urged her to continue with the Tessalon Perles and sugar free cough drops  Also reassured her that lungs are clear    Type 2 diabetes mellitus with complication [facility])    Lab Results   Component Value Date    HGBA1C 7 2 (A) 02/18/2020      diabetes has been stable  Urged them to schedule for her eye exam     Other insomnia    She is sleeping well with recent medication change  Doxepin was decreased from 10 mg to 6 mg  And melatonin 3 mg was added  Diagnoses and all orders for this visit:    Type 2 diabetes mellitus with complication [facility])  -     Ambulatory Referral to Ophthalmology; Future    Post-menopause  -     DXA bone density spine hip and pelvis; Future    Influenza A    Other insomnia          Subjective:      Patient ID: Saloni Yung is a 80 y o  female  She is here with her  for follow-up  We saw her 2 weeks ago for TCM visit following hospitalization for the flu  Thankfully she feels much better now but she still has chronic coughing  The cough is nonproductive  She thinks the [employer] only help a little bit  Her doxepin was decreased from 10 mg to 6 mg and melatonin 3 mg was added  She feels like her sleep is okay on this regimen  The following portions of the patient's history were reviewed and updated as appropriate: allergies, current medications, past family history, past medical history, past social history, past surgical history and problem list     Review of Systems   Constitutional: Negative for chills and fever  HENT: Negative for congestion  Respiratory: Positive for cough  Negative for chest tightness and shortness of breath  Cardiovascular: Negative for palpitations  Gastrointestinal: Negative for abdominal pain, nausea and vomiting  Genitourinary: Negative for difficulty urinating  Neurological: Negative for headaches     Psychiatric/Behavioral: The patient is not nervous/anxious  Objective:      /80 (BP Location: Left arm, Patient Position: Sitting, Cuff Size: Standard)   Pulse 82   Ht 4' 6 6" (1 387 m)   Wt 50 kg (110 lb 4 oz)   SpO2 97%   BMI 26 00 kg/m²          Physical Exam   Constitutional: She appears well-developed and well-nourished  HENT:   Head: Normocephalic  Mouth/Throat: Oropharynx is clear and moist    Fading ecchymoses face   Neck: Normal range of motion  Neck supple  No thyromegaly present  Cardiovascular: Normal rate, regular rhythm and normal heart sounds  Pulmonary/Chest: Effort normal and breath sounds normal  No respiratory distress  She has no wheezes  Occasional cough   Musculoskeletal: She exhibits no edema  Lymphadenopathy:     She has no cervical adenopathy  Skin: Skin is warm and dry  Psychiatric: She has a normal mood and affect  Her behavior is normal    Nursing note and vitals reviewed

## 2020-03-04 NOTE — ASSESSMENT & PLAN NOTE
She has improved but she still has residual coughing  I urged her to continue with the Tessalon Perles and sugar free cough drops    Also reassured her that lungs are clear

## 2020-03-09 DIAGNOSIS — J20.9 ACUTE BRONCHITIS, UNSPECIFIED ORGANISM: ICD-10-CM

## 2020-03-09 RX ORDER — BENZONATATE 100 MG/1
CAPSULE ORAL
Qty: 30 CAPSULE | Refills: 0 | Status: SHIPPED | OUTPATIENT
Start: 2020-03-09 | End: 2020-04-02

## 2020-03-09 NOTE — TELEPHONE ENCOUNTER
She could discontinue the prescription vitamin-D and take over-the-counter vitamin-D 2000 units daily

## 2020-03-18 ENCOUNTER — TELEPHONE (OUTPATIENT)
Dept: FAMILY MEDICINE CLINIC | Facility: CLINIC | Age: 85
End: 2020-03-18

## 2020-04-01 DIAGNOSIS — J20.9 ACUTE BRONCHITIS, UNSPECIFIED ORGANISM: ICD-10-CM

## 2020-04-02 RX ORDER — BENZONATATE 100 MG/1
CAPSULE ORAL
Qty: 30 CAPSULE | Refills: 0 | Status: SHIPPED | OUTPATIENT
Start: 2020-04-02 | End: 2020-04-23 | Stop reason: HOSPADM

## 2020-04-06 ENCOUNTER — APPOINTMENT (EMERGENCY)
Dept: CT IMAGING | Facility: HOSPITAL | Age: 85
DRG: 470 | End: 2020-04-06
Payer: COMMERCIAL

## 2020-04-06 ENCOUNTER — APPOINTMENT (EMERGENCY)
Dept: RADIOLOGY | Facility: HOSPITAL | Age: 85
DRG: 470 | End: 2020-04-06
Payer: COMMERCIAL

## 2020-04-06 ENCOUNTER — HOSPITAL ENCOUNTER (INPATIENT)
Facility: HOSPITAL | Age: 85
LOS: 4 days | Discharge: NURSING, REHAB OR LONG-TERM CARE | DRG: 470 | End: 2020-04-11
Attending: EMERGENCY MEDICINE | Admitting: HOSPITALIST
Payer: COMMERCIAL

## 2020-04-06 DIAGNOSIS — E87.1 ACUTE HYPONATREMIA: ICD-10-CM

## 2020-04-06 DIAGNOSIS — S72.002A CLOSED LEFT HIP FRACTURE, INITIAL ENCOUNTER (HCC): Primary | ICD-10-CM

## 2020-04-06 DIAGNOSIS — S72.002A CLOSED LEFT HIP FRACTURE (HCC): ICD-10-CM

## 2020-04-06 LAB
ABO GROUP BLD: NORMAL
ANION GAP SERPL CALCULATED.3IONS-SCNC: 7 MMOL/L (ref 4–13)
APTT PPP: 32 SECONDS (ref 23–37)
BASOPHILS # BLD AUTO: 0.02 THOUSANDS/ΜL (ref 0–0.1)
BASOPHILS NFR BLD AUTO: 0 % (ref 0–1)
BLD GP AB SCN SERPL QL: NEGATIVE
BUN SERPL-MCNC: 17 MG/DL (ref 5–25)
CALCIUM SERPL-MCNC: 9.1 MG/DL (ref 8.3–10.1)
CHLORIDE SERPL-SCNC: 90 MMOL/L (ref 100–108)
CO2 SERPL-SCNC: 28 MMOL/L (ref 21–32)
CREAT SERPL-MCNC: 1.35 MG/DL (ref 0.6–1.3)
EOSINOPHIL # BLD AUTO: 0.45 THOUSAND/ΜL (ref 0–0.61)
EOSINOPHIL NFR BLD AUTO: 9 % (ref 0–6)
ERYTHROCYTE [DISTWIDTH] IN BLOOD BY AUTOMATED COUNT: 12.9 % (ref 11.6–15.1)
GFR SERPL CREATININE-BSD FRML MDRD: 35 ML/MIN/1.73SQ M
GLUCOSE SERPL-MCNC: 172 MG/DL (ref 65–140)
HCT VFR BLD AUTO: 33.3 % (ref 34.8–46.1)
HGB BLD-MCNC: 11.1 G/DL (ref 11.5–15.4)
IMM GRANULOCYTES # BLD AUTO: 0.02 THOUSAND/UL (ref 0–0.2)
IMM GRANULOCYTES NFR BLD AUTO: 0 % (ref 0–2)
INR PPP: 0.89 (ref 0.84–1.19)
LYMPHOCYTES # BLD AUTO: 1.48 THOUSANDS/ΜL (ref 0.6–4.47)
LYMPHOCYTES NFR BLD AUTO: 30 % (ref 14–44)
MCH RBC QN AUTO: 29.1 PG (ref 26.8–34.3)
MCHC RBC AUTO-ENTMCNC: 33.3 G/DL (ref 31.4–37.4)
MCV RBC AUTO: 87 FL (ref 82–98)
MONOCYTES # BLD AUTO: 0.49 THOUSAND/ΜL (ref 0.17–1.22)
MONOCYTES NFR BLD AUTO: 10 % (ref 4–12)
NEUTROPHILS # BLD AUTO: 2.51 THOUSANDS/ΜL (ref 1.85–7.62)
NEUTS SEG NFR BLD AUTO: 51 % (ref 43–75)
NRBC BLD AUTO-RTO: 0 /100 WBCS
PLATELET # BLD AUTO: 273 THOUSANDS/UL (ref 149–390)
PMV BLD AUTO: 8.4 FL (ref 8.9–12.7)
POTASSIUM SERPL-SCNC: 4 MMOL/L (ref 3.5–5.3)
PROTHROMBIN TIME: 12.1 SECONDS (ref 11.6–14.5)
RBC # BLD AUTO: 3.82 MILLION/UL (ref 3.81–5.12)
RH BLD: POSITIVE
SODIUM SERPL-SCNC: 125 MMOL/L (ref 136–145)
SPECIMEN EXPIRATION DATE: NORMAL
TROPONIN I SERPL-MCNC: <0.02 NG/ML
WBC # BLD AUTO: 4.97 THOUSAND/UL (ref 4.31–10.16)

## 2020-04-06 PROCEDURE — 85730 THROMBOPLASTIN TIME PARTIAL: CPT | Performed by: EMERGENCY MEDICINE

## 2020-04-06 PROCEDURE — 86850 RBC ANTIBODY SCREEN: CPT | Performed by: EMERGENCY MEDICINE

## 2020-04-06 PROCEDURE — 86900 BLOOD TYPING SEROLOGIC ABO: CPT | Performed by: EMERGENCY MEDICINE

## 2020-04-06 PROCEDURE — 85025 COMPLETE CBC W/AUTO DIFF WBC: CPT | Performed by: EMERGENCY MEDICINE

## 2020-04-06 PROCEDURE — 85610 PROTHROMBIN TIME: CPT | Performed by: EMERGENCY MEDICINE

## 2020-04-06 PROCEDURE — 36415 COLL VENOUS BLD VENIPUNCTURE: CPT | Performed by: EMERGENCY MEDICINE

## 2020-04-06 PROCEDURE — 73502 X-RAY EXAM HIP UNI 2-3 VIEWS: CPT

## 2020-04-06 PROCEDURE — 84484 ASSAY OF TROPONIN QUANT: CPT | Performed by: EMERGENCY MEDICINE

## 2020-04-06 PROCEDURE — 99285 EMERGENCY DEPT VISIT HI MDM: CPT | Performed by: EMERGENCY MEDICINE

## 2020-04-06 PROCEDURE — 71045 X-RAY EXAM CHEST 1 VIEW: CPT

## 2020-04-06 PROCEDURE — 70450 CT HEAD/BRAIN W/O DYE: CPT

## 2020-04-06 PROCEDURE — 72125 CT NECK SPINE W/O DYE: CPT

## 2020-04-06 PROCEDURE — 99285 EMERGENCY DEPT VISIT HI MDM: CPT

## 2020-04-06 PROCEDURE — 86901 BLOOD TYPING SEROLOGIC RH(D): CPT | Performed by: EMERGENCY MEDICINE

## 2020-04-06 PROCEDURE — 80048 BASIC METABOLIC PNL TOTAL CA: CPT | Performed by: EMERGENCY MEDICINE

## 2020-04-06 PROCEDURE — 93005 ELECTROCARDIOGRAM TRACING: CPT

## 2020-04-06 RX ORDER — ACETAMINOPHEN 325 MG/1
650 TABLET ORAL ONCE
Status: COMPLETED | OUTPATIENT
Start: 2020-04-06 | End: 2020-04-06

## 2020-04-06 RX ADMIN — ACETAMINOPHEN 650 MG: 325 TABLET ORAL at 23:11

## 2020-04-07 ENCOUNTER — ANESTHESIA EVENT (INPATIENT)
Dept: PERIOP | Facility: HOSPITAL | Age: 85
DRG: 470 | End: 2020-04-07
Payer: COMMERCIAL

## 2020-04-07 PROBLEM — S72.002A CLOSED LEFT HIP FRACTURE, INITIAL ENCOUNTER (HCC): Status: ACTIVE | Noted: 2020-04-07

## 2020-04-07 LAB
ABO GROUP BLD: NORMAL
ABO GROUP BLD: NORMAL
ANION GAP SERPL CALCULATED.3IONS-SCNC: 10 MMOL/L (ref 4–13)
ANION GAP SERPL CALCULATED.3IONS-SCNC: 11 MMOL/L (ref 4–13)
ATRIAL RATE: 91 BPM
BLD GP AB SCN SERPL QL: NEGATIVE
BUN SERPL-MCNC: 12 MG/DL (ref 5–25)
BUN SERPL-MCNC: 16 MG/DL (ref 5–25)
CALCIUM SERPL-MCNC: 8.9 MG/DL (ref 8.3–10.1)
CALCIUM SERPL-MCNC: 9 MG/DL (ref 8.3–10.1)
CHLORIDE SERPL-SCNC: 90 MMOL/L (ref 100–108)
CHLORIDE SERPL-SCNC: 91 MMOL/L (ref 100–108)
CHLORIDE UR-SCNC: 113 MMOL/L
CO2 SERPL-SCNC: 23 MMOL/L (ref 21–32)
CO2 SERPL-SCNC: 26 MMOL/L (ref 21–32)
CREAT SERPL-MCNC: 1.21 MG/DL (ref 0.6–1.3)
CREAT SERPL-MCNC: 1.36 MG/DL (ref 0.6–1.3)
ERYTHROCYTE [DISTWIDTH] IN BLOOD BY AUTOMATED COUNT: 12.7 % (ref 11.6–15.1)
GFR SERPL CREATININE-BSD FRML MDRD: 35 ML/MIN/1.73SQ M
GFR SERPL CREATININE-BSD FRML MDRD: 40 ML/MIN/1.73SQ M
GLUCOSE SERPL-MCNC: 111 MG/DL (ref 65–140)
GLUCOSE SERPL-MCNC: 121 MG/DL (ref 65–140)
GLUCOSE SERPL-MCNC: 132 MG/DL (ref 65–140)
GLUCOSE SERPL-MCNC: 175 MG/DL (ref 65–140)
GLUCOSE SERPL-MCNC: 259 MG/DL (ref 65–140)
GLUCOSE SERPL-MCNC: 326 MG/DL (ref 65–140)
HCT VFR BLD AUTO: 31.1 % (ref 34.8–46.1)
HGB BLD-MCNC: 10.3 G/DL (ref 11.5–15.4)
MCH RBC QN AUTO: 28.8 PG (ref 26.8–34.3)
MCHC RBC AUTO-ENTMCNC: 33.1 G/DL (ref 31.4–37.4)
MCV RBC AUTO: 87 FL (ref 82–98)
OSMOLALITY UR: 330 MMOL/KG
P AXIS: 89 DEGREES
PLATELET # BLD AUTO: 257 THOUSANDS/UL (ref 149–390)
PMV BLD AUTO: 8.4 FL (ref 8.9–12.7)
POTASSIUM SERPL-SCNC: 3.8 MMOL/L (ref 3.5–5.3)
POTASSIUM SERPL-SCNC: 4.4 MMOL/L (ref 3.5–5.3)
PR INTERVAL: 286 MS
QRS AXIS: 62 DEGREES
QRSD INTERVAL: 86 MS
QT INTERVAL: 346 MS
QTC INTERVAL: 414 MS
RBC # BLD AUTO: 3.58 MILLION/UL (ref 3.81–5.12)
RH BLD: POSITIVE
RH BLD: POSITIVE
SODIUM 24H UR-SCNC: 98 MOL/L
SODIUM SERPL-SCNC: 125 MMOL/L (ref 136–145)
SODIUM SERPL-SCNC: 125 MMOL/L (ref 136–145)
SODIUM SERPL-SCNC: 126 MMOL/L (ref 136–145)
SPECIMEN EXPIRATION DATE: NORMAL
T WAVE AXIS: 81 DEGREES
VENTRICULAR RATE: 86 BPM
WBC # BLD AUTO: 6.75 THOUSAND/UL (ref 4.31–10.16)

## 2020-04-07 PROCEDURE — 86900 BLOOD TYPING SEROLOGIC ABO: CPT | Performed by: PHYSICIAN ASSISTANT

## 2020-04-07 PROCEDURE — 82948 REAGENT STRIP/BLOOD GLUCOSE: CPT

## 2020-04-07 PROCEDURE — 99223 1ST HOSP IP/OBS HIGH 75: CPT | Performed by: PHYSICIAN ASSISTANT

## 2020-04-07 PROCEDURE — 93010 ELECTROCARDIOGRAM REPORT: CPT | Performed by: INTERNAL MEDICINE

## 2020-04-07 PROCEDURE — 82436 ASSAY OF URINE CHLORIDE: CPT | Performed by: INTERNAL MEDICINE

## 2020-04-07 PROCEDURE — 80048 BASIC METABOLIC PNL TOTAL CA: CPT | Performed by: INTERNAL MEDICINE

## 2020-04-07 PROCEDURE — 86901 BLOOD TYPING SEROLOGIC RH(D): CPT | Performed by: PHYSICIAN ASSISTANT

## 2020-04-07 PROCEDURE — 84300 ASSAY OF URINE SODIUM: CPT | Performed by: INTERNAL MEDICINE

## 2020-04-07 PROCEDURE — 99223 1ST HOSP IP/OBS HIGH 75: CPT | Performed by: INTERNAL MEDICINE

## 2020-04-07 PROCEDURE — 83935 ASSAY OF URINE OSMOLALITY: CPT | Performed by: INTERNAL MEDICINE

## 2020-04-07 PROCEDURE — 85027 COMPLETE CBC AUTOMATED: CPT | Performed by: PHYSICIAN ASSISTANT

## 2020-04-07 PROCEDURE — 99223 1ST HOSP IP/OBS HIGH 75: CPT | Performed by: HOSPITALIST

## 2020-04-07 PROCEDURE — 80048 BASIC METABOLIC PNL TOTAL CA: CPT | Performed by: PHYSICIAN ASSISTANT

## 2020-04-07 PROCEDURE — 84295 ASSAY OF SERUM SODIUM: CPT | Performed by: INTERNAL MEDICINE

## 2020-04-07 PROCEDURE — 86850 RBC ANTIBODY SCREEN: CPT | Performed by: PHYSICIAN ASSISTANT

## 2020-04-07 PROCEDURE — 36415 COLL VENOUS BLD VENIPUNCTURE: CPT | Performed by: EMERGENCY MEDICINE

## 2020-04-07 RX ORDER — LANOLIN ALCOHOL/MO/W.PET/CERES
3 CREAM (GRAM) TOPICAL
Status: DISCONTINUED | OUTPATIENT
Start: 2020-04-07 | End: 2020-04-11 | Stop reason: HOSPADM

## 2020-04-07 RX ORDER — SODIUM CHLORIDE 9 MG/ML
75 INJECTION, SOLUTION INTRAVENOUS CONTINUOUS
Status: DISCONTINUED | OUTPATIENT
Start: 2020-04-07 | End: 2020-04-07

## 2020-04-07 RX ORDER — LISINOPRIL 20 MG/1
20 TABLET ORAL DAILY
Status: DISCONTINUED | OUTPATIENT
Start: 2020-04-07 | End: 2020-04-07

## 2020-04-07 RX ORDER — ACETAMINOPHEN 325 MG/1
650 TABLET ORAL EVERY 6 HOURS PRN
Status: DISCONTINUED | OUTPATIENT
Start: 2020-04-07 | End: 2020-04-11 | Stop reason: HOSPADM

## 2020-04-07 RX ORDER — BENZONATATE 100 MG/1
100 CAPSULE ORAL 3 TIMES DAILY PRN
Status: DISCONTINUED | OUTPATIENT
Start: 2020-04-07 | End: 2020-04-11 | Stop reason: HOSPADM

## 2020-04-07 RX ORDER — SODIUM CHLORIDE 1000 MG
2 TABLET, SOLUBLE MISCELLANEOUS 2 TIMES DAILY
Status: DISCONTINUED | OUTPATIENT
Start: 2020-04-07 | End: 2020-04-11 | Stop reason: HOSPADM

## 2020-04-07 RX ORDER — CALCIUM CARBONATE 200(500)MG
1000 TABLET,CHEWABLE ORAL DAILY PRN
Status: DISCONTINUED | OUTPATIENT
Start: 2020-04-07 | End: 2020-04-11 | Stop reason: HOSPADM

## 2020-04-07 RX ORDER — ATORVASTATIN CALCIUM 40 MG/1
40 TABLET, FILM COATED ORAL
Status: DISCONTINUED | OUTPATIENT
Start: 2020-04-07 | End: 2020-04-11 | Stop reason: HOSPADM

## 2020-04-07 RX ORDER — HYDRALAZINE HYDROCHLORIDE 20 MG/ML
5 INJECTION INTRAMUSCULAR; INTRAVENOUS EVERY 8 HOURS PRN
Status: DISCONTINUED | OUTPATIENT
Start: 2020-04-07 | End: 2020-04-08

## 2020-04-07 RX ORDER — CEFAZOLIN SODIUM 1 G/50ML
1000 SOLUTION INTRAVENOUS
Status: COMPLETED | OUTPATIENT
Start: 2020-04-07 | End: 2020-04-08

## 2020-04-07 RX ORDER — CEFAZOLIN SODIUM 1 G/50ML
1000 SOLUTION INTRAVENOUS
Status: DISCONTINUED | OUTPATIENT
Start: 2020-04-07 | End: 2020-04-07

## 2020-04-07 RX ORDER — DOXEPIN HYDROCHLORIDE 6 MG/1
6 TABLET ORAL
Status: DISCONTINUED | OUTPATIENT
Start: 2020-04-07 | End: 2020-04-07 | Stop reason: RX

## 2020-04-07 RX ORDER — AMLODIPINE BESYLATE 5 MG/1
5 TABLET ORAL 2 TIMES DAILY
Status: DISCONTINUED | OUTPATIENT
Start: 2020-04-07 | End: 2020-04-11 | Stop reason: HOSPADM

## 2020-04-07 RX ORDER — AMLODIPINE BESYLATE 5 MG/1
5 TABLET ORAL DAILY
Status: DISCONTINUED | OUTPATIENT
Start: 2020-04-07 | End: 2020-04-07

## 2020-04-07 RX ORDER — ONDANSETRON 2 MG/ML
4 INJECTION INTRAMUSCULAR; INTRAVENOUS EVERY 6 HOURS PRN
Status: DISCONTINUED | OUTPATIENT
Start: 2020-04-07 | End: 2020-04-11 | Stop reason: HOSPADM

## 2020-04-07 RX ADMIN — MELATONIN 3 MG: 3 TAB ORAL at 21:54

## 2020-04-07 RX ADMIN — AMLODIPINE BESYLATE 5 MG: 5 TABLET ORAL at 09:02

## 2020-04-07 RX ADMIN — AMLODIPINE BESYLATE 5 MG: 5 TABLET ORAL at 17:27

## 2020-04-07 RX ADMIN — LISINOPRIL 20 MG: 20 TABLET ORAL at 09:02

## 2020-04-07 RX ADMIN — ATORVASTATIN CALCIUM 40 MG: 40 TABLET, FILM COATED ORAL at 16:16

## 2020-04-07 RX ADMIN — SODIUM CHLORIDE TAB 1 GM 2 G: 1 TAB at 17:27

## 2020-04-07 RX ADMIN — SODIUM CHLORIDE 75 ML/HR: 0.9 INJECTION, SOLUTION INTRAVENOUS at 01:45

## 2020-04-07 RX ADMIN — MELATONIN 3 MG: 3 TAB ORAL at 01:50

## 2020-04-07 RX ADMIN — SODIUM CHLORIDE: 234 INJECTION INTRAMUSCULAR; INTRAVENOUS; SUBCUTANEOUS at 17:28

## 2020-04-07 RX ADMIN — INSULIN LISPRO 1 UNITS: 100 INJECTION, SOLUTION INTRAVENOUS; SUBCUTANEOUS at 01:51

## 2020-04-07 RX ADMIN — SODIUM CHLORIDE TAB 1 GM 2 G: 1 TAB at 09:02

## 2020-04-08 ENCOUNTER — ANESTHESIA (INPATIENT)
Dept: PERIOP | Facility: HOSPITAL | Age: 85
DRG: 470 | End: 2020-04-08
Payer: COMMERCIAL

## 2020-04-08 ENCOUNTER — APPOINTMENT (INPATIENT)
Dept: RADIOLOGY | Facility: HOSPITAL | Age: 85
DRG: 470 | End: 2020-04-08
Payer: COMMERCIAL

## 2020-04-08 LAB
ANION GAP SERPL CALCULATED.3IONS-SCNC: 9 MMOL/L (ref 4–13)
BUN SERPL-MCNC: 11 MG/DL (ref 5–25)
BUN SERPL-MCNC: 13 MG/DL (ref 5–25)
BUN SERPL-MCNC: 13 MG/DL (ref 5–25)
BUN SERPL-MCNC: 16 MG/DL (ref 5–25)
CALCIUM SERPL-MCNC: 8.1 MG/DL (ref 8.3–10.1)
CALCIUM SERPL-MCNC: 8.9 MG/DL (ref 8.3–10.1)
CHLORIDE SERPL-SCNC: 94 MMOL/L (ref 100–108)
CHLORIDE SERPL-SCNC: 94 MMOL/L (ref 100–108)
CHLORIDE SERPL-SCNC: 96 MMOL/L (ref 100–108)
CHLORIDE SERPL-SCNC: 97 MMOL/L (ref 100–108)
CO2 SERPL-SCNC: 23 MMOL/L (ref 21–32)
CO2 SERPL-SCNC: 25 MMOL/L (ref 21–32)
CO2 SERPL-SCNC: 26 MMOL/L (ref 21–32)
CO2 SERPL-SCNC: 26 MMOL/L (ref 21–32)
CREAT SERPL-MCNC: 1.07 MG/DL (ref 0.6–1.3)
CREAT SERPL-MCNC: 1.15 MG/DL (ref 0.6–1.3)
CREAT SERPL-MCNC: 1.15 MG/DL (ref 0.6–1.3)
CREAT SERPL-MCNC: 1.44 MG/DL (ref 0.6–1.3)
ERYTHROCYTE [DISTWIDTH] IN BLOOD BY AUTOMATED COUNT: 12.9 % (ref 11.6–15.1)
GFR SERPL CREATININE-BSD FRML MDRD: 33 ML/MIN/1.73SQ M
GFR SERPL CREATININE-BSD FRML MDRD: 43 ML/MIN/1.73SQ M
GFR SERPL CREATININE-BSD FRML MDRD: 43 ML/MIN/1.73SQ M
GFR SERPL CREATININE-BSD FRML MDRD: 47 ML/MIN/1.73SQ M
GLUCOSE SERPL-MCNC: 124 MG/DL (ref 65–140)
GLUCOSE SERPL-MCNC: 125 MG/DL (ref 65–140)
GLUCOSE SERPL-MCNC: 134 MG/DL (ref 65–140)
GLUCOSE SERPL-MCNC: 152 MG/DL (ref 65–140)
GLUCOSE SERPL-MCNC: 154 MG/DL (ref 65–140)
GLUCOSE SERPL-MCNC: 187 MG/DL (ref 65–140)
GLUCOSE SERPL-MCNC: 205 MG/DL (ref 65–140)
GLUCOSE SERPL-MCNC: 238 MG/DL (ref 65–140)
GLUCOSE SERPL-MCNC: 300 MG/DL (ref 65–140)
GLUCOSE SERPL-MCNC: 346 MG/DL (ref 65–140)
HCT VFR BLD AUTO: 32.6 % (ref 34.8–46.1)
HGB BLD-MCNC: 11 G/DL (ref 11.5–15.4)
MAGNESIUM SERPL-MCNC: 1.8 MG/DL (ref 1.6–2.6)
MCH RBC QN AUTO: 28.8 PG (ref 26.8–34.3)
MCHC RBC AUTO-ENTMCNC: 33.7 G/DL (ref 31.4–37.4)
MCV RBC AUTO: 85 FL (ref 82–98)
OSMOLALITY UR/SERPL-RTO: 269 MMOL/KG (ref 282–298)
PLATELET # BLD AUTO: 257 THOUSANDS/UL (ref 149–390)
PMV BLD AUTO: 8.2 FL (ref 8.9–12.7)
POTASSIUM SERPL-SCNC: 3.4 MMOL/L (ref 3.5–5.3)
POTASSIUM SERPL-SCNC: 3.5 MMOL/L (ref 3.5–5.3)
POTASSIUM SERPL-SCNC: 3.5 MMOL/L (ref 3.5–5.3)
POTASSIUM SERPL-SCNC: 4.8 MMOL/L (ref 3.5–5.3)
RBC # BLD AUTO: 3.82 MILLION/UL (ref 3.81–5.12)
SODIUM SERPL-SCNC: 128 MMOL/L (ref 136–145)
SODIUM SERPL-SCNC: 129 MMOL/L (ref 136–145)
SODIUM SERPL-SCNC: 129 MMOL/L (ref 136–145)
SODIUM SERPL-SCNC: 131 MMOL/L (ref 136–145)
URATE SERPL-MCNC: 3.3 MG/DL (ref 2–6.8)
WBC # BLD AUTO: 6.05 THOUSAND/UL (ref 4.31–10.16)

## 2020-04-08 PROCEDURE — 99232 SBSQ HOSP IP/OBS MODERATE 35: CPT | Performed by: INTERNAL MEDICINE

## 2020-04-08 PROCEDURE — 80048 BASIC METABOLIC PNL TOTAL CA: CPT | Performed by: INTERNAL MEDICINE

## 2020-04-08 PROCEDURE — C1776 JOINT DEVICE (IMPLANTABLE): HCPCS | Performed by: ORTHOPAEDIC SURGERY

## 2020-04-08 PROCEDURE — 83735 ASSAY OF MAGNESIUM: CPT | Performed by: HOSPITALIST

## 2020-04-08 PROCEDURE — 84550 ASSAY OF BLOOD/URIC ACID: CPT | Performed by: HOSPITALIST

## 2020-04-08 PROCEDURE — 0SRS0JZ REPLACEMENT OF LEFT HIP JOINT, FEMORAL SURFACE WITH SYNTHETIC SUBSTITUTE, OPEN APPROACH: ICD-10-PCS | Performed by: ORTHOPAEDIC SURGERY

## 2020-04-08 PROCEDURE — 83930 ASSAY OF BLOOD OSMOLALITY: CPT | Performed by: HOSPITALIST

## 2020-04-08 PROCEDURE — 99232 SBSQ HOSP IP/OBS MODERATE 35: CPT | Performed by: HOSPITALIST

## 2020-04-08 PROCEDURE — 27236 TREAT THIGH FRACTURE: CPT | Performed by: PHYSICIAN ASSISTANT

## 2020-04-08 PROCEDURE — 85027 COMPLETE CBC AUTOMATED: CPT | Performed by: HOSPITALIST

## 2020-04-08 PROCEDURE — 27236 TREAT THIGH FRACTURE: CPT | Performed by: ORTHOPAEDIC SURGERY

## 2020-04-08 PROCEDURE — 80048 BASIC METABOLIC PNL TOTAL CA: CPT | Performed by: PHYSICIAN ASSISTANT

## 2020-04-08 PROCEDURE — 82948 REAGENT STRIP/BLOOD GLUCOSE: CPT

## 2020-04-08 PROCEDURE — 73502 X-RAY EXAM HIP UNI 2-3 VIEWS: CPT

## 2020-04-08 PROCEDURE — 99222 1ST HOSP IP/OBS MODERATE 55: CPT | Performed by: INTERNAL MEDICINE

## 2020-04-08 DEVICE — TRI-LOCK BPS FEMORAL STEM 12/14 TAPER TRI-LOCK BPS W/GRIPTION SIZE 1 STD 97MM
Type: IMPLANTABLE DEVICE | Site: HIP | Status: FUNCTIONAL
Brand: TRI-LOCK GRIPTION

## 2020-04-08 DEVICE — MODULAR CATHCART FRACTURE HEAD HIP BALL 45MM OD +5MM: Type: IMPLANTABLE DEVICE | Site: HIP | Status: FUNCTIONAL

## 2020-04-08 DEVICE — MODULAR CATHCART TAPERED SPACER 12/14 TAPER -3MM: Type: IMPLANTABLE DEVICE | Site: HIP | Status: FUNCTIONAL

## 2020-04-08 RX ORDER — GLYCOPYRROLATE 0.2 MG/ML
INJECTION INTRAMUSCULAR; INTRAVENOUS AS NEEDED
Status: DISCONTINUED | OUTPATIENT
Start: 2020-04-08 | End: 2020-04-08 | Stop reason: SURG

## 2020-04-08 RX ORDER — OXYCODONE HYDROCHLORIDE 5 MG/1
5 TABLET ORAL EVERY 4 HOURS PRN
Status: DISCONTINUED | OUTPATIENT
Start: 2020-04-08 | End: 2020-04-11 | Stop reason: HOSPADM

## 2020-04-08 RX ORDER — SENNOSIDES 8.6 MG
1 TABLET ORAL DAILY
Status: DISCONTINUED | OUTPATIENT
Start: 2020-04-08 | End: 2020-04-11 | Stop reason: HOSPADM

## 2020-04-08 RX ORDER — DOCUSATE SODIUM 100 MG/1
100 CAPSULE, LIQUID FILLED ORAL 2 TIMES DAILY
Status: DISCONTINUED | OUTPATIENT
Start: 2020-04-08 | End: 2020-04-11 | Stop reason: HOSPADM

## 2020-04-08 RX ORDER — LIDOCAINE HYDROCHLORIDE 10 MG/ML
INJECTION, SOLUTION EPIDURAL; INFILTRATION; INTRACAUDAL; PERINEURAL AS NEEDED
Status: DISCONTINUED | OUTPATIENT
Start: 2020-04-08 | End: 2020-04-08 | Stop reason: SURG

## 2020-04-08 RX ORDER — SODIUM CHLORIDE 9 MG/ML
INJECTION, SOLUTION INTRAVENOUS CONTINUOUS PRN
Status: DISCONTINUED | OUTPATIENT
Start: 2020-04-08 | End: 2020-04-08 | Stop reason: SURG

## 2020-04-08 RX ORDER — CEFAZOLIN SODIUM 1 G/50ML
1000 SOLUTION INTRAVENOUS
Status: DISCONTINUED | OUTPATIENT
Start: 2020-04-08 | End: 2020-04-08

## 2020-04-08 RX ORDER — ONDANSETRON 2 MG/ML
INJECTION INTRAMUSCULAR; INTRAVENOUS AS NEEDED
Status: DISCONTINUED | OUTPATIENT
Start: 2020-04-08 | End: 2020-04-08 | Stop reason: SURG

## 2020-04-08 RX ORDER — POTASSIUM CHLORIDE 20MEQ/15ML
40 LIQUID (ML) ORAL ONCE
Status: COMPLETED | OUTPATIENT
Start: 2020-04-08 | End: 2020-04-08

## 2020-04-08 RX ORDER — ATROPINE SULFATE 1 MG/ML
INJECTION, SOLUTION INTRAMUSCULAR; INTRAVENOUS; SUBCUTANEOUS AS NEEDED
Status: DISCONTINUED | OUTPATIENT
Start: 2020-04-08 | End: 2020-04-08 | Stop reason: SURG

## 2020-04-08 RX ORDER — PANTOPRAZOLE SODIUM 40 MG/1
40 TABLET, DELAYED RELEASE ORAL
Status: DISCONTINUED | OUTPATIENT
Start: 2020-04-08 | End: 2020-04-11 | Stop reason: HOSPADM

## 2020-04-08 RX ORDER — PROPOFOL 10 MG/ML
INJECTION, EMULSION INTRAVENOUS AS NEEDED
Status: DISCONTINUED | OUTPATIENT
Start: 2020-04-08 | End: 2020-04-08 | Stop reason: SURG

## 2020-04-08 RX ORDER — FENTANYL CITRATE 50 UG/ML
INJECTION, SOLUTION INTRAMUSCULAR; INTRAVENOUS AS NEEDED
Status: DISCONTINUED | OUTPATIENT
Start: 2020-04-08 | End: 2020-04-08 | Stop reason: SURG

## 2020-04-08 RX ORDER — ONDANSETRON 2 MG/ML
4 INJECTION INTRAMUSCULAR; INTRAVENOUS EVERY 6 HOURS PRN
Status: DISCONTINUED | OUTPATIENT
Start: 2020-04-08 | End: 2020-04-08 | Stop reason: HOSPADM

## 2020-04-08 RX ORDER — FENTANYL CITRATE 50 UG/ML
25 INJECTION, SOLUTION INTRAMUSCULAR; INTRAVENOUS
Status: DISCONTINUED | OUTPATIENT
Start: 2020-04-08 | End: 2020-04-08 | Stop reason: HOSPADM

## 2020-04-08 RX ORDER — CEFAZOLIN SODIUM 1 G/50ML
1000 SOLUTION INTRAVENOUS EVERY 8 HOURS
Status: DISCONTINUED | OUTPATIENT
Start: 2020-04-08 | End: 2020-04-10

## 2020-04-08 RX ORDER — OXYCODONE HYDROCHLORIDE 5 MG/1
2.5 TABLET ORAL EVERY 4 HOURS PRN
Status: DISCONTINUED | OUTPATIENT
Start: 2020-04-08 | End: 2020-04-11 | Stop reason: HOSPADM

## 2020-04-08 RX ORDER — NEOSTIGMINE METHYLSULFATE 1 MG/ML
INJECTION INTRAVENOUS AS NEEDED
Status: DISCONTINUED | OUTPATIENT
Start: 2020-04-08 | End: 2020-04-08 | Stop reason: SURG

## 2020-04-08 RX ORDER — ROCURONIUM BROMIDE 10 MG/ML
INJECTION, SOLUTION INTRAVENOUS AS NEEDED
Status: DISCONTINUED | OUTPATIENT
Start: 2020-04-08 | End: 2020-04-08 | Stop reason: SURG

## 2020-04-08 RX ORDER — MAGNESIUM HYDROXIDE 1200 MG/15ML
LIQUID ORAL AS NEEDED
Status: DISCONTINUED | OUTPATIENT
Start: 2020-04-08 | End: 2020-04-08 | Stop reason: HOSPADM

## 2020-04-08 RX ORDER — HYDRALAZINE HYDROCHLORIDE 20 MG/ML
10 INJECTION INTRAMUSCULAR; INTRAVENOUS EVERY 6 HOURS PRN
Status: DISCONTINUED | OUTPATIENT
Start: 2020-04-08 | End: 2020-04-11 | Stop reason: HOSPADM

## 2020-04-08 RX ADMIN — BENZONATATE 100 MG: 100 CAPSULE ORAL at 22:14

## 2020-04-08 RX ADMIN — SENNOSIDES 8.6 MG: 8.6 TABLET, FILM COATED ORAL at 17:53

## 2020-04-08 RX ADMIN — INSULIN LISPRO 5 UNITS: 100 INJECTION, SOLUTION INTRAVENOUS; SUBCUTANEOUS at 20:16

## 2020-04-08 RX ADMIN — ONDANSETRON 4 MG: 2 INJECTION INTRAMUSCULAR; INTRAVENOUS at 13:19

## 2020-04-08 RX ADMIN — PHENYLEPHRINE HYDROCHLORIDE 100 MCG: 10 INJECTION INTRAVENOUS at 14:22

## 2020-04-08 RX ADMIN — CEFAZOLIN SODIUM 1000 MG: 1 SOLUTION INTRAVENOUS at 13:15

## 2020-04-08 RX ADMIN — SODIUM CHLORIDE TAB 1 GM 2 G: 1 TAB at 17:54

## 2020-04-08 RX ADMIN — PANTOPRAZOLE SODIUM 40 MG: 40 TABLET, DELAYED RELEASE ORAL at 16:46

## 2020-04-08 RX ADMIN — PHENYLEPHRINE HYDROCHLORIDE 100 MCG: 10 INJECTION INTRAVENOUS at 13:33

## 2020-04-08 RX ADMIN — PHENYLEPHRINE HYDROCHLORIDE 100 MCG: 10 INJECTION INTRAVENOUS at 13:54

## 2020-04-08 RX ADMIN — FENTANYL CITRATE 50 MCG: 50 INJECTION, SOLUTION INTRAMUSCULAR; INTRAVENOUS at 13:19

## 2020-04-08 RX ADMIN — DOCUSATE SODIUM 100 MG: 100 CAPSULE, LIQUID FILLED ORAL at 17:53

## 2020-04-08 RX ADMIN — INSULIN LISPRO 3 UNITS: 100 INJECTION, SOLUTION INTRAVENOUS; SUBCUTANEOUS at 22:14

## 2020-04-08 RX ADMIN — LIDOCAINE HYDROCHLORIDE 100 MG: 10 INJECTION, SOLUTION EPIDURAL; INFILTRATION; INTRACAUDAL; PERINEURAL at 13:19

## 2020-04-08 RX ADMIN — INSULIN LISPRO 1 UNITS: 100 INJECTION, SOLUTION INTRAVENOUS; SUBCUTANEOUS at 00:01

## 2020-04-08 RX ADMIN — CEFAZOLIN SODIUM 1000 MG: 1 SOLUTION INTRAVENOUS at 20:18

## 2020-04-08 RX ADMIN — BENZONATATE 100 MG: 100 CAPSULE ORAL at 16:47

## 2020-04-08 RX ADMIN — ATORVASTATIN CALCIUM 40 MG: 40 TABLET, FILM COATED ORAL at 17:52

## 2020-04-08 RX ADMIN — FENTANYL CITRATE 50 MCG: 50 INJECTION, SOLUTION INTRAMUSCULAR; INTRAVENOUS at 14:06

## 2020-04-08 RX ADMIN — GLYCOPYRROLATE 0.4 MG: 0.2 INJECTION INTRAMUSCULAR; INTRAVENOUS at 14:25

## 2020-04-08 RX ADMIN — ATROPINE SULFATE 0.4 MG: 1 INJECTION, SOLUTION INTRAMUSCULAR; INTRAVENOUS; SUBCUTANEOUS at 13:59

## 2020-04-08 RX ADMIN — MELATONIN 3 MG: 3 TAB ORAL at 22:14

## 2020-04-08 RX ADMIN — PROPOFOL 120 MG: 10 INJECTION, EMULSION INTRAVENOUS at 13:19

## 2020-04-08 RX ADMIN — AMLODIPINE BESYLATE 5 MG: 5 TABLET ORAL at 07:45

## 2020-04-08 RX ADMIN — POTASSIUM CHLORIDE 40 MEQ: 20 SOLUTION ORAL at 11:49

## 2020-04-08 RX ADMIN — ROCURONIUM BROMIDE 30 MG: 10 INJECTION, SOLUTION INTRAVENOUS at 13:19

## 2020-04-08 RX ADMIN — SODIUM CHLORIDE TAB 1 GM 2 G: 1 TAB at 07:44

## 2020-04-08 RX ADMIN — NEOSTIGMINE METHYLSULFATE 3 MG: 1 INJECTION, SOLUTION INTRAVENOUS at 14:25

## 2020-04-08 RX ADMIN — INSULIN LISPRO 1 UNITS: 100 INJECTION, SOLUTION INTRAVENOUS; SUBCUTANEOUS at 05:25

## 2020-04-08 RX ADMIN — SODIUM CHLORIDE: 0.9 INJECTION, SOLUTION INTRAVENOUS at 13:10

## 2020-04-09 LAB
ANION GAP SERPL CALCULATED.3IONS-SCNC: 10 MMOL/L (ref 4–13)
ANION GAP SERPL CALCULATED.3IONS-SCNC: 10 MMOL/L (ref 4–13)
ANION GAP SERPL CALCULATED.3IONS-SCNC: 11 MMOL/L (ref 4–13)
ANION GAP SERPL CALCULATED.3IONS-SCNC: 7 MMOL/L (ref 4–13)
BUN SERPL-MCNC: 17 MG/DL (ref 5–25)
BUN SERPL-MCNC: 17 MG/DL (ref 5–25)
BUN SERPL-MCNC: 19 MG/DL (ref 5–25)
BUN SERPL-MCNC: 20 MG/DL (ref 5–25)
CALCIUM SERPL-MCNC: 8.4 MG/DL (ref 8.3–10.1)
CALCIUM SERPL-MCNC: 8.7 MG/DL (ref 8.3–10.1)
CALCIUM SERPL-MCNC: 8.8 MG/DL (ref 8.3–10.1)
CALCIUM SERPL-MCNC: 9 MG/DL (ref 8.3–10.1)
CHLORIDE SERPL-SCNC: 91 MMOL/L (ref 100–108)
CHLORIDE SERPL-SCNC: 95 MMOL/L (ref 100–108)
CHLORIDE SERPL-SCNC: 97 MMOL/L (ref 100–108)
CHLORIDE SERPL-SCNC: 98 MMOL/L (ref 100–108)
CO2 SERPL-SCNC: 22 MMOL/L (ref 21–32)
CO2 SERPL-SCNC: 22 MMOL/L (ref 21–32)
CO2 SERPL-SCNC: 24 MMOL/L (ref 21–32)
CO2 SERPL-SCNC: 26 MMOL/L (ref 21–32)
CREAT SERPL-MCNC: 1.29 MG/DL (ref 0.6–1.3)
CREAT SERPL-MCNC: 1.29 MG/DL (ref 0.6–1.3)
CREAT SERPL-MCNC: 1.45 MG/DL (ref 0.6–1.3)
CREAT SERPL-MCNC: 1.56 MG/DL (ref 0.6–1.3)
ERYTHROCYTE [DISTWIDTH] IN BLOOD BY AUTOMATED COUNT: 13.3 % (ref 11.6–15.1)
GFR SERPL CREATININE-BSD FRML MDRD: 30 ML/MIN/1.73SQ M
GFR SERPL CREATININE-BSD FRML MDRD: 32 ML/MIN/1.73SQ M
GFR SERPL CREATININE-BSD FRML MDRD: 37 ML/MIN/1.73SQ M
GFR SERPL CREATININE-BSD FRML MDRD: 37 ML/MIN/1.73SQ M
GLUCOSE SERPL-MCNC: 104 MG/DL (ref 65–140)
GLUCOSE SERPL-MCNC: 158 MG/DL (ref 65–140)
GLUCOSE SERPL-MCNC: 175 MG/DL (ref 65–140)
GLUCOSE SERPL-MCNC: 183 MG/DL (ref 65–140)
GLUCOSE SERPL-MCNC: 250 MG/DL (ref 65–140)
GLUCOSE SERPL-MCNC: 267 MG/DL (ref 65–140)
GLUCOSE SERPL-MCNC: 59 MG/DL (ref 65–140)
GLUCOSE SERPL-MCNC: 96 MG/DL (ref 65–140)
HCT VFR BLD AUTO: 29.3 % (ref 34.8–46.1)
HGB BLD-MCNC: 9.3 G/DL (ref 11.5–15.4)
MAGNESIUM SERPL-MCNC: 1.9 MG/DL (ref 1.6–2.6)
MCH RBC QN AUTO: 28.4 PG (ref 26.8–34.3)
MCHC RBC AUTO-ENTMCNC: 31.7 G/DL (ref 31.4–37.4)
MCV RBC AUTO: 90 FL (ref 82–98)
PLATELET # BLD AUTO: 210 THOUSANDS/UL (ref 149–390)
PMV BLD AUTO: 8.8 FL (ref 8.9–12.7)
POTASSIUM SERPL-SCNC: 3.2 MMOL/L (ref 3.5–5.3)
POTASSIUM SERPL-SCNC: 4.1 MMOL/L (ref 3.5–5.3)
POTASSIUM SERPL-SCNC: 4.2 MMOL/L (ref 3.5–5.3)
POTASSIUM SERPL-SCNC: 4.5 MMOL/L (ref 3.5–5.3)
RBC # BLD AUTO: 3.27 MILLION/UL (ref 3.81–5.12)
SODIUM SERPL-SCNC: 126 MMOL/L (ref 136–145)
SODIUM SERPL-SCNC: 127 MMOL/L (ref 136–145)
SODIUM SERPL-SCNC: 129 MMOL/L (ref 136–145)
SODIUM SERPL-SCNC: 131 MMOL/L (ref 136–145)
WBC # BLD AUTO: 7.24 THOUSAND/UL (ref 4.31–10.16)

## 2020-04-09 PROCEDURE — 99024 POSTOP FOLLOW-UP VISIT: CPT | Performed by: PHYSICIAN ASSISTANT

## 2020-04-09 PROCEDURE — 83735 ASSAY OF MAGNESIUM: CPT | Performed by: PHYSICIAN ASSISTANT

## 2020-04-09 PROCEDURE — 82948 REAGENT STRIP/BLOOD GLUCOSE: CPT

## 2020-04-09 PROCEDURE — 99232 SBSQ HOSP IP/OBS MODERATE 35: CPT | Performed by: INTERNAL MEDICINE

## 2020-04-09 PROCEDURE — 97535 SELF CARE MNGMENT TRAINING: CPT

## 2020-04-09 PROCEDURE — 80048 BASIC METABOLIC PNL TOTAL CA: CPT | Performed by: PHYSICIAN ASSISTANT

## 2020-04-09 PROCEDURE — 97167 OT EVAL HIGH COMPLEX 60 MIN: CPT

## 2020-04-09 PROCEDURE — 99232 SBSQ HOSP IP/OBS MODERATE 35: CPT | Performed by: HOSPITALIST

## 2020-04-09 PROCEDURE — 85027 COMPLETE CBC AUTOMATED: CPT | Performed by: PHYSICIAN ASSISTANT

## 2020-04-09 PROCEDURE — 97163 PT EVAL HIGH COMPLEX 45 MIN: CPT | Performed by: PHYSICAL THERAPIST

## 2020-04-09 PROCEDURE — 97112 NEUROMUSCULAR REEDUCATION: CPT | Performed by: PHYSICAL THERAPIST

## 2020-04-09 PROCEDURE — 80048 BASIC METABOLIC PNL TOTAL CA: CPT | Performed by: INTERNAL MEDICINE

## 2020-04-09 RX ORDER — FUROSEMIDE 20 MG/1
20 TABLET ORAL ONCE
Status: COMPLETED | OUTPATIENT
Start: 2020-04-09 | End: 2020-04-09

## 2020-04-09 RX ORDER — POTASSIUM CHLORIDE 20 MEQ/1
40 TABLET, EXTENDED RELEASE ORAL ONCE
Status: COMPLETED | OUTPATIENT
Start: 2020-04-09 | End: 2020-04-09

## 2020-04-09 RX ORDER — POLYETHYLENE GLYCOL 3350 17 G/17G
17 POWDER, FOR SOLUTION ORAL DAILY PRN
Status: DISCONTINUED | OUTPATIENT
Start: 2020-04-09 | End: 2020-04-11 | Stop reason: HOSPADM

## 2020-04-09 RX ADMIN — ATORVASTATIN CALCIUM 40 MG: 40 TABLET, FILM COATED ORAL at 16:38

## 2020-04-09 RX ADMIN — MELATONIN 3 MG: 3 TAB ORAL at 21:44

## 2020-04-09 RX ADMIN — CEFAZOLIN SODIUM 1000 MG: 1 SOLUTION INTRAVENOUS at 21:44

## 2020-04-09 RX ADMIN — DOCUSATE SODIUM 100 MG: 100 CAPSULE, LIQUID FILLED ORAL at 09:00

## 2020-04-09 RX ADMIN — CEFAZOLIN SODIUM 1000 MG: 1 SOLUTION INTRAVENOUS at 04:19

## 2020-04-09 RX ADMIN — AMLODIPINE BESYLATE 5 MG: 5 TABLET ORAL at 17:27

## 2020-04-09 RX ADMIN — ENOXAPARIN SODIUM 40 MG: 40 INJECTION SUBCUTANEOUS at 08:59

## 2020-04-09 RX ADMIN — PANTOPRAZOLE SODIUM 40 MG: 40 TABLET, DELAYED RELEASE ORAL at 05:25

## 2020-04-09 RX ADMIN — DOCUSATE SODIUM 100 MG: 100 CAPSULE, LIQUID FILLED ORAL at 17:27

## 2020-04-09 RX ADMIN — CEFAZOLIN SODIUM 1000 MG: 1 SOLUTION INTRAVENOUS at 12:03

## 2020-04-09 RX ADMIN — POLYETHYLENE GLYCOL 3350 17 G: 17 POWDER, FOR SOLUTION ORAL at 22:16

## 2020-04-09 RX ADMIN — SENNOSIDES 8.6 MG: 8.6 TABLET, FILM COATED ORAL at 08:59

## 2020-04-09 RX ADMIN — INSULIN LISPRO 1 UNITS: 100 INJECTION, SOLUTION INTRAVENOUS; SUBCUTANEOUS at 12:03

## 2020-04-09 RX ADMIN — AMLODIPINE BESYLATE 5 MG: 5 TABLET ORAL at 09:00

## 2020-04-09 RX ADMIN — POTASSIUM CHLORIDE 40 MEQ: 1500 TABLET, EXTENDED RELEASE ORAL at 21:44

## 2020-04-09 RX ADMIN — ACETAMINOPHEN 650 MG: 325 TABLET ORAL at 16:38

## 2020-04-09 RX ADMIN — SODIUM CHLORIDE TAB 1 GM 2 G: 1 TAB at 17:27

## 2020-04-09 RX ADMIN — INSULIN LISPRO 2 UNITS: 100 INJECTION, SOLUTION INTRAVENOUS; SUBCUTANEOUS at 21:53

## 2020-04-09 RX ADMIN — FUROSEMIDE 20 MG: 20 TABLET ORAL at 14:48

## 2020-04-09 RX ADMIN — INSULIN LISPRO 1 UNITS: 100 INJECTION, SOLUTION INTRAVENOUS; SUBCUTANEOUS at 16:38

## 2020-04-09 RX ADMIN — SODIUM CHLORIDE TAB 1 GM 2 G: 1 TAB at 08:59

## 2020-04-10 LAB
ANION GAP SERPL CALCULATED.3IONS-SCNC: 10 MMOL/L (ref 4–13)
ANION GAP SERPL CALCULATED.3IONS-SCNC: 9 MMOL/L (ref 4–13)
BUN SERPL-MCNC: 13 MG/DL (ref 5–25)
BUN SERPL-MCNC: 14 MG/DL (ref 5–25)
BUN SERPL-MCNC: 14 MG/DL (ref 5–25)
BUN SERPL-MCNC: 15 MG/DL (ref 5–25)
CALCIUM SERPL-MCNC: 8.1 MG/DL (ref 8.3–10.1)
CALCIUM SERPL-MCNC: 8.2 MG/DL (ref 8.3–10.1)
CALCIUM SERPL-MCNC: 8.2 MG/DL (ref 8.3–10.1)
CALCIUM SERPL-MCNC: 8.8 MG/DL (ref 8.3–10.1)
CHLORIDE SERPL-SCNC: 94 MMOL/L (ref 100–108)
CHLORIDE SERPL-SCNC: 95 MMOL/L (ref 100–108)
CHLORIDE SERPL-SCNC: 95 MMOL/L (ref 100–108)
CHLORIDE SERPL-SCNC: 96 MMOL/L (ref 100–108)
CO2 SERPL-SCNC: 23 MMOL/L (ref 21–32)
CO2 SERPL-SCNC: 25 MMOL/L (ref 21–32)
CREAT SERPL-MCNC: 1.22 MG/DL (ref 0.6–1.3)
CREAT SERPL-MCNC: 1.36 MG/DL (ref 0.6–1.3)
CREAT SERPL-MCNC: 1.41 MG/DL (ref 0.6–1.3)
CREAT SERPL-MCNC: 1.54 MG/DL (ref 0.6–1.3)
ERYTHROCYTE [DISTWIDTH] IN BLOOD BY AUTOMATED COUNT: 13.2 % (ref 11.6–15.1)
GFR SERPL CREATININE-BSD FRML MDRD: 30 ML/MIN/1.73SQ M
GFR SERPL CREATININE-BSD FRML MDRD: 34 ML/MIN/1.73SQ M
GFR SERPL CREATININE-BSD FRML MDRD: 35 ML/MIN/1.73SQ M
GFR SERPL CREATININE-BSD FRML MDRD: 40 ML/MIN/1.73SQ M
GLUCOSE SERPL-MCNC: 127 MG/DL (ref 65–140)
GLUCOSE SERPL-MCNC: 164 MG/DL (ref 65–140)
GLUCOSE SERPL-MCNC: 179 MG/DL (ref 65–140)
GLUCOSE SERPL-MCNC: 188 MG/DL (ref 65–140)
GLUCOSE SERPL-MCNC: 190 MG/DL (ref 65–140)
GLUCOSE SERPL-MCNC: 248 MG/DL (ref 65–140)
GLUCOSE SERPL-MCNC: 265 MG/DL (ref 65–140)
GLUCOSE SERPL-MCNC: 99 MG/DL (ref 65–140)
HCT VFR BLD AUTO: 28.2 % (ref 34.8–46.1)
HGB BLD-MCNC: 9.1 G/DL (ref 11.5–15.4)
MCH RBC QN AUTO: 28.8 PG (ref 26.8–34.3)
MCHC RBC AUTO-ENTMCNC: 32.3 G/DL (ref 31.4–37.4)
MCV RBC AUTO: 89 FL (ref 82–98)
OSMOLALITY UR: 345 MMOL/KG
PLATELET # BLD AUTO: 207 THOUSANDS/UL (ref 149–390)
PMV BLD AUTO: 8.7 FL (ref 8.9–12.7)
POTASSIUM SERPL-SCNC: 3.3 MMOL/L (ref 3.5–5.3)
POTASSIUM SERPL-SCNC: 3.3 MMOL/L (ref 3.5–5.3)
POTASSIUM SERPL-SCNC: 3.9 MMOL/L (ref 3.5–5.3)
POTASSIUM SERPL-SCNC: 4 MMOL/L (ref 3.5–5.3)
RBC # BLD AUTO: 3.16 MILLION/UL (ref 3.81–5.12)
SODIUM 24H UR-SCNC: 124 MOL/L
SODIUM SERPL-SCNC: 127 MMOL/L (ref 136–145)
SODIUM SERPL-SCNC: 128 MMOL/L (ref 136–145)
SODIUM SERPL-SCNC: 130 MMOL/L (ref 136–145)
SODIUM SERPL-SCNC: 130 MMOL/L (ref 136–145)
WBC # BLD AUTO: 7.11 THOUSAND/UL (ref 4.31–10.16)

## 2020-04-10 PROCEDURE — 80048 BASIC METABOLIC PNL TOTAL CA: CPT | Performed by: PHYSICIAN ASSISTANT

## 2020-04-10 PROCEDURE — 99232 SBSQ HOSP IP/OBS MODERATE 35: CPT | Performed by: INTERNAL MEDICINE

## 2020-04-10 PROCEDURE — 85027 COMPLETE CBC AUTOMATED: CPT | Performed by: PHYSICIAN ASSISTANT

## 2020-04-10 PROCEDURE — 80048 BASIC METABOLIC PNL TOTAL CA: CPT | Performed by: INTERNAL MEDICINE

## 2020-04-10 PROCEDURE — 97110 THERAPEUTIC EXERCISES: CPT

## 2020-04-10 PROCEDURE — 84300 ASSAY OF URINE SODIUM: CPT | Performed by: INTERNAL MEDICINE

## 2020-04-10 PROCEDURE — 82948 REAGENT STRIP/BLOOD GLUCOSE: CPT

## 2020-04-10 PROCEDURE — 97530 THERAPEUTIC ACTIVITIES: CPT

## 2020-04-10 PROCEDURE — 97535 SELF CARE MNGMENT TRAINING: CPT

## 2020-04-10 PROCEDURE — 83935 ASSAY OF URINE OSMOLALITY: CPT | Performed by: INTERNAL MEDICINE

## 2020-04-10 PROCEDURE — 99232 SBSQ HOSP IP/OBS MODERATE 35: CPT | Performed by: HOSPITALIST

## 2020-04-10 PROCEDURE — 97116 GAIT TRAINING THERAPY: CPT

## 2020-04-10 PROCEDURE — 99024 POSTOP FOLLOW-UP VISIT: CPT | Performed by: PHYSICIAN ASSISTANT

## 2020-04-10 RX ORDER — POTASSIUM CHLORIDE 20 MEQ/1
20 TABLET, EXTENDED RELEASE ORAL ONCE
Status: COMPLETED | OUTPATIENT
Start: 2020-04-10 | End: 2020-04-10

## 2020-04-10 RX ORDER — FUROSEMIDE 40 MG/1
40 TABLET ORAL ONCE
Status: COMPLETED | OUTPATIENT
Start: 2020-04-10 | End: 2020-04-10

## 2020-04-10 RX ORDER — POTASSIUM CHLORIDE 20 MEQ/1
40 TABLET, EXTENDED RELEASE ORAL ONCE
Status: COMPLETED | OUTPATIENT
Start: 2020-04-10 | End: 2020-04-10

## 2020-04-10 RX ADMIN — OXYCODONE HYDROCHLORIDE 5 MG: 5 TABLET ORAL at 13:58

## 2020-04-10 RX ADMIN — SENNOSIDES 8.6 MG: 8.6 TABLET, FILM COATED ORAL at 08:39

## 2020-04-10 RX ADMIN — AMLODIPINE BESYLATE 5 MG: 5 TABLET ORAL at 17:39

## 2020-04-10 RX ADMIN — SODIUM CHLORIDE: 234 INJECTION INTRAMUSCULAR; INTRAVENOUS; SUBCUTANEOUS at 11:18

## 2020-04-10 RX ADMIN — DOCUSATE SODIUM 100 MG: 100 CAPSULE, LIQUID FILLED ORAL at 08:39

## 2020-04-10 RX ADMIN — FUROSEMIDE 40 MG: 40 TABLET ORAL at 08:39

## 2020-04-10 RX ADMIN — INSULIN LISPRO 2 UNITS: 100 INJECTION, SOLUTION INTRAVENOUS; SUBCUTANEOUS at 07:28

## 2020-04-10 RX ADMIN — ATORVASTATIN CALCIUM 40 MG: 40 TABLET, FILM COATED ORAL at 16:35

## 2020-04-10 RX ADMIN — SODIUM CHLORIDE TAB 1 GM 2 G: 1 TAB at 08:39

## 2020-04-10 RX ADMIN — POTASSIUM CHLORIDE 40 MEQ: 1500 TABLET, EXTENDED RELEASE ORAL at 08:40

## 2020-04-10 RX ADMIN — POTASSIUM CHLORIDE 40 MEQ: 1500 TABLET, EXTENDED RELEASE ORAL at 04:02

## 2020-04-10 RX ADMIN — DOCUSATE SODIUM 100 MG: 100 CAPSULE, LIQUID FILLED ORAL at 17:39

## 2020-04-10 RX ADMIN — ENOXAPARIN SODIUM 40 MG: 40 INJECTION SUBCUTANEOUS at 08:40

## 2020-04-10 RX ADMIN — PANTOPRAZOLE SODIUM 40 MG: 40 TABLET, DELAYED RELEASE ORAL at 05:29

## 2020-04-10 RX ADMIN — CEFAZOLIN SODIUM 1000 MG: 1 SOLUTION INTRAVENOUS at 11:19

## 2020-04-10 RX ADMIN — CEFAZOLIN SODIUM 1000 MG: 1 SOLUTION INTRAVENOUS at 04:02

## 2020-04-10 RX ADMIN — MELATONIN 3 MG: 3 TAB ORAL at 21:52

## 2020-04-10 RX ADMIN — SODIUM CHLORIDE TAB 1 GM 2 G: 1 TAB at 18:05

## 2020-04-10 RX ADMIN — INSULIN LISPRO 1 UNITS: 100 INJECTION, SOLUTION INTRAVENOUS; SUBCUTANEOUS at 21:53

## 2020-04-10 RX ADMIN — POTASSIUM CHLORIDE 20 MEQ: 1500 TABLET, EXTENDED RELEASE ORAL at 11:19

## 2020-04-10 RX ADMIN — OXYCODONE HYDROCHLORIDE 5 MG: 5 TABLET ORAL at 22:12

## 2020-04-10 RX ADMIN — AMLODIPINE BESYLATE 5 MG: 5 TABLET ORAL at 08:39

## 2020-04-10 RX ADMIN — INSULIN LISPRO 3 UNITS: 100 INJECTION, SOLUTION INTRAVENOUS; SUBCUTANEOUS at 16:38

## 2020-04-11 ENCOUNTER — HOSPITAL ENCOUNTER (INPATIENT)
Facility: HOSPITAL | Age: 85
LOS: 12 days | Discharge: HOME HEALTH | DRG: 560 | End: 2020-04-23
Attending: INTERNAL MEDICINE | Admitting: INTERNAL MEDICINE
Payer: COMMERCIAL

## 2020-04-11 VITALS
HEIGHT: 55 IN | OXYGEN SATURATION: 96 % | TEMPERATURE: 97.6 F | SYSTOLIC BLOOD PRESSURE: 159 MMHG | BODY MASS INDEX: 26.94 KG/M2 | DIASTOLIC BLOOD PRESSURE: 73 MMHG | HEART RATE: 87 BPM | RESPIRATION RATE: 18 BRPM | WEIGHT: 116.4 LBS

## 2020-04-11 DIAGNOSIS — E87.1 HYPONATREMIA: ICD-10-CM

## 2020-04-11 DIAGNOSIS — S72.002A CLOSED LEFT HIP FRACTURE (HCC): ICD-10-CM

## 2020-04-11 DIAGNOSIS — L60.9 NAIL ABNORMALITIES: Primary | ICD-10-CM

## 2020-04-11 DIAGNOSIS — I10 ESSENTIAL HYPERTENSION: ICD-10-CM

## 2020-04-11 PROBLEM — M25.531 RIGHT WRIST PAIN: Status: RESOLVED | Noted: 2020-01-23 | Resolved: 2020-04-11

## 2020-04-11 PROBLEM — N18.30 STAGE 3 CHRONIC KIDNEY DISEASE (HCC): Status: ACTIVE | Noted: 2020-04-11

## 2020-04-11 PROBLEM — I11.9 HYPERTENSIVE HEART DISEASE WITHOUT HEART FAILURE: Status: ACTIVE | Noted: 2020-04-11

## 2020-04-11 PROBLEM — H61.23 BILATERAL IMPACTED CERUMEN: Status: RESOLVED | Noted: 2019-10-30 | Resolved: 2020-04-11

## 2020-04-11 PROBLEM — S00.10XA PERIORBITAL ECCHYMOSIS: Status: RESOLVED | Noted: 2020-01-22 | Resolved: 2020-04-11

## 2020-04-11 PROBLEM — J10.1 INFLUENZA A: Status: RESOLVED | Noted: 2020-01-24 | Resolved: 2020-04-11

## 2020-04-11 PROBLEM — N17.9 ACUTE KIDNEY INJURY (HCC): Status: RESOLVED | Noted: 2020-01-22 | Resolved: 2020-04-11

## 2020-04-11 PROBLEM — S72.002D FRACTURE OF UNSPECIFIED PART OF NECK OF LEFT FEMUR, SUBSEQUENT ENCOUNTER FOR CLOSED FRACTURE WITH ROUTINE HEALING: Status: ACTIVE | Noted: 2020-04-07

## 2020-04-11 LAB
ANION GAP SERPL CALCULATED.3IONS-SCNC: 11 MMOL/L (ref 4–13)
ANION GAP SERPL CALCULATED.3IONS-SCNC: 12 MMOL/L (ref 4–13)
BUN SERPL-MCNC: 13 MG/DL (ref 5–25)
BUN SERPL-MCNC: 13 MG/DL (ref 5–25)
CALCIUM SERPL-MCNC: 8.5 MG/DL (ref 8.3–10.1)
CALCIUM SERPL-MCNC: 8.5 MG/DL (ref 8.3–10.1)
CHLORIDE SERPL-SCNC: 96 MMOL/L (ref 100–108)
CHLORIDE SERPL-SCNC: 98 MMOL/L (ref 100–108)
CO2 SERPL-SCNC: 24 MMOL/L (ref 21–32)
CO2 SERPL-SCNC: 25 MMOL/L (ref 21–32)
CREAT SERPL-MCNC: 1.16 MG/DL (ref 0.6–1.3)
CREAT SERPL-MCNC: 1.26 MG/DL (ref 0.6–1.3)
ERYTHROCYTE [DISTWIDTH] IN BLOOD BY AUTOMATED COUNT: 13.2 % (ref 11.6–15.1)
GFR SERPL CREATININE-BSD FRML MDRD: 38 ML/MIN/1.73SQ M
GFR SERPL CREATININE-BSD FRML MDRD: 42 ML/MIN/1.73SQ M
GLUCOSE SERPL-MCNC: 119 MG/DL (ref 65–140)
GLUCOSE SERPL-MCNC: 127 MG/DL (ref 65–140)
GLUCOSE SERPL-MCNC: 148 MG/DL (ref 65–140)
GLUCOSE SERPL-MCNC: 91 MG/DL (ref 65–140)
HCT VFR BLD AUTO: 27.1 % (ref 34.8–46.1)
HGB BLD-MCNC: 8.9 G/DL (ref 11.5–15.4)
MCH RBC QN AUTO: 28.6 PG (ref 26.8–34.3)
MCHC RBC AUTO-ENTMCNC: 32.8 G/DL (ref 31.4–37.4)
MCV RBC AUTO: 87 FL (ref 82–98)
PLATELET # BLD AUTO: 251 THOUSANDS/UL (ref 149–390)
PMV BLD AUTO: 8.8 FL (ref 8.9–12.7)
POTASSIUM SERPL-SCNC: 3.5 MMOL/L (ref 3.5–5.3)
POTASSIUM SERPL-SCNC: 3.8 MMOL/L (ref 3.5–5.3)
RBC # BLD AUTO: 3.11 MILLION/UL (ref 3.81–5.12)
SODIUM SERPL-SCNC: 133 MMOL/L (ref 136–145)
SODIUM SERPL-SCNC: 133 MMOL/L (ref 136–145)
WBC # BLD AUTO: 6.49 THOUSAND/UL (ref 4.31–10.16)

## 2020-04-11 PROCEDURE — 99239 HOSP IP/OBS DSCHRG MGMT >30: CPT | Performed by: HOSPITALIST

## 2020-04-11 PROCEDURE — 82948 REAGENT STRIP/BLOOD GLUCOSE: CPT

## 2020-04-11 PROCEDURE — 99306 1ST NF CARE HIGH MDM 50: CPT | Performed by: INTERNAL MEDICINE

## 2020-04-11 PROCEDURE — 99024 POSTOP FOLLOW-UP VISIT: CPT | Performed by: PHYSICIAN ASSISTANT

## 2020-04-11 PROCEDURE — 85027 COMPLETE CBC AUTOMATED: CPT | Performed by: PHYSICIAN ASSISTANT

## 2020-04-11 PROCEDURE — 80048 BASIC METABOLIC PNL TOTAL CA: CPT | Performed by: PHYSICIAN ASSISTANT

## 2020-04-11 PROCEDURE — 80048 BASIC METABOLIC PNL TOTAL CA: CPT | Performed by: INTERNAL MEDICINE

## 2020-04-11 RX ORDER — DOCUSATE SODIUM 100 MG/1
100 CAPSULE, LIQUID FILLED ORAL 2 TIMES DAILY
Qty: 10 CAPSULE | Refills: 0
Start: 2020-04-11 | End: 2020-04-23 | Stop reason: HOSPADM

## 2020-04-11 RX ORDER — LANOLIN ALCOHOL/MO/W.PET/CERES
3 CREAM (GRAM) TOPICAL
Status: DISCONTINUED | OUTPATIENT
Start: 2020-04-11 | End: 2020-04-23 | Stop reason: HOSPADM

## 2020-04-11 RX ORDER — SENNOSIDES 8.6 MG
1 TABLET ORAL
Status: DISCONTINUED | OUTPATIENT
Start: 2020-04-11 | End: 2020-04-12

## 2020-04-11 RX ORDER — LIDOCAINE 50 MG/G
1 PATCH TOPICAL DAILY
Status: DISCONTINUED | OUTPATIENT
Start: 2020-04-11 | End: 2020-04-23 | Stop reason: HOSPADM

## 2020-04-11 RX ORDER — AMLODIPINE BESYLATE 5 MG/1
5 TABLET ORAL DAILY
Status: DISCONTINUED | OUTPATIENT
Start: 2020-04-12 | End: 2020-04-13

## 2020-04-11 RX ORDER — SENNOSIDES 8.6 MG
1 TABLET ORAL
Qty: 30 EACH | Refills: 0 | Status: SHIPPED | OUTPATIENT
Start: 2020-04-11

## 2020-04-11 RX ORDER — PANTOPRAZOLE SODIUM 40 MG/1
40 TABLET, DELAYED RELEASE ORAL
Qty: 30 TABLET | Refills: 0
Start: 2020-04-11 | End: 2020-04-28

## 2020-04-11 RX ORDER — CALCIUM CARBONATE 200(500)MG
1000 TABLET,CHEWABLE ORAL DAILY PRN
Qty: 30 TABLET | Refills: 0
Start: 2020-04-11 | End: 2020-04-23 | Stop reason: HOSPADM

## 2020-04-11 RX ORDER — LISINOPRIL 20 MG/1
20 TABLET ORAL DAILY
Status: DISCONTINUED | OUTPATIENT
Start: 2020-04-12 | End: 2020-04-13

## 2020-04-11 RX ORDER — ACETAMINOPHEN 325 MG/1
650 TABLET ORAL EVERY 6 HOURS PRN
Qty: 30 TABLET | Refills: 0
Start: 2020-04-11

## 2020-04-11 RX ORDER — PANTOPRAZOLE SODIUM 40 MG/1
40 TABLET, DELAYED RELEASE ORAL
Status: DISCONTINUED | OUTPATIENT
Start: 2020-04-12 | End: 2020-04-23 | Stop reason: HOSPADM

## 2020-04-11 RX ORDER — SODIUM CHLORIDE 1000 MG
2 TABLET, SOLUBLE MISCELLANEOUS 2 TIMES DAILY WITH MEALS
Status: DISCONTINUED | OUTPATIENT
Start: 2020-04-11 | End: 2020-04-13

## 2020-04-11 RX ORDER — ACETAMINOPHEN 325 MG/1
650 TABLET ORAL EVERY 6 HOURS PRN
Status: DISCONTINUED | OUTPATIENT
Start: 2020-04-11 | End: 2020-04-11

## 2020-04-11 RX ORDER — ECHINACEA PURPUREA EXTRACT 125 MG
1 TABLET ORAL
Status: DISCONTINUED | OUTPATIENT
Start: 2020-04-11 | End: 2020-04-23 | Stop reason: HOSPADM

## 2020-04-11 RX ORDER — ATORVASTATIN CALCIUM 40 MG/1
40 TABLET, FILM COATED ORAL
Status: DISCONTINUED | OUTPATIENT
Start: 2020-04-11 | End: 2020-04-23 | Stop reason: HOSPADM

## 2020-04-11 RX ORDER — ACETAMINOPHEN 325 MG/1
975 TABLET ORAL EVERY 8 HOURS SCHEDULED
Status: DISCONTINUED | OUTPATIENT
Start: 2020-04-11 | End: 2020-04-15

## 2020-04-11 RX ORDER — CALCIUM CARBONATE 200(500)MG
1000 TABLET,CHEWABLE ORAL DAILY PRN
Status: DISCONTINUED | OUTPATIENT
Start: 2020-04-11 | End: 2020-04-23 | Stop reason: HOSPADM

## 2020-04-11 RX ADMIN — SENNOSIDES 8.6 MG: 8.6 TABLET, FILM COATED ORAL at 10:20

## 2020-04-11 RX ADMIN — SODIUM CHLORIDE TAB 1 GM 2 G: 1 TAB at 10:19

## 2020-04-11 RX ADMIN — ACETAMINOPHEN 975 MG: 325 TABLET ORAL at 16:08

## 2020-04-11 RX ADMIN — AMLODIPINE BESYLATE 5 MG: 5 TABLET ORAL at 10:20

## 2020-04-11 RX ADMIN — PANTOPRAZOLE SODIUM 40 MG: 40 TABLET, DELAYED RELEASE ORAL at 10:20

## 2020-04-11 RX ADMIN — SODIUM CHLORIDE: 234 INJECTION INTRAMUSCULAR; INTRAVENOUS; SUBCUTANEOUS at 09:08

## 2020-04-11 RX ADMIN — DOCUSATE SODIUM 100 MG: 100 CAPSULE, LIQUID FILLED ORAL at 10:19

## 2020-04-11 RX ADMIN — SODIUM CHLORIDE TAB 1 GM 2 G: 1 TAB at 16:09

## 2020-04-11 RX ADMIN — METFORMIN HYDROCHLORIDE 500 MG: 500 TABLET ORAL at 16:09

## 2020-04-11 RX ADMIN — MELATONIN TAB 3 MG 3 MG: 3 TAB at 21:33

## 2020-04-11 RX ADMIN — ATORVASTATIN CALCIUM 40 MG: 40 TABLET, FILM COATED ORAL at 17:44

## 2020-04-11 RX ADMIN — LIDOCAINE 1 PATCH: 50 PATCH TOPICAL at 17:44

## 2020-04-11 RX ADMIN — ENOXAPARIN SODIUM 40 MG: 40 INJECTION SUBCUTANEOUS at 10:20

## 2020-04-11 RX ADMIN — ACETAMINOPHEN 975 MG: 325 TABLET ORAL at 21:26

## 2020-04-12 LAB
GLUCOSE SERPL-MCNC: 100 MG/DL (ref 65–140)
GLUCOSE SERPL-MCNC: 163 MG/DL (ref 65–140)
GLUCOSE SERPL-MCNC: 178 MG/DL (ref 65–140)
GLUCOSE SERPL-MCNC: 207 MG/DL (ref 65–140)

## 2020-04-12 PROCEDURE — 99309 SBSQ NF CARE MODERATE MDM 30: CPT | Performed by: INTERNAL MEDICINE

## 2020-04-12 PROCEDURE — 82948 REAGENT STRIP/BLOOD GLUCOSE: CPT

## 2020-04-12 PROCEDURE — 97163 PT EVAL HIGH COMPLEX 45 MIN: CPT

## 2020-04-12 PROCEDURE — 97116 GAIT TRAINING THERAPY: CPT

## 2020-04-12 PROCEDURE — 97110 THERAPEUTIC EXERCISES: CPT

## 2020-04-12 RX ORDER — POLYETHYLENE GLYCOL 3350 17 G/17G
17 POWDER, FOR SOLUTION ORAL DAILY PRN
Status: DISCONTINUED | OUTPATIENT
Start: 2020-04-12 | End: 2020-04-23 | Stop reason: HOSPADM

## 2020-04-12 RX ORDER — SENNOSIDES 8.6 MG
1 TABLET ORAL
Status: DISCONTINUED | OUTPATIENT
Start: 2020-04-12 | End: 2020-04-23 | Stop reason: HOSPADM

## 2020-04-12 RX ADMIN — ACETAMINOPHEN 975 MG: 325 TABLET ORAL at 14:31

## 2020-04-12 RX ADMIN — MELATONIN TAB 3 MG 3 MG: 3 TAB at 21:02

## 2020-04-12 RX ADMIN — AMLODIPINE BESYLATE 5 MG: 5 TABLET ORAL at 08:32

## 2020-04-12 RX ADMIN — ACETAMINOPHEN 975 MG: 325 TABLET ORAL at 21:00

## 2020-04-12 RX ADMIN — SODIUM CHLORIDE TAB 1 GM 2 G: 1 TAB at 08:32

## 2020-04-12 RX ADMIN — METFORMIN HYDROCHLORIDE 500 MG: 500 TABLET ORAL at 17:10

## 2020-04-12 RX ADMIN — LIDOCAINE 1 PATCH: 50 PATCH TOPICAL at 08:33

## 2020-04-12 RX ADMIN — ENOXAPARIN SODIUM 30 MG: 30 INJECTION SUBCUTANEOUS at 08:33

## 2020-04-12 RX ADMIN — POLYETHYLENE GLYCOL 3350 17 G: 17 POWDER, FOR SOLUTION ORAL at 20:52

## 2020-04-12 RX ADMIN — SODIUM CHLORIDE TAB 1 GM 2 G: 1 TAB at 17:10

## 2020-04-12 RX ADMIN — ATORVASTATIN CALCIUM 40 MG: 40 TABLET, FILM COATED ORAL at 17:57

## 2020-04-12 RX ADMIN — INSULIN LISPRO 2 UNITS: 100 INJECTION, SOLUTION INTRAVENOUS; SUBCUTANEOUS at 21:01

## 2020-04-12 RX ADMIN — LISINOPRIL 20 MG: 20 TABLET ORAL at 08:32

## 2020-04-12 RX ADMIN — SENNOSIDES 8.6 MG: 8.6 TABLET, FILM COATED ORAL at 14:37

## 2020-04-12 RX ADMIN — INSULIN LISPRO 1 UNITS: 100 INJECTION, SOLUTION INTRAVENOUS; SUBCUTANEOUS at 17:10

## 2020-04-12 RX ADMIN — INSULIN LISPRO 1 UNITS: 100 INJECTION, SOLUTION INTRAVENOUS; SUBCUTANEOUS at 12:29

## 2020-04-12 RX ADMIN — PANTOPRAZOLE SODIUM 40 MG: 40 TABLET, DELAYED RELEASE ORAL at 06:12

## 2020-04-12 RX ADMIN — ACETAMINOPHEN 975 MG: 325 TABLET ORAL at 06:12

## 2020-04-12 RX ADMIN — METFORMIN HYDROCHLORIDE 500 MG: 500 TABLET ORAL at 08:32

## 2020-04-13 PROBLEM — E87.6 HYPOKALEMIA: Status: ACTIVE | Noted: 2020-04-13

## 2020-04-13 PROBLEM — D63.1 ANEMIA DUE TO STAGE 3 CHRONIC KIDNEY DISEASE (HCC): Status: ACTIVE | Noted: 2020-04-11

## 2020-04-13 LAB
ANION GAP SERPL CALCULATED.3IONS-SCNC: 8 MMOL/L (ref 5–14)
BUN SERPL-MCNC: 16 MG/DL (ref 5–25)
CALCIUM SERPL-MCNC: 8.4 MG/DL (ref 8.4–10.2)
CHLORIDE SERPL-SCNC: 94 MMOL/L (ref 97–108)
CHLORIDE UR-SCNC: 154 MMOL/L
CO2 SERPL-SCNC: 27 MMOL/L (ref 22–30)
CREAT SERPL-MCNC: 0.92 MG/DL (ref 0.6–1.2)
GFR SERPL CREATININE-BSD FRML MDRD: 56 ML/MIN/1.73SQ M
GLUCOSE SERPL-MCNC: 103 MG/DL (ref 65–140)
GLUCOSE SERPL-MCNC: 141 MG/DL (ref 65–140)
GLUCOSE SERPL-MCNC: 149 MG/DL (ref 65–140)
GLUCOSE SERPL-MCNC: 97 MG/DL (ref 70–99)
OSMOLALITY UR: 513 MMOL/KG
POTASSIUM SERPL-SCNC: 3.3 MMOL/L (ref 3.6–5)
SODIUM 24H UR-SCNC: 113 MOL/L
SODIUM SERPL-SCNC: 129 MMOL/L (ref 137–147)

## 2020-04-13 PROCEDURE — 82948 REAGENT STRIP/BLOOD GLUCOSE: CPT

## 2020-04-13 PROCEDURE — 97535 SELF CARE MNGMENT TRAINING: CPT

## 2020-04-13 PROCEDURE — 97530 THERAPEUTIC ACTIVITIES: CPT

## 2020-04-13 PROCEDURE — 82436 ASSAY OF URINE CHLORIDE: CPT | Performed by: INTERNAL MEDICINE

## 2020-04-13 PROCEDURE — 84300 ASSAY OF URINE SODIUM: CPT | Performed by: INTERNAL MEDICINE

## 2020-04-13 PROCEDURE — 97167 OT EVAL HIGH COMPLEX 60 MIN: CPT

## 2020-04-13 PROCEDURE — 97110 THERAPEUTIC EXERCISES: CPT

## 2020-04-13 PROCEDURE — 99309 SBSQ NF CARE MODERATE MDM 30: CPT | Performed by: INTERNAL MEDICINE

## 2020-04-13 PROCEDURE — 83935 ASSAY OF URINE OSMOLALITY: CPT | Performed by: INTERNAL MEDICINE

## 2020-04-13 PROCEDURE — 97116 GAIT TRAINING THERAPY: CPT

## 2020-04-13 PROCEDURE — 80048 BASIC METABOLIC PNL TOTAL CA: CPT | Performed by: INTERNAL MEDICINE

## 2020-04-13 RX ORDER — AMLODIPINE BESYLATE 5 MG/1
5 TABLET ORAL DAILY
Status: DISCONTINUED | OUTPATIENT
Start: 2020-04-14 | End: 2020-04-23 | Stop reason: HOSPADM

## 2020-04-13 RX ORDER — SODIUM CHLORIDE 1000 MG
2 TABLET, SOLUBLE MISCELLANEOUS
Status: DISCONTINUED | OUTPATIENT
Start: 2020-04-13 | End: 2020-04-14

## 2020-04-13 RX ORDER — LISINOPRIL 20 MG/1
20 TABLET ORAL DAILY
Status: DISCONTINUED | OUTPATIENT
Start: 2020-04-14 | End: 2020-04-23 | Stop reason: HOSPADM

## 2020-04-13 RX ORDER — POTASSIUM CHLORIDE 20 MEQ/1
40 TABLET, EXTENDED RELEASE ORAL ONCE
Status: COMPLETED | OUTPATIENT
Start: 2020-04-13 | End: 2020-04-13

## 2020-04-13 RX ADMIN — POTASSIUM CHLORIDE 40 MEQ: 20 TABLET, EXTENDED RELEASE ORAL at 16:56

## 2020-04-13 RX ADMIN — LISINOPRIL 20 MG: 20 TABLET ORAL at 09:53

## 2020-04-13 RX ADMIN — ENOXAPARIN SODIUM 30 MG: 30 INJECTION SUBCUTANEOUS at 09:54

## 2020-04-13 RX ADMIN — ACETAMINOPHEN 975 MG: 325 TABLET ORAL at 14:35

## 2020-04-13 RX ADMIN — SODIUM CHLORIDE TAB 1 GM 2 G: 1 TAB at 16:56

## 2020-04-13 RX ADMIN — ATORVASTATIN CALCIUM 40 MG: 40 TABLET, FILM COATED ORAL at 17:46

## 2020-04-13 RX ADMIN — MELATONIN TAB 3 MG 3 MG: 3 TAB at 21:40

## 2020-04-13 RX ADMIN — ACETAMINOPHEN 975 MG: 325 TABLET ORAL at 06:22

## 2020-04-13 RX ADMIN — METFORMIN HYDROCHLORIDE 500 MG: 500 TABLET ORAL at 16:56

## 2020-04-13 RX ADMIN — AMLODIPINE BESYLATE 5 MG: 5 TABLET ORAL at 09:53

## 2020-04-13 RX ADMIN — SODIUM CHLORIDE TAB 1 GM 2 G: 1 TAB at 11:42

## 2020-04-13 RX ADMIN — METFORMIN HYDROCHLORIDE 500 MG: 500 TABLET ORAL at 09:54

## 2020-04-13 RX ADMIN — LIDOCAINE 1 PATCH: 50 PATCH TOPICAL at 09:54

## 2020-04-13 RX ADMIN — PANTOPRAZOLE SODIUM 40 MG: 40 TABLET, DELAYED RELEASE ORAL at 06:22

## 2020-04-13 RX ADMIN — SODIUM CHLORIDE TAB 1 GM 2 G: 1 TAB at 09:53

## 2020-04-14 PROBLEM — E87.6 HYPOKALEMIA: Status: RESOLVED | Noted: 2020-04-13 | Resolved: 2020-04-14

## 2020-04-14 PROBLEM — E83.42 HYPOMAGNESEMIA: Status: ACTIVE | Noted: 2020-04-14

## 2020-04-14 LAB
ANION GAP SERPL CALCULATED.3IONS-SCNC: 8 MMOL/L (ref 5–14)
BASOPHILS # BLD AUTO: 0 THOUSANDS/ΜL (ref 0–0.1)
BASOPHILS NFR BLD AUTO: 1 % (ref 0–1)
BUN SERPL-MCNC: 17 MG/DL (ref 5–25)
CALCIUM SERPL-MCNC: 8.5 MG/DL (ref 8.4–10.2)
CHLORIDE SERPL-SCNC: 95 MMOL/L (ref 97–108)
CO2 SERPL-SCNC: 25 MMOL/L (ref 22–30)
CREAT SERPL-MCNC: 0.89 MG/DL (ref 0.6–1.2)
EOSINOPHIL # BLD AUTO: 0.3 THOUSAND/ΜL (ref 0–0.4)
EOSINOPHIL NFR BLD AUTO: 7 % (ref 0–6)
ERYTHROCYTE [DISTWIDTH] IN BLOOD BY AUTOMATED COUNT: 13.7 %
GFR SERPL CREATININE-BSD FRML MDRD: 58 ML/MIN/1.73SQ M
GLUCOSE SERPL-MCNC: 101 MG/DL (ref 65–140)
GLUCOSE SERPL-MCNC: 110 MG/DL (ref 70–99)
GLUCOSE SERPL-MCNC: 128 MG/DL (ref 65–140)
GLUCOSE SERPL-MCNC: 138 MG/DL (ref 65–140)
GLUCOSE SERPL-MCNC: 153 MG/DL (ref 65–140)
HCT VFR BLD AUTO: 28.3 % (ref 36–46)
HGB BLD-MCNC: 9.7 G/DL (ref 12–16)
LYMPHOCYTES # BLD AUTO: 0.8 THOUSANDS/ΜL (ref 0.5–4)
LYMPHOCYTES NFR BLD AUTO: 16 % (ref 25–45)
MAGNESIUM SERPL-MCNC: 1.6 MG/DL (ref 1.6–2.3)
MCH RBC QN AUTO: 29.3 PG (ref 26.8–34.3)
MCHC RBC AUTO-ENTMCNC: 34.3 G/DL (ref 31.4–37.4)
MCV RBC AUTO: 85 FL (ref 80–100)
MONOCYTES # BLD AUTO: 0.4 THOUSAND/ΜL (ref 0.2–0.9)
MONOCYTES NFR BLD AUTO: 9 % (ref 1–10)
NEUTROPHILS # BLD AUTO: 3.3 THOUSANDS/ΜL (ref 1.8–7.8)
NEUTS SEG NFR BLD AUTO: 69 % (ref 45–65)
PLATELET # BLD AUTO: 341 THOUSANDS/UL (ref 150–450)
PMV BLD AUTO: 6.6 FL (ref 8.9–12.7)
POTASSIUM SERPL-SCNC: 4.3 MMOL/L (ref 3.6–5)
RBC # BLD AUTO: 3.31 MILLION/UL (ref 4–5.2)
SODIUM SERPL-SCNC: 128 MMOL/L (ref 137–147)
WBC # BLD AUTO: 4.9 THOUSAND/UL (ref 4.31–10.16)

## 2020-04-14 PROCEDURE — 97110 THERAPEUTIC EXERCISES: CPT

## 2020-04-14 PROCEDURE — 82948 REAGENT STRIP/BLOOD GLUCOSE: CPT

## 2020-04-14 PROCEDURE — 80048 BASIC METABOLIC PNL TOTAL CA: CPT | Performed by: INTERNAL MEDICINE

## 2020-04-14 PROCEDURE — 97116 GAIT TRAINING THERAPY: CPT

## 2020-04-14 PROCEDURE — 83735 ASSAY OF MAGNESIUM: CPT | Performed by: INTERNAL MEDICINE

## 2020-04-14 PROCEDURE — 85025 COMPLETE CBC W/AUTO DIFF WBC: CPT | Performed by: INTERNAL MEDICINE

## 2020-04-14 PROCEDURE — 97530 THERAPEUTIC ACTIVITIES: CPT

## 2020-04-14 PROCEDURE — 99309 SBSQ NF CARE MODERATE MDM 30: CPT | Performed by: INTERNAL MEDICINE

## 2020-04-14 PROCEDURE — 97535 SELF CARE MNGMENT TRAINING: CPT

## 2020-04-14 RX ORDER — MAGNESIUM SULFATE 1 G/100ML
1 INJECTION INTRAVENOUS ONCE
Status: COMPLETED | OUTPATIENT
Start: 2020-04-14 | End: 2020-04-14

## 2020-04-14 RX ORDER — SODIUM CHLORIDE 1000 MG
3 TABLET, SOLUBLE MISCELLANEOUS
Status: DISCONTINUED | OUTPATIENT
Start: 2020-04-14 | End: 2020-04-21

## 2020-04-14 RX ADMIN — SODIUM CHLORIDE TAB 1 GM 3 G: 1 TAB at 11:50

## 2020-04-14 RX ADMIN — LIDOCAINE 1 PATCH: 50 PATCH TOPICAL at 08:53

## 2020-04-14 RX ADMIN — SODIUM CHLORIDE TAB 1 GM 2 G: 1 TAB at 08:52

## 2020-04-14 RX ADMIN — PANTOPRAZOLE SODIUM 40 MG: 40 TABLET, DELAYED RELEASE ORAL at 06:23

## 2020-04-14 RX ADMIN — ATORVASTATIN CALCIUM 40 MG: 40 TABLET, FILM COATED ORAL at 17:15

## 2020-04-14 RX ADMIN — ACETAMINOPHEN 975 MG: 325 TABLET ORAL at 21:11

## 2020-04-14 RX ADMIN — INSULIN LISPRO 1 UNITS: 100 INJECTION, SOLUTION INTRAVENOUS; SUBCUTANEOUS at 21:11

## 2020-04-14 RX ADMIN — SENNOSIDES 8.6 MG: 8.6 TABLET, FILM COATED ORAL at 21:11

## 2020-04-14 RX ADMIN — ACETAMINOPHEN 975 MG: 325 TABLET ORAL at 06:22

## 2020-04-14 RX ADMIN — METFORMIN HYDROCHLORIDE 500 MG: 500 TABLET ORAL at 08:53

## 2020-04-14 RX ADMIN — MAGNESIUM SULFATE HEPTAHYDRATE 1 G: 1 INJECTION, SOLUTION INTRAVENOUS at 11:10

## 2020-04-14 RX ADMIN — ENOXAPARIN SODIUM 30 MG: 30 INJECTION SUBCUTANEOUS at 08:53

## 2020-04-14 RX ADMIN — AMLODIPINE BESYLATE 5 MG: 5 TABLET ORAL at 08:53

## 2020-04-14 RX ADMIN — ACETAMINOPHEN 975 MG: 325 TABLET ORAL at 14:31

## 2020-04-14 RX ADMIN — MELATONIN TAB 3 MG 3 MG: 3 TAB at 21:11

## 2020-04-14 RX ADMIN — SODIUM CHLORIDE TAB 1 GM 3 G: 1 TAB at 21:11

## 2020-04-14 RX ADMIN — METFORMIN HYDROCHLORIDE 500 MG: 500 TABLET ORAL at 17:15

## 2020-04-14 RX ADMIN — SODIUM CHLORIDE TAB 1 GM 3 G: 1 TAB at 17:15

## 2020-04-14 RX ADMIN — LISINOPRIL 20 MG: 20 TABLET ORAL at 08:53

## 2020-04-15 ENCOUNTER — APPOINTMENT (INPATIENT)
Dept: NON INVASIVE DIAGNOSTICS | Facility: HOSPITAL | Age: 85
DRG: 560 | End: 2020-04-15
Payer: COMMERCIAL

## 2020-04-15 PROBLEM — E87.1 HYPONATREMIA: Status: RESOLVED | Noted: 2020-01-23 | Resolved: 2020-04-15

## 2020-04-15 PROBLEM — E83.42 HYPOMAGNESEMIA: Status: RESOLVED | Noted: 2020-04-14 | Resolved: 2020-04-15

## 2020-04-15 PROBLEM — E44.1 MILD PROTEIN-CALORIE MALNUTRITION (HCC): Status: ACTIVE | Noted: 2020-04-15

## 2020-04-15 LAB
ANION GAP SERPL CALCULATED.3IONS-SCNC: 9 MMOL/L (ref 5–14)
BASOPHILS # BLD AUTO: 0.1 THOUSANDS/ΜL (ref 0–0.1)
BASOPHILS NFR BLD AUTO: 2 % (ref 0–1)
BUN SERPL-MCNC: 17 MG/DL (ref 5–25)
CALCIUM SERPL-MCNC: 8.8 MG/DL (ref 8.4–10.2)
CHLORIDE SERPL-SCNC: 98 MMOL/L (ref 97–108)
CO2 SERPL-SCNC: 24 MMOL/L (ref 22–30)
CREAT SERPL-MCNC: 0.82 MG/DL (ref 0.6–1.2)
EOSINOPHIL # BLD AUTO: 0.7 THOUSAND/ΜL (ref 0–0.4)
EOSINOPHIL NFR BLD AUTO: 10 % (ref 0–6)
ERYTHROCYTE [DISTWIDTH] IN BLOOD BY AUTOMATED COUNT: 13.7 %
GFR SERPL CREATININE-BSD FRML MDRD: 65 ML/MIN/1.73SQ M
GLUCOSE P FAST SERPL-MCNC: 94 MG/DL (ref 70–99)
GLUCOSE SERPL-MCNC: 119 MG/DL (ref 65–140)
GLUCOSE SERPL-MCNC: 92 MG/DL (ref 65–140)
GLUCOSE SERPL-MCNC: 94 MG/DL (ref 70–99)
HCT VFR BLD AUTO: 32.6 % (ref 36–46)
HGB BLD-MCNC: 11 G/DL (ref 12–16)
LYMPHOCYTES # BLD AUTO: 1.5 THOUSANDS/ΜL (ref 0.5–4)
LYMPHOCYTES NFR BLD AUTO: 22 % (ref 25–45)
MAGNESIUM SERPL-MCNC: 1.9 MG/DL (ref 1.6–2.3)
MCH RBC QN AUTO: 29.4 PG (ref 26.8–34.3)
MCHC RBC AUTO-ENTMCNC: 33.8 G/DL (ref 31.4–37.4)
MCV RBC AUTO: 87 FL (ref 80–100)
MONOCYTES # BLD AUTO: 0.6 THOUSAND/ΜL (ref 0.2–0.9)
MONOCYTES NFR BLD AUTO: 8 % (ref 1–10)
NEUTROPHILS # BLD AUTO: 4 THOUSANDS/ΜL (ref 1.8–7.8)
NEUTS SEG NFR BLD AUTO: 58 % (ref 45–65)
PLATELET # BLD AUTO: 375 THOUSANDS/UL (ref 150–450)
PLATELET BLD QL SMEAR: ADEQUATE
PMV BLD AUTO: 7.1 FL (ref 8.9–12.7)
POTASSIUM SERPL-SCNC: 3.9 MMOL/L (ref 3.6–5)
RBC # BLD AUTO: 3.76 MILLION/UL (ref 4–5.2)
RBC MORPH BLD: NORMAL
SODIUM SERPL-SCNC: 131 MMOL/L (ref 137–147)
WBC # BLD AUTO: 6.8 THOUSAND/UL (ref 4.31–10.16)

## 2020-04-15 PROCEDURE — 93971 EXTREMITY STUDY: CPT | Performed by: SURGERY

## 2020-04-15 PROCEDURE — 99309 SBSQ NF CARE MODERATE MDM 30: CPT | Performed by: INTERNAL MEDICINE

## 2020-04-15 PROCEDURE — 97530 THERAPEUTIC ACTIVITIES: CPT

## 2020-04-15 PROCEDURE — 80048 BASIC METABOLIC PNL TOTAL CA: CPT | Performed by: INTERNAL MEDICINE

## 2020-04-15 PROCEDURE — 83735 ASSAY OF MAGNESIUM: CPT | Performed by: INTERNAL MEDICINE

## 2020-04-15 PROCEDURE — 93971 EXTREMITY STUDY: CPT

## 2020-04-15 PROCEDURE — 97116 GAIT TRAINING THERAPY: CPT

## 2020-04-15 PROCEDURE — 85025 COMPLETE CBC W/AUTO DIFF WBC: CPT | Performed by: INTERNAL MEDICINE

## 2020-04-15 PROCEDURE — 99309 SBSQ NF CARE MODERATE MDM 30: CPT | Performed by: FAMILY MEDICINE

## 2020-04-15 PROCEDURE — 97110 THERAPEUTIC EXERCISES: CPT

## 2020-04-15 PROCEDURE — 82948 REAGENT STRIP/BLOOD GLUCOSE: CPT

## 2020-04-15 PROCEDURE — 97535 SELF CARE MNGMENT TRAINING: CPT

## 2020-04-15 RX ORDER — ACETAMINOPHEN 325 MG/1
650 TABLET ORAL EVERY 8 HOURS SCHEDULED
Status: DISCONTINUED | OUTPATIENT
Start: 2020-04-15 | End: 2020-04-23 | Stop reason: HOSPADM

## 2020-04-15 RX ADMIN — METFORMIN HYDROCHLORIDE 500 MG: 500 TABLET ORAL at 17:22

## 2020-04-15 RX ADMIN — SODIUM CHLORIDE TAB 1 GM 3 G: 1 TAB at 17:23

## 2020-04-15 RX ADMIN — AMLODIPINE BESYLATE 5 MG: 5 TABLET ORAL at 09:47

## 2020-04-15 RX ADMIN — MELATONIN TAB 3 MG 3 MG: 3 TAB at 21:01

## 2020-04-15 RX ADMIN — LISINOPRIL 20 MG: 20 TABLET ORAL at 09:47

## 2020-04-15 RX ADMIN — SODIUM CHLORIDE TAB 1 GM 3 G: 1 TAB at 21:01

## 2020-04-15 RX ADMIN — ACETAMINOPHEN 975 MG: 325 TABLET ORAL at 05:03

## 2020-04-15 RX ADMIN — ATORVASTATIN CALCIUM 40 MG: 40 TABLET, FILM COATED ORAL at 17:22

## 2020-04-15 RX ADMIN — SODIUM CHLORIDE TAB 1 GM 3 G: 1 TAB at 09:46

## 2020-04-15 RX ADMIN — METFORMIN HYDROCHLORIDE 500 MG: 500 TABLET ORAL at 09:47

## 2020-04-15 RX ADMIN — LIDOCAINE 1 PATCH: 50 PATCH TOPICAL at 09:48

## 2020-04-15 RX ADMIN — ENOXAPARIN SODIUM 30 MG: 30 INJECTION SUBCUTANEOUS at 09:48

## 2020-04-15 RX ADMIN — ACETAMINOPHEN 650 MG: 325 TABLET ORAL at 21:01

## 2020-04-15 RX ADMIN — PANTOPRAZOLE SODIUM 40 MG: 40 TABLET, DELAYED RELEASE ORAL at 05:03

## 2020-04-15 RX ADMIN — SODIUM CHLORIDE TAB 1 GM 3 G: 1 TAB at 12:29

## 2020-04-16 LAB
ANION GAP SERPL CALCULATED.3IONS-SCNC: 6 MMOL/L (ref 5–14)
BUN SERPL-MCNC: 14 MG/DL (ref 5–25)
CALCIUM SERPL-MCNC: 8.3 MG/DL (ref 8.4–10.2)
CHLORIDE SERPL-SCNC: 100 MMOL/L (ref 97–108)
CO2 SERPL-SCNC: 25 MMOL/L (ref 22–30)
CREAT SERPL-MCNC: 0.76 MG/DL (ref 0.6–1.2)
GFR SERPL CREATININE-BSD FRML MDRD: 71 ML/MIN/1.73SQ M
GLUCOSE SERPL-MCNC: 95 MG/DL (ref 70–99)
POTASSIUM SERPL-SCNC: 3.9 MMOL/L (ref 3.6–5)
SODIUM SERPL-SCNC: 131 MMOL/L (ref 137–147)

## 2020-04-16 PROCEDURE — 80048 BASIC METABOLIC PNL TOTAL CA: CPT | Performed by: INTERNAL MEDICINE

## 2020-04-16 PROCEDURE — 97530 THERAPEUTIC ACTIVITIES: CPT

## 2020-04-16 PROCEDURE — 97116 GAIT TRAINING THERAPY: CPT

## 2020-04-16 PROCEDURE — 97110 THERAPEUTIC EXERCISES: CPT

## 2020-04-16 PROCEDURE — 99309 SBSQ NF CARE MODERATE MDM 30: CPT | Performed by: INTERNAL MEDICINE

## 2020-04-16 RX ORDER — FUROSEMIDE 20 MG/1
10 TABLET ORAL ONCE
Status: COMPLETED | OUTPATIENT
Start: 2020-04-16 | End: 2020-04-16

## 2020-04-16 RX ADMIN — METFORMIN HYDROCHLORIDE 500 MG: 500 TABLET ORAL at 08:08

## 2020-04-16 RX ADMIN — ATORVASTATIN CALCIUM 40 MG: 40 TABLET, FILM COATED ORAL at 17:42

## 2020-04-16 RX ADMIN — AMLODIPINE BESYLATE 5 MG: 5 TABLET ORAL at 09:19

## 2020-04-16 RX ADMIN — METFORMIN HYDROCHLORIDE 500 MG: 500 TABLET ORAL at 15:39

## 2020-04-16 RX ADMIN — ENOXAPARIN SODIUM 30 MG: 30 INJECTION SUBCUTANEOUS at 09:19

## 2020-04-16 RX ADMIN — FUROSEMIDE 10 MG: 20 TABLET ORAL at 11:30

## 2020-04-16 RX ADMIN — LIDOCAINE 1 PATCH: 50 PATCH TOPICAL at 09:19

## 2020-04-16 RX ADMIN — MELATONIN TAB 3 MG 3 MG: 3 TAB at 21:33

## 2020-04-16 RX ADMIN — SODIUM CHLORIDE TAB 1 GM 3 G: 1 TAB at 15:38

## 2020-04-16 RX ADMIN — SODIUM CHLORIDE TAB 1 GM 3 G: 1 TAB at 21:33

## 2020-04-16 RX ADMIN — SODIUM CHLORIDE TAB 1 GM 3 G: 1 TAB at 12:23

## 2020-04-16 RX ADMIN — SODIUM CHLORIDE TAB 1 GM 3 G: 1 TAB at 08:23

## 2020-04-16 RX ADMIN — PANTOPRAZOLE SODIUM 40 MG: 40 TABLET, DELAYED RELEASE ORAL at 05:12

## 2020-04-16 RX ADMIN — LISINOPRIL 20 MG: 20 TABLET ORAL at 09:20

## 2020-04-16 RX ADMIN — ACETAMINOPHEN 650 MG: 325 TABLET ORAL at 05:12

## 2020-04-17 LAB
ANION GAP SERPL CALCULATED.3IONS-SCNC: 7 MMOL/L (ref 5–14)
BASOPHILS # BLD AUTO: 0 THOUSANDS/ΜL (ref 0–0.1)
BASOPHILS NFR BLD AUTO: 1 % (ref 0–1)
BUN SERPL-MCNC: 11 MG/DL (ref 5–25)
CALCIUM SERPL-MCNC: 8.5 MG/DL (ref 8.4–10.2)
CHLORIDE SERPL-SCNC: 97 MMOL/L (ref 97–108)
CO2 SERPL-SCNC: 26 MMOL/L (ref 22–30)
CREAT SERPL-MCNC: 0.79 MG/DL (ref 0.6–1.2)
EOSINOPHIL # BLD AUTO: 0.3 THOUSAND/ΜL (ref 0–0.4)
EOSINOPHIL NFR BLD AUTO: 7 % (ref 0–6)
ERYTHROCYTE [DISTWIDTH] IN BLOOD BY AUTOMATED COUNT: 13.6 %
GFR SERPL CREATININE-BSD FRML MDRD: 68 ML/MIN/1.73SQ M
GLUCOSE SERPL-MCNC: 121 MG/DL (ref 70–99)
GLUCOSE SERPL-MCNC: 132 MG/DL (ref 65–140)
GLUCOSE SERPL-MCNC: 149 MG/DL (ref 65–140)
HCT VFR BLD AUTO: 30.1 % (ref 36–46)
HGB BLD-MCNC: 10 G/DL (ref 12–16)
LYMPHOCYTES # BLD AUTO: 0.8 THOUSANDS/ΜL (ref 0.5–4)
LYMPHOCYTES NFR BLD AUTO: 21 % (ref 25–45)
MAGNESIUM SERPL-MCNC: 1.6 MG/DL (ref 1.6–2.3)
MCH RBC QN AUTO: 28.3 PG (ref 26.8–34.3)
MCHC RBC AUTO-ENTMCNC: 33.4 G/DL (ref 31.4–37.4)
MCV RBC AUTO: 85 FL (ref 80–100)
MONOCYTES # BLD AUTO: 0.3 THOUSAND/ΜL (ref 0.2–0.9)
MONOCYTES NFR BLD AUTO: 9 % (ref 1–10)
NEUTROPHILS # BLD AUTO: 2.5 THOUSANDS/ΜL (ref 1.8–7.8)
NEUTS SEG NFR BLD AUTO: 62 % (ref 45–65)
PLATELET # BLD AUTO: 441 THOUSANDS/UL (ref 150–450)
PMV BLD AUTO: 6.5 FL (ref 8.9–12.7)
POTASSIUM SERPL-SCNC: 3.7 MMOL/L (ref 3.6–5)
RBC # BLD AUTO: 3.55 MILLION/UL (ref 4–5.2)
SODIUM SERPL-SCNC: 130 MMOL/L (ref 137–147)
WBC # BLD AUTO: 4 THOUSAND/UL (ref 4.31–10.16)

## 2020-04-17 PROCEDURE — 85025 COMPLETE CBC W/AUTO DIFF WBC: CPT | Performed by: INTERNAL MEDICINE

## 2020-04-17 PROCEDURE — 97116 GAIT TRAINING THERAPY: CPT

## 2020-04-17 PROCEDURE — 97530 THERAPEUTIC ACTIVITIES: CPT

## 2020-04-17 PROCEDURE — 97535 SELF CARE MNGMENT TRAINING: CPT

## 2020-04-17 PROCEDURE — 99309 SBSQ NF CARE MODERATE MDM 30: CPT | Performed by: INTERNAL MEDICINE

## 2020-04-17 PROCEDURE — 82948 REAGENT STRIP/BLOOD GLUCOSE: CPT

## 2020-04-17 PROCEDURE — 97110 THERAPEUTIC EXERCISES: CPT

## 2020-04-17 PROCEDURE — 83735 ASSAY OF MAGNESIUM: CPT | Performed by: INTERNAL MEDICINE

## 2020-04-17 PROCEDURE — 80048 BASIC METABOLIC PNL TOTAL CA: CPT | Performed by: INTERNAL MEDICINE

## 2020-04-17 RX ADMIN — ATORVASTATIN CALCIUM 40 MG: 40 TABLET, FILM COATED ORAL at 17:23

## 2020-04-17 RX ADMIN — SODIUM CHLORIDE TAB 1 GM 3 G: 1 TAB at 13:23

## 2020-04-17 RX ADMIN — ACETAMINOPHEN 650 MG: 325 TABLET ORAL at 05:02

## 2020-04-17 RX ADMIN — MELATONIN TAB 3 MG 3 MG: 3 TAB at 22:05

## 2020-04-17 RX ADMIN — SODIUM CHLORIDE TAB 1 GM 3 G: 1 TAB at 16:45

## 2020-04-17 RX ADMIN — AMLODIPINE BESYLATE 5 MG: 5 TABLET ORAL at 08:54

## 2020-04-17 RX ADMIN — ACETAMINOPHEN 650 MG: 325 TABLET ORAL at 22:05

## 2020-04-17 RX ADMIN — LISINOPRIL 20 MG: 20 TABLET ORAL at 08:54

## 2020-04-17 RX ADMIN — SODIUM CHLORIDE TAB 1 GM 3 G: 1 TAB at 08:53

## 2020-04-17 RX ADMIN — SODIUM CHLORIDE TAB 1 GM 3 G: 1 TAB at 22:05

## 2020-04-17 RX ADMIN — PANTOPRAZOLE SODIUM 40 MG: 40 TABLET, DELAYED RELEASE ORAL at 05:02

## 2020-04-17 RX ADMIN — ENOXAPARIN SODIUM 30 MG: 30 INJECTION SUBCUTANEOUS at 08:54

## 2020-04-17 RX ADMIN — METFORMIN HYDROCHLORIDE 500 MG: 500 TABLET ORAL at 08:53

## 2020-04-17 RX ADMIN — ACETAMINOPHEN 650 MG: 325 TABLET ORAL at 13:23

## 2020-04-17 RX ADMIN — METFORMIN HYDROCHLORIDE 500 MG: 500 TABLET ORAL at 16:45

## 2020-04-17 RX ADMIN — LIDOCAINE 1 PATCH: 50 PATCH TOPICAL at 08:54

## 2020-04-18 LAB
ANION GAP SERPL CALCULATED.3IONS-SCNC: 10 MMOL/L (ref 5–14)
BASOPHILS # BLD AUTO: 0 THOUSANDS/ΜL (ref 0–0.1)
BASOPHILS NFR BLD AUTO: 1 % (ref 0–1)
BUN SERPL-MCNC: 9 MG/DL (ref 5–25)
CALCIUM SERPL-MCNC: 9 MG/DL (ref 8.4–10.2)
CHLORIDE SERPL-SCNC: 96 MMOL/L (ref 97–108)
CO2 SERPL-SCNC: 25 MMOL/L (ref 22–30)
CREAT SERPL-MCNC: 0.75 MG/DL (ref 0.6–1.2)
EOSINOPHIL # BLD AUTO: 0.4 THOUSAND/ΜL (ref 0–0.4)
EOSINOPHIL NFR BLD AUTO: 10 % (ref 0–6)
ERYTHROCYTE [DISTWIDTH] IN BLOOD BY AUTOMATED COUNT: 13.8 %
GFR SERPL CREATININE-BSD FRML MDRD: 72 ML/MIN/1.73SQ M
GLUCOSE SERPL-MCNC: 101 MG/DL (ref 70–99)
HCT VFR BLD AUTO: 30 % (ref 36–46)
HGB BLD-MCNC: 10.1 G/DL (ref 12–16)
LYMPHOCYTES # BLD AUTO: 0.8 THOUSANDS/ΜL (ref 0.5–4)
LYMPHOCYTES NFR BLD AUTO: 24 % (ref 25–45)
MCH RBC QN AUTO: 29.2 PG (ref 26.8–34.3)
MCHC RBC AUTO-ENTMCNC: 33.9 G/DL (ref 31.4–37.4)
MCV RBC AUTO: 86 FL (ref 80–100)
MONOCYTES # BLD AUTO: 0.3 THOUSAND/ΜL (ref 0.2–0.9)
MONOCYTES NFR BLD AUTO: 9 % (ref 1–10)
NEUTROPHILS # BLD AUTO: 2 THOUSANDS/ΜL (ref 1.8–7.8)
NEUTS SEG NFR BLD AUTO: 56 % (ref 45–65)
PLATELET # BLD AUTO: 418 THOUSANDS/UL (ref 150–450)
PMV BLD AUTO: 6.2 FL (ref 8.9–12.7)
POTASSIUM SERPL-SCNC: 3.8 MMOL/L (ref 3.6–5)
RBC # BLD AUTO: 3.48 MILLION/UL (ref 4–5.2)
SODIUM SERPL-SCNC: 131 MMOL/L (ref 137–147)
WBC # BLD AUTO: 3.6 THOUSAND/UL (ref 4.31–10.16)

## 2020-04-18 PROCEDURE — 85025 COMPLETE CBC W/AUTO DIFF WBC: CPT | Performed by: INTERNAL MEDICINE

## 2020-04-18 PROCEDURE — 97110 THERAPEUTIC EXERCISES: CPT

## 2020-04-18 PROCEDURE — 97116 GAIT TRAINING THERAPY: CPT

## 2020-04-18 PROCEDURE — 97530 THERAPEUTIC ACTIVITIES: CPT

## 2020-04-18 PROCEDURE — 97535 SELF CARE MNGMENT TRAINING: CPT

## 2020-04-18 PROCEDURE — 99309 SBSQ NF CARE MODERATE MDM 30: CPT | Performed by: INTERNAL MEDICINE

## 2020-04-18 PROCEDURE — 80048 BASIC METABOLIC PNL TOTAL CA: CPT | Performed by: INTERNAL MEDICINE

## 2020-04-18 RX ADMIN — ENOXAPARIN SODIUM 30 MG: 30 INJECTION SUBCUTANEOUS at 08:11

## 2020-04-18 RX ADMIN — LIDOCAINE 1 PATCH: 50 PATCH TOPICAL at 08:11

## 2020-04-18 RX ADMIN — ACETAMINOPHEN 650 MG: 325 TABLET ORAL at 05:13

## 2020-04-18 RX ADMIN — MELATONIN TAB 3 MG 3 MG: 3 TAB at 22:54

## 2020-04-18 RX ADMIN — METFORMIN HYDROCHLORIDE 500 MG: 500 TABLET ORAL at 16:09

## 2020-04-18 RX ADMIN — SODIUM CHLORIDE TAB 1 GM 3 G: 1 TAB at 22:54

## 2020-04-18 RX ADMIN — LISINOPRIL 20 MG: 20 TABLET ORAL at 08:11

## 2020-04-18 RX ADMIN — SODIUM CHLORIDE TAB 1 GM 3 G: 1 TAB at 08:16

## 2020-04-18 RX ADMIN — ACETAMINOPHEN 650 MG: 325 TABLET ORAL at 22:53

## 2020-04-18 RX ADMIN — AMLODIPINE BESYLATE 5 MG: 5 TABLET ORAL at 08:10

## 2020-04-18 RX ADMIN — SODIUM CHLORIDE TAB 1 GM 3 G: 1 TAB at 16:09

## 2020-04-18 RX ADMIN — METFORMIN HYDROCHLORIDE 500 MG: 500 TABLET ORAL at 08:10

## 2020-04-18 RX ADMIN — PANTOPRAZOLE SODIUM 40 MG: 40 TABLET, DELAYED RELEASE ORAL at 05:13

## 2020-04-18 RX ADMIN — SODIUM CHLORIDE TAB 1 GM 3 G: 1 TAB at 12:11

## 2020-04-18 RX ADMIN — ATORVASTATIN CALCIUM 40 MG: 40 TABLET, FILM COATED ORAL at 17:31

## 2020-04-19 LAB
ANION GAP SERPL CALCULATED.3IONS-SCNC: 8 MMOL/L (ref 5–14)
BUN SERPL-MCNC: 9 MG/DL (ref 5–25)
CALCIUM SERPL-MCNC: 8.7 MG/DL (ref 8.4–10.2)
CHLORIDE SERPL-SCNC: 97 MMOL/L (ref 97–108)
CO2 SERPL-SCNC: 26 MMOL/L (ref 22–30)
CREAT SERPL-MCNC: 0.79 MG/DL (ref 0.6–1.2)
GFR SERPL CREATININE-BSD FRML MDRD: 68 ML/MIN/1.73SQ M
GLUCOSE SERPL-MCNC: 106 MG/DL (ref 70–99)
POTASSIUM SERPL-SCNC: 3.8 MMOL/L (ref 3.6–5)
SODIUM SERPL-SCNC: 131 MMOL/L (ref 137–147)

## 2020-04-19 PROCEDURE — 99309 SBSQ NF CARE MODERATE MDM 30: CPT | Performed by: PHYSICIAN ASSISTANT

## 2020-04-19 PROCEDURE — 80048 BASIC METABOLIC PNL TOTAL CA: CPT | Performed by: PHYSICIAN ASSISTANT

## 2020-04-19 RX ADMIN — ATORVASTATIN CALCIUM 40 MG: 40 TABLET, FILM COATED ORAL at 17:05

## 2020-04-19 RX ADMIN — SODIUM CHLORIDE TAB 1 GM 3 G: 1 TAB at 11:46

## 2020-04-19 RX ADMIN — LIDOCAINE 1 PATCH: 50 PATCH TOPICAL at 11:47

## 2020-04-19 RX ADMIN — METFORMIN HYDROCHLORIDE 500 MG: 500 TABLET ORAL at 15:55

## 2020-04-19 RX ADMIN — SODIUM CHLORIDE TAB 1 GM 3 G: 1 TAB at 15:56

## 2020-04-19 RX ADMIN — METFORMIN HYDROCHLORIDE 500 MG: 500 TABLET ORAL at 11:47

## 2020-04-19 RX ADMIN — MELATONIN TAB 3 MG 3 MG: 3 TAB at 21:51

## 2020-04-19 RX ADMIN — AMLODIPINE BESYLATE 5 MG: 5 TABLET ORAL at 11:46

## 2020-04-19 RX ADMIN — ACETAMINOPHEN 650 MG: 325 TABLET ORAL at 15:56

## 2020-04-19 RX ADMIN — ENOXAPARIN SODIUM 30 MG: 30 INJECTION SUBCUTANEOUS at 11:47

## 2020-04-19 RX ADMIN — LISINOPRIL 20 MG: 20 TABLET ORAL at 11:47

## 2020-04-19 RX ADMIN — ACETAMINOPHEN 650 MG: 325 TABLET ORAL at 21:51

## 2020-04-19 RX ADMIN — ACETAMINOPHEN 650 MG: 325 TABLET ORAL at 06:25

## 2020-04-19 RX ADMIN — SODIUM CHLORIDE TAB 1 GM 3 G: 1 TAB at 21:51

## 2020-04-19 RX ADMIN — PANTOPRAZOLE SODIUM 40 MG: 40 TABLET, DELAYED RELEASE ORAL at 06:25

## 2020-04-20 LAB
ANION GAP SERPL CALCULATED.3IONS-SCNC: 8 MMOL/L (ref 5–14)
BUN SERPL-MCNC: 11 MG/DL (ref 5–25)
CALCIUM SERPL-MCNC: 8.4 MG/DL (ref 8.4–10.2)
CHLORIDE SERPL-SCNC: 97 MMOL/L (ref 97–108)
CO2 SERPL-SCNC: 26 MMOL/L (ref 22–30)
CREAT SERPL-MCNC: 0.82 MG/DL (ref 0.6–1.2)
GFR SERPL CREATININE-BSD FRML MDRD: 65 ML/MIN/1.73SQ M
GLUCOSE P FAST SERPL-MCNC: 111 MG/DL (ref 70–99)
GLUCOSE SERPL-MCNC: 111 MG/DL (ref 70–99)
POTASSIUM SERPL-SCNC: 4 MMOL/L (ref 3.6–5)
SODIUM SERPL-SCNC: 131 MMOL/L (ref 137–147)

## 2020-04-20 PROCEDURE — 97530 THERAPEUTIC ACTIVITIES: CPT

## 2020-04-20 PROCEDURE — 80048 BASIC METABOLIC PNL TOTAL CA: CPT | Performed by: PHYSICIAN ASSISTANT

## 2020-04-20 PROCEDURE — 97535 SELF CARE MNGMENT TRAINING: CPT

## 2020-04-20 PROCEDURE — 97110 THERAPEUTIC EXERCISES: CPT

## 2020-04-20 PROCEDURE — 97116 GAIT TRAINING THERAPY: CPT

## 2020-04-20 PROCEDURE — 99309 SBSQ NF CARE MODERATE MDM 30: CPT | Performed by: INTERNAL MEDICINE

## 2020-04-20 RX ORDER — FUROSEMIDE 20 MG/1
20 TABLET ORAL DAILY
Status: DISCONTINUED | OUTPATIENT
Start: 2020-04-20 | End: 2020-04-23 | Stop reason: HOSPADM

## 2020-04-20 RX ORDER — HYDRALAZINE HYDROCHLORIDE 25 MG/1
25 TABLET, FILM COATED ORAL EVERY 8 HOURS PRN
Status: DISCONTINUED | OUTPATIENT
Start: 2020-04-20 | End: 2020-04-23 | Stop reason: HOSPADM

## 2020-04-20 RX ADMIN — METFORMIN HYDROCHLORIDE 500 MG: 500 TABLET ORAL at 16:54

## 2020-04-20 RX ADMIN — ACETAMINOPHEN 650 MG: 325 TABLET ORAL at 22:43

## 2020-04-20 RX ADMIN — METFORMIN HYDROCHLORIDE 500 MG: 500 TABLET ORAL at 09:34

## 2020-04-20 RX ADMIN — SODIUM CHLORIDE TAB 1 GM 3 G: 1 TAB at 09:33

## 2020-04-20 RX ADMIN — ACETAMINOPHEN 650 MG: 325 TABLET ORAL at 14:28

## 2020-04-20 RX ADMIN — ATORVASTATIN CALCIUM 40 MG: 40 TABLET, FILM COATED ORAL at 16:56

## 2020-04-20 RX ADMIN — SODIUM CHLORIDE TAB 1 GM 3 G: 1 TAB at 12:12

## 2020-04-20 RX ADMIN — ACETAMINOPHEN 650 MG: 325 TABLET ORAL at 06:22

## 2020-04-20 RX ADMIN — PANTOPRAZOLE SODIUM 40 MG: 40 TABLET, DELAYED RELEASE ORAL at 06:22

## 2020-04-20 RX ADMIN — LIDOCAINE 1 PATCH: 50 PATCH TOPICAL at 09:38

## 2020-04-20 RX ADMIN — MELATONIN TAB 3 MG 3 MG: 3 TAB at 22:44

## 2020-04-20 RX ADMIN — AMLODIPINE BESYLATE 5 MG: 5 TABLET ORAL at 09:34

## 2020-04-20 RX ADMIN — FUROSEMIDE 20 MG: 20 TABLET ORAL at 09:34

## 2020-04-20 RX ADMIN — SODIUM CHLORIDE TAB 1 GM 3 G: 1 TAB at 22:44

## 2020-04-20 RX ADMIN — LISINOPRIL 20 MG: 20 TABLET ORAL at 09:34

## 2020-04-20 RX ADMIN — ENOXAPARIN SODIUM 30 MG: 30 INJECTION SUBCUTANEOUS at 09:37

## 2020-04-20 RX ADMIN — SODIUM CHLORIDE TAB 1 GM 3 G: 1 TAB at 16:54

## 2020-04-21 LAB
ANION GAP SERPL CALCULATED.3IONS-SCNC: 8 MMOL/L (ref 5–14)
BUN SERPL-MCNC: 11 MG/DL (ref 5–25)
CALCIUM SERPL-MCNC: 8.5 MG/DL (ref 8.4–10.2)
CHLORIDE SERPL-SCNC: 95 MMOL/L (ref 97–108)
CO2 SERPL-SCNC: 28 MMOL/L (ref 22–30)
CREAT SERPL-MCNC: 0.84 MG/DL (ref 0.6–1.2)
GFR SERPL CREATININE-BSD FRML MDRD: 63 ML/MIN/1.73SQ M
GLUCOSE SERPL-MCNC: 110 MG/DL (ref 70–99)
POTASSIUM SERPL-SCNC: 3.5 MMOL/L (ref 3.6–5)
SODIUM SERPL-SCNC: 131 MMOL/L (ref 137–147)

## 2020-04-21 PROCEDURE — 97530 THERAPEUTIC ACTIVITIES: CPT

## 2020-04-21 PROCEDURE — 97110 THERAPEUTIC EXERCISES: CPT

## 2020-04-21 PROCEDURE — 99309 SBSQ NF CARE MODERATE MDM 30: CPT | Performed by: INTERNAL MEDICINE

## 2020-04-21 PROCEDURE — 99309 SBSQ NF CARE MODERATE MDM 30: CPT | Performed by: FAMILY MEDICINE

## 2020-04-21 PROCEDURE — 97116 GAIT TRAINING THERAPY: CPT

## 2020-04-21 PROCEDURE — 97535 SELF CARE MNGMENT TRAINING: CPT

## 2020-04-21 PROCEDURE — 80048 BASIC METABOLIC PNL TOTAL CA: CPT | Performed by: INTERNAL MEDICINE

## 2020-04-21 RX ORDER — SODIUM CHLORIDE 1000 MG
3 TABLET, SOLUBLE MISCELLANEOUS
Status: DISCONTINUED | OUTPATIENT
Start: 2020-04-21 | End: 2020-04-23 | Stop reason: HOSPADM

## 2020-04-21 RX ORDER — POTASSIUM CHLORIDE 20 MEQ/1
20 TABLET, EXTENDED RELEASE ORAL DAILY
Status: DISCONTINUED | OUTPATIENT
Start: 2020-04-21 | End: 2020-04-23 | Stop reason: HOSPADM

## 2020-04-21 RX ORDER — POTASSIUM CHLORIDE 20 MEQ/1
20 TABLET, EXTENDED RELEASE ORAL ONCE
Status: DISCONTINUED | OUTPATIENT
Start: 2020-04-21 | End: 2020-04-21

## 2020-04-21 RX ADMIN — METFORMIN HYDROCHLORIDE 500 MG: 500 TABLET ORAL at 09:32

## 2020-04-21 RX ADMIN — ACETAMINOPHEN 650 MG: 325 TABLET ORAL at 05:58

## 2020-04-21 RX ADMIN — METFORMIN HYDROCHLORIDE 500 MG: 500 TABLET ORAL at 17:09

## 2020-04-21 RX ADMIN — SODIUM CHLORIDE TAB 1 GM 3 G: 1 TAB at 07:44

## 2020-04-21 RX ADMIN — SODIUM CHLORIDE TAB 1 GM 3 G: 1 TAB at 17:09

## 2020-04-21 RX ADMIN — LISINOPRIL 20 MG: 20 TABLET ORAL at 09:32

## 2020-04-21 RX ADMIN — FUROSEMIDE 20 MG: 20 TABLET ORAL at 09:32

## 2020-04-21 RX ADMIN — ACETAMINOPHEN 650 MG: 325 TABLET ORAL at 22:36

## 2020-04-21 RX ADMIN — ENOXAPARIN SODIUM 30 MG: 30 INJECTION SUBCUTANEOUS at 09:33

## 2020-04-21 RX ADMIN — POTASSIUM CHLORIDE 20 MEQ: 20 TABLET, EXTENDED RELEASE ORAL at 09:32

## 2020-04-21 RX ADMIN — POLYETHYLENE GLYCOL 3350 17 G: 17 POWDER, FOR SOLUTION ORAL at 09:30

## 2020-04-21 RX ADMIN — SODIUM CHLORIDE TAB 1 GM 3 G: 1 TAB at 12:47

## 2020-04-21 RX ADMIN — AMLODIPINE BESYLATE 5 MG: 5 TABLET ORAL at 09:32

## 2020-04-21 RX ADMIN — PANTOPRAZOLE SODIUM 40 MG: 40 TABLET, DELAYED RELEASE ORAL at 05:59

## 2020-04-21 RX ADMIN — LIDOCAINE 1 PATCH: 50 PATCH TOPICAL at 09:33

## 2020-04-21 RX ADMIN — MELATONIN TAB 3 MG 3 MG: 3 TAB at 22:36

## 2020-04-21 RX ADMIN — ATORVASTATIN CALCIUM 40 MG: 40 TABLET, FILM COATED ORAL at 17:09

## 2020-04-21 RX ADMIN — ACETAMINOPHEN 650 MG: 325 TABLET ORAL at 12:48

## 2020-04-22 LAB
ANION GAP SERPL CALCULATED.3IONS-SCNC: 8 MMOL/L (ref 5–14)
BUN SERPL-MCNC: 10 MG/DL (ref 5–25)
CALCIUM SERPL-MCNC: 8.9 MG/DL (ref 8.4–10.2)
CHLORIDE SERPL-SCNC: 93 MMOL/L (ref 97–108)
CO2 SERPL-SCNC: 29 MMOL/L (ref 22–30)
CREAT SERPL-MCNC: 0.85 MG/DL (ref 0.6–1.2)
GFR SERPL CREATININE-BSD FRML MDRD: 62 ML/MIN/1.73SQ M
GLUCOSE P FAST SERPL-MCNC: 111 MG/DL (ref 70–99)
GLUCOSE SERPL-MCNC: 111 MG/DL (ref 70–99)
POTASSIUM SERPL-SCNC: 4.1 MMOL/L (ref 3.6–5)
SODIUM SERPL-SCNC: 130 MMOL/L (ref 137–147)

## 2020-04-22 PROCEDURE — 97116 GAIT TRAINING THERAPY: CPT

## 2020-04-22 PROCEDURE — 80048 BASIC METABOLIC PNL TOTAL CA: CPT | Performed by: INTERNAL MEDICINE

## 2020-04-22 PROCEDURE — 97530 THERAPEUTIC ACTIVITIES: CPT

## 2020-04-22 PROCEDURE — 99316 NF DSCHRG MGMT 30 MIN+: CPT | Performed by: FAMILY MEDICINE

## 2020-04-22 PROCEDURE — 97535 SELF CARE MNGMENT TRAINING: CPT

## 2020-04-22 PROCEDURE — 99309 SBSQ NF CARE MODERATE MDM 30: CPT | Performed by: INTERNAL MEDICINE

## 2020-04-22 RX ORDER — AMLODIPINE BESYLATE 5 MG/1
5 TABLET ORAL DAILY
Qty: 30 TABLET | Refills: 0 | Status: SHIPPED | OUTPATIENT
Start: 2020-04-22 | End: 2020-05-12 | Stop reason: SDUPTHER

## 2020-04-22 RX ORDER — FUROSEMIDE 20 MG/1
20 TABLET ORAL DAILY
Qty: 30 TABLET | Refills: 0 | Status: SHIPPED | OUTPATIENT
Start: 2020-04-23 | End: 2020-05-12 | Stop reason: SDUPTHER

## 2020-04-22 RX ORDER — SODIUM CHLORIDE 1000 MG
3 TABLET, SOLUBLE MISCELLANEOUS
Qty: 270 TABLET | Refills: 0 | Status: SHIPPED | OUTPATIENT
Start: 2020-04-22 | End: 2021-07-08

## 2020-04-22 RX ORDER — LISINOPRIL 20 MG/1
20 TABLET ORAL DAILY
Qty: 30 TABLET | Refills: 0 | Status: SHIPPED | OUTPATIENT
Start: 2020-04-22 | End: 2020-05-12 | Stop reason: SDUPTHER

## 2020-04-22 RX ADMIN — ACETAMINOPHEN 650 MG: 325 TABLET ORAL at 12:32

## 2020-04-22 RX ADMIN — ACETAMINOPHEN 650 MG: 325 TABLET ORAL at 05:42

## 2020-04-22 RX ADMIN — SODIUM CHLORIDE TAB 1 GM 3 G: 1 TAB at 16:54

## 2020-04-22 RX ADMIN — POTASSIUM CHLORIDE 20 MEQ: 20 TABLET, EXTENDED RELEASE ORAL at 09:51

## 2020-04-22 RX ADMIN — SODIUM CHLORIDE TAB 1 GM 3 G: 1 TAB at 09:51

## 2020-04-22 RX ADMIN — LISINOPRIL 20 MG: 20 TABLET ORAL at 09:51

## 2020-04-22 RX ADMIN — MELATONIN TAB 3 MG 3 MG: 3 TAB at 21:35

## 2020-04-22 RX ADMIN — METFORMIN HYDROCHLORIDE 500 MG: 500 TABLET ORAL at 16:55

## 2020-04-22 RX ADMIN — AMLODIPINE BESYLATE 5 MG: 5 TABLET ORAL at 09:50

## 2020-04-22 RX ADMIN — SODIUM CHLORIDE TAB 1 GM 3 G: 1 TAB at 12:32

## 2020-04-22 RX ADMIN — METFORMIN HYDROCHLORIDE 500 MG: 500 TABLET ORAL at 09:50

## 2020-04-22 RX ADMIN — LIDOCAINE 1 PATCH: 50 PATCH TOPICAL at 09:50

## 2020-04-22 RX ADMIN — ACETAMINOPHEN 650 MG: 325 TABLET ORAL at 21:35

## 2020-04-22 RX ADMIN — PANTOPRAZOLE SODIUM 40 MG: 40 TABLET, DELAYED RELEASE ORAL at 09:52

## 2020-04-22 RX ADMIN — ATORVASTATIN CALCIUM 40 MG: 40 TABLET, FILM COATED ORAL at 17:00

## 2020-04-22 RX ADMIN — FUROSEMIDE 20 MG: 20 TABLET ORAL at 09:51

## 2020-04-22 RX ADMIN — ENOXAPARIN SODIUM 30 MG: 30 INJECTION SUBCUTANEOUS at 09:52

## 2020-04-23 VITALS
WEIGHT: 105.82 LBS | BODY MASS INDEX: 20.78 KG/M2 | TEMPERATURE: 97.8 F | DIASTOLIC BLOOD PRESSURE: 80 MMHG | HEART RATE: 92 BPM | SYSTOLIC BLOOD PRESSURE: 158 MMHG | OXYGEN SATURATION: 99 % | RESPIRATION RATE: 18 BRPM | HEIGHT: 60 IN

## 2020-04-23 LAB
ANION GAP SERPL CALCULATED.3IONS-SCNC: 10 MMOL/L (ref 5–14)
BUN SERPL-MCNC: 11 MG/DL (ref 5–25)
CALCIUM SERPL-MCNC: 8.9 MG/DL (ref 8.4–10.2)
CHLORIDE SERPL-SCNC: 93 MMOL/L (ref 97–108)
CO2 SERPL-SCNC: 28 MMOL/L (ref 22–30)
CREAT SERPL-MCNC: 0.92 MG/DL (ref 0.6–1.2)
GFR SERPL CREATININE-BSD FRML MDRD: 56 ML/MIN/1.73SQ M
GLUCOSE P FAST SERPL-MCNC: 102 MG/DL (ref 70–99)
GLUCOSE SERPL-MCNC: 102 MG/DL (ref 70–99)
POTASSIUM SERPL-SCNC: 4 MMOL/L (ref 3.6–5)
SODIUM SERPL-SCNC: 131 MMOL/L (ref 137–147)

## 2020-04-23 PROCEDURE — 99309 SBSQ NF CARE MODERATE MDM 30: CPT | Performed by: INTERNAL MEDICINE

## 2020-04-23 PROCEDURE — 97535 SELF CARE MNGMENT TRAINING: CPT

## 2020-04-23 PROCEDURE — 80048 BASIC METABOLIC PNL TOTAL CA: CPT | Performed by: INTERNAL MEDICINE

## 2020-04-23 RX ADMIN — ACETAMINOPHEN 650 MG: 325 TABLET ORAL at 06:09

## 2020-04-23 RX ADMIN — AMLODIPINE BESYLATE 5 MG: 5 TABLET ORAL at 08:58

## 2020-04-23 RX ADMIN — FUROSEMIDE 20 MG: 20 TABLET ORAL at 08:58

## 2020-04-23 RX ADMIN — SODIUM CHLORIDE TAB 1 GM 3 G: 1 TAB at 08:10

## 2020-04-23 RX ADMIN — POTASSIUM CHLORIDE 20 MEQ: 20 TABLET, EXTENDED RELEASE ORAL at 08:57

## 2020-04-23 RX ADMIN — LIDOCAINE 1 PATCH: 50 PATCH TOPICAL at 08:58

## 2020-04-23 RX ADMIN — METFORMIN HYDROCHLORIDE 500 MG: 500 TABLET ORAL at 07:45

## 2020-04-23 RX ADMIN — PANTOPRAZOLE SODIUM 40 MG: 40 TABLET, DELAYED RELEASE ORAL at 06:09

## 2020-04-23 RX ADMIN — ENOXAPARIN SODIUM 30 MG: 30 INJECTION SUBCUTANEOUS at 08:57

## 2020-04-23 RX ADMIN — LISINOPRIL 20 MG: 20 TABLET ORAL at 08:58

## 2020-04-27 ENCOUNTER — TRANSITIONAL CARE MANAGEMENT (OUTPATIENT)
Dept: FAMILY MEDICINE CLINIC | Facility: CLINIC | Age: 85
End: 2020-04-27

## 2020-04-28 ENCOUNTER — OFFICE VISIT (OUTPATIENT)
Dept: OBGYN CLINIC | Facility: MEDICAL CENTER | Age: 85
End: 2020-04-28

## 2020-04-28 ENCOUNTER — TELEPHONE (OUTPATIENT)
Dept: FAMILY MEDICINE CLINIC | Facility: CLINIC | Age: 85
End: 2020-04-28

## 2020-04-28 ENCOUNTER — APPOINTMENT (OUTPATIENT)
Dept: RADIOLOGY | Facility: MEDICAL CENTER | Age: 85
End: 2020-04-28
Payer: COMMERCIAL

## 2020-04-28 VITALS
WEIGHT: 105 LBS | BODY MASS INDEX: 22.04 KG/M2 | TEMPERATURE: 98.8 F | SYSTOLIC BLOOD PRESSURE: 101 MMHG | HEART RATE: 118 BPM | HEIGHT: 58 IN | DIASTOLIC BLOOD PRESSURE: 68 MMHG

## 2020-04-28 DIAGNOSIS — S72.002A CLOSED LEFT HIP FRACTURE (HCC): ICD-10-CM

## 2020-04-28 DIAGNOSIS — Z01.89 ENCOUNTER FOR LOWER EXTREMITY COMPARISON IMAGING STUDY: ICD-10-CM

## 2020-04-28 DIAGNOSIS — M25.552 PAIN IN LEFT HIP: ICD-10-CM

## 2020-04-28 DIAGNOSIS — Z96.649 S/P HIP HEMIARTHROPLASTY: Primary | ICD-10-CM

## 2020-04-28 PROCEDURE — 3074F SYST BP LT 130 MM HG: CPT | Performed by: ORTHOPAEDIC SURGERY

## 2020-04-28 PROCEDURE — 1160F RVW MEDS BY RX/DR IN RCRD: CPT | Performed by: ORTHOPAEDIC SURGERY

## 2020-04-28 PROCEDURE — 4040F PNEUMOC VAC/ADMIN/RCVD: CPT | Performed by: ORTHOPAEDIC SURGERY

## 2020-04-28 PROCEDURE — 3008F BODY MASS INDEX DOCD: CPT | Performed by: ORTHOPAEDIC SURGERY

## 2020-04-28 PROCEDURE — 3066F NEPHROPATHY DOC TX: CPT | Performed by: ORTHOPAEDIC SURGERY

## 2020-04-28 PROCEDURE — 1111F DSCHRG MED/CURRENT MED MERGE: CPT | Performed by: ORTHOPAEDIC SURGERY

## 2020-04-28 PROCEDURE — 3060F POS MICROALBUMINURIA REV: CPT | Performed by: ORTHOPAEDIC SURGERY

## 2020-04-28 PROCEDURE — 3078F DIAST BP <80 MM HG: CPT | Performed by: ORTHOPAEDIC SURGERY

## 2020-04-28 PROCEDURE — 73521 X-RAY EXAM HIPS BI 2 VIEWS: CPT

## 2020-04-28 PROCEDURE — 99024 POSTOP FOLLOW-UP VISIT: CPT | Performed by: ORTHOPAEDIC SURGERY

## 2020-04-28 RX ORDER — PANTOPRAZOLE SODIUM 40 MG/1
40 TABLET, DELAYED RELEASE ORAL
Qty: 30 TABLET | Refills: 0 | Status: SHIPPED | OUTPATIENT
Start: 2020-04-28 | End: 2020-05-26

## 2020-04-29 ENCOUNTER — OFFICE VISIT (OUTPATIENT)
Dept: FAMILY MEDICINE CLINIC | Facility: CLINIC | Age: 85
End: 2020-04-29
Payer: COMMERCIAL

## 2020-04-29 VITALS
BODY MASS INDEX: 22.04 KG/M2 | HEART RATE: 100 BPM | WEIGHT: 105 LBS | TEMPERATURE: 98.9 F | HEIGHT: 58 IN | DIASTOLIC BLOOD PRESSURE: 64 MMHG | SYSTOLIC BLOOD PRESSURE: 110 MMHG

## 2020-04-29 DIAGNOSIS — S72.002D FRACTURE OF UNSPECIFIED PART OF NECK OF LEFT FEMUR, SUBSEQUENT ENCOUNTER FOR CLOSED FRACTURE WITH ROUTINE HEALING: Primary | ICD-10-CM

## 2020-04-29 DIAGNOSIS — E78.2 MIXED HYPERLIPIDEMIA: ICD-10-CM

## 2020-04-29 DIAGNOSIS — N18.30 ANEMIA DUE TO STAGE 3 CHRONIC KIDNEY DISEASE (HCC): ICD-10-CM

## 2020-04-29 DIAGNOSIS — E11.9 TYPE 2 DIABETES MELLITUS WITHOUT COMPLICATION, WITHOUT LONG-TERM CURRENT USE OF INSULIN (HCC): ICD-10-CM

## 2020-04-29 DIAGNOSIS — I11.9 HYPERTENSIVE HEART DISEASE WITHOUT HEART FAILURE: ICD-10-CM

## 2020-04-29 DIAGNOSIS — E87.1 HYPONATREMIA: ICD-10-CM

## 2020-04-29 DIAGNOSIS — D63.1 ANEMIA DUE TO STAGE 3 CHRONIC KIDNEY DISEASE (HCC): ICD-10-CM

## 2020-04-29 PROCEDURE — 99496 TRANSJ CARE MGMT HIGH F2F 7D: CPT | Performed by: FAMILY MEDICINE

## 2020-05-08 DIAGNOSIS — E78.5 HYPERLIPIDEMIA, UNSPECIFIED HYPERLIPIDEMIA TYPE: ICD-10-CM

## 2020-05-08 RX ORDER — ATORVASTATIN CALCIUM 40 MG/1
40 TABLET, FILM COATED ORAL DAILY
Qty: 90 TABLET | Refills: 5 | Status: SHIPPED | OUTPATIENT
Start: 2020-05-08 | End: 2021-08-02

## 2020-05-11 DIAGNOSIS — E87.1 HYPONATREMIA: ICD-10-CM

## 2020-05-11 DIAGNOSIS — I10 ESSENTIAL HYPERTENSION: ICD-10-CM

## 2020-05-12 RX ORDER — FUROSEMIDE 20 MG/1
20 TABLET ORAL DAILY
Qty: 30 TABLET | Refills: 2 | Status: SHIPPED | OUTPATIENT
Start: 2020-05-12 | End: 2020-08-03

## 2020-05-12 RX ORDER — LISINOPRIL 20 MG/1
20 TABLET ORAL DAILY
Qty: 30 TABLET | Refills: 2 | Status: SHIPPED | OUTPATIENT
Start: 2020-05-12 | End: 2020-08-14

## 2020-05-12 RX ORDER — AMLODIPINE BESYLATE 5 MG/1
5 TABLET ORAL DAILY
Qty: 30 TABLET | Refills: 2 | Status: SHIPPED | OUTPATIENT
Start: 2020-05-12 | End: 2020-09-09

## 2020-05-23 DIAGNOSIS — S72.002A CLOSED LEFT HIP FRACTURE (HCC): ICD-10-CM

## 2020-05-26 RX ORDER — PANTOPRAZOLE SODIUM 40 MG/1
TABLET, DELAYED RELEASE ORAL
Qty: 30 TABLET | Refills: 0 | Status: SHIPPED | OUTPATIENT
Start: 2020-05-26 | End: 2020-07-13

## 2020-07-13 DIAGNOSIS — S72.002A CLOSED LEFT HIP FRACTURE (HCC): ICD-10-CM

## 2020-07-13 RX ORDER — PANTOPRAZOLE SODIUM 40 MG/1
TABLET, DELAYED RELEASE ORAL
Qty: 30 TABLET | Refills: 0 | Status: SHIPPED | OUTPATIENT
Start: 2020-07-13 | End: 2020-08-14

## 2020-07-17 NOTE — ASSESSMENT & PLAN NOTE
Blood pressure appears to be well controlled on current regimen  She will continue amlodipine 5 mg daily and lisinopril 20 mg daily  The only change in the hospital was discontinuation of hydrochlorothiazide  Patient called and stated that she is coming to  a referral for her to see a chiropractor. The patient states she also believes she needs to see a rheumatologist. She states her dad has had rheumatoid arthritis since he was younger than the patient. She states she can feel her body start to get stiff in her back and her hands. She states its hard to put on underwear in the morning due to this. Please advise.     Patient call back [phone]   MD Cheatham MD Mejía MD Mejía MD Mejía MD Daniel MD Mejía

## 2020-07-21 ENCOUNTER — TELEPHONE (OUTPATIENT)
Dept: FAMILY MEDICINE CLINIC | Facility: CLINIC | Age: 85
End: 2020-07-21

## 2020-07-21 NOTE — TELEPHONE ENCOUNTER
Pt  called asking for a refill for Sodium Chloride 1mg 3x daily  I see this was discontinued by another physician  Please advise

## 2020-07-22 NOTE — TELEPHONE ENCOUNTER
When she was discharged from the hospital she was still taking salt tabs tid but possibly it was D/C'd at nursing facility?   Please order BMP to check sodium

## 2020-07-27 ENCOUNTER — TELEPHONE (OUTPATIENT)
Dept: FAMILY MEDICINE CLINIC | Facility: CLINIC | Age: 85
End: 2020-07-27

## 2020-07-27 DIAGNOSIS — E87.1 LOW SODIUM LEVELS: Primary | ICD-10-CM

## 2020-08-03 DIAGNOSIS — E87.1 HYPONATREMIA: ICD-10-CM

## 2020-08-03 RX ORDER — FUROSEMIDE 20 MG/1
TABLET ORAL
Qty: 30 TABLET | Refills: 0 | Status: SHIPPED | OUTPATIENT
Start: 2020-08-03 | End: 2020-09-11

## 2020-08-14 DIAGNOSIS — S72.002A CLOSED LEFT HIP FRACTURE (HCC): ICD-10-CM

## 2020-08-14 DIAGNOSIS — I10 ESSENTIAL HYPERTENSION: ICD-10-CM

## 2020-08-14 RX ORDER — LISINOPRIL 20 MG/1
TABLET ORAL
Qty: 30 TABLET | Refills: 5 | Status: SHIPPED | OUTPATIENT
Start: 2020-08-14 | End: 2021-02-23

## 2020-08-14 RX ORDER — PANTOPRAZOLE SODIUM 40 MG/1
TABLET, DELAYED RELEASE ORAL
Qty: 30 TABLET | Refills: 5 | Status: SHIPPED | OUTPATIENT
Start: 2020-08-14 | End: 2021-02-17

## 2020-09-03 DIAGNOSIS — E11.9 TYPE 2 DIABETES MELLITUS WITHOUT COMPLICATION, WITHOUT LONG-TERM CURRENT USE OF INSULIN (HCC): ICD-10-CM

## 2020-09-08 DIAGNOSIS — I10 ESSENTIAL HYPERTENSION: ICD-10-CM

## 2020-09-09 RX ORDER — AMLODIPINE BESYLATE 5 MG/1
TABLET ORAL
Qty: 30 TABLET | Refills: 0 | Status: SHIPPED | OUTPATIENT
Start: 2020-09-09 | End: 2020-10-07

## 2020-09-11 DIAGNOSIS — E87.1 HYPONATREMIA: ICD-10-CM

## 2020-09-11 RX ORDER — FUROSEMIDE 20 MG/1
TABLET ORAL
Qty: 30 TABLET | Refills: 5 | Status: SHIPPED | OUTPATIENT
Start: 2020-09-11 | End: 2021-03-09

## 2020-10-02 DIAGNOSIS — E11.9 TYPE 2 DIABETES MELLITUS WITHOUT COMPLICATION, WITHOUT LONG-TERM CURRENT USE OF INSULIN (HCC): ICD-10-CM

## 2020-10-07 DIAGNOSIS — I10 ESSENTIAL HYPERTENSION: ICD-10-CM

## 2020-10-07 RX ORDER — AMLODIPINE BESYLATE 5 MG/1
TABLET ORAL
Qty: 30 TABLET | Refills: 2 | Status: SHIPPED | OUTPATIENT
Start: 2020-10-07 | End: 2021-01-11

## 2021-01-11 DIAGNOSIS — I10 ESSENTIAL HYPERTENSION: ICD-10-CM

## 2021-01-11 RX ORDER — AMLODIPINE BESYLATE 5 MG/1
TABLET ORAL
Qty: 30 TABLET | Refills: 0 | Status: SHIPPED | OUTPATIENT
Start: 2021-01-11 | End: 2021-01-18

## 2021-01-16 DIAGNOSIS — I10 ESSENTIAL HYPERTENSION: ICD-10-CM

## 2021-01-18 DIAGNOSIS — E11.9 TYPE 2 DIABETES MELLITUS WITHOUT COMPLICATION, WITHOUT LONG-TERM CURRENT USE OF INSULIN (HCC): ICD-10-CM

## 2021-01-18 RX ORDER — AMLODIPINE BESYLATE 5 MG/1
TABLET ORAL
Qty: 30 TABLET | Refills: 2 | Status: SHIPPED | OUTPATIENT
Start: 2021-01-18 | End: 2021-05-10

## 2021-02-17 DIAGNOSIS — S72.002A CLOSED LEFT HIP FRACTURE (HCC): ICD-10-CM

## 2021-02-17 RX ORDER — PANTOPRAZOLE SODIUM 40 MG/1
TABLET, DELAYED RELEASE ORAL
Qty: 30 TABLET | Refills: 0 | Status: SHIPPED | OUTPATIENT
Start: 2021-02-17 | End: 2021-03-21

## 2021-02-23 DIAGNOSIS — I10 ESSENTIAL HYPERTENSION: ICD-10-CM

## 2021-02-23 RX ORDER — LISINOPRIL 20 MG/1
TABLET ORAL
Qty: 30 TABLET | Refills: 5 | Status: SHIPPED | OUTPATIENT
Start: 2021-02-23 | End: 2021-08-25

## 2021-03-09 DIAGNOSIS — E87.1 HYPONATREMIA: ICD-10-CM

## 2021-03-09 RX ORDER — FUROSEMIDE 20 MG/1
TABLET ORAL
Qty: 30 TABLET | Refills: 0 | Status: SHIPPED | OUTPATIENT
Start: 2021-03-09 | End: 2021-04-09

## 2021-03-21 DIAGNOSIS — S72.002A CLOSED LEFT HIP FRACTURE (HCC): ICD-10-CM

## 2021-03-21 RX ORDER — PANTOPRAZOLE SODIUM 40 MG/1
TABLET, DELAYED RELEASE ORAL
Qty: 30 TABLET | Refills: 0 | Status: SHIPPED | OUTPATIENT
Start: 2021-03-21 | End: 2021-04-19

## 2021-04-09 DIAGNOSIS — E87.1 HYPONATREMIA: ICD-10-CM

## 2021-04-09 RX ORDER — FUROSEMIDE 20 MG/1
TABLET ORAL
Qty: 30 TABLET | Refills: 0 | Status: SHIPPED | OUTPATIENT
Start: 2021-04-09 | End: 2021-05-12

## 2021-04-19 DIAGNOSIS — S72.002A CLOSED LEFT HIP FRACTURE (HCC): ICD-10-CM

## 2021-04-19 RX ORDER — PANTOPRAZOLE SODIUM 40 MG/1
TABLET, DELAYED RELEASE ORAL
Qty: 30 TABLET | Refills: 0 | Status: SHIPPED | OUTPATIENT
Start: 2021-04-19

## 2021-04-21 ENCOUNTER — OFFICE VISIT (OUTPATIENT)
Dept: FAMILY MEDICINE CLINIC | Facility: CLINIC | Age: 86
End: 2021-04-21
Payer: COMMERCIAL

## 2021-04-21 VITALS
OXYGEN SATURATION: 93 % | HEART RATE: 52 BPM | HEIGHT: 58 IN | WEIGHT: 102.2 LBS | BODY MASS INDEX: 21.45 KG/M2 | SYSTOLIC BLOOD PRESSURE: 126 MMHG | DIASTOLIC BLOOD PRESSURE: 50 MMHG

## 2021-04-21 DIAGNOSIS — E11.8 TYPE 2 DIABETES MELLITUS WITH UNSPECIFIED COMPLICATIONS (HCC): ICD-10-CM

## 2021-04-21 DIAGNOSIS — E22.2 SYNDROME OF INAPPROPRIATE SECRETION OF ANTIDIURETIC HORMONE (HCC): ICD-10-CM

## 2021-04-21 DIAGNOSIS — D63.1 ANEMIA DUE TO STAGE 3 CHRONIC KIDNEY DISEASE, UNSPECIFIED WHETHER STAGE 3A OR 3B CKD (HCC): ICD-10-CM

## 2021-04-21 DIAGNOSIS — E87.1 HYPONATREMIA: ICD-10-CM

## 2021-04-21 DIAGNOSIS — E11.9 TYPE 2 DIABETES MELLITUS WITHOUT COMPLICATION, WITHOUT LONG-TERM CURRENT USE OF INSULIN (HCC): Primary | ICD-10-CM

## 2021-04-21 DIAGNOSIS — H10.13 ALLERGIC CONJUNCTIVITIS OF BOTH EYES: ICD-10-CM

## 2021-04-21 DIAGNOSIS — E78.2 MIXED HYPERLIPIDEMIA: ICD-10-CM

## 2021-04-21 DIAGNOSIS — N18.30 ANEMIA DUE TO STAGE 3 CHRONIC KIDNEY DISEASE, UNSPECIFIED WHETHER STAGE 3A OR 3B CKD (HCC): ICD-10-CM

## 2021-04-21 DIAGNOSIS — E44.1 MILD PROTEIN-CALORIE MALNUTRITION (HCC): ICD-10-CM

## 2021-04-21 DIAGNOSIS — E78.5 HYPERLIPIDEMIA, UNSPECIFIED HYPERLIPIDEMIA TYPE: ICD-10-CM

## 2021-04-21 LAB — SL AMB POCT HEMOGLOBIN AIC: 6.9 (ref ?–6.5)

## 2021-04-21 PROCEDURE — 1160F RVW MEDS BY RX/DR IN RCRD: CPT | Performed by: FAMILY MEDICINE

## 2021-04-21 PROCEDURE — 99214 OFFICE O/P EST MOD 30 MIN: CPT | Performed by: FAMILY MEDICINE

## 2021-04-21 PROCEDURE — 1170F FXNL STATUS ASSESSED: CPT | Performed by: FAMILY MEDICINE

## 2021-04-21 PROCEDURE — G0439 PPPS, SUBSEQ VISIT: HCPCS | Performed by: FAMILY MEDICINE

## 2021-04-21 PROCEDURE — 83036 HEMOGLOBIN GLYCOSYLATED A1C: CPT | Performed by: FAMILY MEDICINE

## 2021-04-21 PROCEDURE — 1036F TOBACCO NON-USER: CPT | Performed by: FAMILY MEDICINE

## 2021-04-21 PROCEDURE — 3725F SCREEN DEPRESSION PERFORMED: CPT | Performed by: FAMILY MEDICINE

## 2021-04-21 PROCEDURE — 3288F FALL RISK ASSESSMENT DOCD: CPT | Performed by: FAMILY MEDICINE

## 2021-04-21 PROCEDURE — 1125F AMNT PAIN NOTED PAIN PRSNT: CPT | Performed by: FAMILY MEDICINE

## 2021-04-21 RX ORDER — AZELASTINE HYDROCHLORIDE 0.5 MG/ML
1 SOLUTION/ DROPS OPHTHALMIC 2 TIMES DAILY
Qty: 20 ML | Refills: 2 | Status: SHIPPED | OUTPATIENT
Start: 2021-04-21 | End: 2021-05-25 | Stop reason: RX

## 2021-04-21 NOTE — PROGRESS NOTES
Assessment/Plan:    Type 2 diabetes mellitus (HCC)    Lab Results   Component Value Date    HGBA1C 6 9 (A) 04/21/2021     Good control with hemoglobin A1c of 6 9  She has not had labs in a year  Stressed importance of getting her blood work done  Hyponatremia  She is no longer taking salt pills  Needs labs  Anemia due to stage 3 chronic kidney disease [facility])  Lab Results   Component Value Date    EGFR 56 (L) 04/23/2020    EGFR 62 04/22/2020    EGFR 63 04/21/2020    CREATININE 0 92 04/23/2020    CREATININE 0 85 04/22/2020    CREATININE 0 84 04/21/2020     Labs ordered       Diagnoses and all orders for this visit:    Type 2 diabetes mellitus without complication, without long-term current use of insulin (HCC)  -     POCT hemoglobin A1c    Hyponatremia    Anemia due to stage 3 chronic kidney disease, unspecified whether stage 3a or 3b CKD          Subjective:      Patient ID: Shanice Becker is a 80 y o  female  She is here with her  for annual wellness visit and follow-up of chronic problems  In general she is doing a little bit better this year  Last April she had a left hip fracture and she has recovered fully from this  She walks with a walker and denies pain at the site  The following portions of the patient's history were reviewed and updated as appropriate: allergies, current medications, past family history, past medical history, past social history, past surgical history and problem list     Review of Systems   Constitutional: Negative for activity change, chills, fatigue and fever  HENT: Positive for hearing loss  Negative for congestion, ear pain, sinus pressure and sore throat  Eyes: Negative for pain and visual disturbance  Respiratory: Positive for cough  Negative for chest tightness, shortness of breath and wheezing  Cardiovascular: Negative for chest pain, palpitations and leg swelling     Gastrointestinal: Negative for abdominal pain, blood in stool, constipation, diarrhea, nausea and vomiting  Endocrine: Negative for polydipsia and polyuria  Genitourinary: Negative for difficulty urinating, dysuria, frequency and urgency  Musculoskeletal: Negative for arthralgias, joint swelling and myalgias  Skin: Negative for rash  Neurological: Negative for dizziness, weakness, numbness and headaches  Hematological: Negative for adenopathy  Does not bruise/bleed easily  Psychiatric/Behavioral: Negative for dysphoric mood  The patient is not nervous/anxious  Objective:      /50 (BP Location: Right arm, Patient Position: Sitting, Cuff Size: Adult)   Pulse (!) 52   Ht 4' 10" (1 473 m)   Wt 46 4 kg (102 lb 3 2 oz)   SpO2 93%   BMI 21 36 kg/m²          Physical Exam  Vitals signs and nursing note reviewed  Constitutional:       Appearance: Normal appearance  HENT:      Head: Normocephalic and atraumatic  Right Ear: External ear normal       Left Ear: External ear normal       Ears:      Comments: TMs obscured by cerumen     Mouth/Throat:      Mouth: Mucous membranes are moist       Comments: Edentulous  Erythema left posterior hard palate, no exudate  Eyes:      General:         Left eye: Discharge present  Conjunctiva/sclera: Conjunctivae normal       Comments: Clear drainage left eye   Cardiovascular:      Rate and Rhythm: Normal rate and regular rhythm  Pulses: Normal pulses  Heart sounds: Normal heart sounds  Pulmonary:      Effort: Pulmonary effort is normal       Breath sounds: Normal breath sounds  Abdominal:      General: Bowel sounds are normal    Musculoskeletal:      Right lower leg: No edema  Left lower leg: No edema  Skin:     General: Skin is warm and dry  Neurological:      Mental Status: She is alert     Psychiatric:         Mood and Affect: Mood normal          Behavior: Behavior normal

## 2021-04-21 NOTE — PROGRESS NOTES
Assessment and Plan:     Problem List Items Addressed This Visit     None           Preventive health issues were discussed with patient, and age appropriate screening tests were ordered as noted in patient's After Visit Summary  Personalized health advice and appropriate referrals for health education or preventive services given if needed, as noted in patient's After Visit Summary  History of Present Illness:     Patient presents for Medicare Annual Wellness visit    Patient Care Team:  Kim Nj MD as PCP - General     Problem List:     Patient Active Problem List   Diagnosis    Calculus of bile duct with acute cholangitis with obstruction    Gallstone pancreatitis    Type 2 diabetes mellitus [facility]    Mixed hyperlipidemia    Parenchymal renal hypertension    Insomnia    Pancreatic cyst    Urinary incontinence    Vitamin D deficiency    Hyponatremia    Fracture of unspecified part of neck of left femur, subsequent encounter for closed fracture with routine healing    Anemia due to stage 3 chronic kidney disease    Hypertensive heart disease without heart failure    Mild protein-calorie malnutrition ([facility] )      Past Medical and Surgical History:     Past Medical History:   Diagnosis Date    Dehydration     Last assessed - 2/22/16    Diabetes mellitus ([facility] )     Hyperlipidemia     Hypertension     Hypotension     Last assessed - 2/22/16    Obstructive jaundice     Last assessed - 3/25/16    Renal disorder     Sepsis [facility])      Past Surgical History:   Procedure Laterality Date    CYSTOSCOPY W/ STONE MANIPULATION N/A 2/23/2016    Procedure: STONE MANIPULATION;  Surgeon: Curtis Doe MD;  Location: [facility]; Service:     ERCP W/ SPHICTEROTOMY N/A 2/23/2016    Procedure: ENDOSCOPIC RETROGRADE CHOLANGIOPANCREATOGRAPHY (ERCP) W/ SPHINCTEROTOMY;  Surgeon: Curtis Doe MD;  Location: [facility];   Service:     OH PARTIAL HIP REPLACEMENT Left 4/8/2020    Procedure: HEMIARTHROPLASTY HIP (BIPOLAR);   Surgeon: Rafy Botello MD;  Location: [facility] OR;  Service: Orthopedics    TUBAL LIGATION        Family History:     Family History   Problem Relation Age of Onset    Diabetes Mother     Diabetes Father     Heart attack Sister     Pancreatic cancer Son         Adenocarcinoma    Alcohol abuse Neg Hx     Arthritis Neg Hx     Asthma Neg Hx     Birth defects Neg Hx     Cancer Neg Hx     COPD Neg Hx     Depression Neg Hx     Drug abuse Neg Hx     Hearing loss Neg Hx     Early death Neg Hx     Heart disease Neg Hx     Hyperlipidemia Neg Hx     Hypertension Neg Hx     Kidney disease Neg Hx     Learning disabilities Neg Hx     Mental illness Neg Hx     Mental retardation Neg Hx     Miscarriages / Stillbirths Neg Hx     Stroke Neg Hx     Vision loss Neg Hx       Social History:     E-Cigarette/Vaping    E-Cigarette Use Never User      E-Cigarette/Vaping Substances    Nicotine No     THC No     CBD No     Flavoring No     Other No     Unknown No      Social History     Socioeconomic History    Marital status: /Civil Union     Spouse name: None    Number of children: None    Years of education: None    Highest education level: None   Occupational History    None   Social Needs    Financial resource strain: None    Food insecurity     Worry: None     Inability: None    Transportation needs     Medical: None     Non-medical: None   Tobacco Use    Smoking status: Never Smoker    Smokeless tobacco: Never Used   Substance and Sexual Activity    Alcohol use: Never     Frequency: Never     Binge frequency: Never    Drug use: Never    Sexual activity: Not Currently     Partners: Male   Lifestyle    Physical activity     Days per week: None     Minutes per session: None    Stress: None   Relationships    Social connections     Talks on phone: None     Gets together: None     Attends Zoroastrian service: None     Active member of club or organization: None     Attends meetings of clubs or organizations: None     Relationship status: None    Intimate partner violence     Fear of current or ex partner: None     Emotionally abused: None     Physically abused: None     Forced sexual activity: None   Other Topics Concern    None   Social History Narrative    None      Medications and Allergies:     Current Outpatient Medications   Medication Sig Dispense Refill    acetaminophen (TYLENOL) 325 mg tablet Take 2 tablets (650 mg total) by mouth every 6 (six) hours as needed for mild pain, headaches or fever 30 tablet 0    amLODIPine (NORVASC) 5 mg tablet TAKE ONE TABLET BY MOUTH ONCE DAILY  30 tablet 2    atorvastatin (LIPITOR) 40 mg tablet Take 1 tablet (40 mg total) by mouth daily 90 tablet 5    enoxaparin (LOVENOX) 30 mg/0 3 mL Inject 0 3 mL (30 mg total) under the skin daily Take for 30 days for DVT prophylaxis and then stop 10 Syringe 0    furosemide (LASIX) 20 mg tablet TAKE ONE TABLET BY MOUTH ONCE DAILY  30 tablet 0    Lancets (ONETOUCH ULTRASOFT) lancets by Does not apply route daily      lisinopril (ZESTRIL) 20 mg tablet TAKE ONE TABLET BY MOUTH ONCE DAILY  30 tablet 5    melatonin 3 mg Take 3 mg by mouth daily at bedtime      metFORMIN (GLUCOPHAGE) 500 mg tablet TAKE ONE TABLET BY MOUTH TWICE DAILY WITH MEALS  60 tablet 5    pantoprazole (PROTONIX) 40 mg tablet TAKE ONE TABLET BY MOUTH ONCE DAILY 30 minutes before breakfast for 30 days then stop  30 tablet 0    senna (SENOKOT) 8 6 mg Take 1 tablet (8 6 mg total) by mouth daily at bedtime as needed for constipation 30 each 0    sodium chloride 1 g tablet Take 3 tablets (3 g total) by mouth 3 (three) times a day with meals 270 tablet 0     No current facility-administered medications for this visit        No Known Allergies   Immunizations:     Immunization History   Administered Date(s) Administered    Influenza Split High Dose Preservative Free IM 10/10/2012, 12/16/2013, 11/19/2015, 11/03/2016, 09/14/2017    Influenza, high dose seasonal 0 7 mL 09/12/2018, 10/30/2019    Pneumococcal Conjugate 13-Valent 11/19/2015    Pneumococcal Polysaccharide PPV23 09/29/2011    Tdap 01/22/2020      Health Maintenance:         Topic Date Due    DXA SCAN  Never done         Topic Date Due    COVID-19 Vaccine (1) Never done    Influenza Vaccine (1) 09/01/2020      Medicare Health Risk Assessment:     Ht 4' 10" (1 473 m)   Wt 46 4 kg (102 lb 3 2 oz)   BMI 21 36 kg/m²          Health Risk Assessment:   Patient rates overall health as good  Patient feels that their physical health rating is much better  Patient is satisfied with their life  Eyesight was rated as same  Hearing was rated as same  Patient feels that their emotional and mental health rating is slightly better  Patients states they are sometimes angry  Patient states they are sometimes unusually tired/fatigued  Pain experienced in the last 7 days has been none  Patient states that she has experienced no weight loss or gain in last 6 months  Fall Risk Screening: In the past year, patient has experienced: no history of falling in past year      Urinary Incontinence Screening:   Patient has not leaked urine accidently in the last six months  Home Safety:  Patient does not have trouble with stairs inside or outside of their home  Patient has working smoke alarms and has working carbon monoxide detector  Home safety hazards include: none  Nutrition:   Current diet is Regular  Medications:   Patient is not currently taking any over-the-counter supplements  Patient is able to manage medications  Activities of Daily Living (ADLs)/Instrumental Activities of Daily Living (IADLs):   Walk and transfer into and out of bed and chair?: Yes  Dress and groom yourself?: Yes    Bathe or shower yourself?: No    Feed yourself?  Yes  Do your laundry/housekeeping?: Yes  Manage your money, pay your bills and track your expenses?: No  Make your own meals?: Yes    Do your own shopping?: No    Previous Hospitalizations:   Any hospitalizations or ED visits within the last 12 months?: No      Advance Care Planning:   Living will: Yes    Advanced directive: Yes    Five wishes given: Yes      PREVENTIVE SCREENINGS      Cardiovascular Screening:    General: Screening Not Indicated and History Lipid Disorder      Diabetes Screening:     General: Screening Not Indicated and History Diabetes      Colorectal Cancer Screening:     General: Screening Not Indicated      Cervical Cancer Screening:    General: Screening Not Indicated      Lung Cancer Screening:     General: Screening Not Indicated    Screening, Brief Intervention, and Referral to Treatment (SBIRT)    Screening  Typical number of drinks in a day: 0  Typical number of drinks in a week: 0  Interpretation: Low risk drinking behavior      Single Item Drug Screening:  How often have you used an illegal drug (including marijuana) or a prescription medication for non-medical reasons in the past year? never    Single Item Drug Screen Score: 0  Interpretation: Negative screen for possible drug use disorder      Suha Gonsales MD

## 2021-04-21 NOTE — ASSESSMENT & PLAN NOTE
Lab Results   Component Value Date    HGBA1C 6 9 (A) 04/21/2021     Good control with hemoglobin A1c of 6 9  She has not had labs in a year  Stressed importance of getting her blood work done

## 2021-04-21 NOTE — ASSESSMENT & PLAN NOTE
Lab Results   Component Value Date    EGFR 56 (L) 04/23/2020    EGFR 62 04/22/2020    EGFR 63 04/21/2020    CREATININE 0 92 04/23/2020    CREATININE 0 85 04/22/2020    CREATININE 0 84 04/21/2020     Labs ordered

## 2021-05-10 DIAGNOSIS — I10 ESSENTIAL HYPERTENSION: ICD-10-CM

## 2021-05-10 RX ORDER — AMLODIPINE BESYLATE 5 MG/1
TABLET ORAL
Qty: 30 TABLET | Refills: 5 | Status: SHIPPED | OUTPATIENT
Start: 2021-05-10 | End: 2021-11-11

## 2021-05-12 DIAGNOSIS — E87.1 HYPONATREMIA: ICD-10-CM

## 2021-05-12 RX ORDER — FUROSEMIDE 20 MG/1
TABLET ORAL
Qty: 30 TABLET | Refills: 5 | Status: SHIPPED | OUTPATIENT
Start: 2021-05-12 | End: 2021-11-08

## 2021-05-18 ENCOUNTER — RA CDI HCC (OUTPATIENT)
Dept: OTHER | Facility: HOSPITAL | Age: 86
End: 2021-05-18

## 2021-05-18 NOTE — PROGRESS NOTES
[facility]  coding opportunities          Chart reviewed, no opportunity found: CHART REVIEWED, NO OPPORTUNITY FOUND              Patients insurance company:  [employer] [facility] (Medicare Advantage and Commercial)

## 2021-05-25 DIAGNOSIS — H10.13 ALLERGIC CONJUNCTIVITIS OF BOTH EYES: Primary | ICD-10-CM

## 2021-05-25 RX ORDER — OLOPATADINE HYDROCHLORIDE 1 MG/ML
1 SOLUTION/ DROPS OPHTHALMIC 2 TIMES DAILY
Qty: 10 ML | Refills: 1 | Status: SHIPPED | OUTPATIENT
Start: 2021-05-25

## 2021-07-08 ENCOUNTER — OFFICE VISIT (OUTPATIENT)
Dept: FAMILY MEDICINE CLINIC | Facility: CLINIC | Age: 86
End: 2021-07-08
Payer: COMMERCIAL

## 2021-07-08 ENCOUNTER — APPOINTMENT (OUTPATIENT)
Dept: LAB | Facility: MEDICAL CENTER | Age: 86
End: 2021-07-08
Attending: FAMILY MEDICINE
Payer: COMMERCIAL

## 2021-07-08 VITALS
WEIGHT: 98.5 LBS | DIASTOLIC BLOOD PRESSURE: 70 MMHG | OXYGEN SATURATION: 97 % | BODY MASS INDEX: 20.68 KG/M2 | HEIGHT: 58 IN | RESPIRATION RATE: 12 BRPM | HEART RATE: 72 BPM | SYSTOLIC BLOOD PRESSURE: 140 MMHG

## 2021-07-08 DIAGNOSIS — D63.1 ANEMIA DUE TO STAGE 3 CHRONIC KIDNEY DISEASE, UNSPECIFIED WHETHER STAGE 3A OR 3B CKD (HCC): ICD-10-CM
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
## 2021-07-08 DIAGNOSIS — E87.1 LOW SODIUM LEVELS: ICD-10-CM

## 2021-07-08 DIAGNOSIS — Z78.0 POST-MENOPAUSAL: ICD-10-CM

## 2021-07-08 DIAGNOSIS — E78.2 MIXED HYPERLIPIDEMIA: ICD-10-CM

## 2021-07-08 DIAGNOSIS — N18.30 ANEMIA DUE TO STAGE 3 CHRONIC KIDNEY DISEASE, UNSPECIFIED WHETHER STAGE 3A OR 3B CKD (HCC): ICD-10-CM

## 2021-07-08 DIAGNOSIS — E11.8 TYPE 2 DIABETES MELLITUS WITH UNSPECIFIED COMPLICATIONS (HCC): ICD-10-CM

## 2021-07-08 DIAGNOSIS — E87.1 HYPONATREMIA: ICD-10-CM

## 2021-07-08 LAB
ALBUMIN SERPL BCP-MCNC: 3.4 G/DL (ref 3.5–5)
ALP SERPL-CCNC: 109 U/L (ref 46–116)
ALT SERPL W P-5'-P-CCNC: 18 U/L (ref 12–78)
ANION GAP SERPL CALCULATED.3IONS-SCNC: 7 MMOL/L (ref 4–13)
AST SERPL W P-5'-P-CCNC: 16 U/L (ref 5–45)
BASOPHILS # BLD AUTO: 0.03 THOUSANDS/ΜL (ref 0–0.1)
BASOPHILS NFR BLD AUTO: 1 % (ref 0–1)
BILIRUB SERPL-MCNC: 0.55 MG/DL (ref 0.2–1)
BUN SERPL-MCNC: 8 MG/DL (ref 5–25)
CALCIUM ALBUM COR SERPL-MCNC: 9.5 MG/DL (ref 8.3–10.1)
CALCIUM SERPL-MCNC: 9 MG/DL (ref 8.3–10.1)
CHLORIDE SERPL-SCNC: 97 MMOL/L (ref 100–108)
CHOLEST SERPL-MCNC: 165 MG/DL (ref 50–200)
CO2 SERPL-SCNC: 26 MMOL/L (ref 21–32)
CREAT SERPL-MCNC: 1.13 MG/DL (ref 0.6–1.3)
EOSINOPHIL # BLD AUTO: 0.15 THOUSAND/ΜL (ref 0–0.61)
EOSINOPHIL NFR BLD AUTO: 3 % (ref 0–6)
ERYTHROCYTE [DISTWIDTH] IN BLOOD BY AUTOMATED COUNT: 12.9 % (ref 11.6–15.1)
EST. AVERAGE GLUCOSE BLD GHB EST-MCNC: 157 MG/DL
GFR SERPL CREATININE-BSD FRML MDRD: 43 ML/MIN/1.73SQ M
GLUCOSE P FAST SERPL-MCNC: 97 MG/DL (ref 65–99)
HBA1C MFR BLD: 7.1 %
HCT VFR BLD AUTO: 33.3 % (ref 34.8–46.1)
HDLC SERPL-MCNC: 65 MG/DL
HGB BLD-MCNC: 10.8 G/DL (ref 11.5–15.4)
IMM GRANULOCYTES # BLD AUTO: 0.02 THOUSAND/UL (ref 0–0.2)
IMM GRANULOCYTES NFR BLD AUTO: 0 % (ref 0–2)
LDLC SERPL CALC-MCNC: 81 MG/DL (ref 0–100)
LYMPHOCYTES # BLD AUTO: 1.12 THOUSANDS/ΜL (ref 0.6–4.47)
LYMPHOCYTES NFR BLD AUTO: 19 % (ref 14–44)
MCH RBC QN AUTO: 29.3 PG (ref 26.8–34.3)
MCHC RBC AUTO-ENTMCNC: 32.4 G/DL (ref 31.4–37.4)
MCV RBC AUTO: 90 FL (ref 82–98)
MONOCYTES # BLD AUTO: 0.5 THOUSAND/ΜL (ref 0.17–1.22)
MONOCYTES NFR BLD AUTO: 8 % (ref 4–12)
NEUTROPHILS # BLD AUTO: 4.18 THOUSANDS/ΜL (ref 1.85–7.62)
NEUTS SEG NFR BLD AUTO: 69 % (ref 43–75)
NONHDLC SERPL-MCNC: 100 MG/DL
NRBC BLD AUTO-RTO: 0 /100 WBCS
PLATELET # BLD AUTO: 359 THOUSANDS/UL (ref 149–390)
PMV BLD AUTO: 9.2 FL (ref 8.9–12.7)
POTASSIUM SERPL-SCNC: 4.4 MMOL/L (ref 3.5–5.3)
PROT SERPL-MCNC: 7.4 G/DL (ref 6.4–8.2)
RBC # BLD AUTO: 3.69 MILLION/UL (ref 3.81–5.12)
SODIUM SERPL-SCNC: 130 MMOL/L (ref 136–145)
TRIGL SERPL-MCNC: 96 MG/DL
WBC # BLD AUTO: 6 THOUSAND/UL (ref 4.31–10.16)

## 2021-07-08 PROCEDURE — 85025 COMPLETE CBC W/AUTO DIFF WBC: CPT

## 2021-07-08 PROCEDURE — 80053 COMPREHEN METABOLIC PANEL: CPT

## 2021-07-08 PROCEDURE — 36415 COLL VENOUS BLD VENIPUNCTURE: CPT

## 2021-07-08 PROCEDURE — 99214 OFFICE O/P EST MOD 30 MIN: CPT | Performed by: FAMILY MEDICINE

## 2021-07-08 PROCEDURE — 1160F RVW MEDS BY RX/DR IN RCRD: CPT | Performed by: FAMILY MEDICINE

## 2021-07-08 PROCEDURE — 83036 HEMOGLOBIN GLYCOSYLATED A1C: CPT

## 2021-07-08 PROCEDURE — 80061 LIPID PANEL: CPT

## 2021-07-08 PROCEDURE — 1036F TOBACCO NON-USER: CPT | Performed by: FAMILY MEDICINE

## 2021-07-08 NOTE — ASSESSMENT & PLAN NOTE
She had an episode of nausea and vomiting on the weekend followed by a fall  The paramedics came to the house and checked her blood pressure and blood sugar  Her sugar was elevated but she had also just eaten lunch    I recommended monitoring blood sugar at home   Lab Results   Component Value Date    HGBA1C 6 9 (A) 04/21/2021

## 2021-07-08 NOTE — PROGRESS NOTES
Assessment/Plan:    She had a fall without LOC past weekend  Thankfully no injuries and vitals were OK  Urged her to change position slowly and carefully  Type 2 diabetes mellitus with unspecified complications (HCC)  She had an episode of nausea and vomiting on the weekend followed by a fall  The paramedics came to the house and checked her blood pressure and blood sugar  Her sugar was elevated but she had also just eaten lunch  I recommended monitoring blood sugar at home   Lab Results   Component Value Date    HGBA1C 6 9 (A) 04/21/2021       Hyponatremia  She has history of hyponatremia and no recent labs  I have urged her to have her Chem panel drawn today  Anemia due to stage 3 chronic kidney disease [facility])  Lab Results   Component Value Date    EGFR 56 (L) 04/23/2020    EGFR 62 04/22/2020    EGFR 63 04/21/2020    CREATININE 0 92 04/23/2020    CREATININE 0 85 04/22/2020    CREATININE 0 84 04/21/2020   Will check BMP and CBC  Diagnoses and all orders for this visit:    Type 2 diabetes mellitus with unspecified complications [facility])  -     Ambulatory referral to Ophthalmology; Future  -     POCT hemoglobin A1c    Post-menopausal  -     DXA bone density spine hip and pelvis; Future    Hyponatremia    Anemia due to stage 3 chronic kidney disease, unspecified whether stage 3a or 3b CKD (HCC)          Subjective:      Patient ID: Keara Madden is a 80 y o  female  This past weekend she had eaten lunch and she stood up afterwards and started to vomit  She fell back and landed on her buttocks  She did not hit her head  She did not lose consciousness  Her  and neighbor helped her sit down on the sofa and called 911  The paramedics came to the house and checked her blood pressure which was normal but they also checked her sugar and this was elevated  She did not go to ER as she was feeling OK  It was a very hot day and AC was not on and fan was not running    She denied any pain from the fall and states she is feeling fine  She usually drinks 2 cups of coffee per day and states she has been trying to drink more water  No recurrence of nausea or vomiting  She denies any pain  The following portions of the patient's history were reviewed and updated as appropriate: allergies, current medications, past family history, past medical history, past social history, past surgical history and problem list     Review of Systems   Constitutional: Negative for activity change, appetite change, fatigue and fever  HENT: Negative for congestion  Cardiovascular: Negative for chest pain  Gastrointestinal: Negative for abdominal distention, diarrhea, nausea and vomiting  Musculoskeletal: Negative for arthralgias  Neurological: Negative for dizziness and headaches  Objective:      /70 (BP Location: Left arm, Patient Position: Sitting, Cuff Size: Standard)   Pulse 72   Resp 12   Ht 4' 10" (1 473 m)   Wt 44 7 kg (98 lb 8 oz)   SpO2 97%   BMI 20 59 kg/m²          Physical Exam  Vitals and nursing note reviewed  Constitutional:       Appearance: Normal appearance  HENT:      Head: Normocephalic and atraumatic  Mouth/Throat:      Mouth: Mucous membranes are moist    Neck:      Vascular: No carotid bruit  Cardiovascular:      Rate and Rhythm: Normal rate and regular rhythm  Pulses: Normal pulses  Heart sounds: Normal heart sounds  Pulmonary:      Effort: Pulmonary effort is normal       Breath sounds: Normal breath sounds  Musculoskeletal:      Cervical back: Normal range of motion and neck supple  Right lower leg: No edema  Left lower leg: No edema  Lymphadenopathy:      Cervical: No cervical adenopathy  Skin:     General: Skin is warm and dry  Neurological:      General: No focal deficit present  Mental Status: She is alert and oriented to person, place, and time     Psychiatric:         Mood and Affect: Mood normal  Behavior: Behavior normal

## 2021-07-08 NOTE — ASSESSMENT & PLAN NOTE
She has history of hyponatremia and no recent labs  I have urged her to have her Chem panel drawn today

## 2021-07-08 NOTE — ASSESSMENT & PLAN NOTE
Lab Results   Component Value Date    EGFR 56 (L) 04/23/2020    EGFR 62 04/22/2020    EGFR 63 04/21/2020    CREATININE 0 92 04/23/2020    CREATININE 0 85 04/22/2020    CREATININE 0 84 04/21/2020   Will check BMP and CBC

## 2021-08-02 DIAGNOSIS — E78.5 HYPERLIPIDEMIA, UNSPECIFIED HYPERLIPIDEMIA TYPE: ICD-10-CM

## 2021-08-02 RX ORDER — ATORVASTATIN CALCIUM 40 MG/1
TABLET, FILM COATED ORAL
Qty: 90 TABLET | Refills: 0 | Status: SHIPPED | OUTPATIENT
Start: 2021-08-02 | End: 2021-10-25

## 2021-08-25 DIAGNOSIS — I10 ESSENTIAL HYPERTENSION: ICD-10-CM

## 2021-08-25 RX ORDER — LISINOPRIL 20 MG/1
TABLET ORAL
Qty: 30 TABLET | Refills: 0 | Status: SHIPPED | OUTPATIENT
Start: 2021-08-25 | End: 2021-09-27

## 2021-09-27 DIAGNOSIS — I10 ESSENTIAL HYPERTENSION: ICD-10-CM

## 2021-09-27 RX ORDER — LISINOPRIL 20 MG/1
TABLET ORAL
Qty: 30 TABLET | Refills: 0 | Status: SHIPPED | OUTPATIENT
Start: 2021-09-27 | End: 2021-10-25

## 2021-10-25 DIAGNOSIS — E78.5 HYPERLIPIDEMIA, UNSPECIFIED HYPERLIPIDEMIA TYPE: ICD-10-CM

## 2021-10-25 DIAGNOSIS — I10 ESSENTIAL HYPERTENSION: ICD-10-CM

## 2021-10-25 RX ORDER — ATORVASTATIN CALCIUM 40 MG/1
TABLET, FILM COATED ORAL
Qty: 90 TABLET | Refills: 0 | Status: SHIPPED | OUTPATIENT
Start: 2021-10-25 | End: 2022-02-02 | Stop reason: SDUPTHER

## 2021-10-25 RX ORDER — LISINOPRIL 20 MG/1
TABLET ORAL
Qty: 30 TABLET | Refills: 0 | Status: SHIPPED | OUTPATIENT
Start: 2021-10-25 | End: 2021-11-30 | Stop reason: SDUPTHER

## 2021-11-08 DIAGNOSIS — E87.1 HYPONATREMIA: ICD-10-CM

## 2021-11-08 RX ORDER — FUROSEMIDE 20 MG/1
TABLET ORAL
Qty: 30 TABLET | Refills: 0 | Status: SHIPPED | OUTPATIENT
Start: 2021-11-08 | End: 2021-12-13

## 2021-11-11 DIAGNOSIS — I10 ESSENTIAL HYPERTENSION: ICD-10-CM

## 2021-11-11 RX ORDER — AMLODIPINE BESYLATE 5 MG/1
TABLET ORAL
Qty: 30 TABLET | Refills: 0 | Status: SHIPPED | OUTPATIENT
Start: 2021-11-11 | End: 2021-12-09

## 2021-11-30 DIAGNOSIS — I10 ESSENTIAL HYPERTENSION: ICD-10-CM

## 2021-11-30 RX ORDER — LISINOPRIL 20 MG/1
20 TABLET ORAL DAILY
Qty: 30 TABLET | Refills: 5 | Status: SHIPPED | OUTPATIENT
Start: 2021-11-30 | End: 2022-05-10

## 2021-12-09 DIAGNOSIS — I10 ESSENTIAL HYPERTENSION: ICD-10-CM

## 2021-12-09 RX ORDER — AMLODIPINE BESYLATE 5 MG/1
TABLET ORAL
Qty: 30 TABLET | Refills: 0 | Status: SHIPPED | OUTPATIENT
Start: 2021-12-09 | End: 2022-01-10

## 2021-12-13 DIAGNOSIS — E87.1 HYPONATREMIA: ICD-10-CM

## 2021-12-13 RX ORDER — FUROSEMIDE 20 MG/1
TABLET ORAL
Qty: 30 TABLET | Refills: 0 | Status: SHIPPED | OUTPATIENT
Start: 2021-12-13 | End: 2022-01-10

## 2022-01-01 NOTE — NURSING NOTE
Patient discharged 1/28/2020 11:37 AM  AVS and discharge instructions reviewed with Melody Edwards nurse, from [facility]  Patient to be picked up by transport team at 1200 1/28/2020  Will continue to monitor      Taisha Azul RN 1/28/2020 11:38 AM [facility]

## 2022-01-10 DIAGNOSIS — E87.1 HYPONATREMIA: ICD-10-CM

## 2022-01-10 DIAGNOSIS — I10 ESSENTIAL HYPERTENSION: ICD-10-CM

## 2022-01-10 RX ORDER — FUROSEMIDE 20 MG/1
TABLET ORAL
Qty: 30 TABLET | Refills: 0 | Status: SHIPPED | OUTPATIENT
Start: 2022-01-10 | End: 2022-02-02 | Stop reason: SDUPTHER

## 2022-01-10 RX ORDER — AMLODIPINE BESYLATE 5 MG/1
TABLET ORAL
Qty: 30 TABLET | Refills: 0 | Status: SHIPPED | OUTPATIENT
Start: 2022-01-10 | End: 2022-02-02 | Stop reason: SDUPTHER

## 2022-01-12 ENCOUNTER — RA CDI HCC (OUTPATIENT)
Dept: OTHER | Facility: HOSPITAL | Age: 87
End: 2022-01-12

## 2022-01-12 NOTE — PROGRESS NOTES
Based on clinical documentation indicated in your record, it appears that the patient may have the following conditions:    E11 22 Type 2 diabetes with chronic kidney disease    Please review/recertify the BPA diagnoses for 2022     If this is correct, please document and assess at your next visit Jan 19th    [facility] 75  coding opportunities          Number of diagnosis code(s) already on the problem list added to FYI flag: 3     Chart Reviewed * (Number of) Inbasket suggestions sent to Provider: 1                             [place] [id number]  coding opportunities          Number of diagnosis code(s) already on the problem list added to FYI flag: 3     Chart Reviewed * (Number of) Inbasket suggestions sent to Provider: 1     Problem listed updated   Provider Accepted, (number of) suggestions accepted: 1               Patients insurance company: [employer] (Medicare Advantage and Commercial)     Visit status: Patient canceled the appointment

## 2022-01-18 DIAGNOSIS — E11.9 TYPE 2 DIABETES MELLITUS WITHOUT COMPLICATION, WITHOUT LONG-TERM CURRENT USE OF INSULIN (HCC): ICD-10-CM

## 2022-01-18 NOTE — TELEPHONE ENCOUNTER
Patient L/m on Rx line requesting:    metFORMIN (GLUCOPHAGE) 500 mg tablet  #60 / R-5    Last OV 7/8/21  Future OV 1/26/22

## 2022-01-26 ENCOUNTER — RA CDI HCC (OUTPATIENT)
Dept: OTHER | Facility: HOSPITAL | Age: 87
End: 2022-01-26

## 2022-01-26 NOTE — PROGRESS NOTES
[facility] 75  coding opportunities             Chart Reviewed * (Number of) Inbasket suggestions sent to Provider: 1                  Patients insurance company: [email or web address] (Medicare Advantage and Commercial)           E11 22

## 2022-02-02 ENCOUNTER — OFFICE VISIT (OUTPATIENT)
Dept: FAMILY MEDICINE CLINIC | Facility: CLINIC | Age: 87
End: 2022-02-02
Payer: COMMERCIAL

## 2022-02-02 VITALS
DIASTOLIC BLOOD PRESSURE: 68 MMHG | RESPIRATION RATE: 12 BRPM | HEART RATE: 62 BPM | TEMPERATURE: 97.6 F | BODY MASS INDEX: 21.32 KG/M2 | OXYGEN SATURATION: 100 % | SYSTOLIC BLOOD PRESSURE: 136 MMHG | WEIGHT: 101.6 LBS | HEIGHT: 58 IN

## 2022-02-02 DIAGNOSIS — N18.30 ANEMIA DUE TO STAGE 3 CHRONIC KIDNEY DISEASE, UNSPECIFIED WHETHER STAGE 3A OR 3B CKD (HCC): ICD-10-CM

## 2022-02-02 DIAGNOSIS — I10 ESSENTIAL HYPERTENSION: ICD-10-CM

## 2022-02-02 DIAGNOSIS — E11.8 TYPE 2 DIABETES MELLITUS WITH UNSPECIFIED COMPLICATIONS (HCC): ICD-10-CM

## 2022-02-02 DIAGNOSIS — E87.1 HYPONATREMIA: ICD-10-CM

## 2022-02-02 DIAGNOSIS — E55.9 VITAMIN D DEFICIENCY: ICD-10-CM

## 2022-02-02 DIAGNOSIS — E22.2 SYNDROME OF INAPPROPRIATE SECRETION OF ANTIDIURETIC HORMONE (HCC): ICD-10-CM

## 2022-02-02 DIAGNOSIS — E78.5 HYPERLIPIDEMIA, UNSPECIFIED HYPERLIPIDEMIA TYPE: ICD-10-CM

## 2022-02-02 DIAGNOSIS — D63.1 ANEMIA DUE TO STAGE 3 CHRONIC KIDNEY DISEASE, UNSPECIFIED WHETHER STAGE 3A OR 3B CKD (HCC): ICD-10-CM

## 2022-02-02 DIAGNOSIS — E11.9 TYPE 2 DIABETES MELLITUS WITHOUT COMPLICATION, WITHOUT LONG-TERM CURRENT USE OF INSULIN (HCC): Primary | ICD-10-CM

## 2022-02-02 PROBLEM — E44.1 MILD PROTEIN-CALORIE MALNUTRITION (HCC): Status: RESOLVED | Noted: 2020-04-15 | Resolved: 2022-02-02

## 2022-02-02 LAB — SL AMB POCT HEMOGLOBIN AIC: 7 (ref ?–6.5)

## 2022-02-02 PROCEDURE — 1036F TOBACCO NON-USER: CPT | Performed by: FAMILY MEDICINE

## 2022-02-02 PROCEDURE — 99214 OFFICE O/P EST MOD 30 MIN: CPT | Performed by: FAMILY MEDICINE

## 2022-02-02 PROCEDURE — 1160F RVW MEDS BY RX/DR IN RCRD: CPT | Performed by: FAMILY MEDICINE

## 2022-02-02 PROCEDURE — 83036 HEMOGLOBIN GLYCOSYLATED A1C: CPT | Performed by: FAMILY MEDICINE

## 2022-02-02 RX ORDER — ATORVASTATIN CALCIUM 40 MG/1
40 TABLET, FILM COATED ORAL DAILY
Qty: 90 TABLET | Refills: 1 | Status: SHIPPED | OUTPATIENT
Start: 2022-02-02 | End: 2022-07-15 | Stop reason: SDUPTHER

## 2022-02-02 RX ORDER — AMLODIPINE BESYLATE 5 MG/1
5 TABLET ORAL DAILY
Qty: 90 TABLET | Refills: 1 | Status: SHIPPED | OUTPATIENT
Start: 2022-02-02 | End: 2022-07-15 | Stop reason: SDUPTHER

## 2022-02-02 RX ORDER — NEOMYCIN SULFATE, POLYMYXIN B SULFATE, AND DEXAMETHASONE 3.5; 10000; 1 MG/G; [USP'U]/G; MG/G
OINTMENT OPHTHALMIC
COMMUNITY
Start: 2021-12-03

## 2022-02-02 RX ORDER — FUROSEMIDE 20 MG/1
20 TABLET ORAL DAILY
Qty: 90 TABLET | Refills: 1 | Status: SHIPPED | OUTPATIENT
Start: 2022-02-02

## 2022-02-02 RX ORDER — PREDNISOLONE ACETATE 10 MG/ML
SUSPENSION/ DROPS OPHTHALMIC
COMMUNITY
Start: 2021-12-02

## 2022-02-02 NOTE — PROGRESS NOTES
Diabetic Foot Exam    Patient's shoes and socks removed  Right Foot/Ankle   Right Foot Inspection  Skin Exam: skin normal and skin intact  No dry skin, no warmth, no callus, no erythema, no maceration, no abnormal color, no pre-ulcer, no ulcer and no callus  Toe Exam: ROM and strength within normal limits  Sensory   Vibration: intact  Proprioception: intact  Monofilament testing: intact    Vascular  Capillary refills: < 3 seconds  The right DP pulse is 2+  The right PT pulse is 2+  Left Foot/Ankle  Left Foot Inspection  Skin Exam: skin normal and skin intact  No dry skin, no warmth, no erythema, no maceration, normal color, no pre-ulcer, no ulcer and no callus  Toe Exam: ROM and strength within normal limits  Sensory   Vibration: intact  Proprioception: intact  Monofilament testing: intact    Vascular  Capillary refills: < 3 seconds  The left DP pulse is 2+  The left PT pulse is 2+       Assign Risk Category  Deformity present  No loss of protective sensation  No weak pulses  Risk: 0

## 2022-02-02 NOTE — ASSESSMENT & PLAN NOTE
Lab Results   Component Value Date    EGFR 43 07/08/2021    EGFR 56 (L) 04/23/2020    EGFR 62 04/22/2020    CREATININE 1 13 07/08/2021    CREATININE 0 92 04/23/2020    CREATININE 0 85 04/22/2020     Ordered repeat BMP

## 2022-02-02 NOTE — ASSESSMENT & PLAN NOTE
Lab Results   Component Value Date    HGBA1C 7 0 (A) 02/02/2022       Diabetes is stable with today's hemoglobin A1c of 7 0  Will continue the metformin 500 mg b i d

## 2022-02-02 NOTE — PROGRESS NOTES
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)   Her sodium has been running about 130  Will have her go for BMP to recheck  Anemia due to stage 3 chronic kidney disease [facility])  Lab Results   Component Value Date    EGFR 43 07/08/2021    EGFR 56 (L) 04/23/2020    EGFR 62 04/22/2020    CREATININE 1 13 07/08/2021    CREATININE 0 92 04/23/2020    CREATININE 0 85 04/22/2020     Ordered repeat BMP       Diagnoses and all orders for this visit:    Type 2 diabetes mellitus without complication, without long-term current use of insulin (HCC)  -     POCT hemoglobin A1c  -     metFORMIN (GLUCOPHAGE) 500 mg tablet; Take 1 tablet (500 mg total) by mouth 2 (two) times a day with meals    Hyperlipidemia, unspecified hyperlipidemia type  -     atorvastatin (LIPITOR) 40 mg tablet; Take 1 tablet (40 mg total) by mouth daily    Essential hypertension  -     amLODIPine (NORVASC) 5 mg tablet; Take 1 tablet (5 mg total) by mouth daily    Hyponatremia  -     furosemide (LASIX) 20 mg tablet; Take 1 tablet (20 mg total) by mouth daily  -     Basic metabolic panel; Future    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)    Anemia due to stage 3 chronic kidney disease, unspecified whether stage 3a or 3b CKD (HCC)  -     Basic metabolic panel; Future  -     CBC and differential; Future    Vitamin D deficiency  -     Vitamin D 25 hydroxy; Future    Type 2 diabetes mellitus with unspecified complications (HCC)    Other orders  -     prednisoLONE acetate (PRED FORTE) 1 % ophthalmic suspension  -     neomycin-polymyxin-dexamethasone (MAXITROL) 0 35%-10,000 units/g-0 1%          Subjective:      Patient ID: Jessica Lizarraga is a 80 y o  female  She is here with her  for diabetes follow-up  She has been feeling okay in general   She just celebrated her 89th birthday 2 days ago  She has home health care helping her 2 days a week with ADLs for 2 hours each time  She had Moderna vaccines last spring but has not had booster  She denies any chest pain, shortness of breath or palpitations  No headaches or dizziness  The following portions of the patient's history were reviewed and updated as appropriate: allergies, current medications, past family history, past medical history, past social history, past surgical history and problem list     Review of Systems   Constitutional: Negative for activity change, chills, fatigue and fever  HENT: Negative for congestion, ear pain, sinus pressure and sore throat  Eyes: Negative for pain and visual disturbance  Respiratory: Negative for cough, chest tightness, shortness of breath and wheezing  Cardiovascular: Negative for chest pain, palpitations and leg swelling  Gastrointestinal: Negative for abdominal pain, blood in stool, constipation, diarrhea, nausea and vomiting  Endocrine: Negative for polydipsia and polyuria  Genitourinary: Negative for difficulty urinating, dysuria, frequency and urgency  Musculoskeletal: Negative for arthralgias, joint swelling and myalgias  Skin: Negative for rash  Neurological: Negative for dizziness, weakness, numbness and headaches  Hematological: Negative for adenopathy  Does not bruise/bleed easily  Psychiatric/Behavioral: Negative for dysphoric mood  The patient is not nervous/anxious  Objective:      /68 (BP Location: Left arm, Patient Position: Sitting, Cuff Size: Adult)   Pulse 62   Temp 97 6 °F (36 4 °C) (Tympanic)   Resp 12   Ht 4' 10" (1 473 m)   Wt 46 1 kg (101 lb 9 6 oz)   SpO2 100%   BMI 21 23 kg/m²          Physical Exam  Vitals and nursing note reviewed     Constitutional:       Appearance: Normal appearance  HENT:      Head: Normocephalic and atraumatic  Right Ear: Tympanic membrane normal       Mouth/Throat:      Mouth: Mucous membranes are moist       Comments: edentulous  Cardiovascular:      Rate and Rhythm: Normal rate and regular rhythm  Pulses: Normal pulses  Heart sounds: Normal heart sounds  Pulmonary:      Effort: Pulmonary effort is normal       Breath sounds: Normal breath sounds  Musculoskeletal:      Right lower leg: No edema  Left lower leg: No edema  Comments: Bilateral bunions with overlapping great toes   Skin:     General: Skin is warm and dry  Neurological:      Mental Status: She is alert     Psychiatric:         Mood and Affect: Mood normal          Behavior: Behavior normal

## 2022-03-23 DIAGNOSIS — J06.9 VIRAL UPPER RESPIRATORY TRACT INFECTION: ICD-10-CM

## 2022-03-23 RX ORDER — BENZONATATE 100 MG/1
100 CAPSULE ORAL 3 TIMES DAILY PRN
Qty: 20 CAPSULE | Refills: 0 | Status: CANCELLED | OUTPATIENT
Start: 2022-03-23

## 2022-03-24 DIAGNOSIS — J06.9 VIRAL UPPER RESPIRATORY TRACT INFECTION: ICD-10-CM

## 2022-03-24 RX ORDER — BENZONATATE 100 MG/1
100 CAPSULE ORAL 3 TIMES DAILY PRN
Qty: 20 CAPSULE | Refills: 0 | Status: SHIPPED | OUTPATIENT
Start: 2022-03-24 | End: 2022-03-29 | Stop reason: SDUPTHER

## 2022-04-10 DIAGNOSIS — J06.9 VIRAL UPPER RESPIRATORY TRACT INFECTION: ICD-10-CM

## 2022-04-11 DIAGNOSIS — J06.9 VIRAL UPPER RESPIRATORY TRACT INFECTION: ICD-10-CM

## 2022-04-11 RX ORDER — BENZONATATE 100 MG/1
100 CAPSULE ORAL 3 TIMES DAILY PRN
Qty: 30 CAPSULE | Refills: 0 | Status: SHIPPED | OUTPATIENT
Start: 2022-04-11 | End: 2022-05-25 | Stop reason: SDUPTHER

## 2022-04-11 RX ORDER — BENZONATATE 100 MG/1
100 CAPSULE ORAL 3 TIMES DAILY PRN
Qty: 30 CAPSULE | Refills: 0 | Status: SHIPPED | OUTPATIENT
Start: 2022-04-11 | End: 2022-04-11 | Stop reason: SDUPTHER

## 2022-04-12 NOTE — TELEPHONE ENCOUNTER
Patient is feeling better  Did not wish to schedule appointment at this time   She will call if symptoms return

## 2022-04-15 ENCOUNTER — RA CDI HCC (OUTPATIENT)
Dept: OTHER | Facility: HOSPITAL | Age: 87
End: 2022-04-15

## 2022-04-15 NOTE — PROGRESS NOTES
E11 22  [facility] 75  coding opportunities          Chart Reviewed number of suggestions sent to Provider: 1     Patients Insurance     Medicare Insurance: [employer]

## 2022-05-10 DIAGNOSIS — I10 ESSENTIAL HYPERTENSION: ICD-10-CM

## 2022-05-10 RX ORDER — LISINOPRIL 20 MG/1
TABLET ORAL
Qty: 30 TABLET | Refills: 0 | Status: SHIPPED | OUTPATIENT
Start: 2022-05-10 | End: 2022-06-16 | Stop reason: SDUPTHER

## 2022-05-10 NOTE — ASSESSMENT & PLAN NOTE
Per Protocol medication refilled. Refill sent to pharmacy. Celeste Carbone, CMA      Will check fasting lipid profile

## 2022-05-25 ENCOUNTER — OFFICE VISIT (OUTPATIENT)
Dept: FAMILY MEDICINE CLINIC | Facility: CLINIC | Age: 87
End: 2022-05-25
Payer: COMMERCIAL

## 2022-05-25 VITALS
WEIGHT: 100.8 LBS | DIASTOLIC BLOOD PRESSURE: 80 MMHG | BODY MASS INDEX: 21.16 KG/M2 | HEIGHT: 58 IN | RESPIRATION RATE: 12 BRPM | SYSTOLIC BLOOD PRESSURE: 140 MMHG | OXYGEN SATURATION: 98 % | HEART RATE: 87 BPM

## 2022-05-25 DIAGNOSIS — E55.9 VITAMIN D DEFICIENCY: ICD-10-CM

## 2022-05-25 DIAGNOSIS — E11.22 TYPE 2 DIABETES MELLITUS WITH STAGE 3A CHRONIC KIDNEY DISEASE, WITHOUT LONG-TERM CURRENT USE OF INSULIN (HCC): Primary | ICD-10-CM

## 2022-05-25 DIAGNOSIS — J06.9 VIRAL UPPER RESPIRATORY TRACT INFECTION: ICD-10-CM

## 2022-05-25 DIAGNOSIS — D63.1 ANEMIA DUE TO STAGE 3 CHRONIC KIDNEY DISEASE, UNSPECIFIED WHETHER STAGE 3A OR 3B CKD (HCC): ICD-10-CM

## 2022-05-25 DIAGNOSIS — N18.31 TYPE 2 DIABETES MELLITUS WITH STAGE 3A CHRONIC KIDNEY DISEASE, WITHOUT LONG-TERM CURRENT USE OF INSULIN (HCC): Primary | ICD-10-CM

## 2022-05-25 DIAGNOSIS — E87.1 HYPONATREMIA: ICD-10-CM

## 2022-05-25 DIAGNOSIS — N18.30 ANEMIA DUE TO STAGE 3 CHRONIC KIDNEY DISEASE, UNSPECIFIED WHETHER STAGE 3A OR 3B CKD (HCC): ICD-10-CM

## 2022-05-25 PROCEDURE — 1160F RVW MEDS BY RX/DR IN RCRD: CPT | Performed by: FAMILY MEDICINE

## 2022-05-25 PROCEDURE — 1036F TOBACCO NON-USER: CPT | Performed by: FAMILY MEDICINE

## 2022-05-25 PROCEDURE — 99214 OFFICE O/P EST MOD 30 MIN: CPT | Performed by: FAMILY MEDICINE

## 2022-05-25 RX ORDER — BENZONATATE 100 MG/1
100 CAPSULE ORAL 3 TIMES DAILY PRN
Qty: 30 CAPSULE | Refills: 1 | Status: SHIPPED | OUTPATIENT
Start: 2022-05-25

## 2022-05-25 NOTE — ASSESSMENT & PLAN NOTE
Lab Results   Component Value Date    EGFR 43 07/08/2021    EGFR 56 (L) 04/23/2020    EGFR 62 04/22/2020    CREATININE 1 13 07/08/2021    CREATININE 0 92 04/23/2020    CREATININE 0 85 04/22/2020     Will check CBC with next labs

## 2022-05-25 NOTE — PROGRESS NOTES
Assessment/Plan:    Type 2 diabetes mellitus with diabetic chronic kidney disease ([facility] 75 )   She has been under good control on metformin 500 mg b i d     We will repeat hemoglobin A1c with her next set of labs  Lab Results   Component Value Date    HGBA1C 7 0 (A) 02/02/2022       Hyponatremia    Gave her a new lab slip to recheck her sodium level  Anemia due to stage 3 chronic kidney disease [facility])  Lab Results   Component Value Date    EGFR 43 07/08/2021    EGFR 56 (L) 04/23/2020    EGFR 62 04/22/2020    CREATININE 1 13 07/08/2021    CREATININE 0 92 04/23/2020    CREATININE 0 85 04/22/2020     Will check CBC with next labs       Diagnoses and all orders for this visit:    Hyponatremia    Anemia due to stage 3 chronic kidney disease, unspecified whether stage 3a or 3b CKD ([facility] 75 )    Type 2 diabetes mellitus with stage 3a chronic kidney disease, without long-term current use of insulin ([facility])          Subjective:      Patient ID: Juanita Dobson is a 80 y o  female  She is here with her  for follow-up  In general she has been feeling okay  She had upper respiratory infection in March which gave her persistent cough  Finally the coughing has improved  She denies any chest pain or shortness of breath  No headaches or dizziness  She states appetite is good and has no complaints with bowels or urination  She has caretaker coming to the house twice per week for a couple hours each time  The following portions of the patient's history were reviewed and updated as appropriate: allergies, current medications, past family history, past medical history, past social history, past surgical history and problem list     Review of Systems   Constitutional: Negative for activity change, chills, fatigue and fever  HENT: Negative for congestion, ear pain, sinus pressure and sore throat  Eyes: Negative for pain and visual disturbance     Respiratory: Negative for cough, chest tightness, shortness of breath and wheezing  Cardiovascular: Negative for chest pain, palpitations and leg swelling  Gastrointestinal: Negative for abdominal pain, blood in stool, constipation, diarrhea, nausea and vomiting  Endocrine: Negative for polydipsia and polyuria  Genitourinary: Negative for difficulty urinating, dysuria, frequency and urgency  Musculoskeletal: Negative for arthralgias, joint swelling and myalgias  Skin: Negative for rash  Neurological: Negative for dizziness, weakness, numbness and headaches  Hematological: Negative for adenopathy  Does not bruise/bleed easily  Psychiatric/Behavioral: Negative for dysphoric mood  The patient is not nervous/anxious  Objective:      /80 (BP Location: Left arm, Patient Position: Sitting, Cuff Size: Standard)   Pulse 87   Resp 12   Ht 4' 10" (1 473 m)   Wt 45 7 kg (100 lb 12 8 oz)   SpO2 98%   BMI 21 07 kg/m²          Physical Exam  Vitals and nursing note reviewed  HENT:      Head: Normocephalic and atraumatic  Mouth/Throat:      Mouth: Mucous membranes are moist       Comments: edentulous  Cardiovascular:      Rate and Rhythm: Normal rate and regular rhythm  Pulses: Normal pulses  Pulmonary:      Effort: Pulmonary effort is normal       Breath sounds: Normal breath sounds  Musculoskeletal:      Right lower leg: No edema  Left lower leg: No edema  Skin:     General: Skin is warm and dry  Neurological:      Mental Status: She is alert     Psychiatric:         Mood and Affect: Mood normal

## 2022-05-25 NOTE — ASSESSMENT & PLAN NOTE
She has been under good control on metformin 500 mg b i d     We will repeat hemoglobin A1c with her next set of labs    Lab Results   Component Value Date    HGBA1C 7 0 (A) 02/02/2022

## 2022-05-27 ENCOUNTER — TELEPHONE (OUTPATIENT)
Dept: FAMILY MEDICINE CLINIC | Facility: CLINIC | Age: 87
End: 2022-05-27

## 2022-06-16 DIAGNOSIS — I10 ESSENTIAL HYPERTENSION: ICD-10-CM

## 2022-06-16 RX ORDER — LISINOPRIL 20 MG/1
20 TABLET ORAL DAILY
Qty: 90 TABLET | Refills: 1 | Status: SHIPPED | OUTPATIENT
Start: 2022-06-16

## 2022-07-15 DIAGNOSIS — E78.5 HYPERLIPIDEMIA, UNSPECIFIED HYPERLIPIDEMIA TYPE: ICD-10-CM

## 2022-07-15 DIAGNOSIS — E11.9 TYPE 2 DIABETES MELLITUS WITHOUT COMPLICATION, WITHOUT LONG-TERM CURRENT USE OF INSULIN (HCC): ICD-10-CM

## 2022-07-15 DIAGNOSIS — I10 ESSENTIAL HYPERTENSION: ICD-10-CM

## 2022-07-15 RX ORDER — ATORVASTATIN CALCIUM 40 MG/1
40 TABLET, FILM COATED ORAL DAILY
Qty: 90 TABLET | Refills: 1 | Status: SHIPPED | OUTPATIENT
Start: 2022-07-15

## 2022-07-15 RX ORDER — AMLODIPINE BESYLATE 5 MG/1
5 TABLET ORAL DAILY
Qty: 90 TABLET | Refills: 1 | Status: SHIPPED | OUTPATIENT
Start: 2022-07-15

## 2022-08-09 DIAGNOSIS — E87.1 HYPONATREMIA: ICD-10-CM

## 2022-08-11 DIAGNOSIS — E87.1 HYPONATREMIA: ICD-10-CM

## 2022-08-11 RX ORDER — FUROSEMIDE 20 MG/1
20 TABLET ORAL DAILY
Qty: 90 TABLET | Refills: 1 | Status: SHIPPED | OUTPATIENT
Start: 2022-08-11 | End: 2022-08-11 | Stop reason: SDUPTHER

## 2022-08-11 RX ORDER — FUROSEMIDE 20 MG/1
20 TABLET ORAL DAILY
Qty: 90 TABLET | Refills: 1 | Status: SHIPPED | OUTPATIENT
Start: 2022-08-11

## 2022-08-24 NOTE — ASSESSMENT & PLAN NOTE
Lupe Ling is a 85 year old female here for  Chief Complaint   Patient presents with   • Cancer     Denies latex allergy or sensitivity.    Medication verified, no changes.  PCP and Pharmacy verified.    Social History     Tobacco Use   Smoking Status Former Smoker   • Packs/day: 0.00   • Years: 20.00   • Pack years: 0.00   • Types: Cigarettes   • Quit date: 1984   • Years since quittin.6   Smokeless Tobacco Former User     Advance Directives Filed: Yes    ECOG:   ECOG [22 0945]   ECOG Performance Status 0       Vitals:    There were no vitals taken for this visit.    These vital signs are:  Within defined parameters (Per Reference \"Defined Limits Hospital Outpatient Department (HOD)\")    Height: No.  Ht Readings from Last 1 Encounters:   21 4' 7.75\" (1.416 m)     Weight:Yes, shoes on.  Wt Readings from Last 3 Encounters:   22 47 kg (103 lb 9.6 oz)   21 46.4 kg (102 lb 6.4 oz)   21 43 kg (94 lb 11.2 oz)       BMI: There is no height or weight on file to calculate BMI.    REVIEW OF SYSTEMS  GENERAL:  Patient denies headache, fevers, chills, night sweats, excessive fatigue, change in appetite, weight loss, dizziness  ALLERGIC/IMMUNOLOGIC: Verified allergies: Yes  EYES:  Patient denies significant visual difficulties, double vision, blurred vision  ENT/MOUTH: Patient denies problems with hearing, sore throat, sinus drainage, mouth sores  ENDOCRINE:  Patient denies diabetes, thyroid disease, hormone replacement, but complains of: hot flashes  HEMATOLOGIC/LYMPHATIC: Patient denies easy bruising, bleeding, tender lymph nodes, swollen lymph nodes  BREASTS: Patient denies abnormal masses of breast, nipple discharge, pain  RESPIRATORY:  Patient denies lung pain with breathing, cough, coughing up blood, shortness of breath  CARDIOVASCULAR:  Patient denies anginal chest pain, palpitations, shortness of breath when lying flat, peripheral edema  GASTROINTESTINAL: Patient denies abdominal  · With syncope due to dehydration versus vasovagal in the setting of coughing r/o valvular heart disease  · Check Echo r/o severe AS- echo pending  · Continue IV fluid hydration with NS due to EMILY  · Place on telemetry for 24 hours  · Consult PT OT for balance and ambulation- she likely will need rehab pain , nausea, vomiting, diarrhea, GI bleeding, constipation, change in bowel habits, heartburn, sensation of feeling full, difficulty swallowing  : Patient denies abnormal genital masses, blood in the urine, frequency, urgency, burning with urination, hesitancy, incontinence, vaginal bleeding, discharge  MUSCULOSKELETAL:  Patient denies bone pain, joint swelling, redness, decreased range of motion   Joint pain from arthritis   SKIN:  Patient denies chronic rashes, inflammation, ulcerations, skin changes, but complains of: itching dry skin  NEUROLOGIC:  Patient denies loss of balance, areas of focal weakness, abnormal gait, sensory problems, numbness, tingling  PSYCHIATRIC: Patient denies insomnia, depression, anxiety    This patient reported abnormal symptoms that needed immediate verbal communication: No

## 2022-10-19 ENCOUNTER — TELEPHONE (OUTPATIENT)
Dept: INTERNAL MEDICINE CLINIC | Facility: CLINIC | Age: 87
End: 2022-10-19

## 2022-10-19 ENCOUNTER — TELEPHONE (OUTPATIENT)
Dept: FAMILY MEDICINE CLINIC | Facility: CLINIC | Age: 87
End: 2022-10-19

## 2022-10-19 NOTE — TELEPHONE ENCOUNTER
[address] [place] [address], [facility]    PCP:   · please see below on cough and advise if patient needs anything  I did inform  you may need to evaluate her in case something else is going on  · She also needs lisinopril and amlodipine renewed  Do you want to wait to renew these until her appointment next week     ____________________________________________________________________    Communication with patient: Yes, Phone call with patient's     Reason for documentation: message from insurance company that patient is due for metformin renewal to improve medication adherence rate  Contacted to review medications prior to PCP appointment next week to establish care  Medication list reviewed with patient's  as patient's  takes care of medications for patient  Does not use pill box  Patient rarely forgets medications, hardly ever misses a dose  Metformin: still has one full bottle remaining, takes twice daily   Lisinopril: needs renewed  Amlodipine: needs renewed  Melatonin: continues to take daily  theratears - daily    Coughs 1-2x/day, inquires if she should restart benzonatate   Reports she has been coughing for the past few days    Pharmacist Tracking Tool  Reason For Outreach: Embedded Pharmacist    Demographics:  Intervention Method: Phone  Type of Intervention: New  Topics Addressed: Polypharmacy and Quality measures  Pharmacologic Interventions: Med Rec  Non-Pharmacologic Interventions: Care coordination  Time:  Direct Patient Care: 15 mins   Care Coordination: 15 mins  Recommendation Recipient: Patient/Caregiver  Outcome: Accepted

## 2022-10-19 NOTE — TELEPHONE ENCOUNTER
PT WITH COUGH  ADVISE VISIT TO CHECK  CAN DO COVID TEST IN PARKING LOT PRIOR TO VISIT  PLEASE SCHEDULE  THANKS

## 2022-11-30 ENCOUNTER — RA CDI HCC (OUTPATIENT)
Dept: OTHER | Facility: HOSPITAL | Age: 87
End: 2022-11-30

## 2022-11-30 NOTE — PROGRESS NOTES
e11 22  [facility] 75  coding opportunities       Chart reviewed, no opportunity found:   [address]        Patients Insurance     Medicare Insurance: [employer]

## 2022-12-12 DIAGNOSIS — I10 ESSENTIAL HYPERTENSION: ICD-10-CM

## 2022-12-12 RX ORDER — LISINOPRIL 20 MG/1
20 TABLET ORAL DAILY
Qty: 90 TABLET | Refills: 1 | OUTPATIENT
Start: 2022-12-12

## 2022-12-14 DIAGNOSIS — I10 ESSENTIAL HYPERTENSION: ICD-10-CM

## 2022-12-14 RX ORDER — LISINOPRIL 20 MG/1
TABLET ORAL
Qty: 90 TABLET | Refills: 0 | Status: SHIPPED | OUTPATIENT
Start: 2022-12-14

## 2022-12-30 ENCOUNTER — OFFICE VISIT (OUTPATIENT)
Dept: FAMILY MEDICINE CLINIC | Facility: CLINIC | Age: 87
End: 2022-12-30

## 2022-12-30 ENCOUNTER — APPOINTMENT (OUTPATIENT)
Dept: LAB | Facility: MEDICAL CENTER | Age: 87
End: 2022-12-30

## 2022-12-30 VITALS
HEIGHT: 58 IN | DIASTOLIC BLOOD PRESSURE: 70 MMHG | TEMPERATURE: 96.7 F | SYSTOLIC BLOOD PRESSURE: 140 MMHG | WEIGHT: 90.8 LBS | BODY MASS INDEX: 19.06 KG/M2

## 2022-12-30 DIAGNOSIS — I13.0 HYPERTENSIVE HEART AND CHRONIC KIDNEY DISEASE WITH HEART FAILURE AND STAGE 1 THROUGH STAGE 4 CHRONIC KIDNEY DISEASE, OR UNSPECIFIED CHRONIC KIDNEY DISEASE (HCC): ICD-10-CM

## 2022-12-30 DIAGNOSIS — I10 ESSENTIAL HYPERTENSION: ICD-10-CM

## 2022-12-30 DIAGNOSIS — E11.9 TYPE 2 DIABETES MELLITUS WITHOUT COMPLICATION, WITHOUT LONG-TERM CURRENT USE OF INSULIN (HCC): ICD-10-CM

## 2022-12-30 DIAGNOSIS — N18.31 TYPE 2 DIABETES MELLITUS WITH STAGE 3A CHRONIC KIDNEY DISEASE, WITHOUT LONG-TERM CURRENT USE OF INSULIN (HCC): ICD-10-CM

## 2022-12-30 DIAGNOSIS — E11.22 TYPE 2 DIABETES MELLITUS WITH STAGE 3A CHRONIC KIDNEY DISEASE, WITHOUT LONG-TERM CURRENT USE OF INSULIN (HCC): ICD-10-CM

## 2022-12-30 DIAGNOSIS — R05.1 ACUTE COUGH: Primary | ICD-10-CM

## 2022-12-30 DIAGNOSIS — Z23 NEED FOR INFLUENZA VACCINATION: ICD-10-CM

## 2022-12-30 DIAGNOSIS — E87.1 HYPONATREMIA: ICD-10-CM

## 2022-12-30 DIAGNOSIS — E78.5 HYPERLIPIDEMIA, UNSPECIFIED HYPERLIPIDEMIA TYPE: ICD-10-CM

## 2022-12-30 LAB
ALBUMIN SERPL BCP-MCNC: 3.4 G/DL (ref 3.5–5)
ALP SERPL-CCNC: 122 U/L (ref 46–116)
ALT SERPL W P-5'-P-CCNC: 15 U/L (ref 12–78)
ANION GAP SERPL CALCULATED.3IONS-SCNC: 9 MMOL/L (ref 4–13)
AST SERPL W P-5'-P-CCNC: 12 U/L (ref 5–45)
BILIRUB SERPL-MCNC: 0.46 MG/DL (ref 0.2–1)
BUN SERPL-MCNC: 10 MG/DL (ref 5–25)
CALCIUM ALBUM COR SERPL-MCNC: 9.5 MG/DL (ref 8.3–10.1)
CALCIUM SERPL-MCNC: 9 MG/DL (ref 8.3–10.1)
CHLORIDE SERPL-SCNC: 96 MMOL/L (ref 96–108)
CHOLEST SERPL-MCNC: 155 MG/DL
CO2 SERPL-SCNC: 25 MMOL/L (ref 21–32)
CREAT SERPL-MCNC: 1.01 MG/DL (ref 0.6–1.3)
EST. AVERAGE GLUCOSE BLD GHB EST-MCNC: 169 MG/DL
GFR SERPL CREATININE-BSD FRML MDRD: 49 ML/MIN/1.73SQ M
GLUCOSE P FAST SERPL-MCNC: 125 MG/DL (ref 65–99)
HBA1C MFR BLD: 7.5 %
HDLC SERPL-MCNC: 74 MG/DL
LDLC SERPL CALC-MCNC: 58 MG/DL (ref 0–100)
NONHDLC SERPL-MCNC: 81 MG/DL
POTASSIUM SERPL-SCNC: 3.8 MMOL/L (ref 3.5–5.3)
PROT SERPL-MCNC: 7.4 G/DL (ref 6.4–8.4)
SODIUM SERPL-SCNC: 130 MMOL/L (ref 135–147)
TRIGL SERPL-MCNC: 113 MG/DL

## 2022-12-30 RX ORDER — AMLODIPINE BESYLATE 5 MG/1
5 TABLET ORAL DAILY
Qty: 90 TABLET | Refills: 1 | Status: SHIPPED | OUTPATIENT
Start: 2022-12-30

## 2022-12-30 RX ORDER — BENZONATATE 100 MG/1
100 CAPSULE ORAL 3 TIMES DAILY PRN
Qty: 20 CAPSULE | Refills: 0 | Status: SHIPPED | OUTPATIENT
Start: 2022-12-30

## 2022-12-30 RX ORDER — ATORVASTATIN CALCIUM 40 MG/1
40 TABLET, FILM COATED ORAL DAILY
Qty: 90 TABLET | Refills: 1 | Status: SHIPPED | OUTPATIENT
Start: 2022-12-30

## 2022-12-30 RX ORDER — LISINOPRIL 20 MG/1
20 TABLET ORAL DAILY
Qty: 90 TABLET | Refills: 1 | Status: SHIPPED | OUTPATIENT
Start: 2022-12-30

## 2022-12-30 NOTE — PROGRESS NOTES
Assessment/Plan:       Problem List Items Addressed This Visit        Endocrine    Type 2 diabetes mellitus with diabetic chronic kidney disease ([facility] [address] )     Continue on metformin, has been missing some doses, slightly elevated, given age a1c near goal  Lab Results   Component Value Date    HGBA1C 7 5 (H) 12/30/2022            Relevant Medications    metFORMIN (GLUCOPHAGE) 500 mg tablet    Other Relevant Orders    Comprehensive metabolic panel (Completed)    Lipid panel (Completed)    Hemoglobin A1C (Completed)       Cardiovascular and Mediastinum    Hypertensive heart and chronic kidney disease with heart failure and stage 1 through stage 4 chronic kidney disease, or unspecified chronic kidney disease (HCC)    Relevant Medications    amLODIPine (NORVASC) 5 mg tablet    Other Relevant Orders    Comprehensive metabolic panel (Completed)    Lipid panel (Completed)    Hemoglobin A1C (Completed)       Other    Hyponatremia   Other Visit Diagnoses     Acute cough    -  Primary    Relevant Medications    benzonatate (TESSALON PERLES) 100 mg capsule    Need for influenza vaccination        Relevant Orders    influenza vaccine, high-dose, PF 0 7 mL (FLUZONE HIGH-DOSE) (Completed)    Essential hypertension        Relevant Medications    amLODIPine (NORVASC) 5 mg tablet    lisinopril (ZESTRIL) 20 mg tablet    Type 2 diabetes mellitus without complication, without long-term current use of insulin (HCC)        Relevant Medications    metFORMIN (GLUCOPHAGE) 500 mg tablet    Hyperlipidemia, unspecified hyperlipidemia type        Relevant Medications    atorvastatin (LIPITOR) 40 mg tablet          Medications refilled today, cough improving, reviewed medications and instructions today  Will recheck labs and schedule follow up to adjust medicaitons  Subjective:      Patient ID: Kassy He is a 80 y o  female      HPI    80year old female presenting in follow up for chronic conditions as well as cough for the past week     The cough has been dry, she did have congestion at the start, the 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  She has had hyponatremia for many years, 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      The following portions of the patient's history were reviewed and updated as appropriate: allergies, current medications, past family history, past medical history, past social history, past surgical history and problem list       Current Outpatient Medications:   •  amLODIPine (NORVASC) 5 mg tablet, Take 1 tablet (5 mg total) by mouth daily, Disp: 90 tablet, Rfl: 1  •  atorvastatin (LIPITOR) 40 mg tablet, Take 1 tablet (40 mg total) by mouth daily, Disp: 90 tablet, Rfl: 1  •  benzonatate (TESSALON PERLES) 100 mg capsule, Take 1 capsule (100 mg total) by mouth 3 (three) times a day as needed for cough, Disp: 20 capsule, Rfl: 0  •  Carboxymethylcellulose Sodium (THERATEARS OP), Apply 1 drop to eye daily, Disp: , Rfl:   •  lisinopril (ZESTRIL) 20 mg tablet, Take 1 tablet (20 mg total) by mouth daily, Disp: 90 tablet, Rfl: 1  •  melatonin 3 mg, Take 3 mg by mouth daily at bedtime, Disp: , Rfl:   •  metFORMIN (GLUCOPHAGE) 500 mg tablet, Take 1 tablet (500 mg total) by mouth 2 (two) times a day with meals, Disp: 180 tablet, Rfl: 1     Review of Systems   Constitutional: Negative for activity change and appetite change  Respiratory: Negative for apnea and chest tightness  Cardiovascular: Negative for chest pain and palpitations  Gastrointestinal: Negative for abdominal distention and abdominal pain  Musculoskeletal: Negative for arthralgias and back pain  Objective:      /70   Temp (!) 96 7 °F (35 9 °C) (Tympanic)   Ht 4' 10" (1 473 m)   Wt 41 2 kg (90 lb 12 8 oz)   BMI 18 98 kg/m²          Physical Exam  Constitutional:       Appearance: Normal appearance  HENT:      Head: Normocephalic     Cardiovascular:      Rate and Rhythm: Normal rate and regular rhythm  Pulses: Normal pulses  Heart sounds: Normal heart sounds  Pulmonary:      Effort: Pulmonary effort is normal       Breath sounds: Normal breath sounds  Abdominal:      General: Abdomen is flat  Tenderness: There is no abdominal tenderness  Musculoskeletal:      Comments: In wheel chair   Skin:     General: Skin is warm  Neurological:      General: No focal deficit present  Mental Status: She is alert and oriented to person, place, and time             Genaro Rico MD

## 2022-12-30 NOTE — PROGRESS NOTES
[address] [place] [address] Avenue, PharmD, [facility]    Communication with patient: No, only completed chart review    Reason for documentation: review prior to PCP appointment     Findings:     Pertinent Lab Data:  Lab Results   Component Value Date    SODIUM 130 (L) 07/08/2021    K 4 4 07/08/2021    EGFR 43 07/08/2021    CREATININE 1 13 07/08/2021    GLUF 97 07/08/2021    MICROALBCRE 147 (H) 02/24/2020     Lab Results   Component Value Date    HGBA1C 7 0 (A) 02/02/2022    HGBA1C 7 1 (H) 07/08/2021    HGBA1C 6 9 (A) 04/21/2021       Recommendations:   Diabetes:   Labs: consider POCT A1c during today's appointment and lab CMP within the next few weeks  Orders pended    2  Hypertension:   Labs: consider CMP to verify K+ status while on lisinopril    3   Hyperlipidemia   Labs: consider lipid panel with next labs        Pharmacist Tracking Tool  Reason For Outreach: Embedded Pharmacist    Demographics:  Intervention Method: Chart Review  Type of Intervention: Follow-Up  Topics Addressed: Diabetes, Dyslipidemia and Hypertension  Pharmacologic Interventions: N/A  Non-Pharmacologic Interventions: Labs  Time:  Direct Patient Care: 0 mins   Care Coordination: 10 mins  Recommendation Recipient: Provider  Outcome: Pending/ Follow-up Required

## 2023-01-03 NOTE — ASSESSMENT & PLAN NOTE
Continue on metformin, has been missing some doses, slightly elevated, given age a1c near goal  Lab Results   Component Value Date    HGBA1C 7 5 (H) 12/30/2022

## 2023-01-22 ENCOUNTER — RA CDI HCC (OUTPATIENT)
Dept: OTHER | Facility: HOSPITAL | Age: 88
End: 2023-01-22

## 2023-01-22 NOTE — PROGRESS NOTES
[facility]  coding opportunities       Chart reviewed, no opportunity found:   [address]        Patients Insurance     Medicare Insurance: [employer]

## 2023-01-30 ENCOUNTER — OFFICE VISIT (OUTPATIENT)
Dept: FAMILY MEDICINE CLINIC | Facility: CLINIC | Age: 88
End: 2023-01-30

## 2023-01-30 VITALS
SYSTOLIC BLOOD PRESSURE: 120 MMHG | TEMPERATURE: 96.3 F | HEIGHT: 58 IN | HEART RATE: 68 BPM | WEIGHT: 97 LBS | BODY MASS INDEX: 20.36 KG/M2 | DIASTOLIC BLOOD PRESSURE: 62 MMHG

## 2023-01-30 DIAGNOSIS — E78.5 HYPERLIPIDEMIA, UNSPECIFIED HYPERLIPIDEMIA TYPE: ICD-10-CM

## 2023-01-30 DIAGNOSIS — E11.22 TYPE 2 DIABETES MELLITUS WITH STAGE 3A CHRONIC KIDNEY DISEASE, WITHOUT LONG-TERM CURRENT USE OF INSULIN (HCC): ICD-10-CM

## 2023-01-30 DIAGNOSIS — N18.31 TYPE 2 DIABETES MELLITUS WITH STAGE 3A CHRONIC KIDNEY DISEASE, WITHOUT LONG-TERM CURRENT USE OF INSULIN (HCC): ICD-10-CM

## 2023-01-30 DIAGNOSIS — N18.31 STAGE 3A CHRONIC KIDNEY DISEASE (HCC): Primary | ICD-10-CM

## 2023-01-30 DIAGNOSIS — I10 ESSENTIAL HYPERTENSION: ICD-10-CM

## 2023-01-30 DIAGNOSIS — E22.2 SYNDROME OF INAPPROPRIATE SECRETION OF ANTIDIURETIC HORMONE (HCC): ICD-10-CM

## 2023-01-30 DIAGNOSIS — R05.1 ACUTE COUGH: ICD-10-CM

## 2023-01-30 PROBLEM — D63.1 ANEMIA DUE TO STAGE 3 CHRONIC KIDNEY DISEASE (HCC): Status: RESOLVED | Noted: 2020-04-11 | Resolved: 2023-01-30

## 2023-01-30 PROBLEM — N18.30 ANEMIA DUE TO STAGE 3 CHRONIC KIDNEY DISEASE (HCC): Status: RESOLVED | Noted: 2020-04-11 | Resolved: 2023-01-30

## 2023-01-30 RX ORDER — BENZONATATE 100 MG/1
100 CAPSULE ORAL 3 TIMES DAILY PRN
Qty: 20 CAPSULE | Refills: 0 | Status: SHIPPED | OUTPATIENT
Start: 2023-01-30

## 2023-01-30 RX ORDER — BENZONATATE 100 MG/1
100 CAPSULE ORAL 3 TIMES DAILY PRN
Qty: 20 CAPSULE | Refills: 0 | Status: CANCELLED | OUTPATIENT
Start: 2023-01-30

## 2023-01-30 RX ORDER — FUROSEMIDE 20 MG/1
20 TABLET ORAL DAILY
Qty: 90 TABLET | Refills: 1 | Status: CANCELLED | OUTPATIENT
Start: 2023-01-30

## 2023-01-30 RX ORDER — AMLODIPINE BESYLATE 5 MG/1
5 TABLET ORAL DAILY
Qty: 90 TABLET | Refills: 1 | Status: CANCELLED | OUTPATIENT
Start: 2023-01-30

## 2023-01-30 RX ORDER — FUROSEMIDE 20 MG/1
TABLET ORAL
COMMUNITY
Start: 2022-12-30 | End: 2023-01-30

## 2023-01-30 RX ORDER — LISINOPRIL 20 MG/1
20 TABLET ORAL DAILY
Qty: 90 TABLET | Refills: 1 | Status: CANCELLED | OUTPATIENT
Start: 2023-01-30

## 2023-01-30 RX ORDER — ATORVASTATIN CALCIUM 40 MG/1
40 TABLET, FILM COATED ORAL DAILY
Qty: 90 TABLET | Refills: 1 | Status: CANCELLED | OUTPATIENT
Start: 2023-01-30

## 2023-01-30 NOTE — ASSESSMENT & PLAN NOTE
Lab Results   Component Value Date    EGFR 49 12/30/2022    EGFR 43 07/08/2021    EGFR 56 (L) 04/23/2020    CREATININE 1 01 12/30/2022    CREATININE 1 13 07/08/2021    CREATININE 0 92 04/23/2020     Remains stable BP controlled

## 2023-01-30 NOTE — PROGRESS NOTES
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)     Increase salt intake, fluid restriction, recheck at next visit            Genitourinary    Stage 3a chronic kidney disease [facility]) - Primary     Lab Results   Component Value Date    EGFR 49 12/30/2022    EGFR 43 07/08/2021    EGFR 56 (L) 04/23/2020    CREATININE 1 01 12/30/2022    CREATININE 1 13 07/08/2021    CREATININE 0 92 04/23/2020     Remains stable BP controlled        Other Visit Diagnoses     Essential hypertension        Hyperlipidemia, unspecified hyperlipidemia type        Acute cough        Relevant Medications    benzonatate (TESSALON PERLES) 100 mg capsule            Subjective:      Patient ID: Otto Loredo is a 80 y o  female  HPI    80year old with pmh of thn, type 2 DM, SIADH, presenting in follow up  Feeling similar to last visit  A1c 7 5    Sodium 130, similar to previous, had seen nephrology in the past, who recommended increased salt, and fluid restriction  Overall doing well with no recent changes in health, is taking care of by her       The following portions of the patient's history were reviewed and updated as appropriate: allergies, current medications, past family history, past medical history, past social history, past surgical history and problem list       Current Outpatient Medications:   •  amLODIPine (NORVASC) 5 mg tablet, Take 1 tablet (5 mg total) by mouth daily, Disp: 90 tablet, Rfl: 1  •  atorvastatin (LIPITOR) 40 mg tablet, Take 1 tablet (40 mg total) by mouth daily, Disp: 90 tablet, Rfl: 1  •  benzonatate (TESSALON PERLES) 100 mg capsule, Take 1 capsule (100 mg total) by mouth 3 (three) times a day as needed for cough, Disp: 20 capsule, Rfl: 0  •  Carboxymethylcellulose Sodium (THERATEARS OP), Apply 1 drop to eye daily, Disp: , Rfl:   •  lisinopril (ZESTRIL) 20 mg tablet, Take 1 tablet (20 mg total) by mouth daily, Disp: 90 tablet, Rfl: 1  •  melatonin 3 mg, Take 3 mg by mouth daily at bedtime, Disp: , Rfl:   •  metFORMIN (GLUCOPHAGE) 500 mg tablet, Take 1 tablet (500 mg total) by mouth 2 (two) times a day with meals, Disp: 180 tablet, Rfl: 1     Review of Systems   Constitutional: Negative for activity change and appetite change  Respiratory: Negative for apnea and chest tightness  Cardiovascular: Negative for chest pain and palpitations  Gastrointestinal: Negative for abdominal distention and abdominal pain  Objective:      /62 (BP Location: Left arm, Patient Position: Sitting, Cuff Size: Adult)   Pulse 68   Temp (!) 96 3 °F (35 7 °C) (Temporal)   Ht 4' 10" (1 473 m)   Wt 44 kg (97 lb)   BMI 20 27 kg/m²          Physical Exam  Constitutional:       Comments: In wheel chair   Cardiovascular:      Pulses: Normal pulses  Heart sounds: Normal heart sounds  Pulmonary:      Effort: Pulmonary effort is normal       Breath sounds: Normal breath sounds  Abdominal:      General: Abdomen is flat  Tenderness: There is no abdominal tenderness  Neurological:      Mental Status: She is alert             Murray Delcid MD

## 2023-01-30 NOTE — ASSESSMENT & PLAN NOTE
Lab Results   Component Value Date    EGFR 49 12/30/2022    EGFR 43 07/08/2021    EGFR 56 (L) 04/23/2020    CREATININE 1 01 12/30/2022    CREATININE 1 13 07/08/2021    CREATININE 0 92 04/23/2020

## 2023-01-30 NOTE — ASSESSMENT & PLAN NOTE
Lab Results   Component Value Date    EGFR 49 12/30/2022    EGFR 43 07/08/2021    EGFR 56 (L) 04/23/2020    CREATININE 1 01 12/30/2022    CREATININE 1 13 07/08/2021    CREATININE 0 92 04/23/2020   BP remains controlled

## 2023-02-10 ENCOUNTER — TELEPHONE (OUTPATIENT)
Dept: FAMILY MEDICINE CLINIC | Facility: CLINIC | Age: 88
End: 2023-02-10

## 2023-02-10 DIAGNOSIS — E22.2 SYNDROME OF INAPPROPRIATE SECRETION OF ANTIDIURETIC HORMONE (HCC): Primary | ICD-10-CM

## 2023-02-13 RX ORDER — FUROSEMIDE 20 MG/1
20 TABLET ORAL DAILY
COMMUNITY
End: 2023-02-13 | Stop reason: SDUPTHER

## 2023-02-13 RX ORDER — FUROSEMIDE 20 MG/1
20 TABLET ORAL DAILY
Qty: 90 TABLET | Refills: 1 | Status: SHIPPED | OUTPATIENT
Start: 2023-02-13

## 2023-02-13 NOTE — TELEPHONE ENCOUNTER
Spoke with patient, was actively taking this medication  It is listed as a discontinued med  It appears as you had cancelled it at her appointment on 12/30, presumably in error  Prior to that it had been regularly filled by Dr Bonifacio Nova then Dr Rubin Mcnamara

## 2023-02-24 DIAGNOSIS — R05.1 ACUTE COUGH: ICD-10-CM

## 2023-02-24 RX ORDER — BENZONATATE 100 MG/1
CAPSULE ORAL
Qty: 20 CAPSULE | Refills: 0 | Status: SHIPPED | OUTPATIENT
Start: 2023-02-24

## 2023-03-06 ENCOUNTER — TELEPHONE (OUTPATIENT)
Dept: FAMILY MEDICINE CLINIC | Facility: CLINIC | Age: 88
End: 2023-03-06

## 2023-03-06 NOTE — TELEPHONE ENCOUNTER
Patient  LM on prescription line this morning asking for Lisinopril, Amlodipine and lasix but patient have refills   Need to call pharmacy for refills  I called patient phone number (the only number in the file ) and is busy all day, I will try tomorrow again

## 2023-03-07 NOTE — TELEPHONE ENCOUNTER
Called patient again to fallow up on yesterday request , phone still have busy ton, no another phone number in file  We will call patient again tomorrow, if phone still busy letter will be sent to the patient

## 2023-03-10 DIAGNOSIS — I10 ESSENTIAL HYPERTENSION: ICD-10-CM

## 2023-03-10 RX ORDER — LISINOPRIL 20 MG/1
20 TABLET ORAL DAILY
Qty: 90 TABLET | Refills: 1 | Status: SHIPPED | OUTPATIENT
Start: 2023-03-10

## 2023-03-17 DIAGNOSIS — R05.1 ACUTE COUGH: ICD-10-CM

## 2023-03-17 RX ORDER — BENZONATATE 100 MG/1
100 CAPSULE ORAL 3 TIMES DAILY PRN
Qty: 20 CAPSULE | Refills: 0 | Status: SHIPPED | OUTPATIENT
Start: 2023-03-17

## 2023-05-09 DIAGNOSIS — R05.1 ACUTE COUGH: ICD-10-CM

## 2023-05-09 RX ORDER — BENZONATATE 100 MG/1
CAPSULE ORAL
Qty: 20 CAPSULE | Refills: 0 | Status: SHIPPED | OUTPATIENT
Start: 2023-05-09

## 2023-06-02 DIAGNOSIS — R05.1 ACUTE COUGH: ICD-10-CM

## 2023-06-02 RX ORDER — BENZONATATE 100 MG/1
CAPSULE ORAL
Qty: 20 CAPSULE | Refills: 0 | Status: SHIPPED | OUTPATIENT
Start: 2023-06-02

## 2023-06-29 ENCOUNTER — TELEPHONE (OUTPATIENT)
Dept: FAMILY MEDICINE CLINIC | Facility: CLINIC | Age: 88
End: 2023-06-29

## 2023-06-29 NOTE — TELEPHONE ENCOUNTER
Spoke to Samson Elizabeth and we set up a new appointment for her AW[facility] 7/6 at 9:20am  Pt is doing okay

## 2023-06-30 DIAGNOSIS — R05.1 ACUTE COUGH: ICD-10-CM

## 2023-06-30 RX ORDER — BENZONATATE 100 MG/1
CAPSULE ORAL
Qty: 20 CAPSULE | Refills: 0 | Status: SHIPPED | OUTPATIENT
Start: 2023-06-30

## 2023-07-06 ENCOUNTER — OFFICE VISIT (OUTPATIENT)
Dept: FAMILY MEDICINE CLINIC | Facility: CLINIC | Age: 88
End: 2023-07-06
Payer: COMMERCIAL

## 2023-07-06 VITALS
WEIGHT: 96.4 LBS | DIASTOLIC BLOOD PRESSURE: 74 MMHG | OXYGEN SATURATION: 93 % | SYSTOLIC BLOOD PRESSURE: 130 MMHG | HEART RATE: 73 BPM | BODY MASS INDEX: 20.15 KG/M2

## 2023-07-06 DIAGNOSIS — Z00.00 MEDICARE ANNUAL WELLNESS VISIT, SUBSEQUENT: ICD-10-CM

## 2023-07-06 DIAGNOSIS — E22.2 SYNDROME OF INAPPROPRIATE SECRETION OF ANTIDIURETIC HORMONE (HCC): Primary | ICD-10-CM

## 2023-07-06 DIAGNOSIS — N18.31 TYPE 2 DIABETES MELLITUS WITH STAGE 3A CHRONIC KIDNEY DISEASE, WITHOUT LONG-TERM CURRENT USE OF INSULIN (HCC): ICD-10-CM

## 2023-07-06 DIAGNOSIS — E11.22 TYPE 2 DIABETES MELLITUS WITH STAGE 3A CHRONIC KIDNEY DISEASE, WITHOUT LONG-TERM CURRENT USE OF INSULIN (HCC): ICD-10-CM

## 2023-07-06 DIAGNOSIS — E44.1 MILD PROTEIN-CALORIE MALNUTRITION (HCC): ICD-10-CM

## 2023-07-06 DIAGNOSIS — E11.9 TYPE 2 DIABETES MELLITUS WITHOUT COMPLICATION, WITHOUT LONG-TERM CURRENT USE OF INSULIN (HCC): ICD-10-CM

## 2023-07-06 DIAGNOSIS — S90.821A BLISTER OF RIGHT FOOT, INITIAL ENCOUNTER: ICD-10-CM

## 2023-07-06 LAB — SL AMB POCT HEMOGLOBIN AIC: 6.8 (ref ?–6.5)

## 2023-07-06 PROCEDURE — G0439 PPPS, SUBSEQ VISIT: HCPCS | Performed by: FAMILY MEDICINE

## 2023-07-06 PROCEDURE — 99214 OFFICE O/P EST MOD 30 MIN: CPT | Performed by: FAMILY MEDICINE

## 2023-07-06 PROCEDURE — 83036 HEMOGLOBIN GLYCOSYLATED A1C: CPT | Performed by: FAMILY MEDICINE

## 2023-07-06 NOTE — PATIENT INSTRUCTIONS
1. 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)  -     Basic metabolic panel; Future    2. Type 2 diabetes mellitus with stage 3a chronic kidney disease, without long-term current use of insulin (HCC)  -     Albumin / creatinine urine ratio; Future  -     POCT hemoglobin A1c    3.  Mild protein-calorie malnutrition ([address])

## 2023-07-06 NOTE — ASSESSMENT & PLAN NOTE
History of present on foot superficial likely from pressure discussed with  try to avoid rubbing area keep covered follow-up if worsening we will refer to podiatry also reports he does take care of feet and nails and does so at this time

## 2023-07-06 NOTE — PROGRESS NOTES
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 Personalized health advice and appropriate referrals for health education or preventive services given if needed, as noted in patient's After Visit Summary.      History of Present Illness:     Patient presents for a Medicare Wellness Visit    HPI   Patient Care Team:  Jovan Bueno MD as PCP - General (Family Medicine)     Review of Systems:     Review of Systems     Problem List:     Patient Active Problem List   Diagnosis   • Calculus of bile duct with acute cholangitis with obstruction   • Gallstone pancreatitis   • Type 2 diabetes mellitus with diabetic chronic kidney disease ([address])   • Mixed hyperlipidemia   • Parenchymal renal hypertension   • Insomnia   • Pancreatic cyst   • Urinary incontinence   • Vitamin D deficiency   • Hyponatremia   • Fracture of unspecified part of neck of left femur, subsequent encounter for closed fracture with routine healing   • Mild protein-calorie malnutrition (HCC)   • 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)   • Stage 3a chronic kidney disease ([address])      Past Medical and Surgical History:     Past Medical History:   Diagnosis Date   • Dehydration     Last assessed - 2/22/16   • Diabetes mellitus ([address])    • Hyperlipidemia    • Hypertension    • Hypotension     Last assessed - 2/22/16   • Obstructive jaundice     Last assessed - 3/25/16   • Renal disorder    • Sepsis [facility])      Past Surgical History:   Procedure Laterality Date   • CYSTOSCOPY W/ STONE MANIPULATION N/A 2/23/2016    Procedure: STONE MANIPULATION;  Surgeon: Nina Cabrera MD;  Location: [facility]; Service:    • ERCP W/ SPHICTEROTOMY N/A 2/23/2016    Procedure: ENDOSCOPIC RETROGRADE CHOLANGIOPANCREATOGRAPHY (ERCP) W/ SPHINCTEROTOMY;  Surgeon: Nina Cabrera MD;  Location: [facility]; Service:    • AL HEMIARTHROPLASTY HIP PARTIAL Left 4/8/2020    Procedure: HEMIARTHROPLASTY HIP (BIPOLAR);   Surgeon: Sable Gosselin, MD;  Location: [facility];  Service: Orthopedics   • TUBAL LIGATION        Family History:     Family History   Problem Relation Age of Onset   • Diabetes Mother    • Diabetes Father    • Heart attack Sister    • Pancreatic cancer Son         Adenocarcinoma   • Alcohol abuse Neg Hx    • Arthritis Neg Hx    • Asthma Neg Hx    • Birth defects Neg Hx    • Cancer Neg Hx    • COPD Neg Hx    • Depression Neg Hx    • Drug abuse Neg Hx    • Hearing loss Neg Hx    • Early death Neg Hx    • Heart disease Neg Hx    • Hyperlipidemia Neg Hx    • Hypertension Neg Hx    • Kidney disease Neg Hx    • Learning disabilities Neg Hx    • Mental illness Neg Hx    • Mental retardation Neg Hx    • Miscarriages / Stillbirths Neg Hx    • Stroke Neg Hx    • Vision loss Neg Hx       Social History:     Social History     Socioeconomic History   • Marital status: /Civil Union     Spouse name: None   • Number of children: None   • Years of education: None   • Highest education level: None   Occupational History   • None   Tobacco Use   • Smoking status: Never   • Smokeless tobacco: Never   Vaping Use   • Vaping Use: Never used   Substance and Sexual Activity   • Alcohol use: Never   • Drug use: Never   • Sexual activity: Not Currently     Partners: Male   Other Topics Concern   • None   Social History Narrative   • None     Social Determinants of Health     Financial Resource Strain: Not on file   Food Insecurity: Not on file   Transportation Needs: Not on file   Physical Activity: Not on file   Stress: Not on file   Social Connections: Not on file   Intimate Partner Violence: Not on file   Housing Stability: Not on file      Medications and Allergies:     Current Outpatient Medications   Medication Sig Dispense Refill   • amLODIPine (NORVASC) 5 mg tablet Take 1 tablet (5 mg total) by mouth daily 90 tablet 1   • atorvastatin (LIPITOR) 40 mg tablet Take 1 tablet (40 mg total) by mouth daily 90 tablet 1   • benzonatate (TESSALON PERLES) 100 mg capsule TAKE ONE CAPSULE BY MOUTH THREE TIMES DAILY AS NEEDED FOR COUGH 20 capsule 0   • furosemide (LASIX) 20 mg tablet Take 1 tablet (20 mg total) by mouth in the morning 90 tablet 1   • lisinopril (ZESTRIL) 20 mg tablet Take 1 tablet (20 mg total) by mouth daily 90 tablet 1   • melatonin 3 mg Take 3 mg by mouth daily at bedtime     • metFORMIN (GLUCOPHAGE) 500 mg tablet Take 1 tablet (500 mg total) by mouth 2 (two) times a day with meals 180 tablet 1   • Carboxymethylcellulose Sodium (THERATEARS OP) Apply 1 drop to eye daily (Patient not taking: Reported on 7/6/2023)       No current facility-administered medications for this visit. No Known Allergies   Immunizations:     Immunization History   Administered Date(s) Administered   • COVID-19 MODERNA VACC 0.5 ML IM 04/12/2021, 05/10/2021   • Influenza Split High Dose Preservative Free IM 10/10/2012, 12/16/2013, 11/19/2015, 11/03/2016, 09/14/2017   • Influenza, high dose seasonal 0.7 mL 09/12/2018, 10/30/2019, 12/30/2022   • Pneumococcal Conjugate 13-Valent 11/19/2015   • Pneumococcal Polysaccharide PPV23 09/29/2011   • Tdap 01/22/2020      Health Maintenance:         Topic Date Due   • DXA SCAN  01/06/2024 (Originally 1/31/1933)         Topic Date Due   • Influenza Vaccine (1) 09/01/2023      Medicare Screening Tests and Risk Assessments:     Mynor is here for her Subsequent Wellness visit. Health Risk Assessment:   Patient rates overall health as good.  Patient feels that their physical health rating is same. Patient is satisfied with their life. Eyesight was rated as same. Hearing was rated as same. Patient feels that their emotional and mental health rating is same. Patients states they are never, rarely angry. Patient states they are sometimes unusually tired/fatigued. Pain experienced in the last 7 days has been some. Patient's pain rating has been 3/10. Patient states that she has experienced weight loss or gain in last 6 months. Fall Risk Screening: In the past year, patient has experienced: history of falling in past year    Number of falls: 1  Injured during fall?: No    Feels unsteady when standing or walking?: No    Worried about falling?: No      Urinary Incontinence Screening:   Patient has not leaked urine accidently in the last six months. Home Safety:  Patient has trouble with stairs inside or outside of their home. Patient has working smoke alarms and has working carbon monoxide detector. Home safety hazards include: none. Nutrition:   Current diet is Diabetic. Medications:   Patient is not currently taking any over-the-counter supplements. Patient is able to manage medications. Activities of Daily Living (ADLs)/Instrumental Activities of Daily Living (IADLs):   Walk and transfer into and out of bed and chair?: Yes  Dress and groom yourself?: Yes    Bathe or shower yourself?: Yes    Feed yourself?  Yes  Do your laundry/housekeeping?: No  Manage your money, pay your bills and track your expenses?: Yes  Make your own meals?: No    Do your own shopping?: No    Previous Hospitalizations:   Any hospitalizations or ED visits within the last 12 months?: No      PREVENTIVE SCREENINGS      Cardiovascular Screening:    General: Screening Not Indicated and History Lipid Disorder      Diabetes Screening:     General: Screening Not Indicated and History Diabetes      Colorectal Cancer Screening:     General: Screening Not Indicated      Cervical Cancer Screening: General: Screening Not Indicated      Osteoporosis Screening:    General: Screening Current      Lung Cancer Screening:     General: Screening Not Indicated    Screening, Brief Intervention, and Referral to Treatment (SBIRT)    Screening  Typical number of drinks in a day: 0  Typical number of drinks in a week: 0  Interpretation: Low risk drinking behavior. Single Item Drug Screening:  How often have you used an illegal drug (including marijuana) or a prescription medication for non-medical reasons in the past year? never    Single Item Drug Screen Score: 0  Interpretation: Negative screen for possible drug use disorder    No results found.      Physical Exam:     /74 (BP Location: Right arm, Patient Position: Sitting, Cuff Size: Standard)   Pulse 73   Wt 43.7 kg (96 lb 6.4 oz)   SpO2 (!) 88%   BMI 20.15 kg/m²     Physical Exam     Jovan Bueno MD

## 2023-07-06 NOTE — PROGRESS NOTES
Diabetic Foot Exam    Patient's shoes and socks removed. Right Foot/Ankle   Right Foot Inspection  Skin Exam: skin normal, skin intact and pre-ulcer. No dry skin, no warmth, no callus, no erythema, no maceration, no abnormal color, no ulcer and no callus. Pre-Ulcer Size (cm): 2    Toe Exam: ROM and strength within normal limits. Sensory   Monofilament testing: intact    Vascular  Capillary refills: < 3 seconds  The right DP pulse is 1+. The right PT pulse is 1+. Left Foot/Ankle  Left Foot Inspection  Skin Exam: skin normal and skin intact. No dry skin, no warmth, no erythema, no maceration, normal color, no pre-ulcer, no ulcer and no callus. Toe Exam: ROM and strength within normal limits. Sensory   Monofilament testing: intact    Vascular  Capillary refills: < 3 seconds  The left DP pulse is 1+. The left PT pulse is 1+.      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     Recheck BMP has been stable         Relevant Orders    Basic metabolic panel       Musculoskeletal and Integument    Blister of right foot     History of present on foot superficial likely from pressure discussed with  try to avoid rubbing area keep covered follow-up if worsening we will refer to podiatry also reports he does take care of feet and nails and does so at this time            Other    Mild protein-calorie malnutrition ([address])     Malnutrition Findings:        Has been stable slightly well albumin continue diet as tolerated                          BMI Findings: Body mass index is 20.15 kg/m². Other Visit Diagnoses     Type 2 diabetes mellitus without complication, without long-term current use of insulin (HCC)        Relevant Medications    metFORMIN (GLUCOPHAGE) 500 mg tablet    Medicare annual wellness visit, subsequent              Depression Screening and Follow-up Plan: Patient was screened for depression during today's encounter. They screened negative with a PHQ-2 score of 2. Preventive health issues were discussed with patient, and age appropriate screening tests were ordered as noted in patient's After Visit Summary. Personalized health advice and appropriate referrals for health education or preventive services given if needed, as noted in patient's After Visit Summary. History of Present Illness:     Patient presents for a Medicare Wellness Visit    Overall doing about the same taking care of by has been no changes made to medications today,  Does have blister on 5 discussed how to take care ofhusband will follow-up or see podiatry if not improving. Patient Care Team:  Jovan Bueno MD as PCP - General (Family Medicine)     Review of Systems:     Review of Systems   Constitutional: Negative for activity change and appetite change. Respiratory: Negative for apnea and chest tightness. Cardiovascular: Negative for chest pain and palpitations. Gastrointestinal: Negative for abdominal distention and abdominal pain.         Problem List:     Patient Active Problem List   Diagnosis   • Calculus of bile duct with acute cholangitis with obstruction   • Gallstone pancreatitis   • Type 2 diabetes mellitus with diabetic chronic kidney disease ([address])   • Mixed hyperlipidemia   • Parenchymal renal hypertension   • Insomnia   • Pancreatic cyst   • Urinary incontinence   • Vitamin D deficiency   • Hyponatremia   • Fracture of unspecified part of neck of left femur, subsequent encounter for closed fracture with routine healing   • Mild protein-calorie malnutrition (HCC)   • 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)   • Stage 3a chronic kidney disease (HCC)   • Blister of right foot      Past Medical and Surgical History:     Past Medical History:   Diagnosis Date   • Dehydration     Last assessed - 2/22/16   • Diabetes mellitus ([address])    • Hyperlipidemia    • Hypertension    • Hypotension     Last assessed - 2/22/16   • Obstructive jaundice     Last assessed - 3/25/16   • Renal disorder    • Sepsis [facility])      Past Surgical History:   Procedure Laterality Date   • CYSTOSCOPY W/ STONE MANIPULATION N/A 2/23/2016    Procedure: STONE MANIPULATION;  Surgeon: Ania Moses MD;  Location: [facility]; Service:    • ERCP W/ SPHICTEROTOMY N/A 2/23/2016    Procedure: ENDOSCOPIC RETROGRADE CHOLANGIOPANCREATOGRAPHY (ERCP) W/ SPHINCTEROTOMY;  Surgeon: Ania Moses MD;  Location: [facility]; Service:    • MI HEMIARTHROPLASTY HIP PARTIAL Left 4/8/2020    Procedure: HEMIARTHROPLASTY HIP (BIPOLAR);   Surgeon: Mariola Lamar MD;  Location: [facility];  Service: Orthopedics   • TUBAL LIGATION        Family History:     Family History   Problem Relation Age of Onset   • Diabetes Mother    • Diabetes Father    • Heart attack Sister    • Pancreatic cancer Son         Adenocarcinoma   • Alcohol abuse Neg Hx    • Arthritis Neg Hx    • Asthma Neg Hx    • Birth defects Neg Hx    • Cancer Neg Hx    • COPD Neg Hx    • Depression Neg Hx    • Drug abuse Neg Hx    • Hearing loss Neg Hx    • Early death Neg Hx    • Heart disease Neg Hx    • Hyperlipidemia Neg Hx    • Hypertension Neg Hx    • Kidney disease Neg Hx    • Learning disabilities Neg Hx    • Mental illness Neg Hx    • Mental retardation Neg Hx    • Miscarriages / Stillbirths Neg Hx    • Stroke Neg Hx    • Vision loss Neg Hx       Social History:     Social History     Socioeconomic History   • Marital status: /Civil Union     Spouse name: None   • Number of children: None   • Years of education: None   • Highest education level: None   Occupational History   • None   Tobacco Use   • Smoking status: Never   • Smokeless tobacco: Never   Vaping Use   • Vaping Use: Never used   Substance and Sexual Activity   • Alcohol use: Never   • Drug use: Never   • Sexual activity: Not Currently     Partners: Male   Other Topics Concern   • None   Social History Narrative   • None     Social Determinants of Health     Financial Resource Strain: Low Risk  (7/6/2023)    Overall Financial Resource Strain (CARDIA)    • Difficulty of Paying Living Expenses: Not very hard   Food Insecurity: Not on file   Transportation Needs: No Transportation Needs (7/6/2023)    PRAPARE - Transportation    • Lack of Transportation (Medical): No    • Lack of Transportation (Non-Medical): No   Physical Activity: Not on file   Stress: Not on file   Social Connections: Not on file   Intimate Partner Violence: Not on file   Housing Stability: Not on file      Medications and Allergies:     Current Outpatient Medications   Medication Sig Dispense Refill   • amLODIPine (NORVASC) 5 mg tablet Take 1 tablet (5 mg total) by mouth daily 90 tablet 1   • atorvastatin (LIPITOR) 40 mg tablet Take 1 tablet (40 mg total) by mouth daily 90 tablet 1   • benzonatate (TESSALON PERLES) 100 mg capsule TAKE ONE CAPSULE BY MOUTH THREE TIMES DAILY AS NEEDED FOR COUGH 20 capsule 0   • furosemide (LASIX) 20 mg tablet Take 1 tablet (20 mg total) by mouth in the morning 90 tablet 1   • lisinopril (ZESTRIL) 20 mg tablet Take 1 tablet (20 mg total) by mouth daily 90 tablet 1   • melatonin 3 mg Take 3 mg by mouth daily at bedtime     • metFORMIN (GLUCOPHAGE) 500 mg tablet Take 1 tablet (500 mg total) by mouth daily with breakfast 180 tablet 1   • Carboxymethylcellulose Sodium (THERATEARS OP) Apply 1 drop to eye daily (Patient not taking: Reported on 7/6/2023)       No current facility-administered medications for this visit.      No Known Allergies   Immunizations:     Immunization History   Administered Date(s) Administered   • COVID-19 MODERNA VACC 0.5 ML IM 04/12/2021, 05/10/2021   • Influenza Split High Dose Preservative Free IM 10/10/2012, 12/16/2013, 11/19/2015, 11/03/2016, 09/14/2017   • Influenza, high dose seasonal 0.7 mL 09/12/2018, 10/30/2019, 12/30/2022   • Pneumococcal Conjugate 13-Valent 11/19/2015   • Pneumococcal Polysaccharide PPV23 09/29/2011   • Tdap 01/22/2020      Health Maintenance:         Topic Date Due   • DXA SCAN  01/06/2024 (Originally 1/31/1933)         Topic Date Due   • Influenza Vaccine (1) 09/01/2023      Medicare Screening Tests and Risk Assessments:     Mynor is here for her Subsequent Wellness visit. Health Risk Assessment:   Patient rates overall health as good. Patient feels that their physical health rating is same. Patient is satisfied with their life. Eyesight was rated as same. Hearing was rated as same. Patient feels that their emotional and mental health rating is same. Patients states they are never, rarely angry. Patient states they are sometimes unusually tired/fatigued. Pain experienced in the last 7 days has been some. Patient's pain rating has been 3/10. Patient states that she has experienced weight loss or gain in last 6 months. Depression Screening:   PHQ-2 Score: 2      Fall Risk Screening: In the past year, patient has experienced: history of falling in past year    Number of falls: 1  Injured during fall?: No    Feels unsteady when standing or walking?: No    Worried about falling?: No      Urinary Incontinence Screening:   Patient has not leaked urine accidently in the last six months. Home Safety:  Patient has trouble with stairs inside or outside of their home. Patient has working smoke alarms and has working carbon monoxide detector. Home safety hazards include: none. Nutrition:   Current diet is Diabetic.      Medications:   Patient is not currently taking any over-the-counter supplements. Patient is able to manage medications.  manages medication    Activities of Daily Living (ADLs)/Instrumental Activities of Daily Living (IADLs):   Walk and transfer into and out of bed and chair?: Yes  Dress and groom yourself?: Yes    Bathe or shower yourself?: Yes    Feed yourself? Yes  Do your laundry/housekeeping?: No  Manage your money, pay your bills and track your expenses?: Yes  Make your own meals?: No    Do your own shopping?: No    Previous Hospitalizations:   Any hospitalizations or ED visits within the last 12 months?: No      Advance Care Planning:   Living will: Yes    Durable POA for healthcare:  Yes    Advanced directive: Yes    Advanced directive counseling given: Yes    End of Life Decisions reviewed with patient: Yes    Provider agrees with end of life decisions: Yes      Comments: Has documents at home,  reports this includes not wanting major treatments, inutbation or machines    Cognitive Screening:   Provider or family/friend/caregiver concerned regarding cognition?: Yes  Mini-Mental Status Exam (MMSE) Score: 22  Interpretation: MMSE Score 20-23: Mild cognitive impairment or early/mild dementia    PREVENTIVE SCREENINGS      Cardiovascular Screening:    General: Screening Not Indicated and History Lipid Disorder      Diabetes Screening:     General: Screening Not Indicated and History Diabetes      Colorectal Cancer Screening:     General: Screening Not Indicated      Breast Cancer Screening:     General: Screening Not Indicated      Cervical Cancer Screening:    General: Screening Not Indicated      Osteoporosis Screening:    General: Screening Current      Abdominal Aortic Aneurysm (AAA) Screening:        General: Screening Not Indicated      Lung Cancer Screening:     General: Screening Not Indicated      Hepatitis C Screening:    General: Screening Not Indicated    Screening, Brief Intervention, and Referral to Treatment (SBIRT)    Screening  Typical number of drinks in a day: 0  Typical number of drinks in a week: 0  Interpretation: Low risk drinking behavior. Single Item Drug Screening:  How often have you used an illegal drug (including marijuana) or a prescription medication for non-medical reasons in the past year? never    Single Item Drug Screen Score: 0  Interpretation: Negative screen for possible drug use disorder    Other Counseling Topics:   Calcium and vitamin D intake. No results found. Physical Exam:     /74 (BP Location: Right arm, Patient Position: Sitting, Cuff Size: Standard)   Pulse 73   Wt 43.7 kg (96 lb 6.4 oz)   SpO2 93%   BMI 20.15 kg/m²     Physical Exam  Constitutional:       Appearance: Normal appearance. Cardiovascular:      Rate and Rhythm: Normal rate and regular rhythm. Pulses: no weak pulses          Dorsalis pedis pulses are 1+ on the right side and 1+ on the left side. Posterior tibial pulses are 1+ on the right side and 1+ on the left side. Heart sounds: Normal heart sounds. Musculoskeletal:        Feet:    Feet:      Right foot:      Skin integrity: No ulcer, skin breakdown, erythema, warmth, callus or dry skin. Left foot:      Skin integrity: No ulcer, skin breakdown, erythema, warmth, callus or dry skin. Skin:     General: Skin is warm. Capillary Refill: Capillary refill takes less than 2 seconds. Comments: Thickened toenails with oncomycosis   Neurological:      General: No focal deficit present. Mental Status: She is alert and oriented to person, place, and time.           Mi Merida MD

## 2023-07-06 NOTE — ASSESSMENT & PLAN NOTE
Malnutrition Findings:        Has been stable slightly well albumin continue diet as tolerated                          BMI Findings: Body mass index is 20.15 kg/m².

## 2023-07-06 NOTE — PROGRESS NOTES
Diabetic Foot Exam    Patient's shoes and socks removed. Right Foot/Ankle   Right Foot Inspection  Skin Exam: skin normal, skin intact and warmth. No dry skin, no callus, no erythema, no maceration, no abnormal color, no pre-ulcer, no ulcer and no callus. Toe Exam: ROM and strength within normal limits. Sensory   Monofilament testing: intact    Vascular  Capillary refills: < 3 seconds  The right DP pulse is 1+. The right PT pulse is 1+. Left Foot/Ankle  Left Foot Inspection  Skin Exam: skin normal, skin intact and warmth. No dry skin, no erythema, no maceration, normal color, no pre-ulcer, no ulcer and no callus. Toe Exam: ROM and strength within normal limits. Sensory   Monofilament testing: intact    Vascular  Capillary refills: < 3 seconds  The left DP pulse is 1+. The left PT pulse is 1+. 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 Personalized health advice and appropriate referrals for health education or preventive services given if needed, as noted in patient's After Visit Summary. History of Present Illness:     Patient presents for a Medicare Wellness Visit    HPI      takes care of patient, does live with son. No changes to visit. Has needed any medical since last visit.     Takes tessalon perles as needed for cough and this is effective. Patient Care Team:  Adrienne Montez MD as PCP - General (Family Medicine)     Review of Systems:     Review of Systems     Problem List:     Patient Active Problem List   Diagnosis   • Calculus of bile duct with acute cholangitis with obstruction   • Gallstone pancreatitis   • Type 2 diabetes mellitus with diabetic chronic kidney disease ([address])   • Mixed hyperlipidemia   • Parenchymal renal hypertension   • Insomnia   • Pancreatic cyst   • Urinary incontinence   • Vitamin D deficiency   • Hyponatremia   • Fracture of unspecified part of neck of left femur, subsequent encounter for closed fracture with routine healing   • Mild protein-calorie malnutrition (HCC)   • 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)      Past Surgical History:   Procedure Laterality Date   • CYSTOSCOPY W/ STONE MANIPULATION N/A 2/23/2016    Procedure: STONE MANIPULATION;  Surgeon: Ania Moses MD;  Location: [facility]; Service:    • ERCP W/ SPHICTEROTOMY N/A 2/23/2016    Procedure: ENDOSCOPIC RETROGRADE CHOLANGIOPANCREATOGRAPHY (ERCP) W/ SPHINCTEROTOMY;  Surgeon: Ania Moess MD;  Location: [facility]; Service:    • TN HEMIARTHROPLASTY HIP PARTIAL Left 4/8/2020    Procedure: HEMIARTHROPLASTY HIP (BIPOLAR);   Surgeon: Mariola Lamar MD;  Location: [facility];  Service: Orthopedics   • TUBAL LIGATION        Family History:     Family History   Problem Relation Age of Onset   • Diabetes Mother    • Diabetes Father    • Heart attack Sister    • Pancreatic cancer Son         Adenocarcinoma   • Alcohol abuse Neg Hx    • Arthritis Neg Hx    • Asthma Neg Hx    • Birth defects Neg Hx    • Cancer Neg Hx    • COPD Neg Hx    • Depression Neg Hx    • Drug abuse Neg Hx    • Hearing loss Neg Hx    • Early death Neg Hx    • Heart disease Neg Hx    • Hyperlipidemia Neg Hx    • Hypertension Neg Hx    • Kidney disease Neg Hx    • Learning disabilities Neg Hx    • Mental illness Neg Hx    • Mental retardation Neg Hx    • Miscarriages / Stillbirths Neg Hx    • Stroke Neg Hx    • Vision loss Neg Hx       Social History:     Social History     Socioeconomic History   • Marital status: /Civil Union     Spouse name: None   • Number of children: None   • Years of education: None   • Highest education level: None   Occupational History   • None   Tobacco Use   • Smoking status: Never   • Smokeless tobacco: Never   Vaping Use   • Vaping Use: Never used   Substance and Sexual Activity   • Alcohol use: Never   • Drug use: Never   • Sexual activity: Not Currently     Partners: Male   Other Topics Concern   • None   Social History Narrative   • None     Social Determinants of Health     Financial Resource Strain: Low Risk  (7/6/2023)    Overall Financial Resource Strain (CARDIA)    • Difficulty of Paying Living Expenses: Not very hard   Food Insecurity: Not on file   Transportation Needs: No Transportation Needs (7/6/2023)    PRAPARE - Transportation    • Lack of Transportation (Medical): No    • Lack of Transportation (Non-Medical):  No   Physical Activity: Not on file   Stress: Not on file   Social Connections: Not on file   Intimate Partner Violence: Not on file   Housing Stability: Not on file      Medications and Allergies:     Current Outpatient Medications   Medication Sig Dispense Refill   • amLODIPine (NORVASC) 5 mg tablet Take 1 tablet (5 mg total) by mouth daily 90 tablet 1   • atorvastatin (LIPITOR) 40 mg tablet Take 1 tablet (40 mg total) by mouth daily 90 tablet 1   • benzonatate (TESSALON PERLES) 100 mg capsule TAKE ONE CAPSULE BY MOUTH THREE TIMES DAILY AS NEEDED FOR COUGH 20 capsule 0   • furosemide (LASIX) 20 mg tablet Take 1 tablet (20 mg total) by mouth in the morning 90 tablet 1   • lisinopril (ZESTRIL) 20 mg tablet Take 1 tablet (20 mg total) by mouth daily 90 tablet 1   • melatonin 3 mg Take 3 mg by mouth daily at bedtime     • metFORMIN (GLUCOPHAGE) 500 mg tablet Take 1 tablet (500 mg total) by mouth 2 (two) times a day with meals 180 tablet 1   • Carboxymethylcellulose Sodium (THERATEARS OP) Apply 1 drop to eye daily (Patient not taking: Reported on 7/6/2023)       No current facility-administered medications for this visit. No Known Allergies   Immunizations:     Immunization History   Administered Date(s) Administered   • COVID-19 MODERNA VACC 0.5 ML IM 04/12/2021, 05/10/2021   • Influenza Split High Dose Preservative Free IM 10/10/2012, 12/16/2013, 11/19/2015, 11/03/2016, 09/14/2017   • Influenza, high dose seasonal 0.7 mL 09/12/2018, 10/30/2019, 12/30/2022   • Pneumococcal Conjugate 13-Valent 11/19/2015   • Pneumococcal Polysaccharide PPV23 09/29/2011   • Tdap 01/22/2020      Health Maintenance:         Topic Date Due   • DXA SCAN  01/06/2024 (Originally 1/31/1933)         Topic Date Due   • Influenza Vaccine (1) 09/01/2023      Medicare Screening Tests and Risk Assessments:         Health Risk Assessment:   Patient rates overall health as good. Patient feels that their physical health rating is same. Patient is satisfied with their life. Eyesight was rated as same. Hearing was rated as same. Patient feels that their emotional and mental health rating is same. Patients states they are never, rarely angry. Patient states they are sometimes unusually tired/fatigued. Pain experienced in the last 7 days has been some. Patient's pain rating has been 3/10. Patient states that she has experienced no weight loss or gain in last 6 months. Depression Screening:   PHQ-2 Score: 2      Fall Risk Screening:    In the past year, patient has experienced: history of falling in past year    Number of falls: 1  Injured during fall?: No    Feels unsteady when standing or walking?: No    Worried about falling?: No      Urinary Incontinence Screening:   Patient has not leaked urine accidently in the last six months. Home Safety:  Patient has trouble with stairs inside or outside of their home. Patient has working smoke alarms and has working carbon monoxide detector. Home safety hazards include: none. Nutrition:   Current diet is Diabetic. Medications:   Patient is not currently taking any over-the-counter supplements. Patient is not able to manage medications.  manages mediciations    Activities of Daily Living (ADLs)/Instrumental Activities of Daily Living (IADLs):   Walk and transfer into and out of bed and chair?: No  Dress and groom yourself?: No    Bathe or shower yourself?: Yes    Feed yourself? Yes  Do your laundry/housekeeping?: No  Manage your money, pay your bills and track your expenses?: No  Make your own meals?: No    Do your own shopping?: No    ADL comments:  takes care of patients. Advance Care Planning:   Living will: Yes    Durable POA for healthcare: Yes    Advanced directive: Yes    Advanced directive counseling given: Yes    End of Life Decisions reviewed with patient: Yes    Provider agrees with end of life decisions: Yes      Comments: Reports has DNR at home, does not want ventilator, feeding tube, asked to bring to next visit.     Cognitive Screening:   Provider or family/friend/caregiver concerned regarding cognition?: Yes    PREVENTIVE SCREENINGS      Cardiovascular Screening:    General: Screening Not Indicated and History Lipid Disorder      Diabetes Screening:     General: Screening Not Indicated and History Diabetes      Colorectal Cancer Screening:     General: Screening Not Indicated      Breast Cancer Screening:     General: Screening Not Indicated      Cervical Cancer Screening:    General: Screening Not Indicated      Osteoporosis Screening:    General: Screening Current      Abdominal Aortic Aneurysm (AAA) Screening:        General: Screening Not Indicated      Lung Cancer Screening:     General: Screening Not Indicated      Hepatitis C Screening:    General: Screening Not Indicated    No results found. Physical Exam:     /74 (BP Location: Right arm, Patient Position: Sitting, Cuff Size: Standard)   Pulse 73   Wt 43.7 kg (96 lb 6.4 oz)   SpO2 (!) 88%   BMI 20.15 kg/m²     Physical Exam  Cardiovascular:      Rate and Rhythm: Normal rate and regular rhythm. Pulses: Pulses are weak. Dorsalis pedis pulses are 1+ on the right side and 1+ on the left side. Posterior tibial pulses are 1+ on the right side and 1+ on the left side. Pulmonary:      Effort: Pulmonary effort is normal.      Breath sounds: Normal breath sounds. Musculoskeletal:      Comments: In wheel chair   Feet:      Right foot:      Skin integrity: Warmth present. No ulcer, skin breakdown, erythema, callus or dry skin. Left foot:      Skin integrity: Warmth present. No ulcer, skin breakdown, erythema, callus or dry skin. Neurological:      General: No focal deficit present. Mental Status: She is alert.           Damien Aragon MD

## 2023-07-08 DIAGNOSIS — I10 ESSENTIAL HYPERTENSION: ICD-10-CM

## 2023-07-08 RX ORDER — AMLODIPINE BESYLATE 5 MG/1
TABLET ORAL
Qty: 90 TABLET | Refills: 0 | Status: SHIPPED | OUTPATIENT
Start: 2023-07-08

## 2023-07-20 DIAGNOSIS — R05.1 ACUTE COUGH: ICD-10-CM

## 2023-07-21 RX ORDER — BENZONATATE 100 MG/1
100 CAPSULE ORAL 3 TIMES DAILY PRN
Qty: 20 CAPSULE | Refills: 0 | Status: SHIPPED | OUTPATIENT
Start: 2023-07-21

## 2023-07-24 NOTE — ASSESSMENT & PLAN NOTE
Emergency Department Attending Note    Patient: Juan Johnson Age: 72year old Sex: male   MRN: [id number] : 1957 Encounter Date: 2023     History     Chief Complaint   Patient presents with   â¢ Shortness of Breath   â¢ Chest Pain       HISTORY OF PRESENT ILLNESS    Juan Johnson is a 72year old male presenting to the emergency department today for evaluation of chest pain and sob that started about 1-2 hours ago. Patient states that he had just finished his dinner when he started having pain all the sudden, states that he had shortness of breath and also endorses increase in pain when he takes a deep breath. Reports that the chest pain is radiating across his whole chest into bilateral shoulders and into his back. He denies any nausea or vomiting. No diaphoresis. Denies any lightheadedness. States he does feel short of breath. Has a history of cardiac bypass 7 years ago. He also reports that he has had prolonged immobilization recently as he took a 16 day bike trip and states that he was riding his motorcycle for 6-8 hours at a time for the longest stretches. No palpitations. No fevers or chills. No cough or congestion. No other complaints.     EM Caveat:  None   Social Hx:  Lives at home with family     PAST HISTORY           Past Medical History:   Diagnosis Date   â¢ Abnormal cardiovascular stress test 2018   â¢ Chest discomfort 2018   â¢ Coronary artery disease 2018    100% occulsion of LAD   â¢ Diverticulitis    â¢ Dupuytren's contracture    â¢ Hemorrhoids, external    â¢ HTN (hypertension)    â¢ Inflammatory bowel disease     crohns disease   â¢ Lung blebs (CMD)     RUPTURE  X 3   â¢ Spontaneous pneumothorax     x2; chest tube placed x 3, had surgery   â¢ Wears glasses     Past Surgical History:   Procedure Laterality Date   â¢ ABDOMEN SURGERY     â¢ APPENDECTOMY     â¢ CDL CATH POSS PTCA  2018   â¢ CHEST SURGERY      to prevent recurrent pneumothorax   â¢ COLECTOMY · Unclear etiology  · Probably from unnoticed poor po intake on top of lisinopril and HCTZ  · Lisinopril and HCTZ on hold  · Gentle hydration with NSS at 75 ml/hr given, will discontinue with adequate oral intake  · Presenting creatinine 1 6    · Baseline appears 1 0 -1 1  · Avoid nephrotoxins / hypotension  Lab Results   Component Value Date    CREATININE 1 19 01/25/2020    CREATININE 1 31 (H) 01/24/2020    CREATININE 1 43 (H) 01/23/2020 2014   â¢ CORONARY ARTERY BYPASS GRAFT  2018   â¢ PTCA WITH STENT  2018   â¢ WRIST FRACTURE SURGERY  age 21      Additional PMH: Additional PSH:          Social History     Tobacco Use   â¢ Smoking status: Every Day     Current packs/day: 0.00     Average packs/day: 0.5 packs/day for 30.0 years (15.0 ttl pk-yrs)     Types: Cigarettes     Start date: 1988     Last attempt to quit: 2018     Years since quittin.0   â¢ Smokeless tobacco: Never   â¢ Tobacco comments:         Substance Use Topics   â¢ Alcohol use: Yes     Alcohol/week: 2.0 standard drinks of alcohol     Types: 2 Cans of beer per week     Comment: daily   â¢ Drug use: No    Family History   Problem Relation Age of Onset   â¢ Other Mother 80           â¢ Other Father 80           â¢ Diabetes Brother          61   â¢ Coronary Artery Disease Brother            â¢ Early death Brother         MI   â¢ Heart disease Brother       Additional SH: Additional FH:          Prior to Admission medications    Medication Sig Start Date End Date Taking? Authorizing Provider   isosorbide mononitrate (IMDUR) 60 MG 24 hr tablet TAKE 1 TABLET BY MOUTH EVERY DAY 19   Dyane Sacks, MD   mercaptopurine (PURINETHOL) 50 MG tablet TAKE 2 TABLETS BY MOUTH EVERY DAY 19   Key Magdaleno MD   Cholecalciferol (VITAMIN D) 2000 units tablet Take 2,000 Units by mouth daily. 19   Dyane Sacks, MD   albuterol-ipratropium (COMBIVENT RESPIMAT) 100-20 MCG/ACT inhaler Inhale 1 puff into the lungs every 4 hours as needed for Shortness of Breath. 6/3/19   Dyane Sacks, MD   metoPROLOL tartrate (LOPRESSOR) 50 MG tablet Take 1 tablet by mouth every 12 hours. 4/15/19   Miriam Crowder MD   hydroCORTisone (PROCTOSOL HC) 2.5 % rectal cream APPLY TO AFFECTED AREA 3 TIMES A DAY AS NEEDED 19   Dyane Sacks, MD   rosuvastatin (CRESTOR) 10 MG tablet Take 1 tablet by mouth 3 days a week.  18   Miriam Crowder MD nitroGLYcerin (NITROSTAT) 0.4 MG sublingual tablet Place 1 tablet under the tongue every 5 minutes as needed for Chest pain. 6/28/18   MIGUEL Schwab   aspirin 81 MG tablet Take 1 tablet by mouth daily. 5/30/17   Job Ferrer MD    No Known Allergies       Review of Systems     as noted in HPI     Physical Exam     Vitals with min/max:      Vital Last Value 24 Hour Range   Temperature 97.8 Â°F (36.6 Â°C) (07/23/23 2120) Temp  Min: 97.8 Â°F (36.6 Â°C)  Max: 97.8 Â°F (36.6 Â°C)   Pulse 79 (07/23/23 2204) Pulse  Min: 79  Max: 81   Respiratory (!) 26 (07/23/23 2204) Resp  Min: 16  Max: 26   Non-Invasive  Blood Pressure (!) 157/90 (07/23/23 2204) BP  Min: 154/93  Max: 169/83   Pulse Oximetry 96 % (07/23/23 2204) SpO2  Min: 94 %  Max: 96 %   Arterial   Blood Pressure   No data recorded       General/Constitutional: Well-developed, well-nourished 72year old male in no acute distress. Nontoxic appearance. Skin: Dry with good color, no rashes on exposed skin  Head: Normocephalic, atraumatic. Neck: Normal range of motion   Eye: extraocular movements are intact. Normal conjunctiva, no discharge  Ears, nose, mouth and throat: external appearance normal without obvious drainage. Cardiovascular: Regular rate and rhythm on patient monitor, Normal peripheral perfusion. Normal heart sounds  Respiratory: Normal respiratory effort, no distress. Speaks in full sentences, no air hunger. Satting well on room air. Lungs are clear to auscultation bilaterally  Chest wall: No deformity  Back: Normal range of motion  Musculoskeletal: Normal ROM, no deformity. Gastrointestinal: Non distended  Genitourinary: not indicated. Neurological: Alert and oriented to person, place, time, and situation, No focal neurological deficit observed  Psychiatric: Cooperative. appropriate mood/affect.   Normal judgment    Additional Exam Findings:   none    Assessment/Plan     Arely Jiménez is a 72year old male presenting to the ED with hx and physical as above.    Patient is mildly hypertensive but hemodynamically stable, presenting with chest pain and shortness of breath that started about an hour prior to arrival     Differential includes:  - ACS: EKG without STEMI but does have some nonspecific findings in the anterior lateral leads, troponin pending   - CHF: BNP pending  - PE: increased concern due to recent long bike rides, will get CTPE study  - Aortic dissection: unlikely given history and physical   - Aortic Aneursym: unlikely given history and physical   - Pericarditis: EKG not consistent, no findings on physical exam  - Myocarditis: history not consistent  - Pneumonia:  No clinical concern, CTPE study pending  - Pneumothorax: unlikely given history and physical   HEART Score Total : 4      Wells: signs DVT (3), PE#1 (3), HR>100 (1.5), immobilzation 3d or surgery 4w (1.5), hx PE or DVT (1.5), hemoptysis (1), malignancy w/in 6 m (1)      Testing Results     Results for orders placed or performed during the hospital encounter of 07/23/23   Comprehensive Metabolic Panel   Result Value Ref Range    Fasting Status      Sodium 135 135 - 145 mmol/L    Potassium 4.6 3.4 - 5.1 mmol/L    Chloride 101 97 - 110 mmol/L    Carbon Dioxide 25 21 - 32 mmol/L    Anion Gap 14 7 - 19 mmol/L    Glucose 180 (H) 70 - 99 mg/dL    BUN 18 6 - 20 mg/dL    Creatinine 1.10 0.67 - 1.17 mg/dL    Glomerular Filtration Rate 74 >=60    BUN/Cr 16 7 - 25    Calcium 10.0 8.4 - 10.2 mg/dL    Bilirubin, Total 0.6 0.2 - 1.0 mg/dL    GOT/AST 62 (H) <=37 Units/L    GPT/ (H) <64 Units/L    Alkaline Phosphatase 90 45 - 117 Units/L    Albumin 3.9 3.6 - 5.1 g/dL    Protein, Total 7.5 6.4 - 8.2 g/dL    Globulin 3.6 2.0 - 4.0 g/dL    A/G Ratio 1.1 1.0 - 2.4   TROPONIN I, HIGH SENSITIVITY   Result Value Ref Range    Troponin I, High Sensitivity 5 <77 ng/L   Prothrombin Time (INR/PT)   Result Value Ref Range    Protime- PT 9.8 9.7 - 11.8 sec    INR 0.9     Partial Thromboplastin Time (PTT)   Result Value Ref Range    PTT 24 22 - 30 sec   NT proBNP   Result Value Ref Range    NT-proBNP 125 <=125 pg/mL   Magnesium   Result Value Ref Range    Magnesium 1.8 1.7 - 2.4 mg/dL   CBC with Automated Differential (performable only)   Result Value Ref Range    WBC 10.7 4.2 - 11.0 K/mcL    RBC 4.12 (L) 4.50 - 5.90 mil/mcL    HGB 13.8 13.0 - 17.0 g/dL    HCT 39.4 39.0 - 51.0 %    MCV 95.6 78.0 - 100.0 fl    MCH 33.5 26.0 - 34.0 pg    MCHC 35.0 32.0 - 36.5 g/dL    RDW-CV 12.8 11.0 - 15.0 %    RDW-SD 44.9 39.0 - 50.0 fL     140 - 450 K/mcL    NRBC 0 <=0 /100 WBC    Neutrophil, Percent 80 %    Lymphocytes, Percent 12 %    Mono, Percent 6 %    Eosinophils, Percent 1 %    Basophils, Percent 1 %    Immature Granulocytes 0 %    Absolute Neutrophils 8.5 (H) 1.8 - 7.7 K/mcL    Absolute Lymphocytes 1.3 1.0 - 4.0 K/mcL    Absolute Monocytes 0.7 0.3 - 0.9 K/mcL    Absolute Eosinophils  0.2 0.0 - 0.5 K/mcL    Absolute Basophils 0.1 0.0 - 0.3 K/mcL    Absolute Immature Granulocytes 0.0 0.0 - 0.2 K/mcL       CTA CHEST PULMONARY EMBOLISM   Final Result   IMPRESSION:   1 no evidence of pulmonary embolus   2. Small left pleural effusion with some slight atelectasis seen in the   left lung base      Electronically Signed by: Rani Mckenzie MD    Signed on: 7/23/2023 10:48 PM    Workstation ID: [id number]          ED Course     ED Course as of 07/23/23 2307   Sun Jul 23, 2023 2200 EKG done at 9:17 p.m. shows normal sinus rhythm at 86 beats per minute, no ST segment elevations or depressions to suggest acute ischemic event. Patient does have T-wave inversions in the anterior and lateral leads, nonspecific findings. No STEMI. ECG interpretation done independently by myself without presence of cardiologist, final read pending. [MR]      ED Course User Index  [MR] Ashley Owens MD       Consultations/Phone Calls        Consultations: none    Clinical Impression     ED Diagnosis   1.  Chest pain, unspecified type        2. Shortness of breath              Disposition        Admit 7/23/2023 11:07 PM  Telemetry Bed?: Yes  Admitting Physician: Marilu Voss [[id number]]  Observation Unit Appropriate?: Yes  Transferring Patient to? Only adjust for transfers between [facility] and [address] [place] [address]**: [address] [place] [address]  Is this a telephone or verbal order?: This is a telephone order from the admitting physician    The [address] [place] [address] makes medical notes like these available to patients in the interest of transparency. However, be advised this is a medical document. It is intended as peer to peer communication. It is written in medical language and may contain abbreviations or verbiage that are unfamiliar. It may appear blunt or direct. Medical documents are intended to carry relevant information, facts as evident, and the clinical opinion of the practitioner.      Romina Parker MD  07/23/23 [id number]

## 2023-07-31 DIAGNOSIS — E22.2 SYNDROME OF INAPPROPRIATE SECRETION OF ANTIDIURETIC HORMONE (HCC): ICD-10-CM

## 2023-07-31 DIAGNOSIS — E78.5 HYPERLIPIDEMIA, UNSPECIFIED HYPERLIPIDEMIA TYPE: ICD-10-CM

## 2023-07-31 RX ORDER — FUROSEMIDE 20 MG/1
20 TABLET ORAL EVERY MORNING
Qty: 90 TABLET | Refills: 0 | Status: SHIPPED | OUTPATIENT
Start: 2023-07-31

## 2023-07-31 RX ORDER — ATORVASTATIN CALCIUM 40 MG/1
40 TABLET, FILM COATED ORAL DAILY
Qty: 90 TABLET | Refills: 0 | Status: SHIPPED | OUTPATIENT
Start: 2023-07-31

## 2023-08-15 DIAGNOSIS — R05.1 ACUTE COUGH: ICD-10-CM

## 2023-08-15 RX ORDER — BENZONATATE 100 MG/1
100 CAPSULE ORAL 3 TIMES DAILY PRN
Qty: 20 CAPSULE | Refills: 0 | Status: SHIPPED | OUTPATIENT
Start: 2023-08-15

## 2023-08-29 DIAGNOSIS — I10 ESSENTIAL HYPERTENSION: ICD-10-CM

## 2023-08-29 RX ORDER — LISINOPRIL 20 MG/1
20 TABLET ORAL DAILY
Qty: 90 TABLET | Refills: 1 | Status: SHIPPED | OUTPATIENT
Start: 2023-08-29

## 2023-08-29 NOTE — TELEPHONE ENCOUNTER
Patient l/m on Rx Line requesting a refill of:  Lisinopril 20 mg tablet    Pharmacy: [id number]  Last Appt: 7/6/2023  Future Appt: 1/8/2024

## 2023-09-05 DIAGNOSIS — R05.1 ACUTE COUGH: ICD-10-CM

## 2023-09-05 RX ORDER — BENZONATATE 100 MG/1
100 CAPSULE ORAL 3 TIMES DAILY PRN
Qty: 20 CAPSULE | Refills: 0 | Status: SHIPPED | OUTPATIENT
Start: 2023-09-05

## 2023-09-22 ENCOUNTER — TELEPHONE (OUTPATIENT)
Dept: FAMILY MEDICINE CLINIC | Facility: CLINIC | Age: 88
End: 2023-09-22

## 2023-09-27 DIAGNOSIS — R05.1 ACUTE COUGH: ICD-10-CM

## 2023-09-27 RX ORDER — BENZONATATE 100 MG/1
100 CAPSULE ORAL 3 TIMES DAILY PRN
Qty: 20 CAPSULE | Refills: 0 | Status: SHIPPED | OUTPATIENT
Start: 2023-09-27

## 2023-10-09 DIAGNOSIS — I10 ESSENTIAL HYPERTENSION: ICD-10-CM

## 2023-10-09 RX ORDER — AMLODIPINE BESYLATE 5 MG/1
5 TABLET ORAL DAILY
Qty: 90 TABLET | Refills: 0 | Status: SHIPPED | OUTPATIENT
Start: 2023-10-09

## 2023-10-17 DIAGNOSIS — R05.1 ACUTE COUGH: ICD-10-CM

## 2023-10-17 RX ORDER — BENZONATATE 100 MG/1
100 CAPSULE ORAL 3 TIMES DAILY PRN
Qty: 20 CAPSULE | Refills: 0 | Status: SHIPPED | OUTPATIENT
Start: 2023-10-17

## 2023-10-26 DIAGNOSIS — E22.2 SYNDROME OF INAPPROPRIATE SECRETION OF ANTIDIURETIC HORMONE (HCC): ICD-10-CM

## 2023-10-26 DIAGNOSIS — E78.5 HYPERLIPIDEMIA, UNSPECIFIED HYPERLIPIDEMIA TYPE: ICD-10-CM

## 2023-10-26 RX ORDER — FUROSEMIDE 20 MG/1
20 TABLET ORAL EVERY MORNING
Qty: 90 TABLET | Refills: 0 | Status: SHIPPED | OUTPATIENT
Start: 2023-10-26

## 2023-10-26 RX ORDER — ATORVASTATIN CALCIUM 40 MG/1
40 TABLET, FILM COATED ORAL DAILY
Qty: 90 TABLET | Refills: 0 | Status: SHIPPED | OUTPATIENT
Start: 2023-10-26

## 2023-11-08 DIAGNOSIS — R05.1 ACUTE COUGH: ICD-10-CM

## 2023-11-08 RX ORDER — BENZONATATE 100 MG/1
100 CAPSULE ORAL 3 TIMES DAILY PRN
Qty: 20 CAPSULE | Refills: 0 | Status: SHIPPED | OUTPATIENT
Start: 2023-11-08

## 2023-11-24 DIAGNOSIS — R05.1 ACUTE COUGH: ICD-10-CM

## 2023-11-24 RX ORDER — BENZONATATE 100 MG/1
100 CAPSULE ORAL 3 TIMES DAILY PRN
Qty: 20 CAPSULE | Refills: 0 | Status: SHIPPED | OUTPATIENT
Start: 2023-11-24

## 2023-12-15 DIAGNOSIS — R05.1 ACUTE COUGH: ICD-10-CM

## 2023-12-15 RX ORDER — BENZONATATE 100 MG/1
100 CAPSULE ORAL 3 TIMES DAILY PRN
Qty: 20 CAPSULE | Refills: 0 | Status: SHIPPED | OUTPATIENT
Start: 2023-12-15

## 2024-01-01 ENCOUNTER — HOME CARE VISIT (OUTPATIENT)
Dept: HOME HEALTH SERVICES | Facility: HOME HEALTHCARE | Age: 89
End: 2024-01-01
Payer: MEDICARE

## 2024-01-01 ENCOUNTER — HOSPICE ADMISSION (OUTPATIENT)
Dept: HOME HOSPICE | Facility: HOSPICE | Age: 89
End: 2024-01-01
Payer: MEDICARE

## 2024-01-01 DIAGNOSIS — I10 ESSENTIAL HYPERTENSION: ICD-10-CM

## 2024-01-01 DIAGNOSIS — R05.1 ACUTE COUGH: ICD-10-CM

## 2024-01-01 PROCEDURE — 10330087 HSPC SERVICE INTENSITY ADD-ON

## 2024-01-01 PROCEDURE — G0299 HHS/HOSPICE OF RN EA 15 MIN: HCPCS

## 2024-01-01 RX ORDER — BENZONATATE 100 MG/1
100 CAPSULE ORAL 3 TIMES DAILY PRN
Qty: 20 CAPSULE | Refills: 0 | Status: SHIPPED | OUTPATIENT
Start: 2024-01-01

## 2024-01-01 RX ORDER — AMLODIPINE BESYLATE 5 MG/1
5 TABLET ORAL DAILY
Qty: 90 TABLET | Refills: 0 | Status: SHIPPED | OUTPATIENT
Start: 2024-01-01

## 2024-01-16 NOTE — ASSESSMENT & PLAN NOTE
· Edematous right wrist status post fall  · Imaging from admission reviewed-right forearm and right hand  · No acute osseous abnormality, degenerative changes present    · Continue pain control  · Ice / elevate no

## 2024-01-21 DIAGNOSIS — E22.2 SYNDROME OF INAPPROPRIATE SECRETION OF ANTIDIURETIC HORMONE (HCC): ICD-10-CM

## 2024-01-21 DIAGNOSIS — R05.1 ACUTE COUGH: ICD-10-CM

## 2024-01-22 DIAGNOSIS — E22.2 SYNDROME OF INAPPROPRIATE SECRETION OF ANTIDIURETIC HORMONE (HCC): ICD-10-CM

## 2024-01-22 DIAGNOSIS — R05.1 ACUTE COUGH: ICD-10-CM

## 2024-01-22 RX ORDER — FUROSEMIDE 20 MG/1
20 TABLET ORAL EVERY MORNING
Qty: 90 TABLET | Refills: 0 | OUTPATIENT
Start: 2024-01-22

## 2024-01-22 RX ORDER — BENZONATATE 100 MG/1
100 CAPSULE ORAL 3 TIMES DAILY PRN
Qty: 20 CAPSULE | Refills: 0 | OUTPATIENT
Start: 2024-01-22

## 2024-01-22 NOTE — TELEPHONE ENCOUNTER
Reason for call:   [x] Refill   [] Prior Auth  [] Other:     Office:   [x] PCP/Provider - Carlo Valerio  [] Specialty/Provider -     Medication:     Furosemide 20 mg tablet once by mouth every morning #90 tabs     Benzonatate 100 mg capsule by mouth 3x daily PRN #20 caps     Pharmacy: [facility] [address]    Does the patient have enough for 3 days?   [x] Yes   [] No - Send as HP to POD

## 2024-01-23 RX ORDER — BENZONATATE 100 MG/1
100 CAPSULE ORAL 3 TIMES DAILY PRN
Qty: 20 CAPSULE | Refills: 0 | Status: SHIPPED | OUTPATIENT
Start: 2024-01-23

## 2024-01-23 RX ORDER — FUROSEMIDE 20 MG/1
20 TABLET ORAL EVERY MORNING
Qty: 90 TABLET | Refills: 1 | Status: SHIPPED | OUTPATIENT
Start: 2024-01-23

## 2024-01-30 DIAGNOSIS — E78.5 HYPERLIPIDEMIA, UNSPECIFIED HYPERLIPIDEMIA TYPE: ICD-10-CM

## 2024-01-30 RX ORDER — ATORVASTATIN CALCIUM 40 MG/1
40 TABLET, FILM COATED ORAL DAILY
Qty: 30 TABLET | Refills: 0 | Status: SHIPPED | OUTPATIENT
Start: 2024-01-30

## 2024-01-30 NOTE — TELEPHONE ENCOUNTER
Reason for call:   [x] Refill   [] Prior Auth  [] Other:     Office:   [x] PCP/Provider -  Carlo Valerio MD   [] Specialty/Provider -     Medication: atorvastatin     Dose/Frequency: 40 mg / 1 tablet daily    Quantity: 90    Pharmacy: [id number]    Does the patient have enough for 3 days?   [x] Yes   [] No - Send as HP to POD

## 2024-02-07 DIAGNOSIS — R05.1 ACUTE COUGH: ICD-10-CM

## 2024-02-07 RX ORDER — BENZONATATE 100 MG/1
100 CAPSULE ORAL 3 TIMES DAILY PRN
Qty: 20 CAPSULE | Refills: 0 | Status: SHIPPED | OUTPATIENT
Start: 2024-02-07

## 2024-02-07 NOTE — TELEPHONE ENCOUNTER
Reason for call:   [x] Refill   [] Prior Auth  [] Other:     Office:   [x] PCP/Provider -   [] Specialty/Provider -     Medication: benzonatate (TESSALON PERLES) 100 mg capsule       Pharmacy: [id number] - [place] - [address]     Does the patient have enough for 3 days?   [] Yes   [x] No - Send as HP to POD

## 2024-02-21 DIAGNOSIS — I10 ESSENTIAL HYPERTENSION: ICD-10-CM

## 2024-02-21 DIAGNOSIS — E78.5 HYPERLIPIDEMIA, UNSPECIFIED HYPERLIPIDEMIA TYPE: ICD-10-CM

## 2024-02-21 DIAGNOSIS — E11.9 TYPE 2 DIABETES MELLITUS WITHOUT COMPLICATION, WITHOUT LONG-TERM CURRENT USE OF INSULIN (HCC): ICD-10-CM

## 2024-02-21 DIAGNOSIS — R05.1 ACUTE COUGH: ICD-10-CM

## 2024-02-21 DIAGNOSIS — E22.2 SYNDROME OF INAPPROPRIATE SECRETION OF ANTIDIURETIC HORMONE (HCC): ICD-10-CM

## 2024-02-21 RX ORDER — BENZONATATE 100 MG/1
100 CAPSULE ORAL 3 TIMES DAILY PRN
Qty: 20 CAPSULE | Refills: 0 | Status: SHIPPED | OUTPATIENT
Start: 2024-02-21

## 2024-02-21 RX ORDER — FUROSEMIDE 20 MG/1
20 TABLET ORAL EVERY MORNING
Qty: 90 TABLET | Refills: 1 | Status: SHIPPED | OUTPATIENT
Start: 2024-02-21

## 2024-02-21 RX ORDER — AMLODIPINE BESYLATE 5 MG/1
5 TABLET ORAL DAILY
Qty: 90 TABLET | Refills: 0 | Status: SHIPPED | OUTPATIENT
Start: 2024-02-21

## 2024-02-21 RX ORDER — LISINOPRIL 20 MG/1
20 TABLET ORAL DAILY
Qty: 90 TABLET | Refills: 1 | Status: SHIPPED | OUTPATIENT
Start: 2024-02-21

## 2024-02-21 RX ORDER — ATORVASTATIN CALCIUM 40 MG/1
40 TABLET, FILM COATED ORAL DAILY
Qty: 30 TABLET | Refills: 0 | Status: SHIPPED | OUTPATIENT
Start: 2024-02-21

## 2024-02-22 DIAGNOSIS — I10 ESSENTIAL HYPERTENSION: ICD-10-CM

## 2024-02-22 DIAGNOSIS — R05.1 ACUTE COUGH: ICD-10-CM

## 2024-02-22 DIAGNOSIS — E11.9 TYPE 2 DIABETES MELLITUS WITHOUT COMPLICATION, WITHOUT LONG-TERM CURRENT USE OF INSULIN (HCC): ICD-10-CM

## 2024-02-22 RX ORDER — LISINOPRIL 20 MG/1
20 TABLET ORAL DAILY
Qty: 90 TABLET | Refills: 0 | OUTPATIENT
Start: 2024-02-22

## 2024-02-22 RX ORDER — BENZONATATE 100 MG/1
100 CAPSULE ORAL 3 TIMES DAILY PRN
Qty: 20 CAPSULE | Refills: 0 | OUTPATIENT
Start: 2024-02-22

## 2024-02-22 NOTE — TELEPHONE ENCOUNTER
Reason for call:   [x] Refill   [] Prior Auth  [] Other:     Office:   [x] PCP/Provider - Carlo Valerio  [] Specialty/Provider -     Medication:     Does the patient have enough for 3 days?   [x] Yes   [] No - Send as HP to POD

## 2024-03-21 DIAGNOSIS — R05.1 ACUTE COUGH: ICD-10-CM

## 2024-03-21 DIAGNOSIS — I10 ESSENTIAL HYPERTENSION: ICD-10-CM

## 2024-03-21 RX ORDER — BENZONATATE 100 MG/1
100 CAPSULE ORAL 3 TIMES DAILY PRN
Qty: 20 CAPSULE | Refills: 5 | Status: SHIPPED | OUTPATIENT
Start: 2024-03-21

## 2024-03-21 RX ORDER — AMLODIPINE BESYLATE 5 MG/1
5 TABLET ORAL DAILY
Qty: 30 TABLET | Refills: 0 | Status: SHIPPED | OUTPATIENT
Start: 2024-03-21

## 2024-03-21 NOTE — TELEPHONE ENCOUNTER
3/21 2:10pm: Spoke to Dwight () who states that PT has a Visiting Nurse that comes in to check on her. The Nurse is scheduled to come back next month, he will provide the nurse w/PCP's info so that she can send over the visit notes. Dwight will talk to PT to see if she is able to come in for her 6 month follow up.

## 2024-04-01 DIAGNOSIS — E78.5 HYPERLIPIDEMIA, UNSPECIFIED HYPERLIPIDEMIA TYPE: ICD-10-CM

## 2024-04-01 NOTE — TELEPHONE ENCOUNTER
Reason for call:   [x] Refill   [] Prior Auth  [] Other:     Office:   [x] PCP/Provider - Carlo Valerio / [facility]  [] Specialty/Provider -     Medication: atorvastatin (LIPITOR) 40 mg tablet      Dose/Frequency:  Take 1 tablet (40 mg total) by mouth daily     Quantity: 30    Pharmacy: [id number] - [place], [place] - [address]     Does the patient have enough for 3 days?   [x] Yes   [] No - Send as HP to POD

## 2024-04-02 RX ORDER — ATORVASTATIN CALCIUM 40 MG/1
40 TABLET, FILM COATED ORAL DAILY
Qty: 30 TABLET | Refills: 0 | Status: SHIPPED | OUTPATIENT
Start: 2024-04-02

## 2024-04-03 ENCOUNTER — TELEPHONE (OUTPATIENT)
Dept: FAMILY MEDICINE CLINIC | Facility: CLINIC | Age: 89
End: 2024-04-03

## 2024-04-03 DIAGNOSIS — E11.9 TYPE 2 DIABETES MELLITUS WITHOUT COMPLICATION, WITHOUT LONG-TERM CURRENT USE OF INSULIN (HCC): ICD-10-CM

## 2024-04-03 DIAGNOSIS — E22.2 SYNDROME OF INAPPROPRIATE SECRETION OF ANTIDIURETIC HORMONE (HCC): ICD-10-CM

## 2024-04-03 DIAGNOSIS — I10 ESSENTIAL HYPERTENSION: ICD-10-CM

## 2024-04-03 DIAGNOSIS — E78.5 HYPERLIPIDEMIA, UNSPECIFIED HYPERLIPIDEMIA TYPE: ICD-10-CM

## 2024-04-03 NOTE — TELEPHONE ENCOUNTER
I called pt s/w her spouse and he stated to pt that she needs to come in for a follow up visit and pt stated back I am not going to the Dr. I then stated to pt spouse this is for the safety of the pt we need to check her blood pressure and she needs to have lab work completed and her last ov was July 2023. I advised the spouse that we can not keep giving her med refills an she's not being seen here in our office. He then stated to me that she is having an evaluation done with her insurance company [employer] called. I advised him to call us back asap as she will run out of medications and will need to be seen.

## 2024-04-03 NOTE — TELEPHONE ENCOUNTER
Reason for call:   [facility] patient insurance called to request 100 D supply refill on behalf of the patient.  [x] Refill   [] Prior Auth  [] Other:     Office:   [x] PCP/Provider - Dr Valerio   [] Specialty/Provider -     Medication: see below    Dose/Frequency:     Quantity: 100D supply w refill    Pharmacy: [facility]     Does the patient have enough for 3 days?   [x] Yes   [] No - Send as HP to POD

## 2024-04-10 RX ORDER — ATORVASTATIN CALCIUM 40 MG/1
40 TABLET, FILM COATED ORAL DAILY
Qty: 100 TABLET | Refills: 1 | OUTPATIENT
Start: 2024-04-10

## 2024-04-10 RX ORDER — LISINOPRIL 20 MG/1
20 TABLET ORAL DAILY
Qty: 100 TABLET | Refills: 1 | OUTPATIENT
Start: 2024-04-10

## 2024-04-10 RX ORDER — FUROSEMIDE 20 MG/1
20 TABLET ORAL EVERY MORNING
Qty: 100 TABLET | Refills: 1 | OUTPATIENT
Start: 2024-04-10

## 2024-04-10 RX ORDER — AMLODIPINE BESYLATE 5 MG/1
5 TABLET ORAL DAILY
Qty: 100 TABLET | Refills: 1 | OUTPATIENT
Start: 2024-04-10

## 2024-04-11 ENCOUNTER — RA CDI HCC (OUTPATIENT)
Dept: OTHER | Facility: HOSPITAL | Age: 89
End: 2024-04-11

## 2024-04-16 DIAGNOSIS — I10 ESSENTIAL HYPERTENSION: ICD-10-CM

## 2024-04-16 NOTE — TELEPHONE ENCOUNTER
Reason for call:   [x] Refill   [] Prior Auth  [] Other:     Office:   [x] PCP/Provider -   [] Specialty/Provider -     Medication: Amlodipine     Dose/Frequency: 5 mg tablet taken by mouth once daily     Quantity: 90    Pharmacy:   [id number] -   [place] -   [address] [facility] [phone]     Does the patient have enough for 3 days?   [x] Yes   [] No - Send as HP to POD

## 2024-04-17 RX ORDER — AMLODIPINE BESYLATE 5 MG/1
5 TABLET ORAL DAILY
Qty: 30 TABLET | Refills: 0 | Status: SHIPPED | OUTPATIENT
Start: 2024-04-17

## 2024-04-23 ENCOUNTER — TELEPHONE (OUTPATIENT)
Age: 89
End: 2024-04-23

## 2024-04-23 NOTE — TELEPHONE ENCOUNTER
Patient called the RX Refill Line. Message is being forwarded to the office.     Patient needs to reschedule appointment for today for sometime week.     Please contact patient at [phone]

## 2024-04-30 DIAGNOSIS — E78.5 HYPERLIPIDEMIA, UNSPECIFIED HYPERLIPIDEMIA TYPE: ICD-10-CM

## 2024-04-30 NOTE — TELEPHONE ENCOUNTER
Reason for call:   [x] Refill   [] Prior Auth  [] Other:     Office:   [x] PCP/Provider -   [] Specialty/Provider -     Medication: Lipitor    Dose/Frequency: 40 mg     Quantity: #30    Pharmacy: [facility]     Does the patient have enough for 3 days?   [x] Yes   [] No - Send as HP to POD

## 2024-05-02 RX ORDER — ATORVASTATIN CALCIUM 40 MG/1
40 TABLET, FILM COATED ORAL DAILY
Qty: 30 TABLET | Refills: 0 | OUTPATIENT
Start: 2024-05-02

## 2024-05-03 DIAGNOSIS — E78.5 HYPERLIPIDEMIA, UNSPECIFIED HYPERLIPIDEMIA TYPE: ICD-10-CM

## 2024-05-04 RX ORDER — ATORVASTATIN CALCIUM 40 MG/1
40 TABLET, FILM COATED ORAL DAILY
Qty: 30 TABLET | Refills: 0 | Status: SHIPPED | OUTPATIENT
Start: 2024-05-04

## 2024-05-30 DIAGNOSIS — E78.5 HYPERLIPIDEMIA, UNSPECIFIED HYPERLIPIDEMIA TYPE: ICD-10-CM

## 2024-05-31 RX ORDER — ATORVASTATIN CALCIUM 40 MG/1
40 TABLET, FILM COATED ORAL DAILY
Qty: 30 TABLET | Refills: 0 | Status: SHIPPED | OUTPATIENT
Start: 2024-05-31

## 2024-06-06 DIAGNOSIS — E78.5 HYPERLIPIDEMIA, UNSPECIFIED HYPERLIPIDEMIA TYPE: ICD-10-CM

## 2024-06-06 RX ORDER — ATORVASTATIN CALCIUM 40 MG/1
40 TABLET, FILM COATED ORAL DAILY
Qty: 30 TABLET | Refills: 0 | OUTPATIENT
Start: 2024-06-06

## 2024-06-06 NOTE — TELEPHONE ENCOUNTER
atorvastatin (LIPITOR) 40 mg tablet Take 1 tablet (40 mg total) by mouth daily   Insurance company called and asked we send a 90 day supply to the [facility] pharmacy.

## 2024-06-11 DIAGNOSIS — R05.1 ACUTE COUGH: ICD-10-CM

## 2024-06-11 RX ORDER — BENZONATATE 100 MG/1
100 CAPSULE ORAL 3 TIMES DAILY PRN
Qty: 20 CAPSULE | Refills: 0 | Status: SHIPPED | OUTPATIENT
Start: 2024-06-11

## 2024-06-27 DIAGNOSIS — R05.1 ACUTE COUGH: ICD-10-CM

## 2024-06-27 DIAGNOSIS — E78.5 HYPERLIPIDEMIA, UNSPECIFIED HYPERLIPIDEMIA TYPE: ICD-10-CM

## 2024-06-27 NOTE — TELEPHONE ENCOUNTER
Reason for call:   [x] Refill   [] Prior Auth  [] Other:     Office:   [x] PCP/Provider - [facility]   [] Specialty/Provider -     Medication:   atorvastatin (LIPITOR) 40 mg tablet - Take 1 tablet (40 mg total) by mouth daily   benzonatate (TESSALON PERLES) 100 mg capsule - Take 1 capsule (100 mg total) by mouth 3 (three) times a day as needed for cough     Pharmacy:   [id number] - [place] - [address]     Does the patient have enough for 3 days?   [x] Yes   [] No - Send as HP to POD

## 2024-06-28 ENCOUNTER — TELEPHONE (OUTPATIENT)
Dept: FAMILY MEDICINE CLINIC | Facility: CLINIC | Age: 89
End: 2024-06-28

## 2024-06-28 DIAGNOSIS — E78.5 HYPERLIPIDEMIA, UNSPECIFIED HYPERLIPIDEMIA TYPE: ICD-10-CM

## 2024-06-28 DIAGNOSIS — I10 ESSENTIAL HYPERTENSION: ICD-10-CM

## 2024-06-28 RX ORDER — BENZONATATE 100 MG/1
100 CAPSULE ORAL 3 TIMES DAILY PRN
Qty: 20 CAPSULE | Refills: 0 | Status: SHIPPED | OUTPATIENT
Start: 2024-06-28

## 2024-06-28 RX ORDER — ATORVASTATIN CALCIUM 40 MG/1
40 TABLET, FILM COATED ORAL DAILY
Qty: 30 TABLET | Refills: 0 | Status: SHIPPED | OUTPATIENT
Start: 2024-06-28

## 2024-06-28 RX ORDER — ATORVASTATIN CALCIUM 40 MG/1
40 TABLET, FILM COATED ORAL DAILY
Qty: 30 TABLET | Refills: 0 | OUTPATIENT
Start: 2024-06-28

## 2024-06-28 NOTE — TELEPHONE ENCOUNTER
Please fill out communication consent at next apt for joby. Offered to make f/u apt because pt is due but [employer] declined, said he will call back after he speaks with her. She apparently declines apts. Made aware in order to give more refills she must come in for an apt.

## 2024-07-01 RX ORDER — AMLODIPINE BESYLATE 5 MG/1
5 TABLET ORAL DAILY
Qty: 30 TABLET | Refills: 0 | OUTPATIENT
Start: 2024-07-01

## 2024-07-24 DIAGNOSIS — E78.5 HYPERLIPIDEMIA, UNSPECIFIED HYPERLIPIDEMIA TYPE: ICD-10-CM

## 2024-07-24 DIAGNOSIS — I10 ESSENTIAL HYPERTENSION: ICD-10-CM

## 2024-07-24 RX ORDER — LISINOPRIL 20 MG/1
20 TABLET ORAL DAILY
Qty: 90 TABLET | Refills: 0 | OUTPATIENT
Start: 2024-07-24

## 2024-07-24 RX ORDER — ATORVASTATIN CALCIUM 40 MG/1
40 TABLET, FILM COATED ORAL DAILY
Qty: 30 TABLET | Refills: 0 | OUTPATIENT
Start: 2024-07-24

## 2024-07-26 NOTE — TELEPHONE ENCOUNTER
Spoke to pt spouse and stressed how important it is for her to be seen before the prescription can be refilled.

## 2024-07-26 NOTE — TELEPHONE ENCOUNTER
Patient's  called and states his wife does not want to come in to the office and they have no way of doing a virtual visit. He is wondering if he could just have a phone call visit with the doctor because she really needs her medications.     He also states he did take her vital signs and got the following:     Pulse: 96  BP: 93/52  Weight: 95 lbs     Please call patient's  in regards to if he can do a follow up visit over the phone or not due to his wife needing her medications.

## 2024-08-05 DIAGNOSIS — E78.5 HYPERLIPIDEMIA, UNSPECIFIED HYPERLIPIDEMIA TYPE: ICD-10-CM

## 2024-08-06 RX ORDER — ATORVASTATIN CALCIUM 40 MG/1
40 TABLET, FILM COATED ORAL DAILY
Qty: 30 TABLET | Refills: 0 | OUTPATIENT
Start: 2024-08-06

## 2024-08-12 ENCOUNTER — TELEPHONE (OUTPATIENT)
Dept: FAMILY MEDICINE CLINIC | Facility: CLINIC | Age: 89
End: 2024-08-12

## 2024-08-12 NOTE — TELEPHONE ENCOUNTER
Spouse sent a letter addressed to Dr Valerio asking for his wife's RX to be refilled.  We have communicated with him several times that for the RX could not be refilled because the pt has not been seen for a year.

## 2024-08-13 DIAGNOSIS — E78.5 HYPERLIPIDEMIA, UNSPECIFIED HYPERLIPIDEMIA TYPE: ICD-10-CM

## 2024-08-13 DIAGNOSIS — I10 ESSENTIAL HYPERTENSION: ICD-10-CM

## 2024-08-13 DIAGNOSIS — E22.2 SYNDROME OF INAPPROPRIATE SECRETION OF ANTIDIURETIC HORMONE (HCC): ICD-10-CM

## 2024-08-13 NOTE — TELEPHONE ENCOUNTER
Reason for call:   [x] Refill   [] Prior Auth  [x] Other: Pt's  stated he knows patient is over due for an appt but he states Pt has dementia and she cannot see so is difficult for him to bring her to the office. Dwight is requesting provider to please approve these medications.    Office:   [x] PCP/Provider - Carlo Valerio MD   [] Specialty/Provider -     Medication: lisinopril 20 mg / 1 tab daily / 90 tabs                      Furosemide 20 mg / 1 tab daily / 90 tabs                      atorvastatin 40 mg / 1 tab daily / 90 tabs                      amLODIPine 5 mg / 1 tab daily / 90 tabs      Pharmacy: [id number] - [place] - [address]      Does the patient have enough for 3 days?   [] Yes   [x] No - Send as HP to POD

## 2024-08-14 NOTE — TELEPHONE ENCOUNTER
Called pt today to schedule wellness. He states he checks her temp at home and blood pressure and he would like a phone visit. I explained she needs to come in to make sure she is on the correct dosage of medication. She is also diabetic and we need to make sure she is on the correct meds. Husbands states he will call back when she wakes up

## 2024-08-19 RX ORDER — ATORVASTATIN CALCIUM 40 MG/1
40 TABLET, FILM COATED ORAL DAILY
Qty: 90 TABLET | Refills: 0 | OUTPATIENT
Start: 2024-08-19

## 2024-08-19 RX ORDER — FUROSEMIDE 20 MG
20 TABLET ORAL EVERY MORNING
Qty: 90 TABLET | Refills: 0 | OUTPATIENT
Start: 2024-08-19

## 2024-08-19 RX ORDER — LISINOPRIL 20 MG/1
20 TABLET ORAL DAILY
Qty: 90 TABLET | Refills: 0 | OUTPATIENT
Start: 2024-08-19

## 2024-08-19 RX ORDER — AMLODIPINE BESYLATE 5 MG/1
5 TABLET ORAL DAILY
Qty: 90 TABLET | Refills: 0 | OUTPATIENT
Start: 2024-08-19

## 2024-08-21 NOTE — TELEPHONE ENCOUNTER
Patients  states he knows patient is over due for an appt but he states Pt has dementia and she cannot see nor hear great so is difficult for him to bring her to the office. I informed patient that medication was denied and he is very concerned about this and needs assistance on what to do at this point. He would like to know if it is possible for someone to come into the home? He struggles himself to get up out of the house as well and needs something that can help him and his wife get an appointment for her and for her medication to be approved before she runs out. Please advise. [phone] Dwight.

## 2024-08-26 NOTE — TELEPHONE ENCOUNTER
8/26 2:15pm: LVM for Dwight () to call back to schedule a Virtual (Phone call) appt for PT's AWV. Per PCP: PT needs blood work done, we can send [facility] lab. Prefer for PT to come in for AWV since we haven't seen her in over a year.

## 2024-08-28 DIAGNOSIS — I10 ESSENTIAL HYPERTENSION: ICD-10-CM

## 2024-08-29 ENCOUNTER — TELEPHONE (OUTPATIENT)
Dept: FAMILY MEDICINE CLINIC | Facility: CLINIC | Age: 89
End: 2024-08-29

## 2024-08-29 RX ORDER — AMLODIPINE BESYLATE 5 MG/1
5 TABLET ORAL DAILY
Qty: 90 TABLET | Refills: 0 | OUTPATIENT
Start: 2024-08-29

## 2024-08-29 RX ORDER — LISINOPRIL 20 MG/1
20 TABLET ORAL DAILY
Qty: 90 TABLET | Refills: 0 | OUTPATIENT
Start: 2024-08-29

## 2024-08-29 NOTE — TELEPHONE ENCOUNTER
I understand pt needs appt she has not been seen in over a year. I s/w pt spouse he states she has Dementia, and she fights him to get to leave the house and can't get her out. He is taking her out of her comfort zone. While on the phone with him he was yelling at the pt and seemed very frustrated. I asked him what she is currently and how much she has left. (See below) is it possible to have pt outreach help this pt and spouse. Get in home care ect? I did ask pt spouse if he has anyone to help and he stated no.  Please advise.     Atorvastatin couple of days    Lasix she is stlll good    Lisinopril about 1 week left    Metformin she has some left with a refill     Amlodipine she took her last one 8.28.24

## 2024-08-29 NOTE — TELEPHONE ENCOUNTER
I called spouse ans he stated to me that pt will not leave the house even to just go for a ride, she is home bound. I advised him to call his insurance company to see what kind of home health services are offered to the pt, and he is to call us back.

## 2024-09-03 DIAGNOSIS — I10 ESSENTIAL HYPERTENSION: ICD-10-CM

## 2024-09-03 DIAGNOSIS — E78.5 HYPERLIPIDEMIA, UNSPECIFIED HYPERLIPIDEMIA TYPE: ICD-10-CM

## 2024-09-03 NOTE — TELEPHONE ENCOUNTER
Pts  called refill line requesting refills. He states their daughter will be bringing pt to appt that is scheduled on 9/12. They are requesting that at least a temporary supply be sent until appt.    Reason for call:   [x] Refill   [] Prior Auth  [] Other:     Office:   [x] PCP/Provider - [facility]   [] Specialty/Provider -     Medication:   amLODIPine (NORVASC) 5 mg tablet - Take 1 tablet (5 mg total) by mouth daily   atorvastatin (LIPITOR) 40 mg tablet - Take 1 tablet (40 mg total) by mouth daily     Pharmacy:   [id number] - [place], [place] - [address]     Does the patient have enough for 3 days?   [] Yes   [x] No - Send as HP to POD

## 2024-09-04 DIAGNOSIS — I10 ESSENTIAL HYPERTENSION: ICD-10-CM

## 2024-09-04 RX ORDER — ATORVASTATIN CALCIUM 40 MG/1
40 TABLET, FILM COATED ORAL DAILY
Qty: 30 TABLET | Refills: 0 | OUTPATIENT
Start: 2024-09-04

## 2024-09-04 RX ORDER — AMLODIPINE BESYLATE 5 MG/1
5 TABLET ORAL DAILY
Qty: 30 TABLET | Refills: 0 | OUTPATIENT
Start: 2024-09-04

## 2024-09-05 RX ORDER — LISINOPRIL 20 MG/1
20 TABLET ORAL DAILY
Qty: 30 TABLET | Refills: 0 | Status: ON HOLD | OUTPATIENT
Start: 2024-09-05

## 2024-09-05 RX ORDER — AMLODIPINE BESYLATE 5 MG/1
5 TABLET ORAL DAILY
Qty: 30 TABLET | Refills: 0 | Status: ON HOLD | OUTPATIENT
Start: 2024-09-05

## 2024-09-10 ENCOUNTER — APPOINTMENT (EMERGENCY)
Dept: CT IMAGING | Facility: HOSPITAL | Age: 89
DRG: 563 | End: 2024-09-10
Payer: COMMERCIAL

## 2024-09-10 ENCOUNTER — APPOINTMENT (EMERGENCY)
Dept: RADIOLOGY | Facility: HOSPITAL | Age: 89
DRG: 563 | End: 2024-09-10
Payer: COMMERCIAL

## 2024-09-10 ENCOUNTER — HOSPITAL ENCOUNTER (INPATIENT)
Facility: HOSPITAL | Age: 89
LOS: 7 days | Discharge: NURSING, REHAB OR LONG-TERM CARE | DRG: 563 | End: 2024-09-18
Attending: EMERGENCY MEDICINE | Admitting: INTERNAL MEDICINE
Payer: COMMERCIAL

## 2024-09-10 DIAGNOSIS — E44.1 MILD PROTEIN-CALORIE MALNUTRITION (HCC): ICD-10-CM

## 2024-09-10 DIAGNOSIS — S82.131A RIGHT MEDIAL TIBIAL PLATEAU FRACTURE: ICD-10-CM

## 2024-09-10 DIAGNOSIS — D64.9 CHRONIC ANEMIA: ICD-10-CM

## 2024-09-10 DIAGNOSIS — E87.1 HYPONATREMIA: ICD-10-CM
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
## 2024-09-10 DIAGNOSIS — R41.89 COGNITIVE DECLINE: ICD-10-CM

## 2024-09-10 DIAGNOSIS — R26.2 AMBULATORY DYSFUNCTION: ICD-10-CM

## 2024-09-10 LAB
ALBUMIN SERPL BCG-MCNC: 3.2 G/DL (ref 3.5–5)
ALP SERPL-CCNC: 157 U/L (ref 34–104)
ALT SERPL W P-5'-P-CCNC: 8 U/L (ref 7–52)
ANION GAP SERPL CALCULATED.3IONS-SCNC: 15 MMOL/L (ref 4–13)
AST SERPL W P-5'-P-CCNC: 11 U/L (ref 13–39)
BASOPHILS # BLD MANUAL: 0 THOUSAND/UL (ref 0–0.1)
BASOPHILS NFR MAR MANUAL: 0 % (ref 0–1)
BILIRUB SERPL-MCNC: 0.46 MG/DL (ref 0.2–1)
BUN SERPL-MCNC: 14 MG/DL (ref 5–25)
CALCIUM ALBUM COR SERPL-MCNC: 8.8 MG/DL (ref 8.3–10.1)
CALCIUM SERPL-MCNC: 8.2 MG/DL (ref 8.4–10.2)
CHLORIDE SERPL-SCNC: 90 MMOL/L (ref 96–108)
CO2 SERPL-SCNC: 22 MMOL/L (ref 21–32)
CREAT SERPL-MCNC: 1.02 MG/DL (ref 0.6–1.3)
EOSINOPHIL # BLD MANUAL: 0 THOUSAND/UL (ref 0–0.4)
EOSINOPHIL NFR BLD MANUAL: 0 % (ref 0–6)
ERYTHROCYTE [DISTWIDTH] IN BLOOD BY AUTOMATED COUNT: 14.5 % (ref 11.6–15.1)
GFR SERPL CREATININE-BSD FRML MDRD: 48 ML/MIN/1.73SQ M
GLUCOSE SERPL-MCNC: 227 MG/DL (ref 65–140)
HCT VFR BLD AUTO: 32.5 % (ref 34.8–46.1)
HGB BLD-MCNC: 11 G/DL (ref 11.5–15.4)
LYMPHOCYTES # BLD AUTO: 0.67 THOUSAND/UL (ref 0.6–4.47)
LYMPHOCYTES # BLD AUTO: 7 % (ref 14–44)
MCH RBC QN AUTO: 30.3 PG (ref 26.8–34.3)
MCHC RBC AUTO-ENTMCNC: 33.8 G/DL (ref 31.4–37.4)
MCV RBC AUTO: 90 FL (ref 82–98)
MONOCYTES # BLD AUTO: 0.39 THOUSAND/UL (ref 0–1.22)
MONOCYTES NFR BLD: 4 % (ref 4–12)
NEUTROPHILS # BLD MANUAL: 8.58 THOUSAND/UL (ref 1.85–7.62)
NEUTS SEG NFR BLD AUTO: 89 % (ref 43–75)
PLATELET # BLD AUTO: 382 THOUSANDS/UL (ref 149–390)
PLATELET BLD QL SMEAR: ADEQUATE
PMV BLD AUTO: 8.3 FL (ref 8.9–12.7)
POTASSIUM SERPL-SCNC: 3.4 MMOL/L (ref 3.5–5.3)
PROT SERPL-MCNC: 6.2 G/DL (ref 6.4–8.4)
RBC # BLD AUTO: 3.63 MILLION/UL (ref 3.81–5.12)
RBC MORPH BLD: NORMAL
SODIUM SERPL-SCNC: 127 MMOL/L (ref 135–147)
WBC # BLD AUTO: 9.64 THOUSAND/UL (ref 4.31–10.16)

## 2024-09-10 PROCEDURE — 85007 BL SMEAR W/DIFF WBC COUNT: CPT | Performed by: EMERGENCY MEDICINE

## 2024-09-10 PROCEDURE — 36415 COLL VENOUS BLD VENIPUNCTURE: CPT | Performed by: EMERGENCY MEDICINE

## 2024-09-10 PROCEDURE — 71260 CT THORAX DX C+: CPT

## 2024-09-10 PROCEDURE — 99285 EMERGENCY DEPT VISIT HI MDM: CPT

## 2024-09-10 PROCEDURE — 73700 CT LOWER EXTREMITY W/O DYE: CPT

## 2024-09-10 PROCEDURE — 73130 X-RAY EXAM OF HAND: CPT

## 2024-09-10 PROCEDURE — 74177 CT ABD & PELVIS W/CONTRAST: CPT

## 2024-09-10 PROCEDURE — 72125 CT NECK SPINE W/O DYE: CPT

## 2024-09-10 PROCEDURE — 70450 CT HEAD/BRAIN W/O DYE: CPT

## 2024-09-10 PROCEDURE — 73552 X-RAY EXAM OF FEMUR 2/>: CPT

## 2024-09-10 PROCEDURE — 96374 THER/PROPH/DIAG INJ IV PUSH: CPT

## 2024-09-10 PROCEDURE — 73610 X-RAY EXAM OF ANKLE: CPT

## 2024-09-10 PROCEDURE — 80053 COMPREHEN METABOLIC PANEL: CPT | Performed by: EMERGENCY MEDICINE

## 2024-09-10 PROCEDURE — 99285 EMERGENCY DEPT VISIT HI MDM: CPT | Performed by: EMERGENCY MEDICINE

## 2024-09-10 PROCEDURE — 85027 COMPLETE CBC AUTOMATED: CPT | Performed by: EMERGENCY MEDICINE

## 2024-09-10 PROCEDURE — 73564 X-RAY EXAM KNEE 4 OR MORE: CPT

## 2024-09-10 RX ORDER — FENTANYL CITRATE 50 UG/ML
25 INJECTION, SOLUTION INTRAMUSCULAR; INTRAVENOUS ONCE
Status: COMPLETED | OUTPATIENT
Start: 2024-09-10 | End: 2024-09-10

## 2024-09-10 RX ADMIN — IOHEXOL 100 ML: 350 INJECTION, SOLUTION INTRAVENOUS at 22:57

## 2024-09-10 RX ADMIN — FENTANYL CITRATE 25 MCG: 50 INJECTION INTRAMUSCULAR; INTRAVENOUS at 21:52

## 2024-09-11 ENCOUNTER — TELEPHONE (OUTPATIENT)
Age: 89
End: 2024-09-11

## 2024-09-11 PROBLEM — J32.3 SPHENOID SINUSITIS: Status: ACTIVE | Noted: 2024-09-11

## 2024-09-11 PROBLEM — M25.461 EFFUSION OF RIGHT KNEE JOINT: Status: ACTIVE | Noted: 2024-09-11

## 2024-09-11 PROBLEM — S90.821A BLISTER OF RIGHT FOOT: Status: RESOLVED | Noted: 2023-07-06 | Resolved: 2024-09-11

## 2024-09-11 PROBLEM — S82.131A RIGHT MEDIAL TIBIAL PLATEAU FRACTURE: Status: ACTIVE | Noted: 2024-09-11

## 2024-09-11 PROBLEM — R41.89 COGNITIVE DECLINE: Status: ACTIVE | Noted: 2024-09-11

## 2024-09-11 PROBLEM — R26.2 AMBULATORY DYSFUNCTION: Status: ACTIVE | Noted: 2024-09-11

## 2024-09-11 PROBLEM — S72.002D FRACTURE OF UNSPECIFIED PART OF NECK OF LEFT FEMUR, SUBSEQUENT ENCOUNTER FOR CLOSED FRACTURE WITH ROUTINE HEALING: Status: RESOLVED | Noted: 2020-04-07 | Resolved: 2024-09-11

## 2024-09-11 PROBLEM — E22.2 SYNDROME OF INAPPROPRIATE SECRETION OF ANTIDIURETIC HORMONE (HCC): Status: RESOLVED | Noted: 2021-04-21 | Resolved: 2024-09-11

## 2024-09-11 LAB
ANION GAP SERPL CALCULATED.3IONS-SCNC: 9 MMOL/L (ref 4–13)
BILIRUB UR QL STRIP: NEGATIVE
BUN SERPL-MCNC: 12 MG/DL (ref 5–25)
CALCIUM SERPL-MCNC: 7.9 MG/DL (ref 8.4–10.2)
CHLORIDE SERPL-SCNC: 90 MMOL/L (ref 96–108)
CLARITY UR: CLEAR
CO2 SERPL-SCNC: 29 MMOL/L (ref 21–32)
COLOR UR: ABNORMAL
CREAT SERPL-MCNC: 0.96 MG/DL (ref 0.6–1.3)
ERYTHROCYTE [DISTWIDTH] IN BLOOD BY AUTOMATED COUNT: 14.6 % (ref 11.6–15.1)
EST. AVERAGE GLUCOSE BLD GHB EST-MCNC: 148 MG/DL
FOLATE SERPL-MCNC: 4.8 NG/ML
GFR SERPL CREATININE-BSD FRML MDRD: 51 ML/MIN/1.73SQ M
GLUCOSE SERPL-MCNC: 113 MG/DL (ref 65–140)
GLUCOSE SERPL-MCNC: 121 MG/DL (ref 65–140)
GLUCOSE SERPL-MCNC: 128 MG/DL (ref 65–140)
GLUCOSE SERPL-MCNC: 132 MG/DL (ref 65–140)
GLUCOSE SERPL-MCNC: 136 MG/DL (ref 65–140)
GLUCOSE UR STRIP-MCNC: ABNORMAL MG/DL
HBA1C MFR BLD: 6.8 %
HCT VFR BLD AUTO: 30.5 % (ref 34.8–46.1)
HGB BLD-MCNC: 10.5 G/DL (ref 11.5–15.4)
HGB UR QL STRIP.AUTO: NEGATIVE
KETONES UR STRIP-MCNC: NEGATIVE MG/DL
LEUKOCYTE ESTERASE UR QL STRIP: NEGATIVE
MCH RBC QN AUTO: 31.1 PG (ref 26.8–34.3)
MCHC RBC AUTO-ENTMCNC: 34.4 G/DL (ref 31.4–37.4)
MCV RBC AUTO: 90 FL (ref 82–98)
NITRITE UR QL STRIP: NEGATIVE
OSMOLALITY UR/SERPL-RTO: 278 MMOL/KG (ref 282–298)
OSMOLALITY UR: 363 MMOL/KG
PH UR STRIP.AUTO: 6.5 [PH]
PLATELET # BLD AUTO: 336 THOUSANDS/UL (ref 149–390)
PMV BLD AUTO: 8.4 FL (ref 8.9–12.7)
POTASSIUM SERPL-SCNC: 3.2 MMOL/L (ref 3.5–5.3)
PROT UR STRIP-MCNC: NEGATIVE MG/DL
RBC # BLD AUTO: 3.38 MILLION/UL (ref 3.81–5.12)
SODIUM 24H UR-SCNC: 57 MOL/L
SODIUM SERPL-SCNC: 128 MMOL/L (ref 135–147)
SP GR UR STRIP.AUTO: 1.05 (ref 1–1.03)
TSH SERPL DL<=0.05 MIU/L-ACNC: 0.6 UIU/ML (ref 0.45–4.5)
TSH SERPL DL<=0.05 MIU/L-ACNC: 0.6 UIU/ML (ref 0.45–4.5)
URATE SERPL-MCNC: 8.2 MG/DL (ref 2–7.5)
UROBILINOGEN UR STRIP-ACNC: <2 MG/DL
VIT B12 SERPL-MCNC: 205 PG/ML (ref 180–914)
WBC # BLD AUTO: 10.27 THOUSAND/UL (ref 4.31–10.16)

## 2024-09-11 PROCEDURE — 84300 ASSAY OF URINE SODIUM: CPT

## 2024-09-11 PROCEDURE — 83036 HEMOGLOBIN GLYCOSYLATED A1C: CPT

## 2024-09-11 PROCEDURE — 99222 1ST HOSP IP/OBS MODERATE 55: CPT

## 2024-09-11 PROCEDURE — 82746 ASSAY OF FOLIC ACID SERUM: CPT

## 2024-09-11 PROCEDURE — 82948 REAGENT STRIP/BLOOD GLUCOSE: CPT

## 2024-09-11 PROCEDURE — 97167 OT EVAL HIGH COMPLEX 60 MIN: CPT

## 2024-09-11 PROCEDURE — 27530 TREAT KNEE FRACTURE: CPT | Performed by: STUDENT IN AN ORGANIZED HEALTH CARE EDUCATION/TRAINING PROGRAM

## 2024-09-11 PROCEDURE — 99222 1ST HOSP IP/OBS MODERATE 55: CPT | Performed by: INTERNAL MEDICINE

## 2024-09-11 PROCEDURE — 82607 VITAMIN B-12: CPT

## 2024-09-11 PROCEDURE — 99222 1ST HOSP IP/OBS MODERATE 55: CPT | Performed by: STUDENT IN AN ORGANIZED HEALTH CARE EDUCATION/TRAINING PROGRAM

## 2024-09-11 PROCEDURE — 84443 ASSAY THYROID STIM HORMONE: CPT

## 2024-09-11 PROCEDURE — 97163 PT EVAL HIGH COMPLEX 45 MIN: CPT

## 2024-09-11 PROCEDURE — 84550 ASSAY OF BLOOD/URIC ACID: CPT | Performed by: INTERNAL MEDICINE

## 2024-09-11 PROCEDURE — 92610 EVALUATE SWALLOWING FUNCTION: CPT

## 2024-09-11 PROCEDURE — 83930 ASSAY OF BLOOD OSMOLALITY: CPT

## 2024-09-11 PROCEDURE — 84443 ASSAY THYROID STIM HORMONE: CPT | Performed by: INTERNAL MEDICINE

## 2024-09-11 PROCEDURE — 83935 ASSAY OF URINE OSMOLALITY: CPT

## 2024-09-11 PROCEDURE — 80048 BASIC METABOLIC PNL TOTAL CA: CPT

## 2024-09-11 PROCEDURE — 85027 COMPLETE CBC AUTOMATED: CPT

## 2024-09-11 RX ORDER — CYANOCOBALAMIN 1000 UG/ML
1000 INJECTION, SOLUTION INTRAMUSCULAR; SUBCUTANEOUS ONCE
Status: COMPLETED | OUTPATIENT
Start: 2024-09-11 | End: 2024-09-11

## 2024-09-11 RX ORDER — POLYETHYLENE GLYCOL 3350 17 G/17G
17 POWDER, FOR SOLUTION ORAL DAILY PRN
Status: DISCONTINUED | OUTPATIENT
Start: 2024-09-11 | End: 2024-09-13

## 2024-09-11 RX ORDER — ATORVASTATIN CALCIUM 40 MG/1
40 TABLET, FILM COATED ORAL
Status: DISCONTINUED | OUTPATIENT
Start: 2024-09-11 | End: 2024-09-18 | Stop reason: HOSPADM

## 2024-09-11 RX ORDER — INSULIN LISPRO 100 [IU]/ML
1-5 INJECTION, SOLUTION INTRAVENOUS; SUBCUTANEOUS
Status: DISCONTINUED | OUTPATIENT
Start: 2024-09-11 | End: 2024-09-18 | Stop reason: HOSPADM

## 2024-09-11 RX ORDER — ENOXAPARIN SODIUM 100 MG/ML
40 INJECTION SUBCUTANEOUS DAILY
Status: DISCONTINUED | OUTPATIENT
Start: 2024-09-11 | End: 2024-09-17

## 2024-09-11 RX ORDER — SODIUM CHLORIDE 9 MG/ML
83.3 INJECTION, SOLUTION INTRAVENOUS CONTINUOUS
Status: DISPENSED | OUTPATIENT
Start: 2024-09-11 | End: 2024-09-12

## 2024-09-11 RX ORDER — LANOLIN ALCOHOL/MO/W.PET/CERES
3 CREAM (GRAM) TOPICAL
Status: DISCONTINUED | OUTPATIENT
Start: 2024-09-11 | End: 2024-09-18 | Stop reason: HOSPADM

## 2024-09-11 RX ORDER — MAGNESIUM HYDROXIDE/ALUMINUM HYDROXICE/SIMETHICONE 120; 1200; 1200 MG/30ML; MG/30ML; MG/30ML
30 SUSPENSION ORAL EVERY 6 HOURS PRN
Status: DISCONTINUED | OUTPATIENT
Start: 2024-09-11 | End: 2024-09-18 | Stop reason: HOSPADM

## 2024-09-11 RX ORDER — AMLODIPINE BESYLATE 5 MG/1
5 TABLET ORAL DAILY
Status: DISCONTINUED | OUTPATIENT
Start: 2024-09-11 | End: 2024-09-18 | Stop reason: HOSPADM

## 2024-09-11 RX ORDER — FUROSEMIDE 20 MG
20 TABLET ORAL EVERY MORNING
Status: DISCONTINUED | OUTPATIENT
Start: 2024-09-11 | End: 2024-09-16

## 2024-09-11 RX ORDER — ACETAMINOPHEN 325 MG/1
975 TABLET ORAL EVERY 8 HOURS PRN
Status: DISCONTINUED | OUTPATIENT
Start: 2024-09-11 | End: 2024-09-18 | Stop reason: HOSPADM

## 2024-09-11 RX ORDER — ONDANSETRON 2 MG/ML
4 INJECTION INTRAMUSCULAR; INTRAVENOUS EVERY 6 HOURS PRN
Status: DISCONTINUED | OUTPATIENT
Start: 2024-09-11 | End: 2024-09-18 | Stop reason: HOSPADM

## 2024-09-11 RX ORDER — LISINOPRIL 20 MG/1
20 TABLET ORAL DAILY
Status: DISCONTINUED | OUTPATIENT
Start: 2024-09-11 | End: 2024-09-18 | Stop reason: HOSPADM

## 2024-09-11 RX ADMIN — ENOXAPARIN SODIUM 40 MG: 40 INJECTION SUBCUTANEOUS at 08:28

## 2024-09-11 RX ADMIN — CYANOCOBALAMIN 1000 MCG: 1000 INJECTION, SOLUTION INTRAMUSCULAR at 18:02

## 2024-09-11 RX ADMIN — SODIUM CHLORIDE 83.3 ML/HR: 0.9 INJECTION, SOLUTION INTRAVENOUS at 17:13

## 2024-09-11 NOTE — PLAN OF CARE
Problem: PHYSICAL THERAPY ADULT  Goal: Performs mobility at highest level of function for planned discharge setting.  See evaluation for individualized goals.  Description: Treatment/Interventions: Functional transfer training, LE strengthening/ROM, Therapeutic exercise, Endurance training, Patient/family training, Equipment eval/education, Bed mobility, Gait training, Spoke to nursing, OT, Family (wc mobility and mangement)  Equipment Recommended: Walker, Wheelchair (pt owns RW at home for use)       See flowsheet documentation for full assessment, interventions and recommendations.  Note: Prognosis: Fair  Problem List: Decreased strength, Decreased range of motion, Decreased endurance, Impaired balance, Decreased mobility, Decreased cognition, Impaired judgement, Decreased safety awareness, Impaired hearing, Impaired vision, Decreased skin integrity, Orthopedic restrictions, Pain (NWB RLE with knee immobilizer)  Assessment: Pt is a 89 yo female admitted to [facility] s/p fall and R patellar fx. Pt lives with  and 2 adult family members in 1 floor apt, (+)MITCH,use of RW PTA, multiple falls per pts  unable to quantify,needs A for ADLs and IADLs, rarely leaves the apartment. pt currently is not at functional mobility baseline, needs significant A for mobility,multiple lines, NWB RLE with knee immobilizer orders via ortho,ataxic and unsteady movement patterns, unable to maintain NWB RLE during mobility and transfers. Pt demonstrates maximal deficits during functional mobility including dec endurance, dec balance, dec BLE strength (RLE>LLE),pain, ataxic and unsteady movement patterns, dec cognition, dec vision and hearing and needs maxAx1 for bed mobility and maxAx2 for transfers with B HHA. pt would cont to benefit from skilled inpt PT services to maximize functional independence and to dec caregiver burden upon being DC from the hospital.  Barriers to Discharge: Inaccessible home environment ((+)MITCH)      Rehab Resource Intensity Level, PT: II (Moderate Resource Intensity)    See flowsheet documentation for full assessment.

## 2024-09-11 NOTE — PLAN OF CARE
Problem: Potential for Falls  Goal: Patient will remain free of falls  Description: INTERVENTIONS:  - Educate patient/family on patient safety including physical limitations  - Instruct patient to call for assistance with activity   - Consult OT/PT to assist with strengthening/mobility   - Keep Call bell within reach  - Keep bed low and locked with side rails adjusted as appropriate  - Keep care items and personal belongings within reach  - Initiate and maintain comfort rounds  - Make Fall Risk Sign visible to staff  - Offer Toileting every  Hours, in advance of need  - Initiate/Maintainalarm  - Obtain necessary fall risk management equipment:   - Apply yellow socks and bracelet for high fall risk patients  - Consider moving patient to room near nurses station  Outcome: Progressing     Problem: Prexisting or High Potential for Compromised Skin Integrity  Goal: Skin integrity is maintained or improved  Description: INTERVENTIONS:  - Identify patients at risk for skin breakdown  - Assess and monitor skin integrity  - Assess and monitor nutrition and hydration status  - Monitor labs   - Assess for incontinence   - Turn and reposition patient  - Assist with mobility/ambulation  - Relieve pressure over bony prominences  - Avoid friction and shearing  - Provide appropriate hygiene as needed including keeping skin clean and dry  - Evaluate need for skin moisturizer/barrier cream  - Collaborate with interdisciplinary team   - Patient/family teaching  - Consider wound care consult   Outcome: Progressing     Problem: PAIN - ADULT  Goal: Verbalizes/displays adequate comfort level or baseline comfort level  Description: Interventions:  - Encourage patient to monitor pain and request assistance  - Assess pain using appropriate pain scale  - Administer analgesics based on type and severity of pain and evaluate response  - Implement non-pharmacological measures as appropriate and evaluate response  - Consider cultural and social  influences on pain and pain management  - Notify physician/advanced practitioner if interventions unsuccessful or patient reports new pain  Outcome: Progressing     Problem: INFECTION - ADULT  Goal: Absence or prevention of progression during hospitalization  Description: INTERVENTIONS:  - Assess and monitor for signs and symptoms of infection  - Monitor lab/diagnostic results  - Monitor all insertion sites, i.e. indwelling lines, tubes, and drains  - Monitor endotracheal if appropriate and nasal secretions for changes in amount and color  - [place] appropriate cooling/warming therapies per order  - Administer medications as ordered  - Instruct and encourage patient and family to use good hand hygiene technique  - Identify and instruct in appropriate isolation precautions for identified infection/condition  Outcome: Progressing  Goal: Absence of fever/infection during neutropenic period  Description: INTERVENTIONS:  - Monitor WBC    Outcome: Progressing     Problem: SAFETY ADULT  Goal: Patient will remain free of falls  Description: INTERVENTIONS:  - Educate patient/family on patient safety including physical limitations  - Instruct patient to call for assistance with activity   - Consult OT/PT to assist with strengthening/mobility   - Keep Call bell within reach  - Keep bed low and locked with side rails adjusted as appropriate  - Keep care items and personal belongings within reach  - Initiate and maintain comfort rounds  - Make Fall Risk Sign visible to staff  - Offer Toileting every  Hours, in advance of need  - Initiate/Maintainalarm  - Obtain necessary fall risk management equipment:   - Apply yellow socks and bracelet for high fall risk patients  - Consider moving patient to room near nurses station  Outcome: Progressing  Goal: Maintain or return to baseline ADL function  Description: INTERVENTIONS:  -  Assess patient's ability to carry out ADLs; assess patient's baseline for ADL function and identify  physical deficits which impact ability to perform ADLs (bathing, care of mouth/teeth, toileting, grooming, dressing, etc.)  - Assess/evaluate cause of self-care deficits   - Assess range of motion  - Assess patient's mobility; develop plan if impaired  - Assess patient's need for assistive devices and provide as appropriate  - Encourage maximum independence but intervene and supervise when necessary  - Involve family in performance of ADLs  - Assess for home care needs following discharge   - Consider OT consult to assist with ADL evaluation and planning for discharge  - Provide patient education as appropriate  Outcome: Progressing  Goal: Maintains/Returns to pre admission functional level  Description: INTERVENTIONS:  - Perform AM-PAC 6 Click Basic Mobility/ Daily Activity assessment daily.  - Set and communicate daily mobility goal to care team and patient/family/caregiver.   - Collaborate with rehabilitation services on mobility goals if consulted  - Perform Range of Motion  times a day.  - Reposition patient every hours.  - Dangle patient  times a day  - Stand patient times a day  - Ambulate patient  times a day  - Out of bed to chair  times a day   - Out of bed for meals times a day  - Out of bed for toileting  - Record patient progress and toleration of activity level   Outcome: Progressing     Problem: DISCHARGE PLANNING  Goal: Discharge to home or other facility with appropriate resources  Description: INTERVENTIONS:  - Identify barriers to discharge w/patient and caregiver  - Arrange for needed discharge resources and transportation as appropriate  - Identify discharge learning needs (meds, wound care, etc.)  - Arrange for interpretive services to assist at discharge as needed  - Refer to Case Management Department for coordinating discharge planning if the patient needs post-hospital services based on physician/advanced practitioner order or complex needs related to functional status, cognitive ability,  or social support system  Outcome: Progressing     Problem: Knowledge Deficit  Goal: Patient/family/caregiver demonstrates understanding of disease process, treatment plan, medications, and discharge instructions  Description: Complete learning assessment and assess knowledge base.  Interventions:  - Provide teaching at level of understanding  - Provide teaching via preferred learning methods  Outcome: Progressing

## 2024-09-11 NOTE — ASSESSMENT & PLAN NOTE
NWB to RLE in knee immobilizer -ordered   Consider ice and compression  Follow up outpatient in 2 weeks  Seen on XR and CT 9/10

## 2024-09-11 NOTE — ASSESSMENT & PLAN NOTE
Suspect secondary to fall tonight   CT RLE reviewed: large lipohemarthrosis/joint effusion, irregular lucency through medial tibial plateau appears to be subacute to chronic     Plan:  NWB  Multimodal analgesia  Orthopedics, PT/OT consult

## 2024-09-11 NOTE — PLAN OF CARE
Problem: OCCUPATIONAL THERAPY ADULT  Goal: Performs self-care activities at highest level of function for planned discharge setting.  See evaluation for individualized goals.  Description: Treatment Interventions: ADL retraining, UE strengthening/ROM, Functional transfer training, Endurance training, Cognitive reorientation, Patient/family training, Equipment evaluation/education, Compensatory technique education, Energy conservation, Activityengagement          See flowsheet documentation for full assessment, interventions and recommendations.   Note: Limitation: Decreased ADL status, Decreased UE strength, Decreased Safe judgement during ADL, Decreased cognition, Decreased endurance, Decreased self-care trans, Decreased high-level ADLs, Decreased sensation  Prognosis: Good  Assessment: Pt is a 89 y.o. female seen for OT evaluation s/p admit to [facility] on 9/10/2024 w/ fall and Ambulatory dysfunction.  Comorbidities affecting pt's functional performance at time of assessment include: DM II, HTN, hyponatremia, CKD III, cognitive decline, effusion of R knee joint CT RLE reviewed: large lipohemarthrosis/joint effusion, irregular lucency through medial tibial plateau appears to be subacute to chronic (NWB in knee immobilzer per ortho via secure chat). Personal factors affecting pt at time of IE include:steps to enter environment, difficulty performing ADLS, difficulty performing IADLS , limited insight into deficits, compliance, flat affect, and decreased initiation and engagement . Prior to admission, pt was living w/ spouse and reports: per pt independent w/ self-feeding, setup grooming/UB ADLs, assist w/ LB ADLs, supervision toileting, assist w/ IADLs. Upon evaluation: Pt requires MOD assist grooming, MAX Assist UB ADLs, total assist toileting, MAX assist supine>sit bed mobility, MAX assist x2 sit<>stand several trials w/ and w/o RW, MAX assist x2 SPT to recliner w/ maintaining NWB R LE 2* the following deficits  impacting occupational performance: increased pain in R LE, impaired balance, impaired functional reach, flat affect, decreased initiation, impaired insight and safety awareness, impaired cognition, [place], fall risk, NWB R LE. Pt to benefit from continued skilled OT tx while in the hospital to address deficits as defined above and maximize level of functional independence w ADL's and functional mobility. Occupational Performance areas to address include: grooming, bathing/shower, toilet hygiene, dressing, health maintenance, functional mobility, and clothing management. From OT standpoint, recommendation at time of d/c would be level II moderate resources.   The patient's raw score on the AM-PAC Daily Activity Inpatient Short Form is 11. A raw score of less than 19 suggests the patient may benefit from discharge to post-acute rehabilitation services. Please refer to the recommendation of the Occupational Therapist for safe discharge planning.     Rehab Resource Intensity Level, OT: II (Moderate Resource Intensity)

## 2024-09-11 NOTE — H&P
H&P - Hospitalist   Name: Ingrid Samson 89 y.o. female I MRN: [id number]  Unit/Bed#: E4 -02 I Date of Admission: 9/10/2024   Date of Service: 9/11/2024 I Hospital Day: 0     Assessment & Plan  Ambulatory dysfunction  In setting of age-related decline, muscle atrophy, fall tonight    Plan:  Orthopedics, PT/OT consult    Type 2 diabetes mellitus with diabetic chronic kidney disease (HCC)  Lab Results   Component Value Date    HGBA1C 6.8 (A) 07/06/2023     Modest control   Outpatient regimen: metformin 500mg daily     Plan:  BG goal 140-200 while inpatient, ACHS sliding scale/q6h if NPO  Carb coverage diet  Holding oral antihyperglycemic    Primary hypertension  Controlled  Outpatient regimen: lisinopril 20mg daily, furosemide 20mg daily, amlodipine 5mg daily    Plan:  Continue outpatient regimen  Hyponatremia  Acute on chronic, suspect secondary to poor solute intake   Baseline 127-130    Plan:  Check serum/urine osm, urine sodium  Trend BMP  Encourage PO intake   Stage 3a chronic kidney disease (HCC)  Lab Results   Component Value Date    EGFR 48 09/10/2024    EGFR 49 12/30/2022    EGFR 43 07/08/2021    CREATININE 1.02 09/10/2024    CREATININE 1.01 12/30/2022    CREATININE 1.13 07/08/2021     In setting of age-related, hypertensive, diabetic nephropathy  Baseline Cr: 0.8-1  Admit Cr: 1     Plan:  Monitor renal function, renal dose medications, maintain normotension/euvolemia, avoid nephrotoxic agents, I&Os  Cognitive decline  Lives with  and ?son.  reports decline, patient not eating as much, fell tonight. Family medicine note 2023 MMSE: 22    Plan:  Spouse reports patient has POA/will paperwork in place, at this point patient full code, would benefit from further advanced care planning conversations   Delirium precautions  Geriatrics consult   Effusion of right knee joint  Suspect secondary to fall tonight   CT RLE reviewed: large lipohemarthrosis/joint effusion, irregular lucency through  medial tibial plateau appears to be subacute to chronic     Plan:  NWB  Multimodal analgesia  Orthopedics, PT/OT consult   Sphenoid sinusitis  Incidental findings, unclear if symptomatic currently  CTH reviewed: mucosal thickening and fluid in left sphenoid sinus, fluid in left mastoid air cells and left middle ear consider otomastoiditis, no acute fracture, cerumen impaction    Plan:  Supportive treatment    VTE Pharmacologic Prophylaxis: VTE Score: 4 Moderate Risk (Score 3-4) - Pharmacological DVT Prophylaxis Ordered: enoxaparin (Lovenox).  Code Status: Prior   Discussion with family: Updated  () at bedside.    Anticipated Length of Stay: Patient will be admitted on an inpatient basis with an anticipated length of stay of greater than 2 midnights secondary to ambulatory dysfunction.    History of Present Illness   Chief Complaint: fall    Ingrid Samson is a 89 y.o. female with a PMH of cognitive decline, HTN, T2DM, CKD who presents with fall.   History limited from patient as she is a poor historian, further information obtained from family, chart review and discussion with ED attending/nursing. She presents tonight for evaluation of fall at home that occurred tonight. Family states that she was using a walker when her leg just gave out and she fell to the floor. He reports she twisted her right leg. Family called for help to get her up but she was complaining of leg/knee pain when she tried to stand prompting ED visit. No loss of consciousness or reported head strike.     Review of Systems   Unable to perform ROS: Dementia       I have reviewed the patient's PMH, PSH, Social History, Family History, Meds, and Allergies  Social History:  Marital Status: /Civil Union   Occupation:   Patient Pre-hospital Living Situation: Home  Patient Pre-hospital Level of Mobility: walks with walker  Patient Pre-hospital Diet Restrictions:     Objective     Vitals:   Blood Pressure: 111/59  (09/11/24 0330)  Pulse: 94 (09/11/24 0330)  Temperature: 97.8 °F (36.6 °C) (09/10/24 2037)  Temp Source: Axillary (09/10/24 2037)  Respirations: 18 (09/11/24 0330)  Weight - Scale: 39.3 kg (86 lb 10.3 oz) (09/10/24 2037)  SpO2: 97 % (09/11/24 0330)    Physical Exam    Lines/Drains:  Lines/Drains/Airways       Active Status       None                        Additional Data:   Lab Results: I have reviewed the following results:   Results from last 7 days   Lab Units 09/10/24  2152   WBC Thousand/uL 9.64   HEMOGLOBIN g/dL 11.0*   HEMATOCRIT % 32.5*   PLATELETS Thousands/uL 382   LYMPHO PCT % 7*   MONO PCT % 4   EOS PCT % 0     Results from last 7 days   Lab Units 09/10/24  2152   SODIUM mmol/L 127*   POTASSIUM mmol/L 3.4*   CHLORIDE mmol/L 90*   CO2 mmol/L 22   BUN mg/dL 14   CREATININE mg/dL 1.02   ANION GAP mmol/L 15*   CALCIUM mg/dL 8.2*   ALBUMIN g/dL 3.2*   TOTAL BILIRUBIN mg/dL 0.46   ALK PHOS U/L 157*   ALT U/L 8   AST U/L 11*   GLUCOSE RANDOM mg/dL 227*             Lab Results   Component Value Date    HGBA1C 6.8 (A) 07/06/2023    HGBA1C 7.5 (H) 12/30/2022    HGBA1C 7.0 (A) 02/02/2022           Imaging Review: Reviewed radiology reports from this admission including: CT chest, CT abdomen/pelvis, CT head, CT C-spine, and CT RLE.  Other Studies: No additional pertinent studies reviewed.    Administrative Statements       ** Please Note: This note has been constructed using a voice recognition system. **

## 2024-09-11 NOTE — ASSESSMENT & PLAN NOTE
Incidental findings, unclear if symptomatic currently  CTH reviewed: mucosal thickening and fluid in left sphenoid sinus, fluid in left mastoid air cells and left middle ear consider otomastoiditis, no acute fracture, cerumen impaction    Plan:  Supportive treatment

## 2024-09-11 NOTE — ASSESSMENT & PLAN NOTE
Lab Results   Component Value Date    HGBA1C 6.8 (A) 07/06/2023     Modest control   Outpatient regimen: metformin 500mg daily     Plan:  BG goal 140-200 while inpatient, ACHS sliding scale/q6h if NPO  Carb coverage diet  Holding oral antihyperglycemic

## 2024-09-11 NOTE — ASSESSMENT & PLAN NOTE
Lives with  and ?son.  reports decline, patient not eating as much, fell tonight. Family medicine note 2023 MMSE: 22    Plan:  Spouse reports patient has POA/will paperwork in place, at this point patient full code, would benefit from further advanced care planning conversations   Delirium precautions  Geriatrics consult

## 2024-09-11 NOTE — NURSING NOTE
Assumed care of pt at 1500, I agree with previously charted assessment. Patient requested repositioning which was performed. Vitals and labs reviewed. Meds held pending speech eval for aspiration risk.

## 2024-09-11 NOTE — ASSESSMENT & PLAN NOTE
In setting of age-related decline, muscle atrophy, fall tonight    Plan:  Orthopedics, PT/OT consult

## 2024-09-11 NOTE — TELEPHONE ENCOUNTER
Pt , Joe called. Wants provider to be aware that his wife is in the hospital. She went in the ambulance 9/10- due to a fall. Had to cancel 9/12 visit. Once she is finished with rehab, will call back to schedule visit. Thanked the patients  for calling, . Noted for visibility.

## 2024-09-11 NOTE — ED PROVIDER NOTES
1. Fall, initial encounter    2. Chronic anemia    3. Cognitive decline    4. Mild protein-calorie malnutrition (HCC)    5. Hyponatremia    6. Ambulatory dysfunction      ED Disposition       ED Disposition   Admit    Condition   Stable    Date/Time   Fri Sep 13, 2024  2:12 PM    Comment   --             Assessment & Plan       Medical Decision Making  Amount and/or Complexity of Data Reviewed  Independent Historian: spouse  External Data Reviewed: notes.  Labs: ordered. Decision-making details documented in ED Course.  Radiology: ordered.    Risk  Prescription drug management.  Decision regarding hospitalization.      Patient is a 89-year-old female presenting after fall, inability to tolerate bearing much weight.  Patient had diffuse tenderness to palpation of the cervical spine the chest.  However she is complaining of right hip/right thigh pain.  On evaluation, patient is maintaining her airway, has bilateral breath sounds, is able to move distal extremities.  Do not appreciate obvious deformity.  Given fall, tenderness, plan to proceed with CT and x-ray images to rule out fracture or acute traumatic injury. If there are no injuries on imaging, will attempt to ambulate patient. If she is unable to ambulate will offer admission for ambulatory dysfunction.          ED Course as of 09/14/24 1127   Tue Sep 10, 2024   2200 Hemoglobin(!): 11.0  At baseline, chronic anemia   2344 There was a potential fracture at the tibial plateau. CT knee non con was added to further evaluate. We are pending formal CT reads. I did not appreaciate an ICH on CTH.  Both  and pt are sleeping and resting comfortably.    Wed Sep 11, 2024   0004 Sodium(!): 127  Chronic hyponatremia   Sat Sep 14, 2024   1126 Completion of note delayed due to epic downtime. No acute fracutures noted on CT imaging. Given concern for ambulatory dysfunction and safe disposition, patient was kindly admitted to the medicine service.       Medications    fentaNYL injection 25 mcg (25 mcg Intravenous Given 9/10/24 2152)   iohexol (OMNIPAQUE) 350 MG/ML injection (MULTI-DOSE) 100 mL (100 mL Intravenous Given 9/10/24 2257)       History of Present Illness         Fall      Ingrid Samson is a 89 y.o. female who identifies as a female presenting to the Emergency Department for a fall.   states that she uses either a cane or walker.  Her leg gave out and she fell on the floor.  This occurred about 4 PM today.  Patient has not been able to walk much since the fall as she is complaining of leg pain whenever she stands.  Patient is complaining of right leg pain however  mentioned left leg pain.  Patient is significantly hard of hearing and has history of dementia.  Patient does have some right hand swelling that has been present for a week.    Review of Systems   Unable to perform ROS: Age           Objective     ED Triage Vitals   Temperature Pulse Blood Pressure Respirations SpO2 Patient Position - Orthostatic VS   09/10/24 2037 09/10/24 2037 09/10/24 2037 09/10/24 2037 09/10/24 2037 09/10/24 2037   97.8 °F (36.6 °C) (!) 107 113/56 18 99 % Lying      Temp Source Heart Rate Source BP Location FiO2 (%) Pain Score    09/10/24 2037 09/10/24 2037 09/10/24 2037 -- 09/10/24 2152    Axillary Monitor Right arm  7        Physical Exam  Vitals and nursing note reviewed.   Constitutional:       Appearance: She is not toxic-appearing.   HENT:      Head: Normocephalic and atraumatic.      Right Ear: External ear normal.      Left Ear: External ear normal.      Nose: Nose normal.   Eyes:      Pupils: Pupils are equal, round, and reactive to light.   Neck:      Comments: Diffuse tenderness of c spine  Cardiovascular:      Pulses: Normal pulses.   Pulmonary:      Effort: Pulmonary effort is normal.      Breath sounds: Normal breath sounds.   Chest:      Chest wall: Tenderness present. No lacerations or deformity.      Comments: Diffuse tenderness to BL chest  walls  Abdominal:      General: Abdomen is flat.      Palpations: Abdomen is soft.      Tenderness: There is no abdominal tenderness.   Musculoskeletal:         General: Normal range of motion.      Cervical back: Neck supple. Spinous process tenderness present.      Right lower leg: No edema.      Left lower leg: No edema.      Comments: Swelling to r hand  Diffuse midline thoracolumbar tenderness  R hip, femur, and ankle tenerness  Mild peripheral edema to BL ankle   Skin:     General: Skin is warm and dry.      Capillary Refill: Capillary refill takes less than 2 seconds.   Neurological:      General: No focal deficit present.      Mental Status: She is alert.   Psychiatric:         Mood and Affect: Mood normal.         Labs Reviewed   CBC AND DIFFERENTIAL - Abnormal       Result Value    WBC 9.64      RBC 3.63 (*)     Hemoglobin 11.0 (*)     Hematocrit 32.5 (*)     MCV 90      MCH 30.3      MCHC 33.8      RDW 14.5      MPV 8.3 (*)     Platelets 382      Narrative:     This is an appended report.  These results have been appended to a previously verified report.   COMPREHENSIVE METABOLIC PANEL - Abnormal    Sodium 127 (*)     Potassium 3.4 (*)     Chloride 90 (*)     CO2 22      ANION GAP 15 (*)     BUN 14      Creatinine 1.02      Glucose 227 (*)     Calcium 8.2 (*)     Corrected Calcium 8.8      AST 11 (*)     ALT 8      Alkaline Phosphatase 157 (*)     Total Protein 6.2 (*)     Albumin 3.2 (*)     Total Bilirubin 0.46      eGFR 48      Narrative:     National Kidney Disease Foundation guidelines for Chronic Kidney Disease (CKD):     Stage 1 with normal or high GFR (GFR > 90 mL/min/1.73 square meters)    Stage 2 Mild CKD (GFR = 60-89 mL/min/1.73 square meters)    Stage 3A Moderate CKD (GFR = 45-59 mL/min/1.73 square meters)    Stage 3B Moderate CKD (GFR = 30-44 mL/min/1.73 square meters)    Stage 4 Severe CKD (GFR = 15-29 mL/min/1.73 square meters)    Stage 5 End Stage CKD (GFR <15 mL/min/1.73 square  meters)  Note: GFR calculation is accurate only with a steady state creatinine   URINALYSIS WITH REFLEX TO SCOPE - Abnormal    Color, UA Light Yellow      Clarity, UA Clear      Specific Gravity, UA 1.046 (*)     pH, UA 6.5      Leukocytes, UA Negative      Nitrite, UA Negative      Protein, UA Negative      Glucose, UA Trace (*)     Ketones, UA Negative      Urobilinogen, UA <2.0      Bilirubin, UA Negative      Occult Blood, UA Negative     HEMOGLOBIN A1C - Abnormal    Hemoglobin A1C 6.8 (*)          BASIC METABOLIC PANEL - Abnormal    Sodium 128 (*)     Potassium 3.2 (*)     Chloride 90 (*)     CO2 29      ANION GAP 9      BUN 12      Creatinine 0.96      Glucose 136      Calcium 7.9 (*)     eGFR 51      Narrative:     National Kidney Disease Foundation guidelines for Chronic Kidney Disease (CKD):     Stage 1 with normal or high GFR (GFR > 90 mL/min/1.73 square meters)    Stage 2 Mild CKD (GFR = 60-89 mL/min/1.73 square meters)    Stage 3A Moderate CKD (GFR = 45-59 mL/min/1.73 square meters)    Stage 3B Moderate CKD (GFR = 30-44 mL/min/1.73 square meters)    Stage 4 Severe CKD (GFR = 15-29 mL/min/1.73 square meters)    Stage 5 End Stage CKD (GFR <15 mL/min/1.73 square meters)  Note: GFR calculation is accurate only with a steady state creatinine   CBC AND PLATELET - Abnormal    WBC 10.27 (*)     RBC 3.38 (*)     Hemoglobin 10.5 (*)     Hematocrit 30.5 (*)     MCV 90      MCH 31.1      MCHC 34.4      RDW 14.6      Platelets 336      MPV 8.4 (*)    FOLATE - Abnormal    Folate 4.8 (*)    OSMOLALITY - Abnormal    Osmolality Serum 278 (*)    URIC ACID - Abnormal    Uric Acid 8.2 (*)     Narrative:     N-acetyl-p-benzoquinone imine (metabolite of Acetaminophen) will generate erroneously low results in samples for patients that have taken an overdose of Acetaminophen.   PREALBUMIN - Abnormal    Prealbumin 7.3 (*)    BASIC METABOLIC PANEL - Abnormal    Sodium 132 (*)     Potassium 3.2 (*)     Chloride 99      CO2  26      ANION GAP 7      BUN 11      Creatinine 0.75      Glucose 92      Calcium 7.7 (*)     eGFR 69      Narrative:     National Kidney Disease Foundation guidelines for Chronic Kidney Disease (CKD):     Stage 1 with normal or high GFR (GFR > 90 mL/min/1.73 square meters)    Stage 2 Mild CKD (GFR = 60-89 mL/min/1.73 square meters)    Stage 3A Moderate CKD (GFR = 45-59 mL/min/1.73 square meters)    Stage 3B Moderate CKD (GFR = 30-44 mL/min/1.73 square meters)    Stage 4 Severe CKD (GFR = 15-29 mL/min/1.73 square meters)    Stage 5 End Stage CKD (GFR <15 mL/min/1.73 square meters)  Note: GFR calculation is accurate only with a steady state creatinine   CBC AND PLATELET - Abnormal    WBC 5.64      RBC 2.93 (*)     Hemoglobin 9.1 (*)     Hematocrit 26.5 (*)     MCV 90      MCH 31.1      MCHC 34.3      RDW 14.5      Platelets 283      MPV 8.5 (*)    CBC AND PLATELET - Abnormal    WBC 6.40      RBC 3.20 (*)     Hemoglobin 9.8 (*)     Hematocrit 28.8 (*)     MCV 90      MCH 30.6      MCHC 34.0      RDW 14.4      Platelets 301      MPV 8.5 (*)    CBC AND PLATELET - Abnormal    WBC 5.46      RBC 2.85 (*)     Hemoglobin 8.9 (*)     Hematocrit 25.4 (*)     MCV 89      MCH 31.2      MCHC 35.0      RDW 14.3      Platelets 284      MPV 8.4 (*)    BASIC METABOLIC PANEL - Abnormal    Sodium 133 (*)     Potassium 2.7 (*)     Chloride 99      CO2 27      ANION GAP 7      BUN 8      Creatinine 0.70      Glucose 85      Calcium 7.7 (*)     eGFR 75      Narrative:     National Kidney Disease Foundation guidelines for Chronic Kidney Disease (CKD):     Stage 1 with normal or high GFR (GFR > 90 mL/min/1.73 square meters)    Stage 2 Mild CKD (GFR = 60-89 mL/min/1.73 square meters)    Stage 3A Moderate CKD (GFR = 45-59 mL/min/1.73 square meters)    Stage 3B Moderate CKD (GFR = 30-44 mL/min/1.73 square meters)    Stage 4 Severe CKD (GFR = 15-29 mL/min/1.73 square meters)    Stage 5 End Stage CKD (GFR <15 mL/min/1.73 square meters)  Note:  GFR calculation is accurate only with a steady state creatinine   MANUAL DIFFERENTIAL(PHLEBS DO NOT ORDER) - Abnormal    Segmented % 89 (*)     Lymphocytes % 7 (*)     Monocytes % 4      Eosinophils % 0      Basophils % 0      Absolute Neutrophils 8.58 (*)     Absolute Lymphocytes 0.67      Absolute Monocytes 0.39      Absolute Eosinophils 0.00      Absolute Basophils 0.00      Total Counted        RBC Morphology Normal      Platelet Estimate Adequate     OSMOLALITY, URINE - Normal    Osmolality, Ur 363     TSH, 3RD GENERATION - Normal    TSH 3RD GENERATON 0.602     VITAMIN B12 - Normal    Vitamin B-12 205     TSH, 3RD GENERATION WITH FREE T4 REFLEX - Normal    TSH 3RD GENERATON 0.601     POCT GLUCOSE - Normal    POC Glucose 121     POCT GLUCOSE - Normal    POC Glucose 113     POCT GLUCOSE - Normal    POC Glucose 128     POCT GLUCOSE - Normal    POC Glucose 132     POCT GLUCOSE - Normal    POC Glucose 79     POCT GLUCOSE - Normal    POC Glucose 105     POCT GLUCOSE - Normal    POC Glucose 132     POCT GLUCOSE - Normal    POC Glucose 135     POCT GLUCOSE - Normal    POC Glucose 73     POCT GLUCOSE - Normal    POC Glucose 69     POCT GLUCOSE - Normal    POC Glucose 73     POCT GLUCOSE - Normal    POC Glucose 124     POCT GLUCOSE - Normal    POC Glucose 113     POCT GLUCOSE - Normal    POC Glucose 84     POCT GLUCOSE - Normal    POC Glucose 107     SODIUM, URINE, RANDOM    Sodium, Ur 57       FL barium swallow video w speech   Final Interpretation by  (09/12 1146)      FL barium swallow video w speech   Final Interpretation by SYSTEMGENERATED, DOCUMENTATION (09/12 1035)      CT lower extremity wo contrast right   Final Interpretation by Darion Gomez DO (09/11 0009)      Irregular lucency through the medial tibial plateau which appears to be subacute to chronic, as the adjacent cortices appear to be intact. Correlate with focal symptomatology.      Large lipohemarthrosis/joint effusion               Workstation  performed: [id number]         CT head wo contrast   Final Interpretation by Faheem Caro DO (09/11 0005)      Mucosal thickening and fluid in the left sphenoid sinus, consider acute sphenoid sinusitis.  Fluid in the left mastoid air cells and left middle ear, consider otomastoiditis. No calvarial fracture or acute intracranial abnormality is seen otherwise.      Moderate cerumen in the bilateral external auditory canals, could cause pain and/or dizziness.      Other findings as above.            CT CERVICAL SPINE - WITHOUT CONTRAST      INDICATION:   fall.      COMPARISON:  None.      TECHNIQUE:  CT examination of the cervical spine was performed without intravenous contrast.  Contiguous axial images were obtained. Multiplanar 2D reformatted images were created from the source data.      Radiation dose length product (DLP) for this visit:  837 mGy-cm  (accession [id number]), 188 mGy-cm  (accession [id number]), 490 mGy-cm  (accession [id number]), 0 mGy-cm  (accession [id number]).  This examination, like all CT scans performed in the [facility], was performed utilizing techniques to minimize radiation dose exposure, including the use of iterative reconstruction and automated exposure control.      IMAGE QUALITY:  Diagnostic.      FINDINGS:      ALIGNMENT: 2 to 3 mm of anterolisthesis of C7 on T1 and T1 on T2, probably related to facet joint arthropathy.      VERTEBRAE: No acute fracture.      DEGENERATIVE CHANGES: Intervertebral disc space narrowing at C4/C5, C5/C6, and C6/C7 with anterior, marginal, and uncovertebral osteophytosis at these levels. Multilevel facet joint arthropathy.      PREVERTEBRAL AND PARASPINAL SOFT TISSUES: Incidental discovery of one or more thyroid nodule(s) measuring less than 1.5 cm and without suspicious features is noted in this patient who is above 35 years old; according to guidelines published in the    February 2015 white paper on incidental thyroid nodules in  the Journal of the American College of Radiology (JACR), no further evaluation is recommended.      THORACIC INLET:  Please refer to the concurrent chest CT report for thoracic inlet findings.         IMPRESSION:      Degenerative changes as described but no evidence of acute cervical spine injury.      Other findings as above.               CT CHEST, ABDOMEN AND PELVIS WITH IV CONTRAST   CT THORACOLUMBAR SPINE WITHOUT CONTRAST      INDICATION: fall.      COMPARISON: None.      TECHNIQUE: CT examination of the chest, abdomen and pelvis was performed. Multiplanar 2D reformatted images were created from the source data.      This examination, like all CT scans performed in the [facility], was performed utilizing techniques to minimize radiation dose exposure, including the use of iterative reconstruction and automated exposure control. Radiation dose length    product (DLP) for this visit: 837 mGy-cm  (accession [id number]), 188 mGy-cm  (accession [id number]), 490 mGy-cm  (accession [id number]), 0 mGy-cm  (accession [id number])      IV Contrast: 100 mL of iohexol (OMNIPAQUE)   Enteric Contrast: Not administered.      FINDINGS:      CHEST      LUNGS/PLEURA: Atelectasis/scarring noted dependently and in the left lower lung field. Biapical pleural and parenchymal scarring with some calcified pleural plaques.      Lungs otherwise appear grossly clear.      HEART/GREAT VESSELS: Heart appears normal in size. Moderate coronary atherosclerosis. Mild aortic atherosclerosis. No thoracic aortic aneurysm.      MEDIASTINUM AND SU: Inspissated secretions in the distal trachea. Otherwise grossly unremarkable      CHEST WALL AND LOWER NECK: Generalized osteopenia. Old right-sided rib fractures size old fracture of the inferior right scapula. Body wall edema. Several subcentimeter low-density nodules in the thyroid gland, of unlikely clinical significance.      ABDOMEN      LIVER/BILIARY TREE: Unremarkable.       GALLBLADDER: No calcified gallstones. No pericholecystic inflammatory change.      SPLEEN: Unremarkable.      PANCREAS: Unremarkable.      ADRENAL GLANDS: Adrenal gland thickening.      KIDNEYS/URETERS: Severe atrophy of the left kidney. 0.8 cm exophytic low-density lesion posteriorly and 1.2 cm low-density lesion in the mid right kidney, nonspecific. Otherwise grossly unremarkable.      STOMACH AND BOWEL: Colonic diverticulosis, no discrete evidence of acute diverticulitis. No evidence of bowel obstruction.      APPENDIX: No findings to suggest appendicitis.      ABDOMINOPELVIC CAVITY: No ascites. No pneumoperitoneum. No lymphadenopathy.      VESSELS: Moderate atherosclerosis, no aortic aneurysm      PELVIS      REPRODUCTIVE ORGANS: Unremarkable for patient's age.      URINARY BLADDER: Unremarkable.      ABDOMINAL WALL/INGUINAL REGIONS: Body wall edema      BONES: Generalized osteopenia. Left bipolar hip hemiarthroplasty. Visualized hardware appears intact. Multilevel degenerative changes of the spine with intervertebral disc space narrowing, osteophytosis, and facet joint arthropathy, most prominent in the    lumbar spine. No acute fracture or suspicious osseous lesion.         IMPRESSION:      No acute process seen. No evidence of acute visceral or vascular injury in the chest, abdomen, or pelvis.      Pulmonary pleural and parenchymal scarring, coronary atherosclerosis, adrenal gland thickening, left renal atrophy, colonic diverticulosis without evidence of acute diverticulitis, degenerative changes of the spine, and other findings as above.               Workstation performed: [id number]         CT spine cervical without contrast   Final Interpretation by Faheem Caro DO (09/11 0005)      Mucosal thickening and fluid in the left sphenoid sinus, consider acute sphenoid sinusitis.  Fluid in the left mastoid air cells and left middle ear, consider otomastoiditis. No calvarial fracture or acute  intracranial abnormality is seen otherwise.      Moderate cerumen in the bilateral external auditory canals, could cause pain and/or dizziness.      Other findings as above.            CT CERVICAL SPINE - WITHOUT CONTRAST      INDICATION:   fall.      COMPARISON:  None.      TECHNIQUE:  CT examination of the cervical spine was performed without intravenous contrast.  Contiguous axial images were obtained. Multiplanar 2D reformatted images were created from the source data.      Radiation dose length product (DLP) for this visit:  837 mGy-cm  (accession [id number]), 188 mGy-cm  (accession [id number]), 490 mGy-cm  (accession [id number]), 0 mGy-cm  (accession [id number]).  This examination, like all CT scans performed in the [facility], was performed utilizing techniques to minimize radiation dose exposure, including the use of iterative reconstruction and automated exposure control.      IMAGE QUALITY:  Diagnostic.      FINDINGS:      ALIGNMENT: 2 to 3 mm of anterolisthesis of C7 on T1 and T1 on T2, probably related to facet joint arthropathy.      VERTEBRAE: No acute fracture.      DEGENERATIVE CHANGES: Intervertebral disc space narrowing at C4/C5, C5/C6, and C6/C7 with anterior, marginal, and uncovertebral osteophytosis at these levels. Multilevel facet joint arthropathy.      PREVERTEBRAL AND PARASPINAL SOFT TISSUES: Incidental discovery of one or more thyroid nodule(s) measuring less than 1.5 cm and without suspicious features is noted in this patient who is above 35 years old; according to guidelines published in the    February 2015 white paper on incidental thyroid nodules in the Journal of the American College of Radiology (JACR), no further evaluation is recommended.      THORACIC INLET:  Please refer to the concurrent chest CT report for thoracic inlet findings.         IMPRESSION:      Degenerative changes as described but no evidence of acute cervical spine injury.      Other findings as above.                CT CHEST, ABDOMEN AND PELVIS WITH IV CONTRAST   CT THORACOLUMBAR SPINE WITHOUT CONTRAST      INDICATION: fall.      COMPARISON: None.      TECHNIQUE: CT examination of the chest, abdomen and pelvis was performed. Multiplanar 2D reformatted images were created from the source data.      This examination, like all CT scans performed in the [facility], was performed utilizing techniques to minimize radiation dose exposure, including the use of iterative reconstruction and automated exposure control. Radiation dose length    product (DLP) for this visit: 837 mGy-cm  (accession [id number]), 188 mGy-cm  (accession [id number]), 490 mGy-cm  (accession [id number]), 0 mGy-cm  (accession [id number])      IV Contrast: 100 mL of iohexol (OMNIPAQUE)   Enteric Contrast: Not administered.      FINDINGS:      CHEST      LUNGS/PLEURA: Atelectasis/scarring noted dependently and in the left lower lung field. Biapical pleural and parenchymal scarring with some calcified pleural plaques.      Lungs otherwise appear grossly clear.      HEART/GREAT VESSELS: Heart appears normal in size. Moderate coronary atherosclerosis. Mild aortic atherosclerosis. No thoracic aortic aneurysm.      MEDIASTINUM AND SU: Inspissated secretions in the distal trachea. Otherwise grossly unremarkable      CHEST WALL AND LOWER NECK: Generalized osteopenia. Old right-sided rib fractures size old fracture of the inferior right scapula. Body wall edema. Several subcentimeter low-density nodules in the thyroid gland, of unlikely clinical significance.      ABDOMEN      LIVER/BILIARY TREE: Unremarkable.      GALLBLADDER: No calcified gallstones. No pericholecystic inflammatory change.      SPLEEN: Unremarkable.      PANCREAS: Unremarkable.      ADRENAL GLANDS: Adrenal gland thickening.      KIDNEYS/URETERS: Severe atrophy of the left kidney. 0.8 cm exophytic low-density lesion posteriorly and 1.2 cm low-density lesion in the mid right kidney,  nonspecific. Otherwise grossly unremarkable.      STOMACH AND BOWEL: Colonic diverticulosis, no discrete evidence of acute diverticulitis. No evidence of bowel obstruction.      APPENDIX: No findings to suggest appendicitis.      ABDOMINOPELVIC CAVITY: No ascites. No pneumoperitoneum. No lymphadenopathy.      VESSELS: Moderate atherosclerosis, no aortic aneurysm      PELVIS      REPRODUCTIVE ORGANS: Unremarkable for patient's age.      URINARY BLADDER: Unremarkable.      ABDOMINAL WALL/INGUINAL REGIONS: Body wall edema      BONES: Generalized osteopenia. Left bipolar hip hemiarthroplasty. Visualized hardware appears intact. Multilevel degenerative changes of the spine with intervertebral disc space narrowing, osteophytosis, and facet joint arthropathy, most prominent in the    lumbar spine. No acute fracture or suspicious osseous lesion.         IMPRESSION:      No acute process seen. No evidence of acute visceral or vascular injury in the chest, abdomen, or pelvis.      Pulmonary pleural and parenchymal scarring, coronary atherosclerosis, adrenal gland thickening, left renal atrophy, colonic diverticulosis without evidence of acute diverticulitis, degenerative changes of the spine, and other findings as above.               Workstation performed: [id number]         CT chest abdomen pelvis w contrast   Final Interpretation by Faheem Caro DO (09/11 0005)      Mucosal thickening and fluid in the left sphenoid sinus, consider acute sphenoid sinusitis.  Fluid in the left mastoid air cells and left middle ear, consider otomastoiditis. No calvarial fracture or acute intracranial abnormality is seen otherwise.      Moderate cerumen in the bilateral external auditory canals, could cause pain and/or dizziness.      Other findings as above.            CT CERVICAL SPINE - WITHOUT CONTRAST      INDICATION:   fall.      COMPARISON:  None.      TECHNIQUE:  CT examination of the cervical spine was performed without  intravenous contrast.  Contiguous axial images were obtained. Multiplanar 2D reformatted images were created from the source data.      Radiation dose length product (DLP) for this visit:  837 mGy-cm  (accession [id number]), 188 mGy-cm  (accession [id number]), 490 mGy-cm  (accession [id number]), 0 mGy-cm  (accession [id number]).  This examination, like all CT scans performed in the [facility], was performed utilizing techniques to minimize radiation dose exposure, including the use of iterative reconstruction and automated exposure control.      IMAGE QUALITY:  Diagnostic.      FINDINGS:      ALIGNMENT: 2 to 3 mm of anterolisthesis of C7 on T1 and T1 on T2, probably related to facet joint arthropathy.      VERTEBRAE: No acute fracture.      DEGENERATIVE CHANGES: Intervertebral disc space narrowing at C4/C5, C5/C6, and C6/C7 with anterior, marginal, and uncovertebral osteophytosis at these levels. Multilevel facet joint arthropathy.      PREVERTEBRAL AND PARASPINAL SOFT TISSUES: Incidental discovery of one or more thyroid nodule(s) measuring less than 1.5 cm and without suspicious features is noted in this patient who is above 35 years old; according to guidelines published in the    February 2015 white paper on incidental thyroid nodules in the Journal of the American College of Radiology (JACR), no further evaluation is recommended.      THORACIC INLET:  Please refer to the concurrent chest CT report for thoracic inlet findings.         IMPRESSION:      Degenerative changes as described but no evidence of acute cervical spine injury.      Other findings as above.               CT CHEST, ABDOMEN AND PELVIS WITH IV CONTRAST   CT THORACOLUMBAR SPINE WITHOUT CONTRAST      INDICATION: fall.      COMPARISON: None.      TECHNIQUE: CT examination of the chest, abdomen and pelvis was performed. Multiplanar 2D reformatted images were created from the source data.      This examination, like all CT scans performed  in the [facility], was performed utilizing techniques to minimize radiation dose exposure, including the use of iterative reconstruction and automated exposure control. Radiation dose length    product (DLP) for this visit: 837 mGy-cm  (accession [id number]), 188 mGy-cm  (accession [id number]), 490 mGy-cm  (accession [id number]), 0 mGy-cm  (accession [id number])      IV Contrast: 100 mL of iohexol (OMNIPAQUE)   Enteric Contrast: Not administered.      FINDINGS:      CHEST      LUNGS/PLEURA: Atelectasis/scarring noted dependently and in the left lower lung field. Biapical pleural and parenchymal scarring with some calcified pleural plaques.      Lungs otherwise appear grossly clear.      HEART/GREAT VESSELS: Heart appears normal in size. Moderate coronary atherosclerosis. Mild aortic atherosclerosis. No thoracic aortic aneurysm.      MEDIASTINUM AND SU: Inspissated secretions in the distal trachea. Otherwise grossly unremarkable      CHEST WALL AND LOWER NECK: Generalized osteopenia. Old right-sided rib fractures size old fracture of the inferior right scapula. Body wall edema. Several subcentimeter low-density nodules in the thyroid gland, of unlikely clinical significance.      ABDOMEN      LIVER/BILIARY TREE: Unremarkable.      GALLBLADDER: No calcified gallstones. No pericholecystic inflammatory change.      SPLEEN: Unremarkable.      PANCREAS: Unremarkable.      ADRENAL GLANDS: Adrenal gland thickening.      KIDNEYS/URETERS: Severe atrophy of the left kidney. 0.8 cm exophytic low-density lesion posteriorly and 1.2 cm low-density lesion in the mid right kidney, nonspecific. Otherwise grossly unremarkable.      STOMACH AND BOWEL: Colonic diverticulosis, no discrete evidence of acute diverticulitis. No evidence of bowel obstruction.      APPENDIX: No findings to suggest appendicitis.      ABDOMINOPELVIC CAVITY: No ascites. No pneumoperitoneum. No lymphadenopathy.      VESSELS: Moderate atherosclerosis,  no aortic aneurysm      PELVIS      REPRODUCTIVE ORGANS: Unremarkable for patient's age.      URINARY BLADDER: Unremarkable.      ABDOMINAL WALL/INGUINAL REGIONS: Body wall edema      BONES: Generalized osteopenia. Left bipolar hip hemiarthroplasty. Visualized hardware appears intact. Multilevel degenerative changes of the spine with intervertebral disc space narrowing, osteophytosis, and facet joint arthropathy, most prominent in the    lumbar spine. No acute fracture or suspicious osseous lesion.         IMPRESSION:      No acute process seen. No evidence of acute visceral or vascular injury in the chest, abdomen, or pelvis.      Pulmonary pleural and parenchymal scarring, coronary atherosclerosis, adrenal gland thickening, left renal atrophy, colonic diverticulosis without evidence of acute diverticulitis, degenerative changes of the spine, and other findings as above.               Workstation performed: [id number]         CT recon only thoracolumbar   Final Interpretation by Faheem Caro DO (09/11 0005)      Mucosal thickening and fluid in the left sphenoid sinus, consider acute sphenoid sinusitis.  Fluid in the left mastoid air cells and left middle ear, consider otomastoiditis. No calvarial fracture or acute intracranial abnormality is seen otherwise.      Moderate cerumen in the bilateral external auditory canals, could cause pain and/or dizziness.      Other findings as above.            CT CERVICAL SPINE - WITHOUT CONTRAST      INDICATION:   fall.      COMPARISON:  None.      TECHNIQUE:  CT examination of the cervical spine was performed without intravenous contrast.  Contiguous axial images were obtained. Multiplanar 2D reformatted images were created from the source data.      Radiation dose length product (DLP) for this visit:  837 mGy-cm  (accession [id number]), 188 mGy-cm  (accession [id number]), 490 mGy-cm  (accession [id number]), 0 mGy-cm  (accession [id number]).  This examination, like all CT  scans performed in the [facility], was performed utilizing techniques to minimize radiation dose exposure, including the use of iterative reconstruction and automated exposure control.      IMAGE QUALITY:  Diagnostic.      FINDINGS:      ALIGNMENT: 2 to 3 mm of anterolisthesis of C7 on T1 and T1 on T2, probably related to facet joint arthropathy.      VERTEBRAE: No acute fracture.      DEGENERATIVE CHANGES: Intervertebral disc space narrowing at C4/C5, C5/C6, and C6/C7 with anterior, marginal, and uncovertebral osteophytosis at these levels. Multilevel facet joint arthropathy.      PREVERTEBRAL AND PARASPINAL SOFT TISSUES: Incidental discovery of one or more thyroid nodule(s) measuring less than 1.5 cm and without suspicious features is noted in this patient who is above 35 years old; according to guidelines published in the    February 2015 white paper on incidental thyroid nodules in the Journal of the American College of Radiology (JACR), no further evaluation is recommended.      THORACIC INLET:  Please refer to the concurrent chest CT report for thoracic inlet findings.         IMPRESSION:      Degenerative changes as described but no evidence of acute cervical spine injury.      Other findings as above.               CT CHEST, ABDOMEN AND PELVIS WITH IV CONTRAST   CT THORACOLUMBAR SPINE WITHOUT CONTRAST      INDICATION: fall.      COMPARISON: None.      TECHNIQUE: CT examination of the chest, abdomen and pelvis was performed. Multiplanar 2D reformatted images were created from the source data.      This examination, like all CT scans performed in the [facility], was performed utilizing techniques to minimize radiation dose exposure, including the use of iterative reconstruction and automated exposure control. Radiation dose length    product (DLP) for this visit: 837 mGy-cm  (accession [id number]), 188 mGy-cm  (accession [id number]), 490 mGy-cm  (accession [id number]), 0 mGy-cm   (accession [id number])      IV Contrast: 100 mL of iohexol (OMNIPAQUE)   Enteric Contrast: Not administered.      FINDINGS:      CHEST      LUNGS/PLEURA: Atelectasis/scarring noted dependently and in the left lower lung field. Biapical pleural and parenchymal scarring with some calcified pleural plaques.      Lungs otherwise appear grossly clear.      HEART/GREAT VESSELS: Heart appears normal in size. Moderate coronary atherosclerosis. Mild aortic atherosclerosis. No thoracic aortic aneurysm.      MEDIASTINUM AND SU: Inspissated secretions in the distal trachea. Otherwise grossly unremarkable      CHEST WALL AND LOWER NECK: Generalized osteopenia. Old right-sided rib fractures size old fracture of the inferior right scapula. Body wall edema. Several subcentimeter low-density nodules in the thyroid gland, of unlikely clinical significance.      ABDOMEN      LIVER/BILIARY TREE: Unremarkable.      GALLBLADDER: No calcified gallstones. No pericholecystic inflammatory change.      SPLEEN: Unremarkable.      PANCREAS: Unremarkable.      ADRENAL GLANDS: Adrenal gland thickening.      KIDNEYS/URETERS: Severe atrophy of the left kidney. 0.8 cm exophytic low-density lesion posteriorly and 1.2 cm low-density lesion in the mid right kidney, nonspecific. Otherwise grossly unremarkable.      STOMACH AND BOWEL: Colonic diverticulosis, no discrete evidence of acute diverticulitis. No evidence of bowel obstruction.      APPENDIX: No findings to suggest appendicitis.      ABDOMINOPELVIC CAVITY: No ascites. No pneumoperitoneum. No lymphadenopathy.      VESSELS: Moderate atherosclerosis, no aortic aneurysm      PELVIS      REPRODUCTIVE ORGANS: Unremarkable for patient's age.      URINARY BLADDER: Unremarkable.      ABDOMINAL WALL/INGUINAL REGIONS: Body wall edema      BONES: Generalized osteopenia. Left bipolar hip hemiarthroplasty. Visualized hardware appears intact. Multilevel degenerative changes of the spine with intervertebral  disc space narrowing, osteophytosis, and facet joint arthropathy, most prominent in the    lumbar spine. No acute fracture or suspicious osseous lesion.         IMPRESSION:      No acute process seen. No evidence of acute visceral or vascular injury in the chest, abdomen, or pelvis.      Pulmonary pleural and parenchymal scarring, coronary atherosclerosis, adrenal gland thickening, left renal atrophy, colonic diverticulosis without evidence of acute diverticulitis, degenerative changes of the spine, and other findings as above.               Workstation performed: [id number]         XR femur 2 views RIGHT   Final Interpretation by Gino Manzo MD (09/11 0930)      No acute osseous abnormality.         Computerized Assisted Algorithm (CAA) may have been used to analyze all applicable images.         Workstation performed: [id number]         XR knee 4+ vw right injury   Final Interpretation by Gino Manzo MD (09/11 0932)      Age-indeterminate medial tibial plateau fracture.         Computerized Assisted Algorithm (CAA) may have been used to analyze all applicable images.         Workstation performed: [id number]         XR ankle 3+ views RIGHT   Final Interpretation by Gino Manzo MD (09/11 0929)      Abnormal sharply marginated osseous projection in the posterior ankle. Fracture not excluded but no obvious donor site is identified. Evaluation limited due to degree of osseous demineralization, suboptimal positioning, and overlying material. If there    is concern for ankle fracture recommend further evaluation with CT.         Computerized Assisted Algorithm (CAA) may have been used to analyze all applicable images.      The study was marked in EPIC for immediate notification.         Workstation performed: [id number]         XR hand 3+ views RIGHT   Final Interpretation by Darrin Crespo MD (09/11 0858)      1.  Dorsal-predominant hand soft tissue swelling without subjacent acute osseous abnormalities.       2.  Pattern of periarticular soft tissue calcifications in the wrist and hand more suggestive of calcium pyrophosphate deposition than gout. Correlate for clinical features of pseudogout as a potential contributor to swelling.         Computerized Assisted Algorithm (CAA) may have been used to analyze all applicable images.         Workstation performed: [id number]         VAS upper limb venous duplex scan, unilateral/limited    (Results Pending)       Procedures       Jose Khan,   09/14/24 1126

## 2024-09-11 NOTE — SPEECH THERAPY NOTE
Speech Language/Pathology  Speech/Language Pathology  Assessment    Patient Name: Ingrid Samson  Today's Date: 9/11/2024     Problem List  Principal Problem:    Ambulatory dysfunction  Active Problems:    Type 2 diabetes mellitus with diabetic chronic kidney disease (HCC)    Primary hypertension    Hyponatremia    Stage 3a chronic kidney disease (HCC)    Cognitive decline    Effusion of right knee joint    Sphenoid sinusitis    Past Medical History  Past Medical History:   Diagnosis Date    Dehydration     Last assessed - 2/22/16    Diabetes mellitus (HCC)     Hyperlipidemia     Hypertension     Hypotension     Last assessed - 2/22/16    Obstructive jaundice     Last assessed - 3/25/16    Renal disorder     Sepsis (HCC)     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     Past Surgical History  Past Surgical History:   Procedure Laterality Date    CYSTOSCOPY W/ STONE MANIPULATION N/A 2/23/2016    Procedure: STONE MANIPULATION;  Surgeon: Bart Whitlock MD;  Location: [facility];  Service:     ERCP W/ SPHICTEROTOMY N/A 2/23/2016    Procedure: ENDOSCOPIC RETROGRADE CHOLANGIOPANCREATOGRAPHY (ERCP) W/ SPHINCTEROTOMY;  Surgeon: Bart Whitlock MD;  Location: [facility];  Service:     CA HEMIARTHROPLASTY HIP PARTIAL Left 4/8/2020    Procedure: HEMIARTHROPLASTY HIP (BIPOLAR);  Surgeon: Jose Lopez MD;  Location: [facility];  Service: Orthopedics    TUBAL LIGATION            Bedside Swallow Evaluation:    Summary:  Pt presented very thin appearing, protruding clavicles.  at bedside. Reported she did not have anything to eat or drink today. Has a donut and coffee in the am, sweet potato or mashed potatoes later in the day. Stated she can not chew anything, though later stated she would eat a banana if soft. Unable to assess oral cavity fully. Pt refused to open for inspection. Appeared to have a few teeth at are rotted/broken. Spoke in Dominican and English and repeatedly asked  me if I took the ice out. Pt needed encouragement to take PO trials. Trials water by tsp, nectar by tsp and cup, and min amt pudding. Reduced mouth opening. Questionable bolus control and transfer. Swallow was delayed. Voice became increasingly wet w/ all trials. Min cough x 1. Pt did not follow cues to cough. Attempted to suction. Only able to get yankauer anteriorly in mouth.  reported she always coughs when eating and drinking. Suspect dysphagia is chronic. ?secondary to weakness, function, anatomy (osteophytes on CT) or combination. Oral stage appears moderate, suspect severe pharyngeal stage though sample was limited.    Recommendations:  Start IV fluids  VBS  Diet:NPO, including meds  Oral care  Aspiration precautions  Reflux precautions  Therapy Prognosis: guarded  Prognosis considerations: etiology of dysphagia  Depends on results of VBS. Attempted to explain results of bedside assessment to her  but I do not think he fully understood. Will need ongoing education    Consider consult w/:  Nutrition  Palliative or hospice    Goal(s):  TBD following VBS    H&P/Admit info/ pertinent provider notes: (PMH noted above)  Chief Complaint: fall  Ingrid Samson is a 89 y.o. female with a PMH of cognitive decline, HTN, T2DM, CKD who presents with fall.              History limited from patient as she is a poor historian, further information obtained from family, chart review and discussion with ED attending/nursing. She presents tonight for evaluation of fall at home that occurred tonight. Family states that she was using a walker when her leg just gave out and she fell to the floor. He reports she twisted her right leg. Family called for help to get her up but she was complaining of leg/knee pain when she tried to stand prompting ED visit. No loss of consciousness or reported head strike.       Special Studies:(please refer to other multiple studies)  9/10 CT  C spine   DEGENERATIVE CHANGES:  Intervertebral disc space narrowing at C4/C5, C5/C6, and C6/C7 with anterior, marginal, and uncovertebral osteophytosis at these levels. Multilevel facet joint arthropathy.   Degenerative changes as described but no evidence of acute cervical spine injury.   CT chest: 9/10  No acute process seen. No evidence of acute visceral or vascular injury in the chest, abdomen, or pelvis.  Pulmonary pleural and parenchymal scarring, coronary atherosclerosis, adrenal gland thickening, left renal atrophy, colonic diverticulosis without evidence of acute diverticulitis, degenerative changes of the spine, and other findings as above.      Sodium 135-147mmol/L  128  9/11   Carbon Dioxide 21-32 mmol/L  29  9/11     Procalcitonin<=0.25ng/ml    WBC  4.31-10.16 Thousand/uL   10.27  9/11     Nitrites, UA  Neg 9/11   Leukocytes, UA  Neg  9/11       Previous MBS:  None known    Patient's goal:none stated    Did the pt report pain?no  If yes, was nursing notified/was it addressed?n/a    Reason for consult:  R/o aspiration  Determine safest and least restrictive diet  Failed nursing dysphagia assessment  H/o neurological disease  poor intake  weight loss   h/o dysphagia     Precautions:  Aspiration  Fall    Food Allergies: None known   Current Diet: Ndd2 and thin (not given)   Premorbid diet:  Noted above   O2 requirement:  none   Social/Prior living  Home w/    Voice/Speech:  Wfl for age   Follows commands:  poorly   Cognitive status:  Alert, confused     Oral [facility] exam:  Dentition: unable to fully asses  Lips (VII):no gross asymmetry  Tongue (XII):unable to  assess  Secretion management: wnl  Volitional cough: unabel    Esophageal stage:  No s/s reported    Results d/w:  Pt, nursing, family, physician

## 2024-09-11 NOTE — CONSULTS
Consultation - Geriatrics   Ingrid Samson 89 y.o. female MRN: [id number]  Unit/Bed#: E4 -02 Encounter: [id number]      Assessment/Plan    Cognitive Screening  Patient appears to have been noted to have memory loss on her last PCP visit on 7/6/2023  She scored 22/30 on MMSE indicating early/mild dementia   Patients  notes that she does have some difficulties with her memory but is unable to elaborate  Patient is alert and oriented to person, place, and situation on my exam today   I did ask about month and year but she did not respond   She is pleasant, calm, and cooperative   Most recent TSH on labs today noted to be 0.601  Most recent vitamin B 12 level on labs today noted to be 205  Would recommend supplementation with goal vitamin B 12 level > 400   CT of the head today revealed moderate microangiopathic changes   Can consider having OT complete cognitive evaluation here though I am not sure how well she will do with her vision and hearing deficits   Maintain delirium precautions as discussed below  Redirect and reorient as needed  Keep physically, mentally, and socially active     Delirium Precautions   Current mentation: alert and oriented to person, place, and situation   She would not answer questions about time   Patient is at high risk secondary to age, underlying cognitive impairment, fall, acute pain, electrolyte imbalances, vision impairment, hearing impairment, and hospitalization   Maintain delirium precautions   Provide redirection, reorientation, and distraction techniques  Maintain fall and safety precautions   Assist with ADLs/IADLs  Avoid deliriogenic medications such as tramadol, benzodiazepines, anticholinergics, benadryl  Treat pain using geriatric pain protocol   Encourage oral hydration and nutrition   Monitor for constipation and urinary retention   Implement sleep hygiene and limit night time interuptions   Maintain sleep-wake cycle   Encourage early and frequent  mobilization   Most recent EKG on 4/6/2020 revealed a QTc interval of 414  Would recommend repeat EKG if considering antipsychotics for acute agitation and behaviors  If all other interventions are unsuccessful for acute agitation and behaviors and QTc interval is < 500, can consider Zyprexa 2.5 mg IM Q 8 hours prn   If all other interventions are unsuccessful for acute agitation and behaviors and QTc interval is > 500, can consider Depakote 125 mg once   Would avoid benzodiazepines such as Ativan as these can worsen delirium     Deconditioning   Baseline function: needs assistance with ADLs and IADLs  Patient is at increased risk for deconditioning secondary to underlying cognitive impairment, fall, acute pain, electrolyte imbalances, weakness, gait dysfunction, and hospitalization  Continue to optimize diet, hydration, and mobility for healing   Chronic Kidney Disease Stage III  Baseline creatinine unclear   Creatinine on labs today stable at 0.96 with a GFR 51  Avoid nephrotoxic medication and renal dose medication   Keep hydrated   Type II Diabetes   Blood sugar log reviewed and blood sugars have been acceptable   She is on metformin 500 mg daily at breakfast at baseline   Most recent hemoglobin A1C on 7/6/2023 noted to be 6.8  Recommend diabetic diet   Continue insulin and diet control   Avoid hypoglycemia   Monitor for signs and symptoms of infection, dehydration, DVT, and skin breakdown    Frailty   Clinical Frail Scale: 6- Moderately Frail  Need help with all outside activities  Need help with stairs and bathing  May need assistance with dressing  Most recent albumin on 9/10/2024 noted to be 3.2  Consider nutrition consult  Encourage protein supplementation     Ambulatory Dysfunction/Falls  Patient presented to the hospital status post fall at home   Her  notes that she has slid off the couch on a few occasions and the neighbors have come over to help him get her up off the floor   She does have a  walker and a cane at home but primarily ambulates with the roller walker   Orthopedics consult currently pending  Awaiting recommendations on weight bearing status    PT/OT consulted to assist with strengthening/mobility and assist with discharge planning to appropriate level of care  Assess patient frequently for physical needs, encourage use of assistant devices as needed and directed by PT/OT  Identify cognitive and physical deficits and behaviors that affect risk of falls  Consider moving patient closer to nursing station to monitor more closely for impulsive behavior which may increase risk of falls  [place] fall and safety precautions   Educate patient/family on patient safety including physical limitations and importance of using call bell for assistance   Modify environment to reduce risk of injury including disconnecting from pole when not in use, ensuring adequate lighting in room and restroom, ensuring that path to restroom is clear and free of trip hazards  Out of bed as tolerated    Impaired Vision   Patient does have vision impairment   Her  notes that she does have glasses but she forgets to wear them   She refuses to go back to the eye doctor for re-evaluation   Recommend use of corrective lenses at all appropriate times  Encourage adequate lighting and encourage use of assistance with ambulation  Keep personal belongings close to avoid reaching  Encourage appropriate footwear at all times  Recommend large font for printed materials provided to patient    Impaired Hearing   Patient does have hearing impairment   She does not wear hearing aids    notes she refuses to go be evaluated for her hearing   Hearing impairment strongly correlated with depression, cognitive impairment, delirium and falls in the older adult  Use hearing aids or sound amplifier  Speak face to face  Use clear dictation and enunciation of words    Dentition/Appetite   Patient does not wear dentures  Her   notes that her appetite is fairly poor at home   She does not consume any protein supplements (i.e boost or ensure)  He notes that she only really likes to eat soft foods   Will place consult for nutrition services   Ensure meal consistency is appropriate for all abilities   Continue aspiration precautions     Elimination   Patient is continent of bowel and bladder at baseline  Patient has been continent of urine here (she did just ask to use the bedpan)   notes that if she does have trouble with a bowel movement she drinks prune juice which works well   No bowel movement documented since admission   Patient is not currently on a bowel regimen   Monitor for constipation and urinary retention     Insomnia   Patient has a documented history of insomnia    notes that she does take melatonin at bedtime   Unclear on the dose as he says she has 3 mg tablets at home and initially noted she takes 2 which would equal 6 mg but when clarifying he states she only takes 3 mg   She is currently ordered melatonin 3 mg daily at bedtime   First line is behavioral therapy   Avoid sedative hypnotics including benzodiazepines and benadryl  Encourage staying awake during the day   Encourage daytime activities and morning exercise   Decrease or eliminate daytime naps   Avoid caffeine especially during late afternoon and evening hours  Establish a nighttime routine  Implement sleep hygiene and limit nighttime interruptions    Anxiety/Depression  Patient has no documented history of anxiety or depression   She is not currently on any medication for this   Mood appears stable on exam today   Continue supportive care     Right Lower Extremity Pain   Patient presented to the hospital with right knee/leg pain after sustaining a fall at home  Xray imaging of the knee revealed an age-indeterminate tibial plateau fracture   Xray imaging of the ankle revealed an abnormal sharply marginated osseous projection in the posterior ankle  (fracture not excluded)  CT of the lower extremity revealed irregular lucency through the medial tibial plateau which appears to be subacute to chronic with large lipohemarthrosis/joint effusion   Orthopedics consult pending   Pain control as discussed below  PT/OT consulted   Management per primary team     Acute Pain due to Injury   Patient presented to the hospital status post fall with complaints of right knee/leg pain   She is complaining of right knee pain on exam today   Would recommend treating pain using the geriatric pain protocol   Scheduled acetaminophen 975 mg po Q8  Oxycodone 2.5 mg po Q 4 prn moderate pain  Oxycodone 5 mg po Q 4 prn severe pain   Dilaudid 0.2 mg IV Q 4 prn breakthrough pain   Monitor for constipation   Lidoderm patch  Monitor for constipation   Continue nonpharmacological methods of pain management    Hyponatremia  Patient with sodium of 127 on admission   Baseline sodium level unclear   Suspected to be related to poor oral intake at home   Encourage oral intake   Management per primary team     Home Safety  Patient resides at home with her , son, and daughter-in-law   She needs assistance with ADLs and IADLs    Advanced Care Planning  Level 1 Full Code    Home Medication Review   [facility] ([phone])  Lisinopril 20 mg daily (last filled on 9/5 for 30 days no refills)  Amlodpine 5 mg daily (last filled on 9/5 for 30 days no refills)  Metformin 500 mg daily with breakfast (last filled on 8/26 for 90 days with 1 refill)  Furosemide 20 mg daily (last filled in 6/29 for 90 days no refills)  Atorvastatin (has not been filled since 6/28 for 30 days with no refills)    I have personally reviewed this medication list with the patients pharmacy listed above.       History of Present Illness   Physician Requesting Consult: Dwight Restrepo DO  Reason for Consult / Principal Problem: Cognitive decline  Hx and PE limited by: Confusion at times/hearing impairment     HPI: Ingrid FRITZ  Chapin is a 89 y.o. year old female who has diabetes, hypertension, CKD, protein calorie malnutrition, insomnia, hyperlipidemia, cognitive decline, and ambulatory dysfunction and presented to the hospital after sustaining a fall at home.  Family noted that she was using a walker when her leg gave out and she fell to the floor.  Her  reported that she twisted her right leg.  Family called for help to get her up but she was complaining of right leg/knee pain when attempting to stand.  Lab work revealed hyponatremia with a sodium of 127.  She was also noted to be hypokalemic.  Xray imaging of the knee revealed an age-indeterminate tibial plateau fracture.  Xray imaging of the ankle revealed an abnormal sharply marginated osseous projection in the posterior ankle (fracture not excluded).  CT of the lower extremity revealed irregular lucency through the medial tibial plateau which appears to be subacute to chronic with large lipohemarthrosis/joint effusion.  Orthopedics consult is pending.  PT/OT also consulted.  Geriatrics has been consulted for cognitive decline.    Care was coordinated with the patient's  over the phone.  He notes that the 2 of them live together with her daughter-in-law.  He states the patient needs assistance with both ADLs and IADLs.  He notes he has had trouble getting some of her medication as she refuses to go to the doctor.  He notes that he manages her medication.  He notes that she does have some difficulty with her memory but is unable to elaborate.  He notes that the patient has had a few episodes where she slid out of her chair at home.  He states that he calls the neighbors and they typically help her up.  He states that she has both a walker and a cane but typically uses the walker most.  He notes that she does have vision impairment but no longer wears glasses.  She refuses to go to any doctor appointments including the eye doctor.  She also has hearing impairment but  does not wear hearing aids.  Again, she has refused to see a doctor for her hearing.  She does not wear dentures.  She has a generally poor appetite at baseline.  She does not drink any boost or Ensure or take any protein supplementation at baseline.  He notes that she does typically only eat soft foods.  She is continent of bowel and bladder but he does assist her to the bathroom when she needs to go.  He states that she does not typically have difficulty with constipation but when she does she will drink prune juice which helps.  He notes that she does not have any trouble sleeping at bedtime but does take melatonin.  It is unclear as to what dosage as he notes that she takes 2 or 3 mg tablets which would equal 6 mg, however, when confirming the patient's  notes that she only takes 3 mg.  He states he feels comfortable continuing to care for her if the plan is for her to discharge home.  He states that she has been to rehab in the past at the rehab facility across from [facility].    The patient was seen and evaluated today at the bedside for geriatric consult.  She is noted to be lying in bed comfortably in no acute distress.  She is sleeping on my entry into the room but is arousable to her name.  She is alert and oriented to person, place, and situation.  I did inquire about month and year, however, she did not provide an answer.  She is currently complaining of pain in her right knee and states that she needs to use the bathroom.  She offers no other acute complaints today.    Care was coordinated with the patient's nurse Maliha, Dr. Restrepo with internal medicine, and Osiel Smith with case management.      Inpatient consult to Gerontology  Consult performed by: JORDIN Jaquez  Consult ordered by: Victorino Walsh PA-C        Review of Systems   Constitutional:  Positive for activity change, appetite change (poor appetite at baseline) and fatigue.   HENT:  Positive for dental  problem (no teeth or dentures) and hearing loss.    Eyes:  Positive for visual disturbance.   Respiratory:  Negative for cough and shortness of breath.    Cardiovascular:  Negative for chest pain and leg swelling.   Gastrointestinal:  Negative for abdominal distention and abdominal pain.   Genitourinary:  Negative for difficulty urinating and dysuria.   Musculoskeletal:  Positive for arthralgias (right knee) and gait problem.   Skin:  Negative for color change and pallor.   Neurological:  Positive for weakness. Negative for speech difficulty.   Psychiatric/Behavioral:  Positive for confusion. Negative for agitation and sleep disturbance. The patient is not nervous/anxious.        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     Past Surgical History:   Procedure Laterality Date    CYSTOSCOPY W/ STONE MANIPULATION N/A 2/23/2016    Procedure: STONE MANIPULATION;  Surgeon: Bart Whitlock MD;  Location: [facility];  Service:     ERCP W/ SPHICTEROTOMY N/A 2/23/2016    Procedure: ENDOSCOPIC RETROGRADE CHOLANGIOPANCREATOGRAPHY (ERCP) W/ SPHINCTEROTOMY;  Surgeon: Bart Whitlock MD;  Location: [facility];  Service:     [place] HEMIARTHROPLASTY HIP PARTIAL Left 4/8/2020    Procedure: HEMIARTHROPLASTY HIP (BIPOLAR);  Surgeon: Jose Lopez MD;  Location: [facility];  Service: Orthopedics    TUBAL LIGATION       Social History   Social History     Substance and Sexual Activity   Alcohol Use Never     Social History     Substance and Sexual Activity   Drug Use Never     Social History     Tobacco Use   Smoking Status Never   Smokeless Tobacco Never     Family History:   Family History   Problem  Relation Age of Onset    Diabetes Mother     Diabetes Father     Heart attack Sister     Pancreatic cancer Son         Adenocarcinoma    Alcohol abuse Neg Hx     Arthritis Neg Hx     Asthma Neg Hx     Birth defects Neg Hx     Cancer Neg Hx     COPD Neg Hx     Depression Neg Hx     Drug abuse Neg Hx     Hearing loss Neg Hx     Early death Neg Hx     Heart disease Neg Hx     Hyperlipidemia Neg Hx     Hypertension Neg Hx     Kidney disease Neg Hx     Learning disabilities Neg Hx     Mental illness Neg Hx     Mental retardation Neg Hx     Miscarriages / Stillbirths Neg Hx     Stroke Neg Hx     Vision loss Neg Hx        No Known Allergies    Objective   Vitals: Blood pressure 130/64, pulse 77, temperature 99.3 °F (37.4 °C), temperature source Temporal, resp. rate 16, weight 39.3 kg (86 lb 10.3 oz), SpO2 100%, not currently breastfeeding.,Body mass index is 18.11 kg/m².      Physical Exam  Vitals and nursing note reviewed.   Constitutional:       General: She is not in acute distress.     Comments: Weak and frail appearing elderly female   HENT:      Head: Normocephalic.      Right Ear: Decreased hearing noted.      Left Ear: Decreased hearing noted.      Mouth/Throat:      Mouth: Mucous membranes are dry.   Eyes:      General: No scleral icterus.     Conjunctiva/sclera: Conjunctivae normal.   Cardiovascular:      Rate and Rhythm: Normal rate and regular rhythm.   Pulmonary:      Effort: Pulmonary effort is normal. No respiratory distress.   Abdominal:      General: Bowel sounds are normal. There is no distension.      Palpations: Abdomen is soft.      Tenderness: There is no abdominal tenderness.   Musculoskeletal:         General: Tenderness (right knee) present.   Skin:     General: Skin is warm and dry.   Neurological:      General: No focal deficit present.      Mental Status: She is alert.      Motor: Weakness present.      Gait: Gait abnormal.      Comments: Oriented to person, place, and situation   Does not  "answer questions related to month or year  Hx of cognitive impairment         Lab Results:   Results from last 7 days   Lab Units 09/11/24  0622   WBC Thousand/uL 10.27*   HEMOGLOBIN g/dL 10.5*   HEMATOCRIT % 30.5*   PLATELETS Thousands/uL 336        Results from last 7 days   Lab Units 09/11/24  0622 09/10/24  2152   POTASSIUM mmol/L 3.2* 3.4*   CHLORIDE mmol/L 90* 90*   CO2 mmol/L 29 22   BUN mg/dL 12 14   CREATININE mg/dL 0.96 1.02   CALCIUM mg/dL 7.9* 8.2*   ALK PHOS U/L  --  157*   ALT U/L  --  8   AST U/L  --  11*       Imaging Studies: Reviewed radiology reports from this admission including: CT chest, CT abdomen/pelvis, CT head, CT C-spine, CT lower extremity right, and xray(s).  EKG, Pathology, and Other Studies: Reviewed the following from this admission including: EKG, .CMP and CBC 9/10, AM labs (BMP, folate, vitamin B 12 level, CBC, TSH, UA, and uric acid), and POC glucose  VTE Prophylaxis: Enoxaparin (Lovenox)    Code Status: Level 1 - Full Code      Please note:  Voice-recognition software may have been used in the preparation of this document.  Occasional wrong word or \"sound-alike\" substitutions may have occurred due to the inherent limitations of voice recognition software.  Interpretation should be guided by context.    "

## 2024-09-11 NOTE — ASSESSMENT & PLAN NOTE
Acute on chronic, suspect secondary to poor solute intake   Baseline 127-130    Plan:  Check serum/urine osm, urine sodium  Trend BMP  Encourage PO intake

## 2024-09-11 NOTE — ASSESSMENT & PLAN NOTE
Controlled  Outpatient regimen: lisinopril 20mg daily, furosemide 20mg daily, amlodipine 5mg daily    Plan:  Continue outpatient regimen

## 2024-09-11 NOTE — PHYSICAL THERAPY NOTE
Physical Therapy Evaluation:    2 forms of pt ID verified:name,birthdate and pt ID [email or web address]    Patient's Name: Ingrid Samson    Admitting Diagnosis  Right knee pain [M25.561]  Chronic anemia [D64.9]  Cognitive decline [R41.89]  Fall in elderly patient [R29.6]    Problem List  Patient Active Problem List   Diagnosis    Type 2 diabetes mellitus with diabetic chronic kidney disease (HCC)    Mixed hyperlipidemia    Primary hypertension    Insomnia    Hyponatremia    Mild protein-calorie malnutrition (HCC)    Stage 3a chronic kidney disease (HCC)    Ambulatory dysfunction    Cognitive decline    Effusion of right knee joint    Sphenoid sinusitis    Right medial tibial plateau fracture       Past Medical History  Past Medical History:   Diagnosis Date    Dehydration     Last assessed - 2/22/16    Diabetes mellitus (HCC)     Hyperlipidemia     Hypertension     Hypotension     Last assessed - 2/22/16    Obstructive jaundice     Last assessed - 3/25/16    Renal disorder     Sepsis (HCC)     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       Past Surgical History  Past Surgical History:   Procedure Laterality Date    CYSTOSCOPY W/ STONE MANIPULATION N/A 2/23/2016    Procedure: STONE MANIPULATION;  Surgeon: Bart Whitlock MD;  [facility]: [facility];  Service:     ERCP W/ SPHICTEROTOMY N/A 2/23/2016    Procedure: ENDOSCOPIC RETROGRADE CHOLANGIOPANCREATOGRAPHY (ERCP) W/ SPHINCTEROTOMY;  Surgeon: Bart Whitlock MD;  Location: [facility];  Service:     TX HEMIARTHROPLASTY HIP PARTIAL Left 4/8/2020    Procedure: HEMIARTHROPLASTY HIP (BIPOLAR);  Surgeon: Jose Lopez MD;  [facility];  Service: Orthopedics    TUBAL LIGATION          09/11/24 1520   PT Last Visit   PT Visit Date 09/11/24   Note Type   Note type Evaluation   Pain Assessment   Pain Assessment Tool FLACC   Pain Rating: FLACC (Rest) - Face 1   Pain Rating: FLACC (Rest) - Legs 1   Pain Rating: FLACC (Rest) -  "Activity 0   Pain Rating: FLACC (Rest) - Cry 1   Pain Rating: FLACC (Rest) - Consolability 1   Score: FLACC (Rest) 4   Pain Rating: FLACC (Activity) - Face 1   Pain Rating: FLACC (Activity) - Legs 0   Pain Rating: FLACC (Activity) - Activity 0   Pain Rating: FLACC (Activity) - Cry 1   Pain Rating: FLACC (Activity) - Consolability 1   Score: FLACC (Activity) 3   Restrictions/Precautions   Weight Bearing Precautions Per Order Yes   RLE Weight Bearing Per Order NWB  (with knee immobilizer per ortho)   Braces or Orthoses Knee immobilizer  (RLE knee immobilizer per ortho)   Other Precautions Cognitive;Chair Alarm;Bed Alarm;WBS;Multiple lines;Fall Risk;Pain;Hard of hearing  (extremly [place] and does not like pocket talker)   Home Living   Type of Home Apartment   Home Layout One level;Performs ADLs on one level;Able to live on main level with bedroom/bathroom;Stairs to enter with rails  (4 MITCH back entrance)   Home Equipment Cane;Walker  (per pts , pt has been using RW PTA for mobility and pts  reports \"I just follow her every where she goes\".)   Additional Comments pt lives with spouse in 1st floor apt,(+)MITCH,reports use of RW to ambulate mainly household distances as of recently, unsure of number of falls PTA, pt currently had recent fall resulting in current hospital admission   Prior Function   Level of [place] Needs assistance with ADLs;Needs assistance with functional mobility;Needs assistance with IADLS   Lives With Spouse;Son;Other (Comment)  (lives with DIL and son, )   Receives Help From Family;Friend(s)   IADLs Family/Friend/Other provides transportation;Family/Friend/Other provides meals;Family/Friend/Other provides medication management   Falls in the last 6 months 1 to 4  (x1 recent fall reason for current hospital admission)   Vocational Retired   General   Additional Pertinent History s/p fall, Right medial tibial plateau fracture   Family/Caregiver Present Yes  (pts ) " "  Cognition   Overall Cognitive Status Impaired   Arousal/Participation Cooperative   Attention Difficulty attending to directions  (due to extremly [place])   Orientation Level Oriented to person;Oriented to place;Disoriented to time;Disoriented to situation   Following Commands Follows one step commands with increased time or repetition   Comments due to reported pain, extremely [place]->does not like to use pocket talker   Subjective   Subjective Pt supine in bed resting comfortably with reports of R knee and RLE pain. Pt willing and agreeable to work with PT and to participate in therapy intervention; \"I just hurt when I move my leg and knee\".   RLE Assessment   RLE Assessment   (dec ability to perform MMT RLE due to pain and discomfort)   LLE Assessment   LLE Assessment   (at least 3+/5 grossly throughout)   Vision-Basic Assessment   Current Vision   (has glasses, does not wear)   Patient Visual Report   (poor vision, able to see images and objects immediately in front of her)   Coordination   Movements are Fluid and Coordinated 0   Coordination and Movement Description pain, dec ability to bear weight through BUE and LLE during transfer   Sensation WFL   Light Touch   RLE Light Touch Grossly intact   LLE Light Touch Grossly intact   Bed Mobility   Supine to Sit 2  Maximal assistance   Additional items Assist x 1;HOB elevated;Bedrails;Increased time required;Verbal cues;LE management   Additional Comments initially por static sit balance, with verbal and physical instruction with BLE on floor able to sit at EOB needing S level of A   Transfers   Sit to Stand 2  Maximal assistance   Additional items Assist x 2;Bedrails;Increased time required;Verbal cues   Stand to Sit 2  Maximal assistance   Additional items Assist x 2;Armrests;Increased time required;Verbal cues   Stand pivot 2  Maximal assistance   Additional items Assist x 2;Increased time required;Verbal cues  (B HHA)   Additional Comments pt unable to maintain NWB " RLE during mobility and transfer   Ambulation/Elevation   Gait Assistance Not tested   Assistive Device   (B HHA with stand pivot transfer)   Balance   Static Sitting Fair  (initially poor, progressing ot fair at EOB prior to sit->stand transfer)   Dynamic Sitting   (zero)   Static Standing Zero   Endurance Deficit   Endurance Deficit Yes   Endurance Deficit Description pain, dec endurance, dec balance, limited activity tolerance   Activity Tolerance   Activity Tolerance Patient limited by fatigue;Patient limited by pain  (poor)   Medical Staff Made Aware OTR [place]   Nurse Made Aware yes   Assessment   Prognosis Fair   Problem List Decreased strength;Decreased range of motion;Decreased endurance;Impaired balance;Decreased mobility;Decreased cognition;Impaired judgement;Decreased safety awareness;Impaired hearing;Impaired vision;Decreased skin integrity;Orthopedic restrictions;Pain  (NWB RLE with knee immobilizer)   Assessment Pt is a 89 yo female admitted [facility] s/p fall and R patellar fx. Pt lives with  and 2 adult family members in 1 floor apt, (+)MITCH,use of RW PTA, multiple falls per pts  unable to quantify,needs A for ADLs and IADLs, rarely leaves the apartment. pt currently is not at functional mobility baseline, needs significant A for mobility,multiple lines, NWB RLE with knee immobilizer orders via ortho,ataxic and unsteady movement patterns, unable to maintain NWB RLE during mobility and transfers. Pt demonstrates maximal deficits during functional mobility including dec endurance, dec balance, dec BLE strength (RLE>LLE),pain, ataxic and unsteady movement patterns, dec cognition, dec vision and hearing and needs maxAx1 for bed mobility and maxAx2 for transfers with B HHA. pt would cont to benefit from skilled inpt PT services to maximize functional independence and to dec caregiver burden upon being DC from the hospital.   Barriers to Discharge Inaccessible home environment  ((+)MITCH)    Goals   Patient Goals to just have less pain   STG Expiration Date 09/21/24   Short Term Goal #1 in 7-10 days:  (1) Pt will be able to ambulate greater than 20 feet while maintaining NWB RLE with knee immobilizer 100% of the time with use of RW on various surfaces needing modAx1 in order to A pt to return to PLOF, (2) activity tolerance:45 mins/45mins, (3) pt will be able to perform sit to stand transfers needing modAx1 to and from various surfaces consistently in order to return to PLOF, (4) pt will be able to perform BM needing modAx1 to A pt to return to PLOF, (5) (I) with LLE therapeutic ex HEP in various positions to A pt to inc balance,strength,mobility,endurance and to A to dec pain, (6) inc balance 1/2 grade in order to dec fall risk, (7) cont to provide pt and pt family education for safe D/C planning, (8) inc BLE strength 1/2 to 1 full grade in order to A pt to inc balance,strength,mobility,endurance and to A to dec pain, (9) pt will be able to perform wc mobility greater than 50 feet on various surfaces needing min Ax1 and wc management modAx1   PT Treatment Day 0   Plan   Treatment/Interventions Functional transfer training;LE strengthening/ROM;Therapeutic exercise;Endurance training;Patient/family training;Equipment eval/education;Bed mobility;Gait training;Spoke to nursing;OT;Family  (wc mobility and mangement)   PT Frequency 3-5x/wk   Discharge Recommendation   Rehab Resource Intensity Level, PT II (Moderate Resource Intensity)   Equipment Recommended Walker;Wheelchair  (pt owns RW at home for use)   AM-PAC Basic Mobility Inpatient   Turning in Flat Bed Without Bedrails 2   Lying on Back to Sitting on Edge of Flat Bed Without Bedrails 2   Moving Bed to Chair 2   Standing Up From Chair Using Arms 1   Walk in Room 1   Climb 3-5 Stairs With Railing 1   Basic Mobility Inpatient Raw Score 9   [facility] Highest Level Of Mobility   -HLM Goal 3: Sit at edge of bed   -HLM Achieved 4: Move to  chair/mary           @Jessica Villegas, PT, DPT@

## 2024-09-11 NOTE — OCCUPATIONAL THERAPY NOTE
Occupational Therapy Evaluation     Patient Name: Ingrid Samson  Today's Date: 9/11/2024  Problem List  Principal Problem:    Ambulatory dysfunction  Active Problems:    Type 2 diabetes mellitus with diabetic chronic kidney disease (HCC)    Primary hypertension    Hyponatremia    Stage 3a chronic kidney disease (HCC)    Cognitive decline    Effusion of right knee joint    Sphenoid sinusitis    Right medial tibial plateau fracture    Past Medical History  Past Medical History:   Diagnosis Date    Dehydration     Last assessed - 2/22/16    Diabetes mellitus (HCC)     Hyperlipidemia     Hypertension     Hypotension     Last assessed - 2/22/16    Obstructive jaundice     Last assessed - 3/25/16    Renal disorder     Sepsis (HCC)     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     Past Surgical History  Past Surgical History:   Procedure Laterality Date    CYSTOSCOPY W/ STONE MANIPULATION N/A 2/23/2016    Procedure: STONE MANIPULATION;  Surgeon: Bart Whitlock MD;  [facility];  Service:     ERCP W/ SPHICTEROTOMY N/A 2/23/2016    Procedure: ENDOSCOPIC RETROGRADE CHOLANGIOPANCREATOGRAPHY (ERCP) W/ SPHINCTEROTOMY;  Surgeon: Bart Whitlock MD;  [facility];  Service:     OK HEMIARTHROPLASTY HIP PARTIAL Left 4/8/2020    Procedure: HEMIARTHROPLASTY HIP (BIPOLAR);  Surgeon: Jose Lopze MD;  [facility];  Service: Orthopedics    TUBAL LIGATION        09/11/24 1512   OT Last Visit   OT Visit Date 09/11/24   Note Type   Note type Evaluation   Pain Assessment   Pain Assessment Tool Isaacs-Baker FACES   Isaacs-Baker FACES Pain Rating 8   Pain Location/Orientation Orientation: Right;Location: Knee;Location: Leg   Hospital Pain Intervention(s) Repositioned;Emotional support;Cold applied   Restrictions/Precautions   Weight Bearing Precautions Per Order Yes   RLE Weight Bearing Per Order (S)  NWB   Braces or Orthoses Knee immobilizer  (R LE)   Other Precautions  "Cognitive;Bed Alarm;Chair Alarm;Visual impairment;Hard of hearing;Multiple lines;Fall Risk;Pain   Home Living   Type of Home Apartment   Home Layout One level;Able to live on main level with bedroom/bathroom;Performs ADLs on one level;Stairs to enter with rails  (4STE back 1+landing in front to enter)   Bathroom Shower/Tub Tub/shower unit  (spouse assists her w/ spngebathing on BSC)   Bathroom Toilet Standard   Bathroom Equipment Commode;Shower chair;Grab bars in shower;Grab bars around toilet   Bathroom Accessibility Accessible   Home Equipment Cane;Walker   Additional Comments pt spouse reports she ambulates w/ RW and he follows her while walking w/ his RW; reports she has not been out of the home in months   Prior Function   Level of [place] Independent with functional mobility;Needs assistance with ADLs;Needs assistance with functional mobility;Needs assistance with IADLS   Lives With Spouse;Son  (daughter in law)   Receives Help From Family;Friend(s)   IADLs Family/Friend/Other provides medication management;Family/Friend/Other provides meals;Family/Friend/Other provides transportation  (spouse completes for patient)   Falls in the last 6 months 1 to 4   Vocational Retired   Comments pt spouse reports son and daughter in law live w/ them but they do not help out or pay rent and pt daughter lives in [place]; spouse drives, pt is alone at times if he goes out when son and daughter in law are working; reports if patient doesn't want something done he wont do it   Lifestyle   Autonomy per pt independent w/ self-feeding, setup grooming/UB ADLs, assist w/ LB ADLs, supervision toileting, assist w/ IADLs   Reciprocal Relationships spouse of 60+years   Service to Others retired   Intrinsic Gratification watch TV MeTV   Subjective   Subjective \"It hurts\"   ADL   Where Assessed Chair   Eating Assistance 4  Minimal Assistance   Grooming Assistance 3  Moderate Assistance   UB Bathing Assistance 2  Maximal Assistance   LB " Bathing Assistance 1  Total Assistance   UB Dressing Assistance 2  Maximal Assistance   LB Dressing Assistance 1  Total Assistance   Toileting Assistance  1  Total Assistance   Toileting Deficit Setup;Steadying;Verbal cueing;Supervison/safety;Increased time to complete;Use of bedpan/urinal setup;Clothing management up;Clothing management down;Perineal hygiene   Functional Assistance 2  Maximal Assistance   Additional Comments pt reporting needing to urinate, provided pt w/ bed pan while in chair and total assist hygiene cleanup w/ MAX assist support x2   Bed Mobility   Supine to Sit 2  Maximal assistance   Additional items Assist x 1;Increased time required;LE management;Verbal cues;Bedrails;HOB elevated   Additional Comments at first support to maintain sitting balance and then able to maintain   Transfers   Sit to Stand 2  Maximal assistance   Additional items Assist x 2;Increased time required;Verbal cues;Armrests  (able to maintain NWB R LE)   Stand to Sit 2  Maximal assistance   Additional items Assist x 2;Increased time required;Verbal cues;Armrests   Stand pivot 2  Maximal assistance   Additional items Assist x 2;Increased time required;Verbal cues;Armrests   Additional Comments cues for positioning and safety   Functional Mobility   Functional Mobility 2  Maximal assistance   Additional Comments assist x2 SPT, trialed RW w/ difficulty able to maintain NWB unable to pivot w/ RW, utilized arm and arm w/ OT and PT   Balance   Static Sitting Fair -   Dynamic Sitting Poor +   Static Standing Poor -   Activity Tolerance   Activity Tolerance Patient limited by pain;Patient limited by fatigue;Treatment limited secondary to medical complications (Comment)   Medical Staff Made Aware PT Jessica: Pt seen for co-session with skilled Physical therapist 2* clinically unstable presentation, medical complexity, new precautions, performance deficits/functional limitations, impaired cognition/safety awareness, limited  "activity tolerance and present impairments which are a regression from patient patient's baseline and impacting overall occupational performance   Nurse Made Aware appropriate to see per Maliha KAUR Assessment   RUE Assessment WFL  (3+/5)   LUE Assessment   LUE Assessment WFL  (3+/5)   Hand Function   Gross Motor Coordination Functional   Fine Motor Coordination Impaired   Sensation   Light Touch No apparent deficits   Proprioception   Proprioception No apparent deficits   Vision-Basic Assessment   Current Vision   (has glasses does not always wear)   Patient Visual Report   (pt able to see body shape, and digits held up within 12-15 inches from her face)   Vision - Complex Assessment   Ocular Range of Motion Intact   Acuity   (unable to see name badge or see  across the room)   Perception   Inattention/Neglect Appears intact   Cognition   Overall Cognitive Status Impaired   Arousal/Participation Responsive;Cooperative   Attention Attends with cues to redirect   Orientation Level Oriented to person;Oriented to place;Disoriented to time;Disoriented to situation   Memory Decreased short term memory;Decreased recall of recent events;Decreased recall of precautions   Following Commands Follows one step commands with increased time or repetition   Comments impaired insight and safety awareness, flat affect, pt hard of hearing w/ pocket talker able to hear but did not like wearing \"Take this off me\"   Assessment   Limitation Decreased ADL status;Decreased UE strength;Decreased Safe judgement during ADL;Decreased cognition;Decreased endurance;Decreased self-care trans;Decreased high-level ADLs;Decreased sensation   Prognosis Good   Assessment Pt is a 89 y.o. female seen for OT evaluation s/p admit to [facility] on 9/10/2024 w/ fall and Ambulatory dysfunction.  Comorbidities affecting pt's functional performance at time of assessment include: DM II, HTN, hyponatremia, CKD III, cognitive decline, effusion of R knee " "joint CT RLE reviewed: large lipohemarthrosis/joint effusion, irregular lucency through medial tibial plateau appears to be subacute to chronic (NWB in knee immobilzer per ortho via secure chat). Personal factors affecting pt at time of IE include:steps to enter environment, difficulty performing ADLS, difficulty performing IADLS , limited insight into deficits, compliance, flat affect, and decreased initiation and engagement . Prior to admission, pt was living w/ spouse and reports: per pt independent w/ self-feeding, setup grooming/UB ADLs, assist w/ LB ADLs, supervision toileting, assist w/ IADLs. Upon evaluation: Pt requires MOD assist grooming, MAX Assist UB ADLs, total assist toileting, MAX assist supine>sit bed mobility, MAX assist x2 sit<>stand several trials w/ and w/o RW, MAX assist x2 SPT to recliner w/ maintaining NWB R LE 2* the following deficits impacting occupational performance: increased pain in R LE, impaired balance, impaired functional reach, flat affect, decreased initiation, impaired insight and safety awareness, impaired cognition, [place], fall risk, NWB R LE. Pt to benefit from continued skilled OT tx while in the hospital to address deficits as defined above and maximize level of functional independence w ADL's and functional mobility. Occupational Performance areas to address include: grooming, bathing/shower, toilet hygiene, dressing, health maintenance, functional mobility, and clothing management. From OT standpoint, recommendation at time of d/c would be level II moderate resources.   The patient's raw score on the AM-PAC Daily Activity Inpatient Short Form is 11. A raw score of less than 19 suggests the patient may benefit from discharge to post-acute rehabilitation services. Please refer to the recommendation of the Occupational Therapist for safe discharge planning.   Goals   Patient Goals \"not have pain\"   LTG Time Frame 10-14   Long Term Goal please see below goals   Plan   Treatment " Interventions ADL retraining;UE strengthening/ROM;Functional transfer training;Endurance training;Cognitive reorientation;Patient/family training;Equipment evaluation/education;Compensatory technique education;Energy conservation;Activityengagement   Goal Expiration Date 09/25/24   OT Frequency 3-5x/wk   Discharge Recommendation   Rehab Resource Intensity Level, OT II (Moderate Resource Intensity)   AM-PAC Daily Activity Inpatient   Lower Body Dressing 1   Bathing 2   Toileting 1   Upper Body Dressing 2   Grooming 2   Eating 3   Daily Activity Raw Score 11   Daily Activity Standardized Score (Calc for Raw Score >=11) 29.04   AM-PAC Applied Cognition Inpatient   Following a Speech/Presentation 2   Understanding Ordinary Conversation 3   Taking Medications 1     Remembering Where Things Are Placed or Put Away 1   Remembering List of 4-5 Errands 1   Taking Care of Complicated Tasks 1   Applied Cognition Raw Score 9   Applied Cognition Standardized Score 22.48   Modified [place] Scale   Modified [place]back Scale 4   End of Consult   Education Provided Yes   Patient Position at End of Consult Bed/Chair alarm activated;All needs within reach;Bedside chair   Nurse Communication Nurse aware of consult      Occupational Therapy Goals to be met in 10-14 days:  1) Pt will improve activity tolerance to G for 30 min txment sessions to enhance ADLs  2) Pt will complete UB ADLs/self care w/ supervision and mod assist LB ADLs  3) Pt will complete toileting w/ supervision w/ G hygiene/thoroughness using DME PRN  4) Pt will improve functional transfers on/off all surfaces using DME PRN w/ G balance/safety including toileting w/ min assist  5) Pt will improve fx'l mobility during I/ADl/leisure tasks using DME PRN w/ g balance/safety w/ min assist while maintaining NWB R LE  6) Pt will engage in ongoing cognitive assessment w/ G participation to A w/ safe d/c planning/recommendations  7) Pt will demonstrate G carryover of pt/caregiver  education and training as appropriate w/ mod I  w/ G tolerance  8) Pt will engage in depression screen/leisure interest checklist w/ G participation to monitor s/s depression and ID 3 positive coping strategies to A w/ emotional regulation and management  9) Pt will demonstrate 100% carryover of E.C. techniques w/ mod I t/o fx'l I/ADL/leisure tasks w/o cues s/p skilled education  10) Pt will demonstrate improved bed mobility to supervision to enhance ADLs  11) Pt will engage in activity configuration activity w/ G participation and mod I to increase time management skills and improve participation in a structured routine to improve overall quality of life  12) Pt will demonstrate improved standing tolerance to 3-5 minutes during functional tasks w/ no LOB to enhance ADL performance while maintaining NWB R LE  13) Pt will demonstrate improved b/l UE strength by 1 MMT grade to enhance ADLS and functional transfers  14) Pt will  recall 3 fall prevention education strategies to enhance safety in home and prevent further falls  Nimco Chi MS, OTR/L

## 2024-09-11 NOTE — ASSESSMENT & PLAN NOTE
Lab Results   Component Value Date    EGFR 48 09/10/2024    EGFR 49 12/30/2022    EGFR 43 07/08/2021    CREATININE 1.02 09/10/2024    CREATININE 1.01 12/30/2022    CREATININE 1.13 07/08/2021     In setting of age-related, hypertensive, diabetic nephropathy  Baseline Cr: 0.8-1  Admit Cr: 1     Plan:  Monitor renal function, renal dose medications, maintain normotension/euvolemia, avoid nephrotoxic agents, I&Os

## 2024-09-11 NOTE — CONSULTS
"Progress Note - Orthopedics   Name: Ingrid Samson 89 y.o. female I MRN: [id number]  Unit/Bed#: E4 -02 I Date of Admission: 9/10/2024   Date of Service: 9/11/2024 I Hospital Day: 0     Assessment & Plan  Ambulatory dysfunction  Secondary to recent fall and tibial plateau fracture/effusion  Effusion of right knee joint  Consider ice/compression with ACE wrap for comfort   Right medial tibial plateau fracture  NWB to RLE in knee immobilizer -ordered   Consider ice and compression  Follow up outpatient in 2 weeks  Seen on XR and CT 9/10    Orthopedics signing off, reconsult as needed    History of Present Illness   89 y.o.female seen and examined in bed, asking for the doctor. She localizes the pain to the right knee, but is unable to elaborate.     Objective      Temp:  [97.5 °F (36.4 °C)-99.3 °F (37.4 °C)] 98.6 °F (37 °C)  HR:  [] 99  Resp:  [16-18] 16  BP: (107-130)/(53-71) 128/59  O2 Device: None (Room air)          I/O         09/09 0701  09/10 0700 09/10 0701 09/11 0700 09/11 0701  09/12 0700    P.O.   0    Total Intake(mL/kg)   0 (0)    Net   0                 Lines/Drains/Airways       Active Status       None                  Physical Exam Musculoskeletal: rightlower  Skin intact throughout . No erythema or ecchymosis.  TTP throughout knee  Effusion noted  Sensation intact distally  Motor intact distally EHL/FHL/DF/PF  ROM limited by participation; gentle PROM without hesitation or wincing  Seen with both hips and knees flexed up to body      Lab Results: I have reviewed the following results:   Recent Labs     09/10/24  2152 09/11/24  0622   WBC 9.64 10.27*   HGB 11.0* 10.5*   HCT 32.5* 30.5*    336   BUN 14 12   CREATININE 1.02 0.96     Blood Culture:    Lab Results   Component Value Date    BLOODCX No Growth After 5 Days. 02/23/2016     Wound Culture: No results found for: \"WOUNDCULT\"    Eduardo Carrera PA-C    "

## 2024-09-12 ENCOUNTER — APPOINTMENT (INPATIENT)
Dept: RADIOLOGY | Facility: HOSPITAL | Age: 89
DRG: 563 | End: 2024-09-12
Payer: COMMERCIAL

## 2024-09-12 PROBLEM — J32.3 SPHENOID SINUSITIS: Status: RESOLVED | Noted: 2024-09-11 | Resolved: 2024-09-12

## 2024-09-12 PROBLEM — R13.10 DYSPHAGIA: Status: ACTIVE | Noted: 2024-09-12

## 2024-09-12 PROBLEM — N18.31 STAGE 3A CHRONIC KIDNEY DISEASE (HCC): Status: RESOLVED | Noted: 2023-01-30 | Resolved: 2024-09-12

## 2024-09-12 PROBLEM — E44.0 MODERATE PROTEIN-CALORIE MALNUTRITION (HCC): Status: ACTIVE | Noted: 2024-09-12

## 2024-09-12 PROBLEM — M25.461 EFFUSION OF RIGHT KNEE JOINT: Status: RESOLVED | Noted: 2024-09-11 | Resolved: 2024-09-12

## 2024-09-12 LAB
ANION GAP SERPL CALCULATED.3IONS-SCNC: 7 MMOL/L (ref 4–13)
BUN SERPL-MCNC: 11 MG/DL (ref 5–25)
CALCIUM SERPL-MCNC: 7.7 MG/DL (ref 8.4–10.2)
CHLORIDE SERPL-SCNC: 99 MMOL/L (ref 96–108)
CO2 SERPL-SCNC: 26 MMOL/L (ref 21–32)
CREAT SERPL-MCNC: 0.75 MG/DL (ref 0.6–1.3)
ERYTHROCYTE [DISTWIDTH] IN BLOOD BY AUTOMATED COUNT: 14.5 % (ref 11.6–15.1)
GFR SERPL CREATININE-BSD FRML MDRD: 69 ML/MIN/1.73SQ M
GLUCOSE SERPL-MCNC: 105 MG/DL (ref 65–140)
GLUCOSE SERPL-MCNC: 132 MG/DL (ref 65–140)
GLUCOSE SERPL-MCNC: 135 MG/DL (ref 65–140)
GLUCOSE SERPL-MCNC: 79 MG/DL (ref 65–140)
GLUCOSE SERPL-MCNC: 92 MG/DL (ref 65–140)
HCT VFR BLD AUTO: 26.5 % (ref 34.8–46.1)
HGB BLD-MCNC: 9.1 G/DL (ref 11.5–15.4)
MCH RBC QN AUTO: 31.1 PG (ref 26.8–34.3)
MCHC RBC AUTO-ENTMCNC: 34.3 G/DL (ref 31.4–37.4)
MCV RBC AUTO: 90 FL (ref 82–98)
PLATELET # BLD AUTO: 283 THOUSANDS/UL (ref 149–390)
PMV BLD AUTO: 8.5 FL (ref 8.9–12.7)
POTASSIUM SERPL-SCNC: 3.2 MMOL/L (ref 3.5–5.3)
PREALB SERPL-MCNC: 7.3 MG/DL (ref 17–34)
RBC # BLD AUTO: 2.93 MILLION/UL (ref 3.81–5.12)
SODIUM SERPL-SCNC: 132 MMOL/L (ref 135–147)
WBC # BLD AUTO: 5.64 THOUSAND/UL (ref 4.31–10.16)

## 2024-09-12 PROCEDURE — 99233 SBSQ HOSP IP/OBS HIGH 50: CPT | Performed by: INTERNAL MEDICINE

## 2024-09-12 PROCEDURE — 92611 MOTION FLUOROSCOPY/SWALLOW: CPT

## 2024-09-12 PROCEDURE — 84134 ASSAY OF PREALBUMIN: CPT | Performed by: INTERNAL MEDICINE

## 2024-09-12 PROCEDURE — 82948 REAGENT STRIP/BLOOD GLUCOSE: CPT

## 2024-09-12 PROCEDURE — 99233 SBSQ HOSP IP/OBS HIGH 50: CPT

## 2024-09-12 PROCEDURE — 80048 BASIC METABOLIC PNL TOTAL CA: CPT | Performed by: INTERNAL MEDICINE

## 2024-09-12 PROCEDURE — 74230 X-RAY XM SWLNG FUNCJ C+: CPT

## 2024-09-12 PROCEDURE — 85027 COMPLETE CBC AUTOMATED: CPT | Performed by: INTERNAL MEDICINE

## 2024-09-12 RX ORDER — POTASSIUM CHLORIDE 1500 MG/1
40 TABLET, EXTENDED RELEASE ORAL ONCE
Status: COMPLETED | OUTPATIENT
Start: 2024-09-12 | End: 2024-09-12

## 2024-09-12 RX ORDER — SODIUM CHLORIDE 9 MG/ML
83.3 INJECTION, SOLUTION INTRAVENOUS CONTINUOUS
Status: DISPENSED | OUTPATIENT
Start: 2024-09-12 | End: 2024-09-13

## 2024-09-12 RX ADMIN — SODIUM CHLORIDE 83.3 ML/HR: 0.9 INJECTION, SOLUTION INTRAVENOUS at 21:55

## 2024-09-12 RX ADMIN — FUROSEMIDE 20 MG: 20 TABLET ORAL at 09:01

## 2024-09-12 RX ADMIN — MELATONIN 3 MG: 3 TAB ORAL at 21:49

## 2024-09-12 RX ADMIN — AMLODIPINE BESYLATE 5 MG: 5 TABLET ORAL at 09:01

## 2024-09-12 RX ADMIN — SODIUM CHLORIDE 83.3 ML/HR: 0.9 INJECTION, SOLUTION INTRAVENOUS at 12:31

## 2024-09-12 RX ADMIN — LISINOPRIL 20 MG: 20 TABLET ORAL at 09:01

## 2024-09-12 RX ADMIN — POTASSIUM CHLORIDE 40 MEQ: 1500 TABLET, EXTENDED RELEASE ORAL at 12:30

## 2024-09-12 RX ADMIN — CYANOCOBALAMIN TAB 500 MCG 500 MCG: 500 TAB at 09:01

## 2024-09-12 RX ADMIN — ENOXAPARIN SODIUM 40 MG: 40 INJECTION SUBCUTANEOUS at 09:02

## 2024-09-12 NOTE — ASSESSMENT & PLAN NOTE
Lives with  and ?son.  reports decline, patient not eating as much, fell tonight. Family medicine note 2023 MMSE: 22  Appreciate geriatric recommendations

## 2024-09-12 NOTE — ASSESSMENT & PLAN NOTE
Lab Results   Component Value Date    EGFR 69 09/12/2024    EGFR 51 09/11/2024    EGFR 48 09/10/2024    CREATININE 0.75 09/12/2024    CREATININE 0.96 09/11/2024    CREATININE 1.02 09/10/2024

## 2024-09-12 NOTE — ASSESSMENT & PLAN NOTE
Malnutrition Findings:   Adult Malnutrition type: Chronic illness  Adult Degree of Malnutrition: Malnutrition of moderate degree                     360 Statement: Moderate malnutrition r/t inadequate intake as evidenced by BMI 18.1, consuming < 75% energy intake compared to estimated energy needs > 1 month, Dysphagia, mild fat/muscle wasting observed at orbital/temple areas. Treated with ENSure Compact tid, pleasure feeds    BMI Findings:  Adult BMI Classifications: Underweight < 18.5        Body mass index is 18.11 kg/m².

## 2024-09-12 NOTE — ASSESSMENT & PLAN NOTE
Controlled  Outpatient regimen: lisinopril 20mg daily, furosemide 20mg daily, amlodipine 5mg daily

## 2024-09-12 NOTE — PROGRESS NOTES
Progress Note - Hospitalist   Name: Ingrid Samson 89 y.o. female I MRN: [id number]  Unit/Bed#: E4 -02 I Date of Admission: 9/10/2024   Date of Service: 9/12/2024 I Hospital Day: 1    Assessment & Plan  Ambulatory dysfunction  Nonweightbearing to right leg due to tibial plateau fracture  PT OT recommending rehab  Continued outpatient orthopedic follow-up  Type 2 diabetes mellitus with diabetic chronic kidney disease (HCC)  Lab Results   Component Value Date    HGBA1C 6.8 (H) 09/11/2024   Home regimen reviewed. Hold Metformin if applicable.  Start SSI and Basal bolus protocol  Primary hypertension  Controlled  Outpatient regimen: lisinopril 20mg daily, furosemide 20mg daily, amlodipine 5mg daily    Hyponatremia  Hypotonic hyponatremia improved with IV fluid  Recent Labs     09/10/24  2152 09/11/24  0622 09/12/24  0437   SODIUM 127* 128* 132*   Continue IV fluid until adequate oral intake    Stage 3a chronic kidney disease (HCC) (Resolved: 9/12/2024)  Lab Results   Component Value Date    EGFR 69 09/12/2024    EGFR 51 09/11/2024    EGFR 48 09/10/2024    CREATININE 0.75 09/12/2024    CREATININE 0.96 09/11/2024    CREATININE 1.02 09/10/2024         Cognitive decline  Lives with  and ?son.  reports decline, patient not eating as much, fell tonight. Family medicine note 2023 MMSE: 22  Appreciate geriatric recommendations  Effusion of right knee joint (Resolved: 9/12/2024)  Suspect secondary to fall tonight   CT RLE reviewed: large lipohemarthrosis/joint effusion, irregular lucency through medial tibial plateau appears to be subacute to chronic     Plan:  NWB  Multimodal analgesia  Orthopedics, PT/OT consult   Sphenoid sinusitis (Resolved: 9/12/2024)  Incidental findings, unclear if symptomatic currently  CTH reviewed: mucosal thickening and fluid in left sphenoid sinus, fluid in left mastoid air cells and left middle ear consider otomastoiditis, no acute fracture, cerumen  impaction    Plan:  Supportive treatment  Right medial tibial plateau fracture  Continue knee brace  PT OT recommending rehab  Dysphagia  Patient with dysphagia in the setting of dementia  Discussed with family alternative nutrition and hydration including artificial feeding tube -they would prefer pleasure feeds  Continue dysphagia 2 diet and thin liquids  Family members including patient understand risk of aspiration    Moderate protein-calorie malnutrition (HCC)  Malnutrition Findings:   Adult Malnutrition type: Chronic illness  Adult Degree of Malnutrition: Malnutrition of moderate degree                     360 Statement: Moderate malnutrition r/t inadequate intake as evidenced by BMI 18.1, consuming < 75% energy intake compared to estimated energy needs > 1 month, Dysphagia, mild fat/muscle wasting observed at orbital/temple areas. Treated with ENSure Compact tid, pleasure feeds    BMI Findings:  Adult BMI Classifications: Underweight < 18.5        Body mass index is 18.11 kg/m².           Hospital Course:     89-year-old female patient presenting with ambulatory dysfunction found to have right tibial plateau fracture.  History of dementia.  Hospitalization complicated by dysphagia.  Family members in agreement with DNR CODE STATUS change.    Due to ongoing decline and ambulatory dysfunction will require rehab placement    Assessment:      Principal Problem:    Ambulatory dysfunction  Active Problems:    Type 2 diabetes mellitus with diabetic chronic kidney disease (HCC)    Primary hypertension    Hyponatremia    Cognitive decline    Right medial tibial plateau fracture    Dysphagia      Plan:    Continue supportive care  No IV fluid throughout today  Continue diet  Discharge planning and management       VTE Pharmacologic Prophylaxis:   Pharmacologic: Enoxaparin (Lovenox)  Mechanical VTE Prophylaxis in Place: Yes    AM-PAC Basic Mobility:  Basic Mobility Inpatient Raw Score: 9    JH-HLM Achieved: 4: Move to  chair/commode  -HL Goal: 3: Sit at edge of bed    HLM Goal listed above. Continue with multidisciplinary rounding and encourage appropriate mobility to improve upon HLM goals.         Patient Centered Rounds: Case discussed and reviewed with nursing    Discussions with Specialists or Other Care Team Provider: Case management    Education and Discussions with Family / Patient: Discussed with patient family    Time Spent for Care: 80 minutes.  More than 50% of total time spent on counseling and coordination of care as described above.    Current Length of Stay: 1 day(s)    Current Patient Status: Inpatient   Certification Statement: The patient will continue to require additional inpatient hospital stay due to rehab placement    Discharge Plan / Estimated Discharge Date: 24-48 Hours    Code Status: Level 3 - DNAR and DNI      Subjective:   Seen and examined, pleasantly demented.  No nausea no vomiting.  Evaluated with nursing as well as geriatrics.    Did very poorly with video swallow, discussed eating with family members.  They would like for the patient to have a diet    A complete and comprehensive 14 point organ system review has been performed and all other systems are negative other than stated above.    Objective:     Vitals:   Temp (24hrs), Av °F (36.7 °C), Min:97.3 °F (36.3 °C), Max:98.6 °F (37 °C)    Temp:  [97.3 °F (36.3 °C)-98.6 °F (37 °C)] 98.6 °F (37 °C)  HR:  [87-97] 94  Resp:  [16] 16  BP: (112-126)/(56-72) 126/58  SpO2:  [98 %-99 %] 99 %  Body mass index is 18.11 kg/m².     Input and Output Summary (last 24 hours):       Intake/Output Summary (Last 24 hours) at 2024 6463  Last data filed at 2024 0851  Gross per 24 hour   Intake 120 ml   Output 550 ml   Net -430 ml       Physical Exam:     General: ill appearing, no acute distress  HEENT: atraumatic, PERRLA, moist mucosa, normal pharynx, normal tonsils and adenoids, normal tongue, no fluid in sinuses  Neck: Trachea midline, no carotid  bruit, no masses  Respiratory: normal chest wall expansion, CTA B, no r/r/w, no rubs  Cardiovascular: RRR, no m/r/g, Normal S1 and S2  Abdomen: Soft, non-tender, non-distended, normal bowel sounds in all quadrants, no hepatosplenomegaly, no tympany  Rectal: deferred  Musculoskeletal: Right leg limited by fracture  Integumentary: warm, dry, and pink, with no rash, purpura, or petechia  Heme/Lymph: no lymphadenopathy, no bruises  Neurological: Cranial Nerves II-XII grossly intact  Psychiatric: Apparent dementia      Additional Data:     Labs:    Results from last 7 days   Lab Units 09/12/24  0437 09/11/24  0622 09/10/24  2152   WBC Thousand/uL 5.64   < > 9.64   HEMOGLOBIN g/dL 9.1*   < > 11.0*   HEMATOCRIT % 26.5*   < > 32.5*   PLATELETS Thousands/uL 283   < > 382   LYMPHO PCT %  --   --  7*   MONO PCT %  --   --  4   EOS PCT %  --   --  0    < > = values in this interval not displayed.     Results from last 7 days   Lab Units 09/12/24  0437 09/11/24  0622 09/10/24  2152   POTASSIUM mmol/L 3.2*   < > 3.4*   CHLORIDE mmol/L 99   < > 90*   CO2 mmol/L 26   < > 22   BUN mg/dL 11   < > 14   CREATININE mg/dL 0.75   < > 1.02   CALCIUM mg/dL 7.7*   < > 8.2*   ALK PHOS U/L  --   --  157*   ALT U/L  --   --  8   AST U/L  --   --  11*    < > = values in this interval not displayed.           * I Have Reviewed All Lab Data Listed Above.  * Additional Pertinent Lab Tests Reviewed: All Labs For Current Hospital Admission Reviewed    Imaging:    Imaging Reports Reviewed Today Include:   Video swallow report  Imaging Personally Reviewed by Myself Includes: Communicated video swallow report    Recent Cultures (last 7 days):           Last 24 Hours Medication List:   Current Facility-Administered Medications   Medication Dose Route Frequency Provider Last Rate    acetaminophen  975 mg Oral Q8H PRN Victorino Walsh PA-C      aluminum-magnesium hydroxide-simethicone  30 mL Oral Q6H PRN Victorino Walsh PA-C      amLODIPine  5 mg Oral Daily Victorino  MARIANA Walsh      atorvastatin  40 mg Oral Daily With Dinner Victorino Walsh PA-C      cyanocobalamin  500 mcg Oral Daily Dwight Restrepo DO      enoxaparin  40 mg Subcutaneous Daily Victorino Walsh PA-C      furosemide  20 mg Oral QAM Victorino Walsh PA-C      insulin lispro  1-5 Units Subcutaneous 4x Daily (AC & HS) Victorino Walsh PA-C      lisinopril  20 mg Oral Daily Victorino Walsh PA-C      melatonin  3 mg Oral HS Victorino Walsh PA-C      ondansetron  4 mg Intravenous Q6H PRN Victorino Walsh PA-C      polyethylene glycol  17 g Oral Daily PRN Victorino Walsh PA-C      sodium chloride  83.3 mL/hr Intravenous Continuous Dwight Resrtepo DO 83.3 mL/hr (09/12/24 1231)       AM-PAC Basic Mobility:  Basic Mobility Inpatient Raw Score: 9    JH-HLM Achieved: 4: Move to chair/commode  JH-HLM Goal: 3: Sit at edge of bed    HLM Goal listed above. Continue with multidisciplinary rounding and encourage appropriate mobility to improve upon HLM goals.     Today, Patient Was Seen By: Dwight Restrepo DO    ** Please Note: This note was completed in part utilizing [employer] Dragon One [employer] software dictation.  Grammatical errors, random word insertions, spelling mistakes, and incomplete sentences may be an occasional consequence of this system secondary to software limitations, ambient noise, and hardware issues.  If you have any questions or concerns about the content, text, or information contained within the body of this dictation, please contact the provider for clarification. **

## 2024-09-12 NOTE — PLAN OF CARE
Problem: Potential for Falls  Goal: Patient will remain free of falls  Description: INTERVENTIONS:  - Educate patient/family on patient safety including physical limitations  - Instruct patient to call for assistance with activity   - Consult OT/PT to assist with strengthening/mobility   - Keep Call bell within reach  - Keep bed low and locked with side rails adjusted as appropriate  - Keep care items and personal belongings within reach  - Initiate and maintain comfort rounds  - Make Fall Risk Sign visible to staff  - Offer Toileting every  Hours, in advance of need  - Initiate/Maintain bed alarm  - Obtain necessary fall risk management equipment:   - Apply yellow socks and bracelet for high fall risk patients  - Consider moving patient to room near nurses station  Outcome: Progressing     Problem: Prexisting or High Potential for Compromised Skin Integrity  Goal: Skin integrity is maintained or improved  Description: INTERVENTIONS:  - Identify patients at risk for skin breakdown  - Assess and monitor skin integrity  - Assess and monitor nutrition and hydration status  - Monitor labs   - Assess for incontinence   - Turn and reposition patient  - Assist with mobility/ambulation  - Relieve pressure over bony prominences  - Avoid friction and shearing  - Provide appropriate hygiene as needed including keeping skin clean and dry  - Evaluate need for skin moisturizer/barrier cream  - Collaborate with interdisciplinary team   - Patient/family teaching  - Consider wound care consult   Outcome: Progressing     Problem: SAFETY ADULT  Goal: Maintain or return to baseline ADL function  Description: INTERVENTIONS:  -  Assess patient's ability to carry out ADLs; assess patient's baseline for ADL function and identify physical deficits which impact ability to perform ADLs (bathing, care of mouth/teeth, toileting, grooming, dressing, etc.)  - Assess/evaluate cause of self-care deficits   - Assess range of motion  - Assess  patient's mobility; develop plan if impaired  - Assess patient's need for assistive devices and provide as appropriate  - Encourage maximum independence but intervene and supervise when necessary  - Involve family in performance of ADLs  - Assess for home care needs following discharge   - Consider OT consult to assist with ADL evaluation and planning for discharge  - Provide patient education as appropriate  Outcome: Progressing     Problem: DISCHARGE PLANNING  Goal: Discharge to home or other facility with appropriate resources  Description: INTERVENTIONS:  - Identify barriers to discharge w/patient and caregiver  - Arrange for needed discharge resources and transportation as appropriate  - Identify discharge learning needs (meds, wound care, etc.)  - Arrange for interpretive services to assist at discharge as needed  - Refer to Case Management Department for coordinating discharge planning if the patient needs post-hospital services based on physician/advanced practitioner order or complex needs related to functional status, cognitive ability, or social support system  Outcome: Progressing     Problem: Knowledge Deficit  Goal: Patient/family/caregiver demonstrates understanding of disease process, treatment plan, medications, and discharge instructions  Description: Complete learning assessment and assess knowledge base.  Interventions:  - Provide teaching at level of understanding  - Provide teaching via preferred learning methods  Outcome: Progressing

## 2024-09-12 NOTE — CASE MANAGEMENT
Case Management Assessment & Discharge Planning Note    Patient name Ingrid Samson  Location [facility] /E4 -* MRN [id number]  : 1933 Date 2024       Current Admission Date: 9/10/2024  Current Admission Diagnosis:Ambulatory dysfunction   Patient Active Problem List    Diagnosis Date Noted Date Diagnosed    Ambulatory dysfunction 2024     Cognitive decline 2024     Effusion of right knee joint 2024     Sphenoid sinusitis 2024     Right medial tibial plateau fracture 2024     Stage 3a chronic kidney disease (HCC) 2023     Mild protein-calorie malnutrition (HCC)      Hyponatremia 2020     Insomnia 2016     Type 2 diabetes mellitus with diabetic chronic kidney disease (HCC) 2016     Mixed hyperlipidemia 10/10/2012     Primary hypertension 10/10/2012       LOS (days): 1  Geometric Mean LOS (GMLOS) (days): 3.3  Days to GMLOS:1.9     OBJECTIVE:    Risk of Unplanned Readmission Score: 12.07         Current admission status: Inpatient       Preferred Pharmacy:   [id number] - [place], [place] - [address]  [address]  [facility] [address]  Phone: [phone] Fax: [phone]    Primary Care Provider: Carlo Valerio MD    Primary Insurance: [facility] Archiver[facility]s [facility]  Secondary Insurance:     ASSESSMENT:  Active Health Care Proxies    There are no active Health Care Proxies on file.       Advance Directives  Does patient have a Health Care POA?: Yes  Does patient have Advance Directives?: Yes  Advance Directives: Power of  for health care  Primary Contact: Patricia Lopes (Daughter)  [phone] (Mobile)         Readmission Root Cause  30 Day Readmission: No    Patient Information  Admitted from:: Home  Mental Status: Sedated  During Assessment patient was accompanied by: Daughter  Assessment information provided by:: Daughter  Primary Caregiver: Spouse  Caregiver's Name:: Dwight  Caregiver's  Relationship to Patient:: Significant Other  Caregiver's Telephone Number:: Patricia Lopes (Daughter)  [phone] (Mobile)  Support Systems: Spouse/significant other, Children  County of Residence: [place]  What city do you live in?: [place]  Home entry access options. Select all that apply.: Stairs  Number of steps to enter home.: 3 (Each step is long)  Do the steps have railings?: Yes  Type of Current Residence: Apartment  Floor Level: 1  Upon entering residence, is there a bedroom on the main floor (no further steps)?: Yes  Upon entering residence, is there a bathroom on the main floor (no further steps)?: Yes  Living Arrangements: Lives w/ Spouse/significant other  Is patient a ?: No    Activities of Daily Living Prior to Admission  Functional Status: Assistance  Completes ADLs independently?: No  Level of ADL dependence: Assistance  Ambulates independently?: No  Level of ambulatory dependence: Assistance  Does patient use assisted devices?: Yes  Assisted Devices (DME) used: Walker, Bedside Commode  Does patient currently own DME?: Yes  What DME does the patient currently own?: Bedside Commode, Walker  Does patient have a history of Outpatient Therapy (PT/OT)?: Yes  Does the patient have a history of Short-Term Rehab?: Yes  Does patient have a history of HHC?: Yes  Does patient currently have HHC?: No         Patient Information Continued  Income Source: Pension/group home  Does patient have prescription coverage?: Yes  Does patient receive dialysis treatments?: No  Does patient have a history of substance abuse?: No  Does patient have a history of Mental Health Diagnosis?: No         Means of Transportation  Means of Transport to Appts:: Family transport      Social Determinants of Health (SDOH)      Flowsheet Row Most Recent Value   Housing Stability    In the last 12 months, was there a time when you were not able to pay the mortgage or rent on time? N   In the past 12 months, how many times have you  moved where you were living? 0   At any time in the past 12 months, were you homeless or living in a shelter (including now)? N   Transportation Needs    In the past 12 months, has lack of transportation kept you from medical appointments or from getting medications? no   In the past 12 months, has lack of transportation kept you from meetings, work, or from getting things needed for daily living? No   Food Insecurity    Within the past 12 months, you worried that your food would run out before you got the money to buy more. Never true   Within the past 12 months, the food you bought just didn't last and you didn't have money to get more. Never true   Utilities    In the past 12 months has the electric, gas, oil, or water company threatened to shut off services in your home? No            DISCHARGE DETAILS:    Discharge planning discussed with:: Patricia Santiago  Freedom of Choice: Yes  Comments - Freedom of Choice: Discussed home versus going to SNF  CM contacted family/caregiver?: Yes  Were Treatment Team discharge recommendations reviewed with patient/caregiver?: Yes  Did patient/caregiver verbalize understanding of patient care needs?: Yes  Were patient/caregiver advised of the risks associated with not following Treatment Team discharge recommendations?: Yes    Contacts  Patient Contacts: LopesPatricia (Daughter)  [phone] (Mobile)  Relationship to Patient:: Family  Contact Method: In Person  Reason/Outcome: Continuity of Care, Emergency Contact, Discharge Planning    Requested Home Health Care         Is the patient interested in HHC at discharge?: No    DME Referral Provided  Referral made for DME?: No         Would you like to participate in our [facility] service program?  : No - Declined    Treatment Team Recommendation: Short Term Rehab  Discharge Destination Plan:: Short Term Rehab                                         Additional Comments: CM had met with the Patricia santiago.  She staed that she had come  from [place] and will be here until 9/15/24.  She said that she has been the POA and that a long time ago she was assigned via DPOA.   She stated that her mother and father have been resistive to any assistance that has been offered.  Dtr had tried to get them assistance via AAA but they became annoyed with her.  She stated that she has been in [facility] after fx her hip but did not like it and she is positive that that her mother had VNA services but feels they did not utilize their services for a long time.  Dtr also stated that her brother and his wife lives in the same 2BR apt, but they do not provide any meaningfull assistance at home.  She said her father assists her with all ADLS and has observed that she has lost significant weight.  Dr. Restrepo has addressed her aspiration issues with the the dtr and Pt is now a DNR and tube feedings will not be planned.  Pt is NWB and plan will be to go to a SNF that can provide PT/OT and skilled nursing, but transition to LTC if needed.  Patricia Santiago and son, Ed has been added to contact list.  Will contact Patricia santiago directly for any discharge planning.  CM to follow.

## 2024-09-12 NOTE — ASSESSMENT & PLAN NOTE
Hypotonic hyponatremia improved with IV fluid  Recent Labs     09/10/24  2152 09/11/24  0622 09/12/24  0437   SODIUM 127* 128* 132*   Continue IV fluid until adequate oral intake

## 2024-09-12 NOTE — ASSESSMENT & PLAN NOTE
Nonweightbearing to right leg due to tibial plateau fracture  PT OT recommending rehab  Continued outpatient orthopedic follow-up

## 2024-09-12 NOTE — ASSESSMENT & PLAN NOTE
Patient with dysphagia in the setting of dementia  Discussed with family alternative nutrition and hydration including artificial feeding tube -they would prefer pleasure feeds  Continue dysphagia 2 diet and thin liquids  Family members including patient understand risk of aspiration

## 2024-09-12 NOTE — PROCEDURES
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        Past Surgical History  Past Surgical History:   Procedure Laterality Date    CYSTOSCOPY W/ STONE MANIPULATION N/A 2/23/2016    Procedure: STONE MANIPULATION;  Surgeon: aBrt Whitlock MD;  Location: [facility];  Service:     ERCP W/ SPHICTEROTOMY N/A 2/23/2016    Procedure: ENDOSCOPIC RETROGRADE CHOLANGIOPANCREATOGRAPHY (ERCP) W/ SPHINCTEROTOMY;  Surgeon: Bart Whitlock MD;  Location: [facility];  Service:     MT HEMIARTHROPLASTY HIP PARTIAL Left 4/8/2020    Procedure: HEMIARTHROPLASTY HIP (BIPOLAR);  Surgeon: Jose Lopez MD;  Location: [facility];  Service: Orthopedics    TUBAL LIGATION         Modified (Video) Barium Swallow Study    Summary:  Images are on PACS for review.     Oral stage:Mod/severe  Weakened labial seal and retrieval of bolus via all consistencies (tsp, cup, straw). Unable to retrieve via straw x2. Poor bolus control and transfer with all consistencies. Lingual pumping noted and decreased BOT retraction. Lingual coating noted with all consistencies after swallow.     Pharyngeal stage: Severe  Timely pharyngeal swallow. Poor laryngeal elevation and inconsistent epiglottic inversion. Significant/severe residue within the pyrifrom sinuses and vallecula with all consistencies. Silent aspiration noted with all consistencies and without response. Wet vocal quality noted with increased trials. Pt unable to volitionally cough or swallow despite max verbal cues. SLP presenting pt with empty tsp  to illicit secondary swallow for pharyngeal clearance (successful x2). Pt with noted silent aspiration after swallow due to pharyngeal residue pooling after multiple trials therefore further PO trials stopped due to safety concerns. Cognition impacting swallow at this time and pt at high risk of aspiration with increased intake.     Per gross esophageal screen:  Was not completed 2/2 safety concerns with increased trials.       Strategy Trialed y/n  Result +/-     Secondary/multiple swallows Y -   Effortful/hard swallow Y -   Alternation w/ liquids N    Chin down/neck flexion N    Volitional cough/throat clear Y -   Head turn/rotation N    Breath hold/cough N    Other       Recommendations:  Diet: NPO  Meds: Non oral   Upright position  F/u ST tx: 3-5x/wk as able/indicated   Therapy Prognosis: Poor  Prognosis considerations: Poor command following, unable to volitionally cough or swallow on commands  Aspiration Precautions  Consider consult with: Dietician, palliative or hospice, GOC discussion   Results reviewed with: pt, physician, dietician  Aspiration precautions posted.  Repeat MBS as necessary      Pt is a 89 year old female admitted s/p ambulatory dysfunction and cognitive decline. Per initial bedside swallow evaluation 9/11/24 with recommendations of NPO + MBS. MBS recommend to further assess oropharyngeal function and r/o aspiration.       Functional Oral Intake Scale (FOIS)  Osiel Salas PhD, F-JUAN JOSÉ  (Does not include liquids)  Nothing by mouth (NPO).  Tube dependent with minimal attempts of food or liquid.  Tube dependent with consistent intake of food or liquid.  Total oral diet of a single consistency.  Total oral diet with multiple consistencies but requiring special preparation or compensations.  Total oral diet with multiple consistencies without special preparations, but with specific food limitations.  Total oral diet with no restriction.       Goals:  Pt will tolerate least restrictive diet w/out  s/s aspiration or oral/pharyngeal difficulties.    Dysphagia LTG  -Patient will demonstrate safe and effective oral intake (without overt s/s significant oral/pharyngeal dysphagia including s/s penetration or aspiration) for the highest appropriate diet level.     Short Term Goals:    -Patient will tolerate trials of upgraded food and/or liquid texture with no significant s/s of oral or pharyngeal dysphagia including aspiration across 1-3 diagnostic sessions     -Patient will comply with a secondary Video/Modified Barium Swallow study for more complete assessment of swallowing anatomy/physiology/aspiration risk and to assess efficacy of treatment techniques prior to diet initiation     Re: Compensatory Strategies  -Patient’s caregiver will demonstrate adherence to recommended diet, as well as application of aspiration precautions and compensatory strategies.      H&P/pertinent provider notes: (PMH noted above)  Chief Complaint: fall  Ingrid Samson is a 89 y.o. female with a PMH of cognitive decline, HTN, T2DM, CKD who presents with fall.              History limited from patient as she is a poor historian, further information obtained from family, chart review and discussion with ED attending/nursing. She presents tonight for evaluation of fall at home that occurred tonight. Family states that she was using a walker when her leg just gave out and she fell to the floor. He reports she twisted her right leg. Family called for help to get her up but she was complaining of leg/knee pain when she tried to stand prompting ED visit. No loss of consciousness or reported head strike.     Special Studies:  9/10 CT  C spine   DEGENERATIVE CHANGES: Intervertebral disc space narrowing at C4/C5, C5/C6, and C6/C7 with anterior, marginal, and uncovertebral osteophytosis at these levels. Multilevel facet joint arthropathy.   Degenerative changes as described but no evidence of acute cervical spine injury.   CT chest: 9/10  No  acute process seen. No evidence of acute visceral or vascular injury in the chest, abdomen, or pelvis.  Pulmonary pleural and parenchymal scarring, coronary atherosclerosis, adrenal gland thickening, left renal atrophy, colonic diverticulosis without evidence of acute diverticulitis, degenerative changes of the spine, and other findings as above.    Previous MBS:  None found on Epic     Does the pt have pain? No  If yes, was nursing made aware/was it addressed?      Food allergies: NKFA   Current diet: Dysphagia 2 (mechanical soft)/thin however recommendations NPO    Premorbid diet: Dysphagia 2/thin   Dentition: Edentulous    O2 requirement: None    Oral mech: WFL   Vocal quality/speech: WFL   Cognitive status: Alert, confused, able to follow 1 step commands intermittently     Precautions: Aspiration, fall     Consistencies administered: Puree,  thin, nectar, honey thick.    Thin liquid by: single cup/straw sip  Nectar thick liquid by: single cup/straw sip  Honey thick liquid by: single cup/straw sip    Pt was viewed seated laterally at 90 degrees.

## 2024-09-12 NOTE — UTILIZATION REVIEW
Initial Clinical Review    Patient arrived on 9/10/2024  8:29 PM. Care then began on 9/10 @ 2037 when vitals were obtained. The patient has already surpassed 1 midnight with active ongoing care.       Admission: Date/Time/Statement:   Admission Orders (From admission, onward)       Ordered        09/11/24 0301  Inpatient Admission  Once                          Orders Placed This Encounter   Procedures    Inpatient Admission     Standing Status:   Standing     Number of Occurrences:   1     Order Specific Question:   Level of Care     Answer:   Med Surg [16]     Order Specific Question:   Estimated length of stay     Answer:   More than 2 Midnights     Order Specific Question:   Certification     Answer:   I certify that inpatient services are medically necessary for this patient for a duration of greater than two midnights. See H&P and MD Progress Notes for additional information about the patient's course of treatment.     ED Arrival Information       Expected   -    Arrival   9/10/2024 20:29    Acuity   Urgent              Means of arrival   Ambulance    Escorted by   [place] [facility] ([facility])    Service   Hospitalist    Admission type   Emergency              Arrival complaint   Fall - Right Leg pain             Chief Complaint   Patient presents with    Fall     Pt arriving via ems from home where family members witnessed pt leg give out and fell. Pt poor historian, pt c/o R knee pain. R arm noted to be swollen with pitting edema and cyanosis to R fingers       Initial Presentation: 89 y.o. female who presented by EMS to [facility] ED. Admitted as Inpatient for evaluation and treatment of ambulatory dysfunction. PMHx: cognitive decline, T2DM, HLD, HTN, SIADH, CKD. Presented after fall at home last night, reports R leg and knee pain. No LOC or head strike. On exam, RLE ROM limited. Labs Na 127. Imaging tibial plateau fracture on RLE. Plan: PT/OT evals, SSI w/ BG checks q6h, continue PTA meds,  check serum/urine osmolality, urine sodium; encourage PO intake, I&O, delirium precautions, NWB RLE, Trend labs, replete electrolytes as needed. Geriatrics, Orthopedics consulted.    Anticipated Length of Stay/Certification Statement: Patient will be admitted on an inpatient basis with an anticipated length of stay of greater than 2 midnights secondary to ambulatory dysfunction.     Geriatrics: Pt 22/30 of MMSE - indicating early dementia. Supplement B12 since level noted to be 205. CTH showed microangiopathic changes. Delirium precautions. Clincal Frail Scale - 6. Family reports pt has slid off the couch numerous times and neighbors have helped get her up off the floor. Nutrition to see patient while admitted. IM cyanocobalamin 1,000 mcg; start PO B12 500 mcg daily.     Orthopedics: Pt w/ R medial tibial plateau fracture. NWB RLE in knee immobilizer. Ice/compression w/ ACE wrap. Tenderness to R knee.       Date: 09/12/24  Day 3: Has surpassed a 2nd midnight with active treatments and services. VBS w/ speech pathologist; recommending NPO, no oral meds, aspiration precautions. Family in agreement w/ DNR/DNI code status change given dysphagia and dementia; family requesting pleasure feeds despite recommendation for NPO and alternative nutrition/hydration. Exam: ill-appearing, RLE limited by fx, dementia. K 3.2. Ca 7.7. Plan: PT/OT recommending rehab, SSI w/ BG checks ACHS, continue current meds, IVF, Trend labs, replete electrolytes as needed. NWB RLE. F      ED Triage Vitals   Temperature Pulse Respirations Blood Pressure SpO2 Pain Score   09/10/24 2037 09/10/24 2037 09/10/24 2037 09/10/24 2037 09/10/24 2037 09/10/24 2152   97.8 °F (36.6 °C) (!) 107 18 113/56 99 % 7     Weight (last 2 days)       Date/Time Weight    09/10/24 2037 39.3 (86.64)            Vital Signs (last 3 days)       Date/Time Temp Pulse Resp BP MAP (mmHg) SpO2 O2 Device David Coma Scale Score Pain    09/12/24 0901 -- -- -- 114/72 -- -- -- -- --     09/12/24 0747 98.2 °F (36.8 °C) 87 16 112/68 88 98 % None (Room air) -- --    09/11/24 2253 97.3 °F (36.3 °C) 97 16 120/56 79 98 % None (Room air) -- --    09/11/24 2020 -- -- -- -- -- -- -- 14 No Pain    09/11/24 1521 98.6 °F (37 °C) 99 16 128/59 88 99 % None (Room air) -- --    09/11/24 0900 -- -- -- -- -- -- -- 14 No Pain    09/11/24 0814 99.3 °F (37.4 °C) 77 16 130/64 91 100 % None (Room air) -- --    09/11/24 0434 97.5 °F (36.4 °C) 94 16 107/53 76 96 % None (Room air) -- --    09/11/24 0426 -- -- -- -- -- -- -- 14 No Pain    09/11/24 0330 -- 94 18 111/59 79 97 % None (Room air) -- --    09/11/24 0229 -- 101 18 115/71 -- 100 % None (Room air) -- --    09/11/24 0140 -- 75 17 110/59 -- 99 % None (Room air) -- --    09/10/24 2329 -- 89 18 118/54 -- 98 % None (Room air) -- --    09/10/24 2152 -- -- -- -- -- -- -- -- 7    09/10/24 2101 -- -- -- -- -- -- -- 14 --    09/10/24 2052 -- -- -- -- -- -- -- 14 --    09/10/24 2037 97.8 °F (36.6 °C) 107 18 113/56 -- 99 % None (Room air) -- --              Pertinent Labs/Diagnostic Test Results:   Radiology:  CT lower extremity wo contrast right   Final Interpretation by Darion Gomez DO (09/11 0009)      Irregular lucency through the medial tibial plateau which appears to be subacute to chronic, as the adjacent cortices appear to be intact. Correlate with focal symptomatology.      Large lipohemarthrosis/joint effusion               Workstation performed: [id number]         CT head wo contrast   Final Interpretation by Faheem Caro DO (09/11 0005)      Mucosal thickening and fluid in the left sphenoid sinus, consider acute sphenoid sinusitis.  Fluid in the left mastoid air cells and left middle ear, consider otomastoiditis. No calvarial fracture or acute intracranial abnormality is seen otherwise.      Moderate cerumen in the bilateral external auditory canals, could cause pain and/or dizziness.      Other findings as above.            CT CERVICAL SPINE -  WITHOUT CONTRAST      INDICATION:   fall.      COMPARISON:  None.      TECHNIQUE:  CT examination of the cervical spine was performed without intravenous contrast.  Contiguous axial images were obtained. Multiplanar 2D reformatted images were created from the source data.      Radiation dose length product (DLP) for this visit:  837 mGy-cm  (accession [id number]), 188 mGy-cm  (accession [id number]), 490 mGy-cm  (accession [id number]), 0 mGy-cm  (accession [id number]).  This examination, like all CT scans performed in the [facility], was performed utilizing techniques to minimize radiation dose exposure, including the use of iterative reconstruction and automated exposure control.      IMAGE QUALITY:  Diagnostic.      FINDINGS:      ALIGNMENT: 2 to 3 mm of anterolisthesis of C7 on T1 and T1 on T2, probably related to facet joint arthropathy.      VERTEBRAE: No acute fracture.      DEGENERATIVE CHANGES: Intervertebral disc space narrowing at C4/C5, C5/C6, and C6/C7 with anterior, marginal, and uncovertebral osteophytosis at these levels. Multilevel facet joint arthropathy.      PREVERTEBRAL AND PARASPINAL SOFT TISSUES: Incidental discovery of one or more thyroid nodule(s) measuring less than 1.5 cm and without suspicious features is noted in this patient who is above 35 years old; according to guidelines published in the    February 2015 white paper on incidental thyroid nodules in the Journal of the American College of Radiology (JACR), no further evaluation is recommended.      THORACIC INLET:  Please refer to the concurrent chest CT report for thoracic inlet findings.         IMPRESSION:      Degenerative changes as described but no evidence of acute cervical spine injury.      Other findings as above.               CT CHEST, ABDOMEN AND PELVIS WITH IV CONTRAST   CT THORACOLUMBAR SPINE WITHOUT CONTRAST      INDICATION: fall.      COMPARISON: None.      TECHNIQUE: CT examination of the chest, abdomen and  pelvis was performed. Multiplanar 2D reformatted images were created from the source data.      This examination, like all CT scans performed in the [facility], was performed utilizing techniques to minimize radiation dose exposure, including the use of iterative reconstruction and automated exposure control. Radiation dose length    product (DLP) for this visit: 837 mGy-cm  (accession [id number]), 188 mGy-cm  (accession [id number]), 490 mGy-cm  (accession [id number]), 0 mGy-cm  (accession [id number])      IV Contrast: 100 mL of iohexol (OMNIPAQUE)   Enteric Contrast: Not administered.      FINDINGS:      CHEST      LUNGS/PLEURA: Atelectasis/scarring noted dependently and in the left lower lung field. Biapical pleural and parenchymal scarring with some calcified pleural plaques.      Lungs otherwise appear grossly clear.      HEART/GREAT VESSELS: Heart appears normal in size. Moderate coronary atherosclerosis. Mild aortic atherosclerosis. No thoracic aortic aneurysm.      MEDIASTINUM AND SU: Inspissated secretions in the distal trachea. Otherwise grossly unremarkable      CHEST WALL AND LOWER NECK: Generalized osteopenia. Old right-sided rib fractures size old fracture of the inferior right scapula. Body wall edema. Several subcentimeter low-density nodules in the thyroid gland, of unlikely clinical significance.      ABDOMEN      LIVER/BILIARY TREE: Unremarkable.      GALLBLADDER: No calcified gallstones. No pericholecystic inflammatory change.      SPLEEN: Unremarkable.      PANCREAS: Unremarkable.      ADRENAL GLANDS: Adrenal gland thickening.      KIDNEYS/URETERS: Severe atrophy of the left kidney. 0.8 cm exophytic low-density lesion posteriorly and 1.2 cm low-density lesion in the mid right kidney, nonspecific. Otherwise grossly unremarkable.      STOMACH AND BOWEL: Colonic diverticulosis, no discrete evidence of acute diverticulitis. No evidence of bowel obstruction.      APPENDIX: No findings to  suggest appendicitis.      ABDOMINOPELVIC CAVITY: No ascites. No pneumoperitoneum. No lymphadenopathy.      VESSELS: Moderate atherosclerosis, no aortic aneurysm      PELVIS      REPRODUCTIVE ORGANS: Unremarkable for patient's age.      URINARY BLADDER: Unremarkable.      ABDOMINAL WALL/INGUINAL REGIONS: Body wall edema      BONES: Generalized osteopenia. Left bipolar hip hemiarthroplasty. Visualized hardware appears intact. Multilevel degenerative changes of the spine with intervertebral disc space narrowing, osteophytosis, and facet joint arthropathy, most prominent in the    lumbar spine. No acute fracture or suspicious osseous lesion.         IMPRESSION:      No acute process seen. No evidence of acute visceral or vascular injury in the chest, abdomen, or pelvis.      Pulmonary pleural and parenchymal scarring, coronary atherosclerosis, adrenal gland thickening, left renal atrophy, colonic diverticulosis without evidence of acute diverticulitis, degenerative changes of the spine, and other findings as above.               Workstation performed: [id number]         CT spine cervical without contrast   Final Interpretation by Faheem Caro DO (09/11 0005)      Mucosal thickening and fluid in the left sphenoid sinus, consider acute sphenoid sinusitis.  Fluid in the left mastoid air cells and left middle ear, consider otomastoiditis. No calvarial fracture or acute intracranial abnormality is seen otherwise.      Moderate cerumen in the bilateral external auditory canals, could cause pain and/or dizziness.      Other findings as above.            CT CERVICAL SPINE - WITHOUT CONTRAST      INDICATION:   fall.      COMPARISON:  None.      TECHNIQUE:  CT examination of the cervical spine was performed without intravenous contrast.  Contiguous axial images were obtained. Multiplanar 2D reformatted images were created from the source data.      Radiation dose length product (DLP) for this visit:  837 mGy-cm   (accession [id number]), 188 mGy-cm  (accession [id number]), 490 mGy-cm  (accession [id number]), 0 mGy-cm  (accession [id number]).  This examination, like all CT scans performed in the [facility], was performed utilizing techniques to minimize radiation dose exposure, including the use of iterative reconstruction and automated exposure control.      IMAGE QUALITY:  Diagnostic.      FINDINGS:      ALIGNMENT: 2 to 3 mm of anterolisthesis of C7 on T1 and T1 on T2, probably related to facet joint arthropathy.      VERTEBRAE: No acute fracture.      DEGENERATIVE CHANGES: Intervertebral disc space narrowing at C4/C5, C5/C6, and C6/C7 with anterior, marginal, and uncovertebral osteophytosis at these levels. Multilevel facet joint arthropathy.      PREVERTEBRAL AND PARASPINAL SOFT TISSUES: Incidental discovery of one or more thyroid nodule(s) measuring less than 1.5 cm and without suspicious features is noted in this patient who is above 35 years old; according to guidelines published in the    February 2015 white paper on incidental thyroid nodules in the Journal of the American College of Radiology (JACR), no further evaluation is recommended.      THORACIC INLET:  Please refer to the concurrent chest CT report for thoracic inlet findings.         IMPRESSION:      Degenerative changes as described but no evidence of acute cervical spine injury.      Other findings as above.               CT CHEST, ABDOMEN AND PELVIS WITH IV CONTRAST   CT THORACOLUMBAR SPINE WITHOUT CONTRAST      INDICATION: fall.      COMPARISON: None.      TECHNIQUE: CT examination of the chest, abdomen and pelvis was performed. Multiplanar 2D reformatted images were created from the source data.      This examination, like all CT scans performed in the [facility], was performed utilizing techniques to minimize radiation dose exposure, including the use of iterative reconstruction and automated exposure control. Radiation dose  length    product (DLP) for this visit: 837 mGy-cm  (accession [id number]), 188 mGy-cm  (accession [id number]), 490 mGy-cm  (accession [id number]), 0 mGy-cm  (accession [id number])      IV Contrast: 100 mL of iohexol (OMNIPAQUE)   Enteric Contrast: Not administered.      FINDINGS:      CHEST      LUNGS/PLEURA: Atelectasis/scarring noted dependently and in the left lower lung field. Biapical pleural and parenchymal scarring with some calcified pleural plaques.      Lungs otherwise appear grossly clear.      HEART/GREAT VESSELS: Heart appears normal in size. Moderate coronary atherosclerosis. Mild aortic atherosclerosis. No thoracic aortic aneurysm.      MEDIASTINUM AND SU: Inspissated secretions in the distal trachea. Otherwise grossly unremarkable      CHEST WALL AND LOWER NECK: Generalized osteopenia. Old right-sided rib fractures size old fracture of the inferior right scapula. Body wall edema. Several subcentimeter low-density nodules in the thyroid gland, of unlikely clinical significance.      ABDOMEN      LIVER/BILIARY TREE: Unremarkable.      GALLBLADDER: No calcified gallstones. No pericholecystic inflammatory change.      SPLEEN: Unremarkable.      PANCREAS: Unremarkable.      ADRENAL GLANDS: Adrenal gland thickening.      KIDNEYS/URETERS: Severe atrophy of the left kidney. 0.8 cm exophytic low-density lesion posteriorly and 1.2 cm low-density lesion in the mid right kidney, nonspecific. Otherwise grossly unremarkable.      STOMACH AND BOWEL: Colonic diverticulosis, no discrete evidence of acute diverticulitis. No evidence of bowel obstruction.      APPENDIX: No findings to suggest appendicitis.      ABDOMINOPELVIC CAVITY: No ascites. No pneumoperitoneum. No lymphadenopathy.      VESSELS: Moderate atherosclerosis, no aortic aneurysm      PELVIS      REPRODUCTIVE ORGANS: Unremarkable for patient's age.      URINARY BLADDER: Unremarkable.      ABDOMINAL WALL/INGUINAL REGIONS: Body wall edema      BONES: Generalized  osteopenia. Left bipolar hip hemiarthroplasty. Visualized hardware appears intact. Multilevel degenerative changes of the spine with intervertebral disc space narrowing, osteophytosis, and facet joint arthropathy, most prominent in the    lumbar spine. No acute fracture or suspicious osseous lesion.         IMPRESSION:      No acute process seen. No evidence of acute visceral or vascular injury in the chest, abdomen, or pelvis.      Pulmonary pleural and parenchymal scarring, coronary atherosclerosis, adrenal gland thickening, left renal atrophy, colonic diverticulosis without evidence of acute diverticulitis, degenerative changes of the spine, and other findings as above.               Workstation performed: [id number]         CT chest abdomen pelvis w contrast   Final Interpretation by Faheem Caro DO (09/11 0005)      Mucosal thickening and fluid in the left sphenoid sinus, consider acute sphenoid sinusitis.  Fluid in the left mastoid air cells and left middle ear, consider otomastoiditis. No calvarial fracture or acute intracranial abnormality is seen otherwise.      Moderate cerumen in the bilateral external auditory canals, could cause pain and/or dizziness.      Other findings as above.            CT CERVICAL SPINE - WITHOUT CONTRAST      INDICATION:   fall.      COMPARISON:  None.      TECHNIQUE:  CT examination of the cervical spine was performed without intravenous contrast.  Contiguous axial images were obtained. Multiplanar 2D reformatted images were created from the source data.      Radiation dose length product (DLP) for this visit:  837 mGy-cm  (accession [id number]), 188 mGy-cm  (accession [id number]), 490 mGy-cm  (accession [id number]), 0 mGy-cm  (accession [id number]).  This examination, like all CT scans performed in the [facility], was performed utilizing techniques to minimize radiation dose exposure, including the use of iterative reconstruction and automated exposure  control.      IMAGE QUALITY:  Diagnostic.      FINDINGS:      ALIGNMENT: 2 to 3 mm of anterolisthesis of C7 on T1 and T1 on T2, probably related to facet joint arthropathy.      VERTEBRAE: No acute fracture.      DEGENERATIVE CHANGES: Intervertebral disc space narrowing at C4/C5, C5/C6, and C6/C7 with anterior, marginal, and uncovertebral osteophytosis at these levels. Multilevel facet joint arthropathy.      PREVERTEBRAL AND PARASPINAL SOFT TISSUES: Incidental discovery of one or more thyroid nodule(s) measuring less than 1.5 cm and without suspicious features is noted in this patient who is above 35 years old; according to guidelines published in the    February 2015 white paper on incidental thyroid nodules in the Journal of the American College of Radiology (JACR), no further evaluation is recommended.      THORACIC INLET:  Please refer to the concurrent chest CT report for thoracic inlet findings.         IMPRESSION:      Degenerative changes as described but no evidence of acute cervical spine injury.      Other findings as above.               CT CHEST, ABDOMEN AND PELVIS WITH IV CONTRAST   CT THORACOLUMBAR SPINE WITHOUT CONTRAST      INDICATION: fall.      COMPARISON: None.      TECHNIQUE: CT examination of the chest, abdomen and pelvis was performed. Multiplanar 2D reformatted images were created from the source data.      This examination, like all CT scans performed in the [facility], was performed utilizing techniques to minimize radiation dose exposure, including the use of iterative reconstruction and automated exposure control. Radiation dose length    product (DLP) for this visit: 837 mGy-cm  (accession [id number]), 188 mGy-cm  (accession [id number]), 490 mGy-cm  (accession [id number]), 0 mGy-cm  (accession [id number])      IV Contrast: 100 mL of iohexol (OMNIPAQUE)   Enteric Contrast: Not administered.      FINDINGS:      CHEST      LUNGS/PLEURA: Atelectasis/scarring noted dependently and  in the left lower lung field. Biapical pleural and parenchymal scarring with some calcified pleural plaques.      Lungs otherwise appear grossly clear.      HEART/GREAT VESSELS: Heart appears normal in size. Moderate coronary atherosclerosis. Mild aortic atherosclerosis. No thoracic aortic aneurysm.      MEDIASTINUM AND SU: Inspissated secretions in the distal trachea. Otherwise grossly unremarkable      CHEST WALL AND LOWER NECK: Generalized osteopenia. Old right-sided rib fractures size old fracture of the inferior right scapula. Body wall edema. Several subcentimeter low-density nodules in the thyroid gland, of unlikely clinical significance.      ABDOMEN      LIVER/BILIARY TREE: Unremarkable.      GALLBLADDER: No calcified gallstones. No pericholecystic inflammatory change.      SPLEEN: Unremarkable.      PANCREAS: Unremarkable.      ADRENAL GLANDS: Adrenal gland thickening.      KIDNEYS/URETERS: Severe atrophy of the left kidney. 0.8 cm exophytic low-density lesion posteriorly and 1.2 cm low-density lesion in the mid right kidney, nonspecific. Otherwise grossly unremarkable.      STOMACH AND BOWEL: Colonic diverticulosis, no discrete evidence of acute diverticulitis. No evidence of bowel obstruction.      APPENDIX: No findings to suggest appendicitis.      ABDOMINOPELVIC CAVITY: No ascites. No pneumoperitoneum. No lymphadenopathy.      VESSELS: Moderate atherosclerosis, no aortic aneurysm      PELVIS      REPRODUCTIVE ORGANS: Unremarkable for patient's age.      URINARY BLADDER: Unremarkable.      ABDOMINAL WALL/INGUINAL REGIONS: Body wall edema      BONES: Generalized osteopenia. Left bipolar hip hemiarthroplasty. Visualized hardware appears intact. Multilevel degenerative changes of the spine with intervertebral disc space narrowing, osteophytosis, and facet joint arthropathy, most prominent in the    lumbar spine. No acute fracture or suspicious osseous lesion.         IMPRESSION:      No acute process  seen. No evidence of acute visceral or vascular injury in the chest, abdomen, or pelvis.      Pulmonary pleural and parenchymal scarring, coronary atherosclerosis, adrenal gland thickening, left renal atrophy, colonic diverticulosis without evidence of acute diverticulitis, degenerative changes of the spine, and other findings as above.               Workstation performed: [id number]         CT recon only thoracolumbar   Final Interpretation by Faheem Caro DO (09/11 0005)      Mucosal thickening and fluid in the left sphenoid sinus, consider acute sphenoid sinusitis.  Fluid in the left mastoid air cells and left middle ear, consider otomastoiditis. No calvarial fracture or acute intracranial abnormality is seen otherwise.      Moderate cerumen in the bilateral external auditory canals, could cause pain and/or dizziness.      Other findings as above.            CT CERVICAL SPINE - WITHOUT CONTRAST      INDICATION:   fall.      COMPARISON:  None.      TECHNIQUE:  CT examination of the cervical spine was performed without intravenous contrast.  Contiguous axial images were obtained. Multiplanar 2D reformatted images were created from the source data.      Radiation dose length product (DLP) for this visit:  837 mGy-cm  (accession [id number]), 188 mGy-cm  (accession [id number]), 490 mGy-cm  (accession [id number]), 0 mGy-cm  (accession [id number]).  This examination, like all CT scans performed in the [facility], was performed utilizing techniques to minimize radiation dose exposure, including the use of iterative reconstruction and automated exposure control.      IMAGE QUALITY:  Diagnostic.      FINDINGS:      ALIGNMENT: 2 to 3 mm of anterolisthesis of C7 on T1 and T1 on T2, probably related to facet joint arthropathy.      VERTEBRAE: No acute fracture.      DEGENERATIVE CHANGES: Intervertebral disc space narrowing at C4/C5, C5/C6, and C6/C7 with anterior, marginal, and uncovertebral  osteophytosis at these levels. Multilevel facet joint arthropathy.      PREVERTEBRAL AND PARASPINAL SOFT TISSUES: Incidental discovery of one or more thyroid nodule(s) measuring less than 1.5 cm and without suspicious features is noted in this patient who is above 35 years old; according to guidelines published in the    February 2015 white paper on incidental thyroid nodules in the Journal of the American College of Radiology (JACR), no further evaluation is recommended.      THORACIC INLET:  Please refer to the concurrent chest CT report for thoracic inlet findings.         IMPRESSION:      Degenerative changes as described but no evidence of acute cervical spine injury.      Other findings as above.               CT CHEST, ABDOMEN AND PELVIS WITH IV CONTRAST   CT THORACOLUMBAR SPINE WITHOUT CONTRAST      INDICATION: fall.      COMPARISON: None.      TECHNIQUE: CT examination of the chest, abdomen and pelvis was performed. Multiplanar 2D reformatted images were created from the source data.      This examination, like all CT scans performed in the [facility], was performed utilizing techniques to minimize radiation dose exposure, including the use of iterative reconstruction and automated exposure control. Radiation dose length    product (DLP) for this visit: 837 mGy-cm  (accession [id number]), 188 mGy-cm  (accession [id number]), 490 mGy-cm  (accession [id number]), 0 mGy-cm  (accession [id number])      IV Contrast: 100 mL of iohexol (OMNIPAQUE)   Enteric Contrast: Not administered.      FINDINGS:      CHEST      LUNGS/PLEURA: Atelectasis/scarring noted dependently and in the left lower lung field. Biapical pleural and parenchymal scarring with some calcified pleural plaques.      Lungs otherwise appear grossly clear.      HEART/GREAT VESSELS: Heart appears normal in size. Moderate coronary atherosclerosis. Mild aortic atherosclerosis. No thoracic aortic aneurysm.      MEDIASTINUM AND SU: Inspissated  secretions in the distal trachea. Otherwise grossly unremarkable      CHEST WALL AND LOWER NECK: Generalized osteopenia. Old right-sided rib fractures size old fracture of the inferior right scapula. Body wall edema. Several subcentimeter low-density nodules in the thyroid gland, of unlikely clinical significance.      ABDOMEN      LIVER/BILIARY TREE: Unremarkable.      GALLBLADDER: No calcified gallstones. No pericholecystic inflammatory change.      SPLEEN: Unremarkable.      PANCREAS: Unremarkable.      ADRENAL GLANDS: Adrenal gland thickening.      KIDNEYS/URETERS: Severe atrophy of the left kidney. 0.8 cm exophytic low-density lesion posteriorly and 1.2 cm low-density lesion in the mid right kidney, nonspecific. Otherwise grossly unremarkable.      STOMACH AND BOWEL: Colonic diverticulosis, no discrete evidence of acute diverticulitis. No evidence of bowel obstruction.      APPENDIX: No findings to suggest appendicitis.      ABDOMINOPELVIC CAVITY: No ascites. No pneumoperitoneum. No lymphadenopathy.      VESSELS: Moderate atherosclerosis, no aortic aneurysm      PELVIS      REPRODUCTIVE ORGANS: Unremarkable for patient's age.      URINARY BLADDER: Unremarkable.      ABDOMINAL WALL/INGUINAL REGIONS: Body wall edema      BONES: Generalized osteopenia. Left bipolar hip hemiarthroplasty. Visualized hardware appears intact. Multilevel degenerative changes of the spine with intervertebral disc space narrowing, osteophytosis, and facet joint arthropathy, most prominent in the    lumbar spine. No acute fracture or suspicious osseous lesion.         IMPRESSION:      No acute process seen. No evidence of acute visceral or vascular injury in the chest, abdomen, or pelvis.      Pulmonary pleural and parenchymal scarring, coronary atherosclerosis, adrenal gland thickening, left renal atrophy, colonic diverticulosis without evidence of acute diverticulitis, degenerative changes of the spine, and other findings as above.                Workstation performed: [id number]         XR femur 2 views RIGHT   Final Interpretation by Gino Manzo MD (09/11 0930)      No acute osseous abnormality.         Computerized Assisted Algorithm (CAA) may have been used to analyze all applicable images.         Workstation performed: [id number]         XR knee 4+ vw right injury   Final Interpretation by Gino Manzo MD (09/11 0932)      Age-indeterminate medial tibial plateau fracture.         Computerized Assisted Algorithm (CAA) may have been used to analyze all applicable images.         Workstation performed: [id number]         XR ankle 3+ views RIGHT   Final Interpretation by Gino Manzo MD (09/11 0929)      Abnormal sharply marginated osseous projection in the posterior ankle. Fracture not excluded but no obvious donor site is identified. Evaluation limited due to degree of osseous demineralization, suboptimal positioning, and overlying material. If there    is concern for ankle fracture recommend further evaluation with CT.         Computerized Assisted Algorithm (CAA) may have been used to analyze all applicable images.      The study was marked in EPIC for immediate notification.         Workstation performed: [id number]         XR hand 3+ views RIGHT   Final Interpretation by Darrin Crespo MD (09/11 0858)      1.  Dorsal-predominant hand soft tissue swelling without subjacent acute osseous abnormalities.      2.  Pattern of periarticular soft tissue calcifications in the wrist and hand more suggestive of calcium pyrophosphate deposition than gout. Correlate for clinical features of pseudogout as a potential contributor to swelling.         Computerized Assisted Algorithm (CAA) may have been used to analyze all applicable images.         Workstation performed: [id number]         FL barium swallow video w speech    (Results Pending)         Results from last 7 days   Lab Units 09/12/24  0437 09/11/24  0622 09/10/24  2152   WBC  Thousand/uL 5.64 10.27* 9.64   HEMOGLOBIN g/dL 9.1* 10.5* 11.0*   HEMATOCRIT % 26.5* 30.5* 32.5*   PLATELETS Thousands/uL 283 336 382         Results from last 7 days   Lab Units 09/12/24  0437 09/11/24  0622 09/10/24  2152   SODIUM mmol/L 132* 128* 127*   POTASSIUM mmol/L 3.2* 3.2* 3.4*   CHLORIDE mmol/L 99 90* 90*   CO2 mmol/L 26 29 22   ANION GAP mmol/L 7 9 15*   BUN mg/dL 11 12 14   CREATININE mg/dL 0.75 0.96 1.02   EGFR ml/min/1.73sq m 69 51 48   CALCIUM mg/dL 7.7* 7.9* 8.2*     Results from last 7 days   Lab Units 09/10/24  2152   AST U/L 11*   ALT U/L 8   ALK PHOS U/L 157*   TOTAL PROTEIN g/dL 6.2*   ALBUMIN g/dL 3.2*   TOTAL BILIRUBIN mg/dL 0.46     Results from last 7 days   Lab Units 09/12/24  0749 09/11/24  2108 09/11/24  1559 09/11/24  1119 09/11/24  0728   POC GLUCOSE mg/dl 79 132 128 113 121     Results from last 7 days   Lab Units 09/12/24  0437 09/11/24  0622 09/10/24  2152   GLUCOSE RANDOM mg/dL 92 136 227*     Results from last 7 days   Lab Units 09/11/24  0622   OSMOLALITY, SERUM mmol/*     Results from last 7 days   Lab Units 09/11/24  0622   HEMOGLOBIN A1C % 6.8*   EAG mg/dl 148     Results from last 7 days   Lab Units 09/11/24  0622   TSH 3RD GENERATON uIU/mL 0.601  0.602     Results from last 7 days   Lab Units 09/11/24  0622   OSMOLALITY, SERUM mmol/*   OSMO UR mmol/     Results from last 7 days   Lab Units 09/11/24  0622   CLARITY UA  Clear   COLOR UA  Light Yellow   SPEC GRAV UA  1.046*   PH UA  6.5   GLUCOSE UA mg/dl Trace*   KETONES UA mg/dl Negative   BLOOD UA  Negative   PROTEIN UA mg/dl Negative   NITRITE UA  Negative   BILIRUBIN UA  Negative   UROBILINOGEN UA (BE) mg/dl <2.0   LEUKOCYTES UA  Negative   SODIUM UR  57         ED Treatment-Medication Administration from 09/10/2024 2029 to 09/11/2024 0419         Date/Time Order Dose Route Action     09/10/2024 2152 fentaNYL injection 25 mcg 25 mcg Intravenous Given     09/10/2024 2257 iohexol (OMNIPAQUE) 350 MG/ML  injection (MULTI-DOSE) 100 mL 100 mL Intravenous Given            Past Medical History:   Diagnosis Date    Dehydration     Last assessed - 2/22/16    Diabetes mellitus (HCC)     Hyperlipidemia     Hypertension     Hypotension     Last assessed - 2/22/16    Obstructive jaundice     Last assessed - 3/25/16    Renal disorder     Sepsis (HCC)     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 female  Admission Orders:  Scheduled Medications:  amLODIPine, 5 mg, Oral, Daily  atorvastatin, 40 mg, Oral, Daily With Dinner  cyanocobalamin, 500 mcg, Oral, Daily  enoxaparin, 40 mg, Subcutaneous, Daily  furosemide, 20 mg, Oral, QAM  insulin lispro, 1-5 Units, Subcutaneous, 4x Daily (AC & HS)  lisinopril, 20 mg, Oral, Daily  melatonin, 3 mg, Oral, HS    Continuous IV Infusions:   sodium chloride 0.9 % infusion  Rate: 83.3 mL/hr Dose: 83.3 mL/hr  Freq: Continuous Route: IV  Indications of Use: IV Hydration  Last Dose: Stopped (09/12/24 0524)  Start: 09/11/24 1600 End: 09/12/24 0512    PRN Meds:  acetaminophen, 975 mg, Oral, Q8H PRN  aluminum-magnesium hydroxide-simethicone, 30 mL, Oral, Q6H PRN  ondansetron, 4 mg, Intravenous, Q6H PRN  polyethylene glycol, 17 g, Oral, Daily PRN    cyanocobalamin injection 1,000 mcg  Dose: 1,000 mcg  Freq: Once Route: IM  Indications of Use: VITAMIN B12 DEFICIENCY  Start: 09/11/24 1730 End: 09/11/24 1802    IP CONSULT TO ORTHOPEDIC SURGERY  IP CONSULT TO GERONTOLOGY  IP CONSULT TO NUTRITION SERVICES    Network Utilization Review Department  ATTENTION: Please call with  any questions or concerns to [phone] and carefully listen to the prompts so that you are directed to the right person. All voicemails are confidential.   For Discharge needs, contact Care Management DC Support Team at [phone] opt. [phone]  Send all requests for admission clinical reviews, approved or denied determinations and any other requests to dedicated fax number below belonging to the campus where the patient is receiving treatment. List of dedicated fax numbers for the Facilities:  FACILITY NAME UR FAX NUMBER   ADMISSION DENIALS (Administrative/Medical Necessity) [phone]   DISCHARGE SUPPORT TEAM (NETWORK) [phone]   PARENT CHILD HEALTH (Maternity/NICU/Pediatrics) [phone]   [facility] [phone]   [facility] [phone]   [facility] [phone]   [facility] [phone]   [facility] [phone]   [facility] [phone]   [facility] [phone]   [facility] [phone]   [facility] [phone]   [facility] [phone]   [facility] [phone]   [facility] [phone]

## 2024-09-12 NOTE — MALNUTRITION/BMI
This medical record reflects one or more clinical indicators suggestive of malnutrition and/or morbid obesity.    Malnutrition Findings:   Adult Malnutrition type: Chronic illness  Adult Degree of Malnutrition: Malnutrition of moderate degree                   360 Statement: Moderate malnutrition r/t inadequate intake as evidenced by BMI 18.1, consuming < 75% energy intake compared to estimated energy needs > 1 month, Dysphagia, mild fat/muscle wasting observed at orbital/temple areas. Treated with ENSure Compact tid, pleasure feeds    BMI Findings:  Adult BMI Classifications: Underweight < 18.5        Body mass index is 18.11 kg/m².     See Nutrition note dated 9/12/24 for additional details.  Completed nutrition assessment is viewable in the nutrition documentation.

## 2024-09-12 NOTE — ASSESSMENT & PLAN NOTE
Lab Results   Component Value Date    HGBA1C 6.8 (H) 09/11/2024   Home regimen reviewed. Hold Metformin if applicable.  Start SSI and Basal bolus protocol

## 2024-09-12 NOTE — PROGRESS NOTES
Progress Note - Geriatric Medicine   Ingrid Samson 89 y.o. female MRN: [id number]  Unit/Bed#: E4 -02 Encounter: [id number]      Assessment/Plan:    Cognitive Screening  Patient appears to have been noted to have memory loss on her last PCP visit on 7/6/2023  She scored 22/30 on MMSE indicating early/mild dementia   Patients  notes that she does have some difficulties with her memory but is unable to elaborate  Patient is noted to be lethargic on exam today  Nursing notes she did not sleep last night   She did not get any oral medication overnight last night as she was awaiting a speech evaluation   She does arouse while I was in the room noting she needed to use the bathroom   She was complaining of right knee pain   She is pleasant, calm, and cooperative when awake  Most recent TSH on labs today noted to be 0.601  Most recent vitamin B 12 level on labs today noted to be 205  Would recommend supplementation with goal vitamin B 12 level > 400   CT of the head today revealed moderate microangiopathic changes   Can consider having OT complete cognitive evaluation here though I am not sure how well she will do with her vision and hearing deficits   Maintain delirium precautions as discussed below  Redirect and reorient as needed  Keep physically, mentally, and socially active     Delirium Precautions   Current mentation: patient lethargic   Patient is at high risk secondary to age, underlying cognitive impairment, fall, right medial tibial plateau fracture, acute pain, electrolyte imbalances, vision impairment, hearing impairment, and hospitalization   Maintain delirium precautions   Provide redirection, reorientation, and distraction techniques  Maintain fall and safety precautions   Assist with ADLs/IADLs  Avoid deliriogenic medications such as tramadol, benzodiazepines, anticholinergics, benadryl  Treat pain using geriatric pain protocol   Encourage oral hydration and nutrition   Monitor for  constipation and urinary retention   Implement sleep hygiene and limit night time interuptions   Maintain sleep-wake cycle   Encourage early and frequent mobilization   Most recent EKG on 4/6/2020 revealed a QTc interval of 414  Would recommend repeat EKG if considering antipsychotics for acute agitation and behaviors  If all other interventions are unsuccessful for acute agitation and behaviors and QTc interval is < 500, can consider Zyprexa 2.5 mg IM Q 8 hours prn   If all other interventions are unsuccessful for acute agitation and behaviors and QTc interval is > 500, can consider Depakote 125 mg once   Would avoid benzodiazepines such as Ativan as these can worsen delirium     Deconditioning   Baseline function: needs assistance with ADLs and IADLs  Patient is at increased risk for deconditioning secondary to underlying cognitive impairment, fall, acute pain, electrolyte imbalances, weakness, gait dysfunction, and hospitalization  Continue to optimize diet, hydration, and mobility for healing   Chronic Kidney Disease Stage III  Baseline creatinine unclear   Creatinine on labs today stable at 0.75 with a GFR 69  Avoid nephrotoxic medication and renal dose medication   Keep hydrated   Type II Diabetes   Blood sugar log reviewed and blood sugars have been acceptable   She is on metformin 500 mg daily at breakfast at baseline   Most recent hemoglobin A1C on 7/6/2023 noted to be 6.8  Recommend diabetic diet   Continue insulin and diet control   Avoid hypoglycemia   Monitor for signs and symptoms of infection, dehydration, DVT, and skin breakdown    Frailty   Clinical Frail Scale: 6- Moderately Frail  Need help with all outside activities  Need help with stairs and bathing  May need assistance with dressing  Most recent albumin on 9/10/2024 noted to be 3.2  Consider nutrition consult  Encourage protein supplementation     Ambulatory Dysfunction/Falls  Patient presented to the hospital status post fall at home   Her   notes that she has slid off the couch on a few occasions and the neighbors have come over to help him get her up off the floor   She does have a walker and a cane at home but primarily ambulates with the roller walker   Orthopedics consult currently pending  Awaiting recommendations on weight bearing status    PT/OT consulted to assist with strengthening/mobility and assist with discharge planning to appropriate level of care  Assess patient frequently for physical needs, encourage use of assistant devices as needed and directed by PT/OT  Identify cognitive and physical deficits and behaviors that affect risk of falls  Consider moving patient closer to nursing station to monitor more closely for impulsive behavior which may increase risk of falls  Cunningham fall and safety precautions   Educate patient/family on patient safety including physical limitations and importance of using call bell for assistance   Modify environment to reduce risk of injury including disconnecting from pole when not in use, ensuring adequate lighting in room and restroom, ensuring that path to restroom is clear and free of trip hazards  Out of bed as tolerated    Impaired Vision   Patient does have vision impairment   Her  notes that she does have glasses but she forgets to wear them   She refuses to go back to the eye doctor for re-evaluation   Recommend use of corrective lenses at all appropriate times  Encourage adequate lighting and encourage use of assistance with ambulation  Keep personal belongings close to avoid reaching  Encourage appropriate footwear at all times  Recommend large font for printed materials provided to patient    Impaired Hearing   Patient does have hearing impairment   She does not wear hearing aids    notes she refuses to go be evaluated for her hearing   Hearing impairment strongly correlated with depression, cognitive impairment, delirium and falls in the older adult  Use hearing aids or  sound amplifier  Speak face to face  Use clear dictation and enunciation of words    Dentition/Appetite   Patient does not wear dentures  Her  notes that her appetite is fairly poor at home   She does not consume any protein supplements (i.e boost or ensure)  He notes that she only really likes to eat soft foods   Speech therapy consulted and following   Patient did have barium study today and patient was noted to have silent aspiration   It was recommended that she stay NPO with goals of care discussion   Primary team notes family does not want a PEG tube   Patient was placed on a diet   Would continue aspiration precautions   Nutrition services now consulted and following as well    Elimination   Patient is continent of bowel and bladder at baseline  Patient has been continent of urine here (she did just ask to use the bedpan)   notes that if she does have trouble with a bowel movement she drinks prune juice which works well   No bowel movement documented since admission   Patient is not currently on a bowel regimen   Monitor for constipation and urinary retention     Insomnia   Patient has a documented history of insomnia    notes that she does take melatonin at bedtime   Unclear on the dose as he says she has 3 mg tablets at home and initially noted she takes 2 which would equal 6 mg but when clarifying he states she only takes 3 mg   She is currently ordered melatonin 3 mg daily at bedtime   Nursing notes she did not sleep well last night   She did not receive melatonin as she was NPO  She is now able to take medication so will monitor tonight as patient should receive melatonin   First line is behavioral therapy   Avoid sedative hypnotics including benzodiazepines and benadryl  Encourage staying awake during the day   Encourage daytime activities and morning exercise   Decrease or eliminate daytime naps   Avoid caffeine especially during late afternoon and evening hours  Establish a  nighttime routine  Implement sleep hygiene and limit nighttime interruptions    Right Lower Extremity Pain   Patient presented to the hospital with right knee/leg pain after sustaining a fall at home  Xray imaging of the knee revealed an age-indeterminate tibial plateau fracture   Xray imaging of the ankle revealed an abnormal sharply marginated osseous projection in the posterior ankle (fracture not excluded)  CT of the lower extremity revealed irregular lucency through the medial tibial plateau which appears to be subacute to chronic with large lipohemarthrosis/joint effusion   Orthopedics consulted and recommended NWB to RLE in a knee immoblizer   Pain control as discussed below  PT/OT consulted   Management per primary team     Acute Pain due to Injury   Patient presented to the hospital status post fall with complaints of right knee/leg pain   She is complaining of right knee pain on exam today when she is awake   Would recommend treating pain using the geriatric pain protocol   Scheduled acetaminophen 975 mg po Q8  Oxycodone 2.5 mg po Q 4 prn moderate pain  Oxycodone 5 mg po Q 4 prn severe pain   Dilaudid 0.2 mg IV Q 4 prn breakthrough pain   Monitor for constipation   Lidoderm patch  Monitor for constipation   Continue nonpharmacological methods of pain management    Hyponatremia  Patient with sodium of 127 on admission   Baseline sodium level unclear   Suspected to be related to poor oral intake at home   Encourage oral intake as tolerated   Management per primary team       Subjective:   The patient is being seen and evaluated today at the bedside for geriatric follow-up. She is noted to be up in her chair in no acute distress. She is noted to be lethargic on exam today. She was temporarily arousable and did note some right knee pain.     Care was coordinated with patients nurse Rachel. She notes the patient did not sleep last night. No other acute issues or events noted.     Care was coordinated with   "Restrepo with internal medicine and family at the bedside.     Review of Systems   Unable to perform ROS: Other (lethargic)       Objective:     Vitals: Blood pressure 126/58, pulse 94, temperature 98.6 °F (37 °C), temperature source Temporal, resp. rate 16, height 4' 10\" (1.473 m), weight 39.3 kg (86 lb 10.3 oz), SpO2 99%, not currently breastfeeding.,Body mass index is 18.11 kg/m².      Intake/Output Summary (Last 24 hours) at 9/12/2024 1711  Last data filed at 9/12/2024 0851  Gross per 24 hour   Intake 120 ml   Output 550 ml   Net -430 ml       Current Medications: Reviewed    Physical Exam:   Physical Exam  Vitals and nursing note reviewed.   Constitutional:       General: She is not in acute distress.     Comments: Weak and frail appearing elderly female   HENT:      Head: Normocephalic.   Cardiovascular:      Rate and Rhythm: Normal rate and regular rhythm.   Pulmonary:      Effort: Pulmonary effort is normal. No respiratory distress.   Abdominal:      General: Bowel sounds are normal. There is no distension.      Palpations: Abdomen is soft.   Musculoskeletal:         General: Tenderness (right knee (noted when temporarily awake)) present. No swelling.   Skin:     General: Skin is warm and dry.   Neurological:      Mental Status: She is lethargic.      Motor: Weakness present.          Invasive Devices       Peripheral Intravenous Line  Duration             Peripheral IV 09/12/24 Left;Ventral (anterior) Forearm <1 day                    Lab, Imaging and other studies: Reviewed AM labs from this admission.     Please note:  Voice-recognition software may have been used in the preparation of this document.  Occasional wrong word or \"sound-alike\" substitutions may have occurred due to the inherent limitations of voice recognition software.  Interpretation should be guided by context.    "

## 2024-09-13 ENCOUNTER — APPOINTMENT (INPATIENT)
Dept: NON INVASIVE DIAGNOSTICS | Facility: HOSPITAL | Age: 89
DRG: 563 | End: 2024-09-13
Payer: COMMERCIAL

## 2024-09-13 LAB
ERYTHROCYTE [DISTWIDTH] IN BLOOD BY AUTOMATED COUNT: 14.4 % (ref 11.6–15.1)
GLUCOSE SERPL-MCNC: 113 MG/DL (ref 65–140)
GLUCOSE SERPL-MCNC: 124 MG/DL (ref 65–140)
GLUCOSE SERPL-MCNC: 69 MG/DL (ref 65–140)
GLUCOSE SERPL-MCNC: 73 MG/DL (ref 65–140)
GLUCOSE SERPL-MCNC: 73 MG/DL (ref 65–140)
HCT VFR BLD AUTO: 28.8 % (ref 34.8–46.1)
HGB BLD-MCNC: 9.8 G/DL (ref 11.5–15.4)
MCH RBC QN AUTO: 30.6 PG (ref 26.8–34.3)
MCHC RBC AUTO-ENTMCNC: 34 G/DL (ref 31.4–37.4)
MCV RBC AUTO: 90 FL (ref 82–98)
PLATELET # BLD AUTO: 301 THOUSANDS/UL (ref 149–390)
PMV BLD AUTO: 8.5 FL (ref 8.9–12.7)
RBC # BLD AUTO: 3.2 MILLION/UL (ref 3.81–5.12)
WBC # BLD AUTO: 6.4 THOUSAND/UL (ref 4.31–10.16)

## 2024-09-13 PROCEDURE — 99232 SBSQ HOSP IP/OBS MODERATE 35: CPT | Performed by: INTERNAL MEDICINE

## 2024-09-13 PROCEDURE — 85027 COMPLETE CBC AUTOMATED: CPT | Performed by: INTERNAL MEDICINE

## 2024-09-13 PROCEDURE — 82948 REAGENT STRIP/BLOOD GLUCOSE: CPT

## 2024-09-13 PROCEDURE — 99232 SBSQ HOSP IP/OBS MODERATE 35: CPT

## 2024-09-13 PROCEDURE — 97530 THERAPEUTIC ACTIVITIES: CPT

## 2024-09-13 PROCEDURE — 93971 EXTREMITY STUDY: CPT

## 2024-09-13 PROCEDURE — 97110 THERAPEUTIC EXERCISES: CPT

## 2024-09-13 RX ORDER — AMOXICILLIN 250 MG
1 CAPSULE ORAL 2 TIMES DAILY
Status: DISCONTINUED | OUTPATIENT
Start: 2024-09-13 | End: 2024-09-18 | Stop reason: HOSPADM

## 2024-09-13 RX ORDER — BISACODYL 10 MG
10 SUPPOSITORY, RECTAL RECTAL DAILY PRN
Status: DISCONTINUED | OUTPATIENT
Start: 2024-09-13 | End: 2024-09-18 | Stop reason: HOSPADM

## 2024-09-13 RX ADMIN — SENNOSIDES AND DOCUSATE SODIUM 1 TABLET: 8.6; 5 TABLET ORAL at 18:14

## 2024-09-13 RX ADMIN — FUROSEMIDE 20 MG: 20 TABLET ORAL at 08:18

## 2024-09-13 RX ADMIN — CYANOCOBALAMIN TAB 500 MCG 500 MCG: 500 TAB at 08:18

## 2024-09-13 RX ADMIN — AMLODIPINE BESYLATE 5 MG: 5 TABLET ORAL at 08:18

## 2024-09-13 RX ADMIN — MELATONIN 3 MG: 3 TAB ORAL at 22:05

## 2024-09-13 RX ADMIN — ACETAMINOPHEN 975 MG: 325 TABLET ORAL at 11:53

## 2024-09-13 RX ADMIN — ENOXAPARIN SODIUM 40 MG: 40 INJECTION SUBCUTANEOUS at 08:18

## 2024-09-13 RX ADMIN — POLYETHYLENE GLYCOL 3350 17 G: 17 POWDER, FOR SOLUTION ORAL at 11:29

## 2024-09-13 RX ADMIN — LISINOPRIL 20 MG: 20 TABLET ORAL at 08:18

## 2024-09-13 NOTE — PLAN OF CARE
Problem: Potential for Falls  Goal: Patient will remain free of falls  Description: INTERVENTIONS:  - Educate patient/family on patient safety including physical limitations  - Instruct patient to call for assistance with activity   - Consult OT/PT to assist with strengthening/mobility   - Keep Call bell within reach  - Keep bed low and locked with side rails adjusted as appropriate  - Keep care items and personal belongings within reach  - Initiate and maintain comfort rounds  - Make Fall Risk Sign visible to staff  - Offer Toileting every 2 Hours, in advance of need  - Initiate/Maintain bed alarm  - Obtain necessary fall risk management equipment:   - Apply yellow socks and bracelet for high fall risk patients  - Consider moving patient to room near nurses station  Outcome: Progressing     Problem: Prexisting or High Potential for Compromised Skin Integrity  Goal: Skin integrity is maintained or improved  Description: INTERVENTIONS:  - Identify patients at risk for skin breakdown  - Assess and monitor skin integrity  - Assess and monitor nutrition and hydration status  - Monitor labs   - Assess for incontinence   - Turn and reposition patient  - Assist with mobility/ambulation  - Relieve pressure over bony prominences  - Avoid friction and shearing  - Provide appropriate hygiene as needed including keeping skin clean and dry  - Evaluate need for skin moisturizer/barrier cream  - Collaborate with interdisciplinary team   - Patient/family teaching  - Consider wound care consult   Outcome: Progressing     Problem: PAIN - ADULT  Goal: Verbalizes/displays adequate comfort level or baseline comfort level  Description: Interventions:  - Encourage patient to monitor pain and request assistance  - Assess pain using appropriate pain scale  - Administer analgesics based on type and severity of pain and evaluate response  - Implement non-pharmacological measures as appropriate and evaluate response  - Consider cultural and  social influences on pain and pain management  - Notify physician/advanced practitioner if interventions unsuccessful or patient reports new pain  Outcome: Progressing     Problem: INFECTION - ADULT  Goal: Absence or prevention of progression during hospitalization  Description: INTERVENTIONS:  - Assess and monitor for signs and symptoms of infection  - Monitor lab/diagnostic results  - Monitor all insertion sites, i.e. indwelling lines, tubes, and drains  - Monitor endotracheal if appropriate and nasal secretions for changes in amount and color  - [place] appropriate cooling/warming therapies per order  - Administer medications as ordered  - Instruct and encourage patient and family to use good hand hygiene technique  - Identify and instruct in appropriate isolation precautions for identified infection/condition  Outcome: Progressing  Goal: Absence of fever/infection during neutropenic period  Description: INTERVENTIONS:  - Monitor WBC    Outcome: Progressing     Problem: SAFETY ADULT  Goal: Patient will remain free of falls  Description: INTERVENTIONS:  - Educate patient/family on patient safety including physical limitations  - Instruct patient to call for assistance with activity   - Consult OT/PT to assist with strengthening/mobility   - Keep Call bell within reach  - Keep bed low and locked with side rails adjusted as appropriate  - Keep care items and personal belongings within reach  - Initiate and maintain comfort rounds  - Make Fall Risk Sign visible to staff  - Offer Toileting every 2 Hours, in advance of need  - Initiate/Maintain bed alarm  - Obtain necessary fall risk management equipment:   - Apply yellow socks and bracelet for high fall risk patients  - Consider moving patient to room near nurses station  Outcome: Progressing  Goal: Maintain or return to baseline ADL function  Description: INTERVENTIONS:  -  Assess patient's ability to carry out ADLs; assess patient's baseline for ADL function and  identify physical deficits which impact ability to perform ADLs (bathing, care of mouth/teeth, toileting, grooming, dressing, etc.)  - Assess/evaluate cause of self-care deficits   - Assess range of motion  - Assess patient's mobility; develop plan if impaired  - Assess patient's need for assistive devices and provide as appropriate  - Encourage maximum independence but intervene and supervise when necessary  - Involve family in performance of ADLs  - Assess for home care needs following discharge   - Consider OT consult to assist with ADL evaluation and planning for discharge  - Provide patient education as appropriate  Outcome: Progressing  Goal: Maintains/Returns to pre admission functional level  Description: INTERVENTIONS:  - Perform AM-PAC 6 Click Basic Mobility/ Daily Activity assessment daily.  - Set and communicate daily mobility goal to care team and patient/family/caregiver.   - Collaborate with rehabilitation services on mobility goals if consulted  - Perform Range of Motion 3 times a day.  - Reposition patient every 2 hours.  - Dangle patient 3 times a day  - Stand patient 3 times a day  - Ambulate patient 3 times a day  - Out of bed to chair 3 times a day   - Out of bed for meals 3 times a day  - Out of bed for toileting  - Record patient progress and toleration of activity level   Outcome: Progressing     Problem: DISCHARGE PLANNING  Goal: Discharge to home or other facility with appropriate resources  Description: INTERVENTIONS:  - Identify barriers to discharge w/patient and caregiver  - Arrange for needed discharge resources and transportation as appropriate  - Identify discharge learning needs (meds, wound care, etc.)  - Arrange for interpretive services to assist at discharge as needed  - Refer to Case Management Department for coordinating discharge planning if the patient needs post-hospital services based on physician/advanced practitioner order or complex needs related to functional status,  cognitive ability, or social support system  Outcome: Progressing     Problem: Knowledge Deficit  Goal: Patient/family/caregiver demonstrates understanding of disease process, treatment plan, medications, and discharge instructions  Description: Complete learning assessment and assess knowledge base.  Interventions:  - Provide teaching at level of understanding  - Provide teaching via preferred learning methods  Outcome: Progressing     Problem: Nutrition/Hydration-ADULT  Goal: Nutrient/Hydration intake appropriate for improving, restoring or maintaining nutritional needs  Description: Monitor and assess patient's nutrition/hydration status for malnutrition. Collaborate with interdisciplinary team and initiate plan and interventions as ordered.  Monitor patient's weight and dietary intake as ordered or per policy. Utilize nutrition screening tool and intervene as necessary. Determine patient's food preferences and provide high-protein, high-caloric foods as appropriate.     INTERVENTIONS:  - Monitor oral intake, urinary output, labs, and treatment plans  - Assess nutrition and hydration status and recommend course of action  - Evaluate amount of meals eaten  - Assist patient with eating if necessary   - Allow adequate time for meals  - Recommend/ encourage appropriate diets, oral nutritional supplements, and vitamin/mineral supplements  - Order, calculate, and assess calorie counts as needed  - Recommend, monitor, and adjust tube feedings and TPN/PPN based on assessed needs  - Assess need for intravenous fluids  - Provide specific nutrition/hydration education as appropriate  - Include patient/family/caregiver in decisions related to nutrition  Outcome: Progressing

## 2024-09-13 NOTE — PROGRESS NOTES
Progress Note - Hospitalist   Name: Ingrid Samson 89 y.o. female I MRN: [id number]  Unit/Bed#: E4 -02 I Date of Admission: 9/10/2024   Date of Service: 9/13/2024 I Hospital Day: 2    Assessment & Plan  Ambulatory dysfunction  Nonweightbearing to right leg due to tibial plateau fracture  PT OT recommending rehab  Continued outpatient orthopedic follow-up  Type 2 diabetes mellitus with diabetic chronic kidney disease (HCC)  Lab Results   Component Value Date    HGBA1C 6.8 (H) 09/11/2024   Home regimen reviewed. Hold Metformin if applicable.  Start SSI and Basal bolus protocol  Primary hypertension  Controlled  Outpatient regimen: lisinopril 20mg daily, furosemide 20mg daily, amlodipine 5mg daily    Hyponatremia  Hypotonic hyponatremia improved with IV fluid  Recent Labs     09/10/24  2152 09/11/24  0622 09/12/24  0437   SODIUM 127* 128* 132*   Continue IV fluid until adequate oral intake    Cognitive decline  Lives with  and ?son.  reports decline, patient not eating as much, fell tonight. Family medicine note 2023 MMSE: 22  Appreciate geriatric recommendations  Right medial tibial plateau fracture  Continue knee brace  PT OT recommending rehab  Dysphagia  Patient with dysphagia in the setting of dementia  Discussed with family alternative nutrition and hydration including artificial feeding tube -they would prefer pleasure feeds  Continue dysphagia 2 diet and thin liquids  Family members including patient understand risk of aspiration    Moderate protein-calorie malnutrition (HCC)  Malnutrition Findings:   Adult Malnutrition type: Chronic illness  Adult Degree of Malnutrition: Malnutrition of moderate degree                     360 Statement: Moderate malnutrition r/t inadequate intake as evidenced by BMI 18.1, consuming < 75% energy intake compared to estimated energy needs > 1 month, Dysphagia, mild fat/muscle wasting observed at orbital/temple areas. Treated with ENSure Compact tid,  pleasure feeds    BMI Findings:  Adult BMI Classifications: Underweight < 18.5        Body mass index is 18.11 kg/m².           Hospital Course:     89-year-old female patient presenting with ambulatory dysfunction, found to have right medial tibial plateau fracture.  Also with hyponatremia and chronic aspiration.    Patient family requesting rehab placement.    Assessment:      Principal Problem:    Ambulatory dysfunction  Active Problems:    Type 2 diabetes mellitus with diabetic chronic kidney disease (HCC)    Primary hypertension    Hyponatremia    Cognitive decline    Right medial tibial plateau fracture    Dysphagia    Moderate protein-calorie malnutrition (HCC)      Plan:    Placement  Continue physical therapy and Occupational Therapy       VTE Pharmacologic Prophylaxis:   Pharmacologic: Enoxaparin (Lovenox)  Mechanical VTE Prophylaxis in Place: Yes    AM-PAC Basic Mobility:  Basic Mobility Inpatient Raw Score: 10    JH-HLM Achieved: 4: Move to chair/commode  JH-HLM Goal: 4: Move to chair/commode    HLM Goal listed above. Continue with multidisciplinary rounding and encourage appropriate mobility to improve upon HLM goals.         Patient Centered Rounds: Case discussed and reviewed with nursing    Discussions with Specialists or Other Care Team Provider: Case management    Education and Discussions with Family / Patient: Discussed with patient daughter and  at bedside    Time Spent for Care: 80 minutes.  More than 50% of total time spent on counseling and coordination of care as described above.    Current Length of Stay: 2 day(s)    Current Patient Status: Inpatient   Certification Statement: The patient will continue to require additional inpatient hospital stay due to rehab placement    Discharge Plan / Estimated Discharge Date: Possibly tomorrow    Code Status: Level 3 - DNAR and DNI      Subjective:   Seen and examined, no acute complaints.  Patient chronically ill and extremely hard of hearing.   Endorses right leg pain    A complete and comprehensive 14 point organ system review has been performed and all other systems are negative other than stated above.    Objective:     Vitals:   Temp (24hrs), Av.5 °F (36.4 °C), Min:97.3 °F (36.3 °C), Max:97.8 °F (36.6 °C)    Temp:  [97.3 °F (36.3 °C)-97.8 °F (36.6 °C)] 97.8 °F (36.6 °C)  HR:  [77-91] 77  Resp:  [14] 14  BP: (130-135)/(59-61) 134/60  SpO2:  [98 %-99 %] 99 %  Body mass index is 18.11 kg/m².     Input and Output Summary (last 24 hours):       Intake/Output Summary (Last 24 hours) at 2024 1717  Last data filed at 2024 1300  Gross per 24 hour   Intake 50 ml   Output --   Net 50 ml       Physical Exam:     General: ill appearing, no acute distress  HEENT: atraumatic, PERRLA, moist mucosa, normal pharynx, normal tonsils and adenoids, normal tongue, no fluid in sinuses  Neck: Trachea midline, no carotid bruit, no masses  Respiratory: normal chest wall expansion, CTA B, no r/r/w, no rubs  Cardiovascular: RRR, no m/r/g, Normal S1 and S2  Abdomen: Soft, non-tender, non-distended, normal bowel sounds in all quadrants, no hepatosplenomegaly, no tympany  Rectal: deferred  Musculoskeletal: Limited by right leg pain  Integumentary: warm, dry, and pink, with no rash, purpura, or petechia  Heme/Lymph: no lymphadenopathy, no bruises  Neurological: Cranial Nerves II-XII grossly intact  Psychiatric: cooperative with normal mood, affect, and cognition      Additional Data:     Labs:    Results from last 7 days   Lab Units 24  0502 24  0622 09/10/24  2152   WBC Thousand/uL 6.40   < > 9.64   HEMOGLOBIN g/dL 9.8*   < > 11.0*   HEMATOCRIT % 28.8*   < > 32.5*   PLATELETS Thousands/uL 301   < > 382   LYMPHO PCT %  --   --  7*   MONO PCT %  --   --  4   EOS PCT %  --   --  0    < > = values in this interval not displayed.     Results from last 7 days   Lab Units 24  0437 24  0622 09/10/24  2152   POTASSIUM mmol/L 3.2*   < > 3.4*   CHLORIDE  mmol/L 99   < > 90*   CO2 mmol/L 26   < > 22   BUN mg/dL 11   < > 14   CREATININE mg/dL 0.75   < > 1.02   CALCIUM mg/dL 7.7*   < > 8.2*   ALK PHOS U/L  --   --  157*   ALT U/L  --   --  8   AST U/L  --   --  11*    < > = values in this interval not displayed.           * I Have Reviewed All Lab Data Listed Above.  * Additional Pertinent Lab Tests Reviewed: All Labs For Current Hospital Admission Reviewed    Imaging:    Imaging Reports Reviewed Today Include: No new imaging  Imaging Personally Reviewed by Myself Includes: No new imaging    Recent Cultures (last 7 days):           Last 24 Hours Medication List:   Current Facility-Administered Medications   Medication Dose Route Frequency Provider Last Rate    acetaminophen  975 mg Oral Q8H PRN Victorino Walsh PA-C      aluminum-magnesium hydroxide-simethicone  30 mL Oral Q6H PRN Victorino Walsh PA-C      amLODIPine  5 mg Oral Daily Victorino Walsh PA-C      atorvastatin  40 mg Oral Daily With Dinner Victorino Walsh PA-C      cyanocobalamin  500 mcg Oral Daily Dwight Resrtepo DO      enoxaparin  40 mg Subcutaneous Daily Victorino Walsh PA-C      furosemide  20 mg Oral QAM Victorino Walsh PA-C      insulin lispro  1-5 Units Subcutaneous 4x Daily (AC & HS) Victorino Walsh PA-C      lisinopril  20 mg Oral Daily Victorino Walsh PA-C      melatonin  3 mg Oral HS Victorino Walsh PA-C      ondansetron  4 mg Intravenous Q6H PRN Victorino Walsh PA-C      polyethylene glycol  17 g Oral Daily PRN Victorino Walsh PA-C         AM-PAC Basic Mobility:  Basic Mobility Inpatient Raw Score: 10    -HLM Achieved: 4: Move to chair/commode  -HLM Goal: 4: Move to chair/commode    HLM Goal listed above. Continue with multidisciplinary rounding and encourage appropriate mobility to improve upon HLM goals.     Today, Patient Was Seen By: Dwight Restrepo DO    ** Please Note: This note was completed in part utilizing [employer] Dragon One [employer] software dictation.  Grammatical errors, random word insertions, spelling  mistakes, and incomplete sentences may be an occasional consequence of this system secondary to software limitations, ambient noise, and hardware issues.  If you have any questions or concerns about the content, text, or information contained within the body of this dictation, please contact the provider for clarification. **

## 2024-09-13 NOTE — PLAN OF CARE
Problem: PHYSICAL THERAPY ADULT  Goal: Performs mobility at highest level of function for planned discharge setting.  See evaluation for individualized goals.  Description: Treatment/Interventions: Functional transfer training, LE strengthening/ROM, Therapeutic exercise, Endurance training, Patient/family training, Equipment eval/education, Bed mobility, Gait training, Spoke to nursing, OT, Family (wc mobility and mangement)  Equipment Recommended: Walker, Wheelchair (pt owns RW at home for use)       See flowsheet documentation for full assessment, interventions and recommendations.  Outcome: Progressing  Note: Prognosis: Fair  Problem List: Decreased range of motion, Impaired balance, Decreased mobility, Impaired judgement, Pain, Orthopedic restrictions, Impaired hearing, Impaired vision  Assessment: Pt. able to follow cues for techniques however unable to maintain NWBing of RLE with standing hence pt. had to be trasnferrred lifting her with blue pad as she is very petite. Pt. able to perform seated and supine LE ROM AA progressing to AROM. Pt. did not reported increased discomfort post session. Pt. in bed with all needs within reach and bed alarm engaged. Will continue to follow per PT POC.  Barriers to Discharge: None     Rehab Resource Intensity Level, PT: II (Moderate Resource Intensity)    See flowsheet documentation for full assessment.

## 2024-09-13 NOTE — PHYSICAL THERAPY NOTE
Physical Therapy Treatment Note     09/13/24 1453   PT Last Visit   PT Visit Date 09/13/24   Note Type   Note Type Treatment   Pain Assessment   Pain Assessment Tool FLACC   Pain Score 3   Pain Location/Orientation Orientation: Right;Location: Leg   Restrictions/Precautions   Weight Bearing Precautions Per Order Yes   RLE Weight Bearing Per Order (S)  NWB   Braces or Orthoses Knee immobilizer  (on RLE)   Other Precautions Cognitive;Chair Alarm;Bed Alarm;WBS;Fall Risk;Pain;Visual impairment;Hard of hearing  (used pocket talker to communicate)   General   Chart Reviewed Yes   Family/Caregiver Present Yes   Subjective   Subjective Pt. agreeable to PT. Seated on recliner upon entry   Bed Mobility   Supine to Sit 4  Minimal assistance   Additional items Assist x 1;Increased time required;LE management;Verbal cues;Bedrails   Sit to Supine 4  Minimal assistance   Additional items Assist x 1;Increased time required;LE management;Verbal cues   Transfers   Sit to Stand 2  Maximal assistance   Additional items Assist x 2;Increased time required;Verbal cues   Stand to Sit 2  Maximal assistance   Additional items Assist x 2;Increased time required;Verbal cues   Additional items Assist x 2;Increased time required;Verbal cues  (used the blue pad to get the pt. up and transfer)   Additional Comments unsupported sitting at the EOB ~ 15 min.   Balance   Static Sitting Fair   Dynamic Sitting Fair -   Static Standing Zero   Dynamic Standing Poor -   Ambulatory Zero   Endurance Deficit   Endurance Deficit No   Activity Tolerance   Activity Tolerance Patient tolerated treatment well   Nurse Made Aware yes   Exercises   THR Supine;Bilateral;AROM;AAROM;20 reps   Assessment   Prognosis Fair   Problem List Decreased range of motion;Impaired balance;Decreased mobility;Impaired judgement;Pain;Orthopedic restrictions;Impaired hearing;Impaired vision   Assessment Pt. able to follow cues for techniques however unable to maintain NWBing of RLE  with standing hence pt. had to be trasnferrred lifting her with blue pad as she is very petite. Pt. able to perform seated and supine LE ROM AA progressing to AROM. Pt. did not reported increased discomfort post session. Pt. in bed with all needs within reach and bed alarm engaged. Will continue to follow per PT POC.   Barriers to Discharge None   Goals   Patient Goals None reported   STG Expiration Date 09/21/24   PT Treatment Day 1   Plan   Treatment/Interventions Functional transfer training;LE strengthening/ROM;Therapeutic exercise;Spoke to nursing;Family;Spoke to advanced practitioner;Bed mobility;Patient/family training;Equipment eval/education;Cognitive reorientation   Progress Progressing toward goals   PT Frequency 3-5x/wk   Discharge Recommendation   Rehab Resource Intensity Level, PT II (Moderate Resource Intensity)   Equipment Recommended   (TBD)   AM-PAC Basic Mobility Inpatient   Turning in Flat Bed Without Bedrails 3   Lying on Back to Sitting on Edge of Flat Bed Without Bedrails 3   Moving Bed to Chair 1   Standing Up From Chair Using Arms 1   Walk in Room 1   Climb 3-5 Stairs With Railing 1   Basic Mobility Inpatient Raw Score 10   Turning Head Towards Sound 3   Follow Simple Instructions 4   Low Function Basic Mobility Raw Score  17   Low Function Basic Mobility Standardized Score  27.46   [facility] Highest Level Of Mobility   -[facility] Goal 4: Move to chair/commode   -HL Achieved 4: Move to chair/commode           Shannan Britt PTA    An AM-PAC basic mobility standardized score less than 42.9 suggest the patient may benefit from discharge to post-acute rehab services.

## 2024-09-13 NOTE — CASE MANAGEMENT
Case Management Discharge Planning Note    Patient name Ingrid Samson  Location [facility] /E4 -* MRN [id number]  : 1933 Date 2024       Current Admission Date: 9/10/2024  Current Admission Diagnosis:Ambulatory dysfunction   Patient Active Problem List    Diagnosis Date Noted Date Diagnosed    Dysphagia 2024     Moderate protein-calorie malnutrition (HCC) 2024     Ambulatory dysfunction 2024     Cognitive decline 2024     Right medial tibial plateau fracture 2024     Mild protein-calorie malnutrition (HCC)      Hyponatremia 2020     Insomnia 2016     Type 2 diabetes mellitus with diabetic chronic kidney disease (HCC) 2016     Mixed hyperlipidemia 10/10/2012     Primary hypertension 10/10/2012       LOS (days): 2  Geometric Mean LOS (GMLOS) (days): 2.8  Days to GMLOS:0.3     OBJECTIVE:  Risk of Unplanned Readmission Score: 11.86         Current admission status: Inpatient   Preferred Pharmacy:   [id number] - [place] - [address]  [address]  [facility] [address]  Phone: [phone] Fax: [phone]    Primary Care Provider: Carlo Valerio MD    Primary Insurance: [facility]  Secondary Insurance:     DISCHARGE DETAILS:    Discharge planning discussed with:: Patricia Phamenid) [phone]  Freedom of Choice: Yes  Comments - Freedom of Choice: Pt will discharge to SNF for STR to LTC  CM contacted family/caregiver?: Yes  Were Treatment Team discharge recommendations reviewed with patient/caregiver?: Yes    Were patient/caregiver advised of the risks associated with not following Treatment Team discharge recommendations?: Yes    Contacts  Patient Contacts: Patricia PhamDtr) [phone]  Relationship to Patient:: Family  Contact Method: In Person  Reason/Outcome: Continuity of Care, Emergency Contact, Referral, Discharge Planning    Discharge Destination Plan:: Short Term Rehab, SNF  (LTC)  Transport at Discharge : BLS Ambulance (v Family)    Additional Comments: CM met w/ Pt's Dtr to review list of accepting facilities who will be able to provide STR To LTC. Pt's spouse was present. Dtr is leaning toward [facility], as this is where Pt's spouse worked for 35 years. Dtr will update CM of her final choice over the weekend. Anticipate a Monday discharge. CM continues to follow.

## 2024-09-13 NOTE — PLAN OF CARE
Problem: Potential for Falls  Goal: Patient will remain free of falls  Description: INTERVENTIONS:  - Educate patient/family on patient safety including physical limitations  - Instruct patient to call for assistance with activity   - Consult OT/PT to assist with strengthening/mobility   - Keep Call bell within reach  - Keep bed low and locked with side rails adjusted as appropriate  - Keep care items and personal belongings within reach  - Initiate and maintain comfort rounds  - Make Fall Risk Sign visible to staff  - Apply yellow socks and bracelet for high fall risk patients  - Consider moving patient to room near nurses station  Outcome: Progressing     Problem: Prexisting or High Potential for Compromised Skin Integrity  Goal: Skin integrity is maintained or improved  Description: INTERVENTIONS:  - Identify patients at risk for skin breakdown  - Assess and monitor skin integrity  - Assess and monitor nutrition and hydration status  - Monitor labs   - Assess for incontinence   - Turn and reposition patient  - Assist with mobility/ambulation  - Relieve pressure over bony prominences  - Avoid friction and shearing  - Provide appropriate hygiene as needed including keeping skin clean and dry  - Evaluate need for skin moisturizer/barrier cream  - Collaborate with interdisciplinary team   - Patient/family teaching  - Consider wound care consult   Outcome: Progressing     Problem: PAIN - ADULT  Goal: Verbalizes/displays adequate comfort level or baseline comfort level  Description: Interventions:  - Encourage patient to monitor pain and request assistance  - Assess pain using appropriate pain scale  - Administer analgesics based on type and severity of pain and evaluate response  - Implement non-pharmacological measures as appropriate and evaluate response  - Consider cultural and social influences on pain and pain management  - Notify physician/advanced practitioner if interventions unsuccessful or patient reports  new pain  Outcome: Progressing     Problem: INFECTION - ADULT  Goal: Absence or prevention of progression during hospitalization  Description: INTERVENTIONS:  - Assess and monitor for signs and symptoms of infection  - Monitor lab/diagnostic results  - Monitor all insertion sites, i.e. indwelling lines, tubes, and drains  - Monitor endotracheal if appropriate and nasal secretions for changes in amount and color  - [place] appropriate cooling/warming therapies per order  - Administer medications as ordered  - Instruct and encourage patient and family to use good hand hygiene technique  - Identify and instruct in appropriate isolation precautions for identified infection/condition  Outcome: Progressing  Goal: Absence of fever/infection during neutropenic period  Description: INTERVENTIONS:  - Monitor WBC    Outcome: Progressing     Problem: SAFETY ADULT  Goal: Patient will remain free of falls  Description: INTERVENTIONS:  - Educate patient/family on patient safety including physical limitations  - Instruct patient to call for assistance with activity   - Consult OT/PT to assist with strengthening/mobility   - Keep Call bell within reach  - Keep bed low and locked with side rails adjusted as appropriate  - Keep care items and personal belongings within reach  - Initiate and maintain comfort rounds  - Make Fall Risk Sign visible to staff  - Apply yellow socks and bracelet for high fall risk patients  - Consider moving patient to room near nurses station  Outcome: Progressing  Goal: Maintain or return to baseline ADL function  Description: INTERVENTIONS:  -  Assess patient's ability to carry out ADLs; assess patient's baseline for ADL function and identify physical deficits which impact ability to perform ADLs (bathing, care of mouth/teeth, toileting, grooming, dressing, etc.)  - Assess/evaluate cause of self-care deficits   - Assess range of motion  - Assess patient's mobility; develop plan if impaired  - Assess  patient's need for assistive devices and provide as appropriate  - Encourage maximum independence but intervene and supervise when necessary  - Involve family in performance of ADLs  - Assess for home care needs following discharge   - Consider OT consult to assist with ADL evaluation and planning for discharge  - Provide patient education as appropriate  Outcome: Progressing  Goal: Maintains/Returns to pre admission functional level  Description: INTERVENTIONS:  - Perform AM-PAC 6 Click Basic Mobility/ Daily Activity assessment daily.  - Set and communicate daily mobility goal to care team and patient/family/caregiver.   - Collaborate with rehabilitation services on mobility goals if consulted  - Out of bed for toileting  - Record patient progress and toleration of activity level   Outcome: Progressing     Problem: DISCHARGE PLANNING  Goal: Discharge to home or other facility with appropriate resources  Description: INTERVENTIONS:  - Identify barriers to discharge w/patient and caregiver  - Arrange for needed discharge resources and transportation as appropriate  - Identify discharge learning needs (meds, wound care, etc.)  - Arrange for interpretive services to assist at discharge as needed  - Refer to Case Management Department for coordinating discharge planning if the patient needs post-hospital services based on physician/advanced practitioner order or complex needs related to functional status, cognitive ability, or social support system  Outcome: Progressing     Problem: Knowledge Deficit  Goal: Patient/family/caregiver demonstrates understanding of disease process, treatment plan, medications, and discharge instructions  Description: Complete learning assessment and assess knowledge base.  Interventions:  - Provide teaching at level of understanding  - Provide teaching via preferred learning methods  Outcome: Progressing     Problem: Nutrition/Hydration-ADULT  Goal: Nutrient/Hydration intake appropriate for  improving, restoring or maintaining nutritional needs  Description: Monitor and assess patient's nutrition/hydration status for malnutrition. Collaborate with interdisciplinary team and initiate plan and interventions as ordered.  Monitor patient's weight and dietary intake as ordered or per policy. Utilize nutrition screening tool and intervene as necessary. Determine patient's food preferences and provide high-protein, high-caloric foods as appropriate.     INTERVENTIONS:  - Monitor oral intake, urinary output, labs, and treatment plans  - Assess nutrition and hydration status and recommend course of action  - Evaluate amount of meals eaten  - Assist patient with eating if necessary   - Allow adequate time for meals  - Recommend/ encourage appropriate diets, oral nutritional supplements, and vitamin/mineral supplements  - Order, calculate, and assess calorie counts as needed  - Recommend, monitor, and adjust tube feedings and TPN/PPN based on assessed needs  - Assess need for intravenous fluids  - Provide specific nutrition/hydration education as appropriate  - Include patient/family/caregiver in decisions related to nutrition  Outcome: Not Progressing

## 2024-09-13 NOTE — PROGRESS NOTES
Progress Note - Geriatric Medicine   Ingrid Samson 89 y.o. female MRN: [id number]  Unit/Bed#: E4 -02 Encounter: [id number]      Assessment/Plan:    Cognitive Screening  Patient appears to have been noted to have memory loss on her last PCP visit on 7/6/2023  She scored 22/30 on MMSE indicating early/mild dementia   Patients  notes that she does have some difficulties with her memory but is unable to elaborate  Patient is alert and oriented to person and place on my exam today   She is participating with PT and is following commands appropriately using the pocket talker   She is pleasant, calm, and cooperative when awake  Most recent TSH on labs today noted to be 0.601  Most recent vitamin B 12 level on labs today noted to be 205  Would recommend supplementation with goal vitamin B 12 level > 400   CT of the head today revealed moderate microangiopathic changes   Can consider having OT complete cognitive evaluation here though I am not sure how well she will do with her vision and hearing deficits   Maintain delirium precautions as discussed below  Redirect and reorient as needed  Keep physically, mentally, and socially active     Delirium Precautions   Current mentation: alert and oriented to person and place   Patient is at high risk secondary to age, underlying cognitive impairment, fall, right medial tibial plateau fracture, acute pain, electrolyte imbalances, vision impairment, hearing impairment, and hospitalization   Maintain delirium precautions   Provide redirection, reorientation, and distraction techniques  Maintain fall and safety precautions   Assist with ADLs/IADLs  Avoid deliriogenic medications such as tramadol, benzodiazepines, anticholinergics, benadryl  Treat pain using geriatric pain protocol   Encourage oral hydration and nutrition   Monitor for constipation and urinary retention   Implement sleep hygiene and limit night time interuptions   Maintain sleep-wake cycle   Encourage  early and frequent mobilization   Most recent EKG on 4/6/2020 revealed a QTc interval of 414  Would recommend repeat EKG if considering antipsychotics for acute agitation and behaviors  If all other interventions are unsuccessful for acute agitation and behaviors and QTc interval is < 500, can consider Zyprexa 2.5 mg IM Q 8 hours prn   If all other interventions are unsuccessful for acute agitation and behaviors and QTc interval is > 500, can consider Depakote 125 mg once   Would avoid benzodiazepines such as Ativan as these can worsen delirium     Deconditioning   Baseline function: needs assistance with ADLs and IADLs  Patient is at increased risk for deconditioning secondary to underlying cognitive impairment, fall, acute pain, electrolyte imbalances, weakness, gait dysfunction, and hospitalization  Continue to optimize diet, hydration, and mobility for healing   Chronic Kidney Disease Stage III  Baseline creatinine unclear   Creatinine on labs yesterday stable at 0.75 with a GFR 69  Avoid nephrotoxic medication and renal dose medication   Keep hydrated   Type II Diabetes   Blood sugar log reviewed and blood sugars have been acceptable   She is on metformin 500 mg daily at breakfast at baseline   Most recent hemoglobin A1C on 7/6/2023 noted to be 6.8  Blood sugars this morning were a bit low thought patient does have poor oral intake   Recommend diabetic diet and continued glucose control   Continue insulin and diet control   Avoid hypoglycemia   Monitor for signs and symptoms of infection, dehydration, DVT, and skin breakdown    Frailty   Clinical Frail Scale: 6- Moderately Frail  Need help with all outside activities  Need help with stairs and bathing  May need assistance with dressing  Most recent albumin on 9/10/2024 noted to be 3.2  Consider nutrition consult  Encourage protein supplementation     Ambulatory Dysfunction/Falls  Patient presented to the hospital status post fall at home   Her  notes  that she has slid off the couch on a few occasions and the neighbors have come over to help him get her up off the floor   She does have a walker and a cane at home but primarily ambulates with the roller walker   Orthopedics consult currently pending  Awaiting recommendations on weight bearing status    PT/OT consulted to assist with strengthening/mobility and assist with discharge planning to appropriate level of care  Assess patient frequently for physical needs, encourage use of assistant devices as needed and directed by PT/OT  Identify cognitive and physical deficits and behaviors that affect risk of falls  Consider moving patient closer to nursing station to monitor more closely for impulsive behavior which may increase risk of falls  [place] fall and safety precautions   Educate patient/family on patient safety including physical limitations and importance of using call bell for assistance   Modify environment to reduce risk of injury including disconnecting from pole when not in use, ensuring adequate lighting in room and restroom, ensuring that path to restroom is clear and free of trip hazards  Out of bed as tolerated    Impaired Vision   Patient does have vision impairment   Her  notes that she does have glasses but she forgets to wear them   She refuses to go back to the eye doctor for re-evaluation   Today patient was not looking at the physical therapist when she was speaking   She notes she only saw a shadow of the therapist   Recommend use of corrective lenses at all appropriate times  Encourage adequate lighting and encourage use of assistance with ambulation  Keep personal belongings close to avoid reaching  Encourage appropriate footwear at all times  Recommend large font for printed materials provided to patient    Impaired Hearing   Patient does have hearing impairment   She does not wear hearing aids    notes she refuses to go be evaluated for her hearing   Hearing impairment  strongly correlated with depression, cognitive impairment, delirium and falls in the older adult  Use hearing aids or sound amplifier  Speak face to face  Use clear dictation and enunciation of words    Dentition/Appetite   Patient does not wear dentures  Her  notes that her appetite is fairly poor at home   She does not consume any protein supplements (i.e boost or ensure)  He notes that she only really likes to eat soft foods   Speech therapy consulted and following   Patient did have barium study yesterday and patient was noted to have silent aspiration   It was recommended that she stay NPO with goals of care discussion   Primary team notes family does not want a PEG tube   Patient was placed on a diet   Would continue aspiration precautions   Nutrition services now consulted and following as well    Elimination   Patient is continent of bowel and bladder at baseline  Patient has been continent of urine here   notes that if she does have trouble with a bowel movement she drinks prune juice which works well   No bowel movement documented since admission   She is very fixated on having a bowel movement today   Nursing notes they did administer miralax this morning   Patients daughter at the bedside noted she has trouble drinking that much fluid   Will adjust current bowel regimen as follows   Will discontinue miralax   Will order Senna-S 8.6-50 mg BID   Will order prn suppository   Monitor for constipation and urinary retention     Insomnia   Patient has a documented history of insomnia    notes that she does take melatonin at bedtime   Unclear on the dose as he says she has 3 mg tablets at home and initially noted she takes 2 which would equal 6 mg but when clarifying he states she only takes 3 mg   She is currently ordered melatonin 3 mg daily at bedtime   Nursing notes no acute issues or events overnight   Patient did receive melatonin last night   She is noted to be more alert and awake  today   Continue melatonin as ordered   First line is behavioral therapy   Avoid sedative hypnotics including benzodiazepines and benadryl  Encourage staying awake during the day   Encourage daytime activities and morning exercise   Decrease or eliminate daytime naps   Avoid caffeine especially during late afternoon and evening hours  Establish a nighttime routine  Implement sleep hygiene and limit nighttime interruptions    Right Medial Tibial Plateau Fracture   Patient presented to the hospital with right knee/leg pain after sustaining a fall at home  Xray imaging of the knee revealed an age-indeterminate tibial plateau fracture   Xray imaging of the ankle revealed an abnormal sharply marginated osseous projection in the posterior ankle (fracture not excluded)  CT of the lower extremity revealed irregular lucency through the medial tibial plateau which appears to be subacute to chronic with large lipohemarthrosis/joint effusion   Orthopedics consulted and recommended NWB to RLE in a knee immoblizer   Pain control as discussed below  PT/OT consulted and following   Management per primary team     Acute Pain due to Injury   Patient presented to the hospital status post fall with complaints of right knee/leg pain   She is complaining of right knee pain on exam today   Would recommend treating pain using the geriatric pain protocol   Scheduled acetaminophen 975 mg po Q8  Oxycodone 2.5 mg po Q 4 prn moderate pain  Oxycodone 5 mg po Q 4 prn severe pain   Dilaudid 0.2 mg IV Q 4 prn breakthrough pain   Monitor for constipation   Lidoderm patch  Monitor for constipation   Continue nonpharmacological methods of pain management    Hyponatremia  Patient with sodium of 127 on admission   Baseline sodium level unclear   Sodium on labs yesterday improved to 132  Suspected to be related to poor oral intake at home   Encourage oral intake as tolerated   Management per primary team       Subjective:   The patient is being seen and  "evaluated today at the bedside for geriatric follow-up. She is noted to be sitting at the edge of the bed working with physical therapy.  Physical therapist notes she has been following commands and participating in the session.  She is alert and oriented to person and place on exam today.  She is very fixated on having a bowel movement.  She notes that she is having some right knee pain.    Care was coordinated with patients nurse Maxine.  She notes no acute issues or events overnight.    Care was coordinated with Dr. Restrepo with internal medicine and family at the bedside.     Care was also coordinated with Samantha Bravo with case management.       Review of Systems   Unable to perform ROS: Other (baseline confusion (suspected underlying dementia))       Objective:     Vitals: Blood pressure 134/60, pulse 77, temperature 97.8 °F (36.6 °C), temperature source Temporal, resp. rate 14, height 4' 10\" (1.473 m), weight 39.3 kg (86 lb 10.3 oz), SpO2 99%, not currently breastfeeding.,Body mass index is 18.11 kg/m².      Intake/Output Summary (Last 24 hours) at 9/13/2024 1744  Last data filed at 9/13/2024 1300  Gross per 24 hour   Intake 50 ml   Output --   Net 50 ml       Current Medications: Reviewed    Physical Exam:   Physical Exam  Vitals and nursing note reviewed.   Constitutional:       General: She is not in acute distress.     Comments: Weak and frail appearing elderly female   HENT:      Head: Normocephalic.      Mouth/Throat:      Mouth: Mucous membranes are dry.   Eyes:      General: No scleral icterus.     Conjunctiva/sclera: Conjunctivae normal.   Cardiovascular:      Rate and Rhythm: Normal rate and regular rhythm.   Pulmonary:      Effort: Pulmonary effort is normal. No respiratory distress.   Abdominal:      General: Bowel sounds are normal. There is no distension.      Palpations: Abdomen is soft.   Musculoskeletal:         General: Tenderness (right knee (noted when temporarily awake)) present. No " "swelling.   Skin:     General: Skin is warm and dry.      Comments: Knee immobilizer intact to RLE   Neurological:      Mental Status: She is alert. She is disoriented.      Motor: Weakness present.      Gait: Gait abnormal.      Comments: Oriented to person and place  Disoriented to time          Invasive Devices       Peripheral Intravenous Line  Duration             Peripheral IV 09/12/24 Left;Ventral (anterior) Forearm 1 day              Drain  Duration             External Urinary Catheter <1 day                    Lab, Imaging and other studies: No new labs or studies noted today.     Please note:  Voice-recognition software may have been used in the preparation of this document.  Occasional wrong word or \"sound-alike\" substitutions may have occurred due to the inherent limitations of voice recognition software.  Interpretation should be guided by context.    "

## 2024-09-14 LAB
ANION GAP SERPL CALCULATED.3IONS-SCNC: 7 MMOL/L (ref 4–13)
BUN SERPL-MCNC: 8 MG/DL (ref 5–25)
CALCIUM SERPL-MCNC: 7.7 MG/DL (ref 8.4–10.2)
CHLORIDE SERPL-SCNC: 99 MMOL/L (ref 96–108)
CO2 SERPL-SCNC: 27 MMOL/L (ref 21–32)
CREAT SERPL-MCNC: 0.7 MG/DL (ref 0.6–1.3)
ERYTHROCYTE [DISTWIDTH] IN BLOOD BY AUTOMATED COUNT: 14.3 % (ref 11.6–15.1)
GFR SERPL CREATININE-BSD FRML MDRD: 75 ML/MIN/1.73SQ M
GLUCOSE SERPL-MCNC: 107 MG/DL (ref 65–140)
GLUCOSE SERPL-MCNC: 129 MG/DL (ref 65–140)
GLUCOSE SERPL-MCNC: 152 MG/DL (ref 65–140)
GLUCOSE SERPL-MCNC: 84 MG/DL (ref 65–140)
GLUCOSE SERPL-MCNC: 85 MG/DL (ref 65–140)
HCT VFR BLD AUTO: 25.4 % (ref 34.8–46.1)
HGB BLD-MCNC: 8.9 G/DL (ref 11.5–15.4)
MAGNESIUM SERPL-MCNC: 1.6 MG/DL (ref 1.9–2.7)
MCH RBC QN AUTO: 31.2 PG (ref 26.8–34.3)
MCHC RBC AUTO-ENTMCNC: 35 G/DL (ref 31.4–37.4)
MCV RBC AUTO: 89 FL (ref 82–98)
PLATELET # BLD AUTO: 284 THOUSANDS/UL (ref 149–390)
PMV BLD AUTO: 8.4 FL (ref 8.9–12.7)
POTASSIUM SERPL-SCNC: 2.7 MMOL/L (ref 3.5–5.3)
RBC # BLD AUTO: 2.85 MILLION/UL (ref 3.81–5.12)
SODIUM SERPL-SCNC: 133 MMOL/L (ref 135–147)
WBC # BLD AUTO: 5.46 THOUSAND/UL (ref 4.31–10.16)

## 2024-09-14 PROCEDURE — 80048 BASIC METABOLIC PNL TOTAL CA: CPT | Performed by: INTERNAL MEDICINE

## 2024-09-14 PROCEDURE — 83735 ASSAY OF MAGNESIUM: CPT | Performed by: INTERNAL MEDICINE

## 2024-09-14 PROCEDURE — 82948 REAGENT STRIP/BLOOD GLUCOSE: CPT

## 2024-09-14 PROCEDURE — 85027 COMPLETE CBC AUTOMATED: CPT | Performed by: INTERNAL MEDICINE

## 2024-09-14 PROCEDURE — 99233 SBSQ HOSP IP/OBS HIGH 50: CPT | Performed by: INTERNAL MEDICINE

## 2024-09-14 RX ORDER — SODIUM CHLORIDE 9 MG/ML
100 INJECTION, SOLUTION INTRAVENOUS CONTINUOUS
Status: DISPENSED | OUTPATIENT
Start: 2024-09-14 | End: 2024-09-15

## 2024-09-14 RX ORDER — POTASSIUM CHLORIDE 14.9 MG/ML
20 INJECTION INTRAVENOUS
Status: COMPLETED | OUTPATIENT
Start: 2024-09-14 | End: 2024-09-14

## 2024-09-14 RX ADMIN — FUROSEMIDE 20 MG: 20 TABLET ORAL at 08:14

## 2024-09-14 RX ADMIN — ENOXAPARIN SODIUM 40 MG: 40 INJECTION SUBCUTANEOUS at 08:14

## 2024-09-14 RX ADMIN — SENNOSIDES AND DOCUSATE SODIUM 1 TABLET: 8.6; 5 TABLET ORAL at 08:14

## 2024-09-14 RX ADMIN — CYANOCOBALAMIN TAB 500 MCG 500 MCG: 500 TAB at 08:14

## 2024-09-14 RX ADMIN — POTASSIUM CHLORIDE 20 MEQ: 14.9 INJECTION, SOLUTION INTRAVENOUS at 18:34

## 2024-09-14 RX ADMIN — LISINOPRIL 20 MG: 20 TABLET ORAL at 08:14

## 2024-09-14 RX ADMIN — MELATONIN 3 MG: 3 TAB ORAL at 22:28

## 2024-09-14 RX ADMIN — ATORVASTATIN CALCIUM 40 MG: 40 TABLET, FILM COATED ORAL at 17:05

## 2024-09-14 RX ADMIN — AMLODIPINE BESYLATE 5 MG: 5 TABLET ORAL at 08:14

## 2024-09-14 RX ADMIN — SODIUM CHLORIDE 100 ML/HR: 0.9 INJECTION, SOLUTION INTRAVENOUS at 16:55

## 2024-09-14 RX ADMIN — SENNOSIDES AND DOCUSATE SODIUM 1 TABLET: 8.6; 5 TABLET ORAL at 17:06

## 2024-09-14 RX ADMIN — ACETAMINOPHEN 975 MG: 325 TABLET ORAL at 11:02

## 2024-09-14 RX ADMIN — POTASSIUM CHLORIDE 20 MEQ: 14.9 INJECTION, SOLUTION INTRAVENOUS at 16:55

## 2024-09-14 RX ADMIN — INSULIN LISPRO 1 UNITS: 100 INJECTION, SOLUTION INTRAVENOUS; SUBCUTANEOUS at 17:05

## 2024-09-14 NOTE — PLAN OF CARE
Problem: Potential for Falls  Goal: Patient will remain free of falls  Description: INTERVENTIONS:  - Educate patient/family on patient safety including physical limitations  - Instruct patient to call for assistance with activity   - Consult OT/PT to assist with strengthening/mobility   - Keep Call bell within reach  - Keep bed low and locked with side rails adjusted as appropriate  - Keep care items and personal belongings within reach  - Initiate and maintain comfort rounds  - Make Fall Risk Sign visible to staff  - Apply yellow socks and bracelet for high fall risk patients  - Consider moving patient to room near nurses station  Outcome: Progressing     Problem: Prexisting or High Potential for Compromised Skin Integrity  Goal: Skin integrity is maintained or improved  Description: INTERVENTIONS:  - Identify patients at risk for skin breakdown  - Assess and monitor skin integrity  - Assess and monitor nutrition and hydration status  - Monitor labs   - Assess for incontinence   - Turn and reposition patient  - Assist with mobility/ambulation  - Relieve pressure over bony prominences  - Avoid friction and shearing  - Provide appropriate hygiene as needed including keeping skin clean and dry  - Evaluate need for skin moisturizer/barrier cream  - Collaborate with interdisciplinary team   - Patient/family teaching  - Consider wound care consult   Outcome: Progressing     Problem: PAIN - ADULT  Goal: Verbalizes/displays adequate comfort level or baseline comfort level  Description: Interventions:  - Encourage patient to monitor pain and request assistance  - Assess pain using appropriate pain scale  - Administer analgesics based on type and severity of pain and evaluate response  - Implement non-pharmacological measures as appropriate and evaluate response  - Consider cultural and social influences on pain and pain management  - Notify physician/advanced practitioner if interventions unsuccessful or patient reports  new pain  Outcome: Progressing     Problem: INFECTION - ADULT  Goal: Absence or prevention of progression during hospitalization  Description: INTERVENTIONS:  - Assess and monitor for signs and symptoms of infection  - Monitor lab/diagnostic results  - Monitor all insertion sites, i.e. indwelling lines, tubes, and drains  - Monitor endotracheal if appropriate and nasal secretions for changes in amount and color  - [place] appropriate cooling/warming therapies per order  - Administer medications as ordered  - Instruct and encourage patient and family to use good hand hygiene technique  - Identify and instruct in appropriate isolation precautions for identified infection/condition  Outcome: Progressing  Goal: Absence of fever/infection during neutropenic period  Description: INTERVENTIONS:  - Monitor WBC    Outcome: Progressing     Problem: SAFETY ADULT  Goal: Patient will remain free of falls  Description: INTERVENTIONS:  - Educate patient/family on patient safety including physical limitations  - Instruct patient to call for assistance with activity   - Consult OT/PT to assist with strengthening/mobility   - Keep Call bell within reach  - Keep bed low and locked with side rails adjusted as appropriate  - Keep care items and personal belongings within reach  - Initiate and maintain comfort rounds  - Make Fall Risk Sign visible to staff  - Apply yellow socks and bracelet for high fall risk patients  - Consider moving patient to room near nurses station  Outcome: Progressing  Goal: Maintain or return to baseline ADL function  Description: INTERVENTIONS:  -  Assess patient's ability to carry out ADLs; assess patient's baseline for ADL function and identify physical deficits which impact ability to perform ADLs (bathing, care of mouth/teeth, toileting, grooming, dressing, etc.)  - Assess/evaluate cause of self-care deficits   - Assess range of motion  - Assess patient's mobility; develop plan if impaired  - Assess  patient's need for assistive devices and provide as appropriate  - Encourage maximum independence but intervene and supervise when necessary  - Involve family in performance of ADLs  - Assess for home care needs following discharge   - Consider OT consult to assist with ADL evaluation and planning for discharge  - Provide patient education as appropriate  Outcome: Progressing  Goal: Maintains/Returns to pre admission functional level  Description: INTERVENTIONS:  - Perform AM-PAC 6 Click Basic Mobility/ Daily Activity assessment daily.  - Set and communicate daily mobility goal to care team and patient/family/caregiver.   - Collaborate with rehabilitation services on mobility goals if consulted  - Out of bed for toileting  - Record patient progress and toleration of activity level   Outcome: Progressing     Problem: DISCHARGE PLANNING  Goal: Discharge to home or other facility with appropriate resources  Description: INTERVENTIONS:  - Identify barriers to discharge w/patient and caregiver  - Arrange for needed discharge resources and transportation as appropriate  - Identify discharge learning needs (meds, wound care, etc.)  - Arrange for interpretive services to assist at discharge as needed  - Refer to Case Management Department for coordinating discharge planning if the patient needs post-hospital services based on physician/advanced practitioner order or complex needs related to functional status, cognitive ability, or social support system  Outcome: Progressing     Problem: Knowledge Deficit  Goal: Patient/family/caregiver demonstrates understanding of disease process, treatment plan, medications, and discharge instructions  Description: Complete learning assessment and assess knowledge base.  Interventions:  - Provide teaching at level of understanding  - Provide teaching via preferred learning methods  Outcome: Progressing     Problem: Nutrition/Hydration-ADULT  Goal: Nutrient/Hydration intake appropriate for  improving, restoring or maintaining nutritional needs  Description: Monitor and assess patient's nutrition/hydration status for malnutrition. Collaborate with interdisciplinary team and initiate plan and interventions as ordered.  Monitor patient's weight and dietary intake as ordered or per policy. Utilize nutrition screening tool and intervene as necessary. Determine patient's food preferences and provide high-protein, high-caloric foods as appropriate.     INTERVENTIONS:  - Monitor oral intake, urinary output, labs, and treatment plans  - Assess nutrition and hydration status and recommend course of action  - Evaluate amount of meals eaten  - Assist patient with eating if necessary   - Allow adequate time for meals  - Recommend/ encourage appropriate diets, oral nutritional supplements, and vitamin/mineral supplements  - Order, calculate, and assess calorie counts as needed  - Recommend, monitor, and adjust tube feedings and TPN/PPN based on assessed needs  - Assess need for intravenous fluids  - Provide specific nutrition/hydration education as appropriate  - Include patient/family/caregiver in decisions related to nutrition  Outcome: Not Progressing

## 2024-09-14 NOTE — ASSESSMENT & PLAN NOTE
Hypotonic hyponatremia improved with IV fluid  Recent Labs     09/12/24  0437 09/14/24  0510   SODIUM 132* 133*   Continue IV fluid until adequate oral intake

## 2024-09-14 NOTE — PROGRESS NOTES
Progress Note - Hospitalist   Name: Ingrid Samson 89 y.o. female I MRN: [id number]  Unit/Bed#: E4 -02 I Date of Admission: 9/10/2024   Date of Service: 9/14/2024 I Hospital Day: 3    Assessment & Plan  Ambulatory dysfunction  Nonweightbearing to right leg due to tibial plateau fracture  PT OT recommending rehab  Continued outpatient orthopedic follow-up  Type 2 diabetes mellitus with diabetic chronic kidney disease (HCC)  Lab Results   Component Value Date    HGBA1C 6.8 (H) 09/11/2024   Home regimen reviewed. Hold Metformin if applicable.  Start SSI and Basal bolus protocol  Primary hypertension  Controlled  Outpatient regimen: lisinopril 20mg daily, furosemide 20mg daily, amlodipine 5mg daily    Hyponatremia  Hypotonic hyponatremia improved with IV fluid  Recent Labs     09/12/24  0437 09/14/24  0510   SODIUM 132* 133*   Continue IV fluid until adequate oral intake    Cognitive decline  Lives with  and ?son.  reports decline, patient not eating as much, fell tonight. Family medicine note 2023 MMSE: 22  Appreciate geriatric recommendations  Right medial tibial plateau fracture  Continue knee brace  PT OT recommending rehab  Dysphagia  Patient with dysphagia in the setting of dementia  Discussed with family alternative nutrition and hydration including artificial feeding tube -they would prefer pleasure feeds  Continue dysphagia 2 diet and thin liquids  Family members including patient understand risk of aspiration    Moderate protein-calorie malnutrition (HCC)  Malnutrition Findings:   Adult Malnutrition type: Chronic illness  Adult Degree of Malnutrition: Malnutrition of moderate degree                     360 Statement: Moderate malnutrition r/t inadequate intake as evidenced by BMI 18.1, consuming < 75% energy intake compared to estimated energy needs > 1 month, Dysphagia, mild fat/muscle wasting observed at orbital/temple areas. Treated with ENSure Compact tid, pleasure feeds    BMI  Findings:  Adult BMI Classifications: Underweight < 18.5        Body mass index is 18.11 kg/m².           Hospital Course:     89-year-old female patient presenting with ambulatory dysfunction found to have right medial tibial plateau fracture.  Hospitalization complicated by dysphagia, family declines PEG tube placement.  Patient is now pending rehab placement    Assessment:      Principal Problem:    Ambulatory dysfunction  Active Problems:    Type 2 diabetes mellitus with diabetic chronic kidney disease (HCC)    Primary hypertension    Hyponatremia    Cognitive decline    Right medial tibial plateau fracture    Dysphagia    Moderate protein-calorie malnutrition (HCC)      Plan:    Ongoing disposition planning  Replenish potassium with IV potassium, give 40 mill equivalents now       VTE Pharmacologic Prophylaxis:   Pharmacologic: Enoxaparin (Lovenox)  Mechanical VTE Prophylaxis in Place: Yes    AM-PAC Basic Mobility:  Basic Mobility Inpatient Raw Score: 8    JH-HLM Achieved: 2: Bed activities/Dependent transfer  -HLM Goal: 3: Sit at edge of bed    HLM Goal listed above. Continue with multidisciplinary rounding and encourage appropriate mobility to improve upon HLM goals.         Patient Centered Rounds: Case discussed and reviewed with nursing    Discussions with Specialists or Other Care Team Provider: Case management    Education and Discussions with Family / Patient: Discussed with patient family at bedside    Time Spent for Care: 80 minutes.  More than 50% of total time spent on counseling and coordination of care as described above.    Current Length of Stay: 3 day(s)    Current Patient Status: Inpatient   Certification Statement: The patient will continue to require additional inpatient hospital stay due to need for rehab placement    Discharge Plan / Estimated Discharge Date: Likely Tuesday, insurance authorization to be submitted on Monday    Code Status: Level 3 - DNAR and DNI      Subjective:   Seen  and examined, no acute complaints.  No nausea no vomiting, tolerating oral diet.  Pain is controlled    A complete and comprehensive 14 point organ system review has been performed and all other systems are negative other than stated above.    Objective:     Vitals:   Temp (24hrs), Av.9 °F (36.6 °C), Min:97.3 °F (36.3 °C), Max:98.9 °F (37.2 °C)    Temp:  [97.3 °F (36.3 °C)-98.9 °F (37.2 °C)] 97.3 °F (36.3 °C)  HR:  [79-83] 83  Resp:  [16-18] 18  BP: (114-131)/(56-74) 114/56  SpO2:  [98 %-100 %] 98 %  Body mass index is 18.11 kg/m².     Input and Output Summary (last 24 hours):     No intake or output data in the 24 hours ending 24 1631    Physical Exam:     General: ill  appearing, no acute distress  HEENT: atraumatic, PERRLA, moist mucosa, normal pharynx, normal tonsils and adenoids, normal tongue, no fluid in sinuses  Neck: Trachea midline, no carotid bruit, no masses  Respiratory: normal chest wall expansion, CTA B, no r/r/w, no rubs  Cardiovascular: RRR, no m/r/g, Normal S1 and S2  Abdomen: Soft, non-tender, non-distended, normal bowel sounds in all quadrants, no hepatosplenomegaly, no tympany  Rectal: deferred  Musculoskeletal: Right knee /leg limited by fracture   integumentary: warm, dry, and pink, with no rash, purpura, or petechia  Heme/Lymph: no lymphadenopathy, no bruises  Neurological: Cranial Nerves II-XII grossly intact  Psychiatric: cooperative with normal mood, affect, and cognition      Additional Data:     Labs:    Results from last 7 days   Lab Units 24  0510 24  0622 09/10/24  2152   WBC Thousand/uL 5.46   < > 9.64   HEMOGLOBIN g/dL 8.9*   < > 11.0*   HEMATOCRIT % 25.4*   < > 32.5*   PLATELETS Thousands/uL 284   < > 382   LYMPHO PCT %  --   --  7*   MONO PCT %  --   --  4   EOS PCT %  --   --  0    < > = values in this interval not displayed.     Results from last 7 days   Lab Units 24  0510 24  0622 09/10/24  2152   POTASSIUM mmol/L 2.7*   < > 3.4*   CHLORIDE  mmol/L 99   < > 90*   CO2 mmol/L 27   < > 22   BUN mg/dL 8   < > 14   CREATININE mg/dL 0.70   < > 1.02   CALCIUM mg/dL 7.7*   < > 8.2*   ALK PHOS U/L  --   --  157*   ALT U/L  --   --  8   AST U/L  --   --  11*    < > = values in this interval not displayed.           * I Have Reviewed All Lab Data Listed Above.  * Additional Pertinent Lab Tests Reviewed: All Labs For Current Hospital Admission Reviewed    Imaging:    Imaging Reports Reviewed Today Include: No new imaging  Imaging Personally Reviewed by Myself Includes: No new imaging    Recent Cultures (last 7 days):           Last 24 Hours Medication List:   Current Facility-Administered Medications   Medication Dose Route Frequency Provider Last Rate    acetaminophen  975 mg Oral Q8H PRN Victorino Walsh PA-C      aluminum-magnesium hydroxide-simethicone  30 mL Oral Q6H PRN Victorino Walsh PA-C      amLODIPine  5 mg Oral Daily Victorino Walsh PA-C      atorvastatin  40 mg Oral Daily With Dinner Victorino Walsh PA-C      bisacodyl  10 mg Rectal Daily PRN JORDIN Jaquez      cyanocobalamin  500 mcg Oral Daily Dwight Restrepo DO      enoxaparin  40 mg Subcutaneous Daily Victorino Walsh PA-C      furosemide  20 mg Oral QAM Victorino Walsh PA-C      insulin lispro  1-5 Units Subcutaneous 4x Daily (AC & HS) Victorino Walsh PA-C      lisinopril  20 mg Oral Daily Victorino Walsh PA-C      melatonin  3 mg Oral HS Victorino Walsh PA-C      ondansetron  4 mg Intravenous Q6H PRN Victorino Walsh PA-C      senna-docusate sodium  1 tablet Oral BID JORDIN Jaquez         AM-PAC Basic Mobility:  Basic Mobility Inpatient Raw Score: 8    -HLM Achieved: 2: Bed activities/Dependent transfer  -HLM Goal: 3: Sit at edge of bed    HLM Goal listed above. Continue with multidisciplinary rounding and encourage appropriate mobility to improve upon HLM goals.     Today, Patient Was Seen By: Dwight Restrepo DO    ** Please Note: This note was completed in part utilizing [employer] software  dictation.  Grammatical errors, random word insertions, spelling mistakes, and incomplete sentences may be an occasional consequence of this system secondary to software limitations, ambient noise, and hardware issues.  If you have any questions or concerns about the content, text, or information contained within the body of this dictation, please contact the provider for clarification. **

## 2024-09-15 PROBLEM — E87.6 HYPOKALEMIA: Status: ACTIVE | Noted: 2024-09-15

## 2024-09-15 LAB
ANION GAP SERPL CALCULATED.3IONS-SCNC: 7 MMOL/L (ref 4–13)
BUN SERPL-MCNC: 7 MG/DL (ref 5–25)
CALCIUM SERPL-MCNC: 7.8 MG/DL (ref 8.4–10.2)
CHLORIDE SERPL-SCNC: 103 MMOL/L (ref 96–108)
CO2 SERPL-SCNC: 23 MMOL/L (ref 21–32)
CREAT SERPL-MCNC: 0.64 MG/DL (ref 0.6–1.3)
GFR SERPL CREATININE-BSD FRML MDRD: 78 ML/MIN/1.73SQ M
GLUCOSE SERPL-MCNC: 118 MG/DL (ref 65–140)
GLUCOSE SERPL-MCNC: 123 MG/DL (ref 65–140)
GLUCOSE SERPL-MCNC: 168 MG/DL (ref 65–140)
GLUCOSE SERPL-MCNC: 74 MG/DL (ref 65–140)
GLUCOSE SERPL-MCNC: 90 MG/DL (ref 65–140)
POTASSIUM SERPL-SCNC: 3.8 MMOL/L (ref 3.5–5.3)
SODIUM SERPL-SCNC: 133 MMOL/L (ref 135–147)

## 2024-09-15 PROCEDURE — 99233 SBSQ HOSP IP/OBS HIGH 50: CPT | Performed by: INTERNAL MEDICINE

## 2024-09-15 PROCEDURE — 92526 ORAL FUNCTION THERAPY: CPT

## 2024-09-15 PROCEDURE — 82948 REAGENT STRIP/BLOOD GLUCOSE: CPT

## 2024-09-15 PROCEDURE — 80048 BASIC METABOLIC PNL TOTAL CA: CPT | Performed by: INTERNAL MEDICINE

## 2024-09-15 RX ORDER — POTASSIUM CHLORIDE 14.9 MG/ML
20 INJECTION INTRAVENOUS
Status: COMPLETED | OUTPATIENT
Start: 2024-09-15 | End: 2024-09-15

## 2024-09-15 RX ORDER — POTASSIUM CHLORIDE 29.8 MG/ML
40 INJECTION INTRAVENOUS ONCE
Status: DISCONTINUED | OUTPATIENT
Start: 2024-09-15 | End: 2024-09-15

## 2024-09-15 RX ORDER — SODIUM CHLORIDE 9 MG/ML
100 INJECTION, SOLUTION INTRAVENOUS CONTINUOUS
Status: DISCONTINUED | OUTPATIENT
Start: 2024-09-15 | End: 2024-09-16

## 2024-09-15 RX ADMIN — INSULIN LISPRO 1 UNITS: 100 INJECTION, SOLUTION INTRAVENOUS; SUBCUTANEOUS at 17:11

## 2024-09-15 RX ADMIN — POTASSIUM CHLORIDE 20 MEQ: 14.9 INJECTION, SOLUTION INTRAVENOUS at 08:56

## 2024-09-15 RX ADMIN — SENNOSIDES AND DOCUSATE SODIUM 1 TABLET: 8.6; 5 TABLET ORAL at 08:01

## 2024-09-15 RX ADMIN — POTASSIUM CHLORIDE 20 MEQ: 14.9 INJECTION, SOLUTION INTRAVENOUS at 10:37

## 2024-09-15 RX ADMIN — AMLODIPINE BESYLATE 5 MG: 5 TABLET ORAL at 08:01

## 2024-09-15 RX ADMIN — CYANOCOBALAMIN TAB 500 MCG 500 MCG: 500 TAB at 08:01

## 2024-09-15 RX ADMIN — SENNOSIDES AND DOCUSATE SODIUM 1 TABLET: 8.6; 5 TABLET ORAL at 17:06

## 2024-09-15 RX ADMIN — SODIUM CHLORIDE 100 ML/HR: 0.9 INJECTION, SOLUTION INTRAVENOUS at 08:56

## 2024-09-15 RX ADMIN — ENOXAPARIN SODIUM 40 MG: 40 INJECTION SUBCUTANEOUS at 08:01

## 2024-09-15 RX ADMIN — LISINOPRIL 20 MG: 20 TABLET ORAL at 08:01

## 2024-09-15 RX ADMIN — ATORVASTATIN CALCIUM 40 MG: 40 TABLET, FILM COATED ORAL at 17:06

## 2024-09-15 RX ADMIN — FUROSEMIDE 20 MG: 20 TABLET ORAL at 08:01

## 2024-09-15 NOTE — PLAN OF CARE
Problem: Potential for Falls  Goal: Patient will remain free of falls  Description: INTERVENTIONS:  - Educate patient/family on patient safety including physical limitations  - Instruct patient to call for assistance with activity   - Consult OT/PT to assist with strengthening/mobility   - Keep Call bell within reach  - Keep bed low and locked with side rails adjusted as appropriate  - Keep care items and personal belongings within reach  - Initiate and maintain comfort rounds  - Make Fall Risk Sign visible to staff  - Offer Toileting every 2 Hours, in advance of need  - Initiate/Maintain bed alarm  - Obtain necessary fall risk management equipment: call bell  Problem: Prexisting or High Potential for Compromised Skin Integrity  Goal: Skin integrity is maintained or improved  Description: INTERVENTIONS:  - Identify patients at risk for skin breakdown  - Assess and monitor skin integrity  - Assess and monitor nutrition and hydration status  - Monitor labs   - Assess for incontinence   - Turn and reposition patient  - Assist with mobility/ambulation  - Relieve pressure over bony prominences  - Avoid friction and shearing  - Provide appropriate hygiene as needed including keeping skin clean and dry  - Evaluate need for skin moisturizer/barrier cream  - Collaborate with interdisciplinary team   - Patient/family teaching  - Consider wound care consult   Outcome: Progressing     Problem: PAIN - ADULT  Goal: Verbalizes/displays adequate comfort level or baseline comfort level  Description: Interventions:  - Encourage patient to monitor pain and request assistance  - Assess pain using appropriate pain scale  - Administer analgesics based on type and severity of pain and evaluate response  - Implement non-pharmacological measures as appropriate and evaluate response  - Consider cultural and social influences on pain and pain management  - Notify physician/advanced practitioner if interventions unsuccessful or patient  reports new pain  Outcome: Progressing     Problem: INFECTION - ADULT  Goal: Absence or prevention of progression during hospitalization  Description: INTERVENTIONS:  - Assess and monitor for signs and symptoms of infection  - Monitor lab/diagnostic results  - Monitor all insertion sites, i.e. indwelling lines, tubes, and drains  - Monitor endotracheal if appropriate and nasal secretions for changes in amount and color  - [place] appropriate cooling/warming therapies per order  - Administer medications as ordered  - Instruct and encourage patient and family to use good hand hygiene technique  - Identify and instruct in appropriate isolation precautions for identified infection/condition  Outcome: Progressing  Goal: Absence of fever/infection during neutropenic period  Description: INTERVENTIONS:  - Monitor WBC    Outcome: Progressing     Problem: SAFETY ADULT  Goal: Patient will remain free of falls  Description: INTERVENTIONS:  - Educate patient/family on patient safety including physical limitations  - Instruct patient to call for assistance with activity   - Consult OT/PT to assist with strengthening/mobility   - Keep Call bell within reach  - Keep bed low and locked with side rails adjusted as appropriate  - Keep care items and personal belongings within reach  - Initiate and maintain comfort rounds  - Make Fall Risk Sign visible to staff  - Offer Toileting every 2 Hours, in advance of need  - Initiate/Maintain bed alarm  - Obtain necessary fall risk management equipment: call bell  - Apply yellow socks and bracelet for high fall risk patients  - Consider moving patient to room near nurses station  Outcome: Progressing  Goal: Maintain or return to baseline ADL function  Description: INTERVENTIONS:  -  Assess patient's ability to carry out ADLs; assess patient's baseline for ADL function and identify physical deficits which impact ability to perform ADLs (bathing, care of mouth/teeth, toileting, grooming,  dressing, etc.)  - Assess/evaluate cause of self-care deficits   - Assess range of motion  - Assess patient's mobility; develop plan if impaired  - Assess patient's need for assistive devices and provide as appropriate  - Encourage maximum independence but intervene and supervise when necessary  - Involve family in performance of ADLs  - Assess for home care needs following discharge   - Consider OT consult to assist with ADL evaluation and planning for discharge  - Provide patient education as appropriate  Outcome: Progressing  Goal: Maintains/Returns to pre admission functional level  Description: INTERVENTIONS:  - Perform AM-PAC 6 Click Basic Mobility/ Daily Activity assessment daily.  - Set and communicate daily mobility goal to care team and patient/family/caregiver.   - Collaborate with rehabilitation services on mobility goals if consulted  - Perform Range of Motion 3 times a day.  - Reposition patient every 2 hours.  - Dangle patient 3 times a day  - Stand patient 3 times a day  - Ambulate patient 3 times a day  - Out of bed to chair 3 times a day   - Out of bed for meals 3  Problem: DISCHARGE PLANNING  Goal: Discharge to home or other facility with appropriate resources  Description: INTERVENTIONS:  - Identify barriers to discharge w/patient and caregiver  - Arrange for needed discharge resources and transportation as appropriate  - Identify discharge learning needs (meds, wound care, etc.)  - Arrange for interpretive services to assist at discharge as needed  - Refer to Case Management Department for coordinating discharge planning if the patient needs post-hospital services based on physician/advanced practitioner order or complex needs related to functional status, cognitive ability, or social support system  Outcome: Progressing     Problem: Knowledge Deficit  Goal: Patient/family/caregiver demonstrates understanding of disease process, treatment plan, medications, and discharge  instructions  Description: Complete learning assessment and assess knowledge base.  Interventions:  - Provide teaching at level of understanding  - Provide teaching via preferred learning methods  Outcome: Progressing     Problem: Nutrition/Hydration-ADULT  Goal: Nutrient/Hydration intake appropriate for improving, restoring or maintaining nutritional needs  Description: Monitor and assess patient's nutrition/hydration status for malnutrition. Collaborate with interdisciplinary team and initiate plan and interventions as ordered.  Monitor patient's weight and dietary intake as ordered or per policy. Utilize nutrition screening tool and intervene as necessary. Determine patient's food preferences and provide high-protein, high-caloric foods as appropriate.     INTERVENTIONS:  - Monitor oral intake, urinary output, labs, and treatment plans  - Assess nutrition and hydration status and recommend course of action  - Evaluate amount of meals eaten  - Assist patient with eating if necessary   - Allow adequate time for meals  - Recommend/ encourage appropriate diets, oral nutritional supplements, and vitamin/mineral supplements  - Order, calculate, and assess calorie counts as needed  - Recommend, monitor, and adjust tube feedings and TPN/PPN based on assessed needs  - Assess need for intravenous fluids  - Provide specific nutrition/hydration education as appropriate  - Include patient/family/caregiver in decisions related to nutrition  Outcome: Progressing    times a day  - Out of bed for toileting  - Record patient progress and toleration of activity level   Outcome: Progressing     - Apply yellow socks and bracelet for high fall risk patients  - Consider moving patient to room near nurses station  Outcome: Progressing

## 2024-09-15 NOTE — PLAN OF CARE
Problem: Potential for Falls  Goal: Patient will remain free of falls  Description: INTERVENTIONS:  - Educate patient/family on patient safety including physical limitations  - Instruct patient to call for assistance with activity   - Consult OT/PT to assist with strengthening/mobility   - Keep Call bell within reach  - Keep bed low and locked with side rails adjusted as appropriate  - Keep care items and personal belongings within reach  - Initiate and maintain comfort rounds  - Make Fall Risk Sign visible to staff  - Apply yellow socks and bracelet for high fall risk patients  - Consider moving patient to room near nurses station  Outcome: Progressing     Problem: Prexisting or High Potential for Compromised Skin Integrity  Goal: Skin integrity is maintained or improved  Description: INTERVENTIONS:  - Identify patients at risk for skin breakdown  - Assess and monitor skin integrity  - Assess and monitor nutrition and hydration status  - Monitor labs   - Assess for incontinence   - Turn and reposition patient  - Assist with mobility/ambulation  - Relieve pressure over bony prominences  - Avoid friction and shearing  - Provide appropriate hygiene as needed including keeping skin clean and dry  - Evaluate need for skin moisturizer/barrier cream  - Collaborate with interdisciplinary team   - Patient/family teaching  - Consider wound care consult   Outcome: Progressing     Problem: PAIN - ADULT  Goal: Verbalizes/displays adequate comfort level or baseline comfort level  Description: Interventions:  - Encourage patient to monitor pain and request assistance  - Assess pain using appropriate pain scale  - Administer analgesics based on type and severity of pain and evaluate response  - Implement non-pharmacological measures as appropriate and evaluate response  - Consider cultural and social influences on pain and pain management  - Notify physician/advanced practitioner if interventions unsuccessful or patient reports  new pain  Outcome: Progressing     Problem: INFECTION - ADULT  Goal: Absence or prevention of progression during hospitalization  Description: INTERVENTIONS:  - Assess and monitor for signs and symptoms of infection  - Monitor lab/diagnostic results  - Monitor all insertion sites, i.e. indwelling lines, tubes, and drains  - Monitor endotracheal if appropriate and nasal secretions for changes in amount and color  - [place] appropriate cooling/warming therapies per order  - Administer medications as ordered  - Instruct and encourage patient and family to use good hand hygiene technique  - Identify and instruct in appropriate isolation precautions for identified infection/condition  Outcome: Progressing  Goal: Absence of fever/infection during neutropenic period  Description: INTERVENTIONS:  - Monitor WBC    Outcome: Progressing     Problem: SAFETY ADULT  Goal: Patient will remain free of falls  Description: INTERVENTIONS:  - Educate patient/family on patient safety including physical limitations  - Instruct patient to call for assistance with activity   - Consult OT/PT to assist with strengthening/mobility   - Keep Call bell within reach  - Keep bed low and locked with side rails adjusted as appropriate  - Keep care items and personal belongings within reach  - Initiate and maintain comfort rounds  - Make Fall Risk Sign visible to staff  - Apply yellow socks and bracelet for high fall risk patients  - Consider moving patient to room near nurses station  Outcome: Progressing  Goal: Maintain or return to baseline ADL function  Description: INTERVENTIONS:  -  Assess patient's ability to carry out ADLs; assess patient's baseline for ADL function and identify physical deficits which impact ability to perform ADLs (bathing, care of mouth/teeth, toileting, grooming, dressing, etc.)  - Assess/evaluate cause of self-care deficits   - Assess range of motion  - Assess patient's mobility; develop plan if impaired  - Assess  patient's need for assistive devices and provide as appropriate  - Encourage maximum independence but intervene and supervise when necessary  - Involve family in performance of ADLs  - Assess for home care needs following discharge   - Consider OT consult to assist with ADL evaluation and planning for discharge  - Provide patient education as appropriate  Outcome: Progressing  Goal: Maintains/Returns to pre admission functional level  Description: INTERVENTIONS:  - Perform AM-PAC 6 Click Basic Mobility/ Daily Activity assessment daily.  - Set and communicate daily mobility goal to care team and patient/family/caregiver.   - Collaborate with rehabilitation services on mobility goals if consulted  - Out of bed for toileting  - Record patient progress and toleration of activity level   Outcome: Progressing     Problem: DISCHARGE PLANNING  Goal: Discharge to home or other facility with appropriate resources  Description: INTERVENTIONS:  - Identify barriers to discharge w/patient and caregiver  - Arrange for needed discharge resources and transportation as appropriate  - Identify discharge learning needs (meds, wound care, etc.)  - Arrange for interpretive services to assist at discharge as needed  - Refer to Case Management Department for coordinating discharge planning if the patient needs post-hospital services based on physician/advanced practitioner order or complex needs related to functional status, cognitive ability, or social support system  Outcome: Progressing     Problem: Knowledge Deficit  Goal: Patient/family/caregiver demonstrates understanding of disease process, treatment plan, medications, and discharge instructions  Description: Complete learning assessment and assess knowledge base.  Interventions:  - Provide teaching at level of understanding  - Provide teaching via preferred learning methods  Outcome: Progressing     Problem: Nutrition/Hydration-ADULT  Goal: Nutrient/Hydration intake appropriate for  improving, restoring or maintaining nutritional needs  Description: Monitor and assess patient's nutrition/hydration status for malnutrition. Collaborate with interdisciplinary team and initiate plan and interventions as ordered.  Monitor patient's weight and dietary intake as ordered or per policy. Utilize nutrition screening tool and intervene as necessary. Determine patient's food preferences and provide high-protein, high-caloric foods as appropriate.     INTERVENTIONS:  - Monitor oral intake, urinary output, labs, and treatment plans  - Assess nutrition and hydration status and recommend course of action  - Evaluate amount of meals eaten  - Assist patient with eating if necessary   - Allow adequate time for meals  - Recommend/ encourage appropriate diets, oral nutritional supplements, and vitamin/mineral supplements  - Order, calculate, and assess calorie counts as needed  - Recommend, monitor, and adjust tube feedings and TPN/PPN based on assessed needs  - Assess need for intravenous fluids  - Provide specific nutrition/hydration education as appropriate  - Include patient/family/caregiver in decisions related to nutrition  Outcome: Not Progressing

## 2024-09-15 NOTE — ASSESSMENT & PLAN NOTE
Hypotonic hyponatremia improved with IV fluid  Recent Labs     09/14/24  0510 09/15/24  0438   SODIUM 133* 133*   Continue IV fluid until adequate oral intake

## 2024-09-15 NOTE — SPEECH THERAPY NOTE
Speech Language/Pathology    Speech/Language Pathology Progress Note    Patient Name: Ingrid Samson  Today's Date: 9/15/2024     Problem List  Principal Problem:    Ambulatory dysfunction  Active Problems:    Type 2 diabetes mellitus with diabetic chronic kidney disease (HCC)    Primary hypertension    Hyponatremia    Cognitive decline    Right medial tibial plateau fracture    Dysphagia    Moderate protein-calorie malnutrition (HCC)    Hypokalemia       Past Medical History  Past Medical History:   Diagnosis Date    Dehydration     Last assessed - 2/22/16    Diabetes mellitus (HCC)     Hyperlipidemia     Hypertension     Hypotension     Last assessed - 2/22/16    Obstructive jaundice     Last assessed - 3/25/16    Renal disorder     Sepsis (HCC)     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        Past Surgical History  Past Surgical History:   Procedure Laterality Date    CYSTOSCOPY W/ STONE MANIPULATION N/A 2/23/2016    Procedure: STONE MANIPULATION;  Surgeon: Bart Whitlock MD;  Location: [facility];  Service:     ERCP W/ SPHICTEROTOMY N/A 2/23/2016    Procedure: ENDOSCOPIC RETROGRADE CHOLANGIOPANCREATOGRAPHY (ERCP) W/ SPHINCTEROTOMY;  Surgeon: Bart Whitlock MD;  Location: [facility];  Service:     DC HEMIARTHROPLASTY HIP PARTIAL Left 4/8/2020    Procedure: HEMIARTHROPLASTY HIP (BIPOLAR);  Surgeon: Jose Lopez MD;  Location: [facility];  Service: Orthopedics    TUBAL LIGATION           Subjective:  Pt seen upright in bed. Poor secretion management + wet congested cough upon arrival. RN at bedside.     Objective:  Pt seen for dysphagia tx post MBS 9/12/24 with NPO recommendations. Oral care completed 2/2 dried secretions around oral cavity and lips. Wet vocal quality and coughing noted. Per MD report, pt family wishing pt be placed on a diet (currently on dysphagia 2 (mechanical soft)/thin liquids) despite aspiration risk and NPO recommendations. X2 ice chips  given to pt after oral care completed. Pt spitting out x1 ice chip. Immediate wet vocal quality noted after x1 swallow with ice chip. Pt cued to cough- unable. Eventual/delayed wet and congested cough noted.     Assessment:  Pt continuing to present with worsening dysphagia and increased s/s of aspiration. Poor secretion management from prior. No further PO trialed at this time due to safety concerns.     Plan/Recommendations:  Extensive oral care. Aspiration precautions. Ongoing GOC discussion as pt continuing to present with severe oropharyngeal dysphagia and new onset of wet/congested cough.

## 2024-09-15 NOTE — PROGRESS NOTES
Progress Note - Hospitalist   Name: Ingrid Samson 89 y.o. female I MRN: [id number]  Unit/Bed#: E4 -02 I Date of Admission: 9/10/2024   Date of Service: 9/15/2024 I Hospital Day: 4    Assessment & Plan  Ambulatory dysfunction  Nonweightbearing to right leg due to tibial plateau fracture  PT OT recommending rehab  Continued outpatient orthopedic follow-up  Type 2 diabetes mellitus with diabetic chronic kidney disease (HCC)  Lab Results   Component Value Date    HGBA1C 6.8 (H) 09/11/2024   Home regimen reviewed. Hold Metformin if applicable.  Start SSI and Basal bolus protocol  Primary hypertension  Controlled  Outpatient regimen: lisinopril 20mg daily, furosemide 20mg daily, amlodipine 5mg daily    Hyponatremia  Hypotonic hyponatremia improved with IV fluid  Recent Labs     09/14/24  0510 09/15/24  0438   SODIUM 133* 133*   Continue IV fluid until adequate oral intake    Cognitive decline  Lives with  and ?son.  reports decline, patient not eating as much, fell tonight. Family medicine note 2023 MMSE: 22  Appreciate geriatric recommendations  Right medial tibial plateau fracture  Continue knee brace  PT OT recommending rehab  Dysphagia  Patient with dysphagia in the setting of dementia  Discussed with family alternative nutrition and hydration including artificial feeding tube -they would prefer pleasure feeds  Continue dysphagia 2 diet and thin liquids  Family members including patient understand risk of aspiration    Moderate protein-calorie malnutrition (HCC)  Malnutrition Findings:   Adult Malnutrition type: Chronic illness  Adult Degree of Malnutrition: Malnutrition of moderate degree                     360 Statement: Moderate malnutrition r/t inadequate intake as evidenced by BMI 18.1, consuming < 75% energy intake compared to estimated energy needs > 1 month, Dysphagia, mild fat/muscle wasting observed at orbital/temple areas. Treated with ENSure Compact tid, pleasure feeds    BMI  Findings:  Adult BMI Classifications: Underweight < 18.5        Body mass index is 18.11 kg/m².     Hypokalemia  Continue repletion          Hospital Course:     89-year-old female patient presented with ambulatory dysfunction, found to have right medial tibial plateau fracture.  Hospitalization complicated by dysphagia, however patient's family would like to attempt rehab.  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)    Primary hypertension    Hyponatremia    Cognitive decline    Right medial tibial plateau fracture    Dysphagia    Moderate protein-calorie malnutrition (HCC)    Hypokalemia      Plan:    Discharge to rehab once bed is available  Possibly Monday or Tuesday pending insurance authorization       VTE Pharmacologic Prophylaxis:   Pharmacologic: Enoxaparin (Lovenox)  Mechanical VTE Prophylaxis in Place: Yes    AM-PAC Basic Mobility:  Basic Mobility Inpatient Raw Score: 7    JH-HLM Achieved: 2: Bed activities/Dependent transfer  JH-HLM Goal: 2: Bed activities/Dependent transfer    HLM Goal listed above. Continue with multidisciplinary rounding and encourage appropriate mobility to improve upon HLM goals.         Patient Centered Rounds: Case discussed and reviewed with nursing    Discussions with Specialists or Other Care Team Provider: Case management    Education and Discussions with Family / Patient: Discussed with patient family    Time Spent for Care: 80 minutes.  More than 50% of total time spent on counseling and coordination of care as described above.    Current Length of Stay: 4 day(s)    Current Patient Status: Inpatient   Certification Statement: The patient will continue to require additional inpatient hospital stay due to rehab placement    Discharge Plan / Estimated Discharge Date: stable for discharge to rehab once insurance  authorization is obtained    Code Status: Level 3 - DNAR and DNI      Subjective:   Seen and examined, no acute complaints.  No nausea no vomiting.  Resting comfortably.    A complete and comprehensive 14 point organ system review has been performed and all other systems are negative other than stated above.    Objective:     Vitals:   Temp (24hrs), Av.6 °F (36.4 °C), Min:97.3 °F (36.3 °C), Max:97.8 °F (36.6 °C)    Temp:  [97.3 °F (36.3 °C)-97.8 °F (36.6 °C)] 97.7 °F (36.5 °C)  HR:  [64-83] 64  Resp:  [16-18] 16  BP: (114-146)/(56-60) 146/60  SpO2:  [98 %] 98 %  Body mass index is 18.11 kg/m².     Input and Output Summary (last 24 hours):       Intake/Output Summary (Last 24 hours) at 9/15/2024 1154  Last data filed at 9/15/2024 0800  Gross per 24 hour   Intake 100 ml   Output --   Net 100 ml       Physical Exam:     General: well appearing, no acute distress  HEENT: atraumatic, PERRLA, moist mucosa, normal pharynx, normal tonsils and adenoids, normal tongue, no fluid in sinuses  Neck: Trachea midline, no carotid bruit, no masses  Respiratory: normal chest wall expansion, CTA B, no r/r/w, no rubs  Cardiovascular: RRR, no m/r/g, Normal S1 and S2  Abdomen: Soft, non-tender, non-distended, normal bowel sounds in all quadrants, no hepatosplenomegaly, no tympany  Rectal: deferred  Musculoskeletal: Moves all, right leg limited by fracture  Integumentary: warm, dry, and pink, with no rash, purpura, or petechia  Heme/Lymph: no lymphadenopathy, no bruises  Neurological: Cranial Nerves II-XII grossly intact  Psychiatric: cooperative with normal mood, affect, and cognition      Additional Data:     Labs:    Results from last 7 days   Lab Units 24  0510 24  0622 09/10/24  2152   WBC Thousand/uL 5.46   < > 9.64   HEMOGLOBIN g/dL 8.9*   < > 11.0*   HEMATOCRIT % 25.4*   < > 32.5*   PLATELETS Thousands/uL 284   < > 382   LYMPHO PCT %  --   --  7*   MONO PCT %  --   --  4   EOS PCT %  --   --  0    < > = values in  this interval not displayed.     Results from last 7 days   Lab Units 09/15/24  0438 09/11/24  0622 09/10/24  2152   POTASSIUM mmol/L 3.8   < > 3.4*   CHLORIDE mmol/L 103   < > 90*   CO2 mmol/L 23   < > 22   BUN mg/dL 7   < > 14   CREATININE mg/dL 0.64   < > 1.02   CALCIUM mg/dL 7.8*   < > 8.2*   ALK PHOS U/L  --   --  157*   ALT U/L  --   --  8   AST U/L  --   --  11*    < > = values in this interval not displayed.           * I Have Reviewed All Lab Data Listed Above.  * Additional Pertinent Lab Tests Reviewed: All Labs For Current Hospital Admission Reviewed    Imaging:    Imaging Reports Reviewed Today Include: No new imaging  Imaging Personally Reviewed by Myself Includes: No new imaging    Recent Cultures (last 7 days):           Last 24 Hours Medication List:   Current Facility-Administered Medications   Medication Dose Route Frequency Provider Last Rate    acetaminophen  975 mg Oral Q8H PRN Victorino Walsh PA-C      aluminum-magnesium hydroxide-simethicone  30 mL Oral Q6H PRN Victorino Walsh PA-C      amLODIPine  5 mg Oral Daily Victorino Walsh PA-C      atorvastatin  40 mg Oral Daily With Dinner Victorino Walsh PA-C      bisacodyl  10 mg Rectal Daily PRN JORDIN Jaquez      cyanocobalamin  500 mcg Oral Daily Dwight Restrepo,       enoxaparin  40 mg Subcutaneous Daily Victorino Walsh PA-C      furosemide  20 mg Oral QAM Victorino Walsh PA-C      insulin lispro  1-5 Units Subcutaneous 4x Daily (AC & HS) Victorino Walsh PA-C      lisinopril  20 mg Oral Daily Victorino Walsh PA-C      melatonin  3 mg Oral HS Victorino Walsh PA-C      ondansetron  4 mg Intravenous Q6H PRN Victorino Walsh PA-C      potassium chloride  20 mEq Intravenous Q2H Dwight Restrepo, DO 20 mEq (09/15/24 1037)    senna-docusate sodium  1 tablet Oral BID JORDIN Jaquez      sodium chloride  100 mL/hr Intravenous Continuous Dwight Restrepo  mL/hr (09/15/24 0856)       AM-PAC Basic Mobility:  Basic Mobility Inpatient Raw Score: 7    JH-HLM Achieved:  2: Bed activities/Dependent transfer  -HLM Goal: 2: Bed activities/Dependent transfer    HLM Goal listed above. Continue with multidisciplinary rounding and encourage appropriate mobility to improve upon HLM goals.     Today, Patient Was Seen By: Dwight Restrepo DO    ** Please Note: This note was completed in part utilizing [employer] software dictation.  Grammatical errors, random word insertions, spelling mistakes, and incomplete sentences may be an occasional consequence of this system secondary to software limitations, ambient noise, and hardware issues.  If you have any questions or concerns about the content, text, or information contained within the body of this dictation, please contact the provider for clarification. **

## 2024-09-16 LAB
ANION GAP SERPL CALCULATED.3IONS-SCNC: 6 MMOL/L (ref 4–13)
BUN SERPL-MCNC: 6 MG/DL (ref 5–25)
CALCIUM SERPL-MCNC: 7.8 MG/DL (ref 8.4–10.2)
CHLORIDE SERPL-SCNC: 103 MMOL/L (ref 96–108)
CO2 SERPL-SCNC: 24 MMOL/L (ref 21–32)
CREAT SERPL-MCNC: 0.57 MG/DL (ref 0.6–1.3)
GFR SERPL CREATININE-BSD FRML MDRD: 81 ML/MIN/1.73SQ M
GLUCOSE SERPL-MCNC: 140 MG/DL (ref 65–140)
GLUCOSE SERPL-MCNC: 60 MG/DL (ref 65–140)
GLUCOSE SERPL-MCNC: 75 MG/DL (ref 65–140)
GLUCOSE SERPL-MCNC: 78 MG/DL (ref 65–140)
GLUCOSE SERPL-MCNC: 92 MG/DL (ref 65–140)
GLUCOSE SERPL-MCNC: 97 MG/DL (ref 65–140)
POTASSIUM SERPL-SCNC: 3.8 MMOL/L (ref 3.5–5.3)
SODIUM SERPL-SCNC: 133 MMOL/L (ref 135–147)

## 2024-09-16 PROCEDURE — 97530 THERAPEUTIC ACTIVITIES: CPT

## 2024-09-16 PROCEDURE — 99233 SBSQ HOSP IP/OBS HIGH 50: CPT | Performed by: INTERNAL MEDICINE

## 2024-09-16 PROCEDURE — 82948 REAGENT STRIP/BLOOD GLUCOSE: CPT

## 2024-09-16 PROCEDURE — 97110 THERAPEUTIC EXERCISES: CPT

## 2024-09-16 PROCEDURE — 80048 BASIC METABOLIC PNL TOTAL CA: CPT | Performed by: INTERNAL MEDICINE

## 2024-09-16 PROCEDURE — 97535 SELF CARE MNGMENT TRAINING: CPT

## 2024-09-16 RX ADMIN — AMLODIPINE BESYLATE 5 MG: 5 TABLET ORAL at 08:11

## 2024-09-16 RX ADMIN — SENNOSIDES AND DOCUSATE SODIUM 1 TABLET: 8.6; 5 TABLET ORAL at 17:24

## 2024-09-16 RX ADMIN — SENNOSIDES AND DOCUSATE SODIUM 1 TABLET: 8.6; 5 TABLET ORAL at 08:11

## 2024-09-16 RX ADMIN — CYANOCOBALAMIN TAB 500 MCG 500 MCG: 500 TAB at 08:11

## 2024-09-16 RX ADMIN — LISINOPRIL 20 MG: 20 TABLET ORAL at 08:11

## 2024-09-16 RX ADMIN — ENOXAPARIN SODIUM 40 MG: 40 INJECTION SUBCUTANEOUS at 08:17

## 2024-09-16 RX ADMIN — FUROSEMIDE 20 MG: 20 TABLET ORAL at 08:11

## 2024-09-16 RX ADMIN — SODIUM CHLORIDE 100 ML/HR: 0.9 INJECTION, SOLUTION INTRAVENOUS at 00:08

## 2024-09-16 RX ADMIN — ATORVASTATIN CALCIUM 40 MG: 40 TABLET, FILM COATED ORAL at 17:24

## 2024-09-16 NOTE — PROGRESS NOTES
Progress Note - Hospitalist   Name: Ingrid Samson 89 y.o. female I MRN: [id number]  Unit/Bed#: E4 -02 I Date of Admission: 9/10/2024   Date of Service: 9/16/2024 I Hospital Day: 5    Assessment & Plan  Ambulatory dysfunction  Nonweightbearing to right leg due to tibial plateau fracture  PT OT recommending rehab  Continued outpatient orthopedic follow-up  Type 2 diabetes mellitus with diabetic chronic kidney disease (HCC)  Lab Results   Component Value Date    HGBA1C 6.8 (H) 09/11/2024   Home regimen reviewed. Hold Metformin if applicable.  Start SSI and Basal bolus protocol  Primary hypertension  Controlled  Outpatient regimen: lisinopril 20mg daily, furosemide 20mg daily, amlodipine 5mg daily    Hyponatremia  Hypotonic hyponatremia improved with IV fluid  Recent Labs     09/14/24  0510 09/15/24  0438 09/16/24  0539   SODIUM 133* 133* 133*   Continue IV fluid until adequate oral intake    Cognitive decline  Lives with  and ?son.  reports decline, patient not eating as much, fell tonight. Family medicine note 2023 MMSE: 22  Appreciate geriatric recommendations  Right medial tibial plateau fracture  Continue knee brace  PT OT recommending rehab  Dysphagia  Patient with dysphagia in the setting of dementia  Discussed with family alternative nutrition and hydration including artificial feeding tube -they would prefer pleasure feeds  Continue dysphagia 1 diet and thin liquids  Family members including patient understand risk of aspiration  Patient remains high risk for aspiration event-the patient have a terminal aspiration event would recommend transition to comfort care, at this time she is tolerating some oral intake although it was unclear given poor video swallow    Moderate protein-calorie malnutrition (HCC)  Malnutrition Findings:   Adult Malnutrition type: Chronic illness  Adult Degree of Malnutrition: Malnutrition of moderate degree                     360 Statement: Moderate malnutrition  r/t inadequate intake as evidenced by BMI 18.1, consuming < 75% energy intake compared to estimated energy needs > 1 month, Dysphagia, mild fat/muscle wasting observed at orbital/temple areas. Treated with ENSure Compact tid, pleasure feeds    BMI Findings:  Adult BMI Classifications: Underweight < 18.5        Body mass index is 18.11 kg/m².     Hypokalemia  Continue repletion            Hospital Course:     89-year-old female patient presented with ambulatory dysfunction in the setting of right tibial plateau fracture.  Patient with significant protein, nutrition in the setting of dysphagia    Plan at this time is for short-term rehab with possible discharge home and/or transition to long-term care r and/or hospice    Assessment:      Principal Problem:    Ambulatory dysfunction  Active Problems:    Type 2 diabetes mellitus with diabetic chronic kidney disease (HCC)    Primary hypertension    Hyponatremia    Cognitive decline    Right medial tibial plateau fracture    Dysphagia    Moderate protein-calorie malnutrition (HCC)    Hypokalemia      Plan:    Continue current medical management  Patient is medically stable to discharge       VTE Pharmacologic Prophylaxis:   Pharmacologic: Enoxaparin (Lovenox)  Mechanical VTE Prophylaxis in Place: Yes    AM-PAC Basic Mobility:  Basic Mobility Inpatient Raw Score: 7    -HLM Achieved: 2: Bed activities/Dependent transfer  -HLM Goal: 2: Bed activities/Dependent transfer    HLM Goal listed above. Continue with multidisciplinary rounding and encourage appropriate mobility to improve upon HLM goals.         Patient Centered Rounds: Case discussed and reviewed with nursing    Discussions with Specialists or Other Care Team Provider: Case management    Education and Discussions with Family / Patient: Discussed with patient family he was aware of care plan    Time Spent for Care: 80 minutes.  More than 50% of total time spent on counseling and coordination of care as described  above.    Current Length of Stay: 5 day(s)    Current Patient Status: Inpatient   Certification Statement: The patient will continue to require additional inpatient hospital stay due to need for rehab placement    Discharge Plan / Estimated Discharge Date: Medically stable for discharge.  Case management aware.    Code Status: Level 3 - DNAR and DNI      Subjective:   Seen and examined, laying in bed comfortably.  No nausea no vomiting.    A complete and comprehensive 14 point organ system review has been performed and all other systems are negative other than stated above.    Objective:     Vitals:   Temp (24hrs), Av.5 °F (36.9 °C), Min:97.9 °F (36.6 °C), Max:98.9 °F (37.2 °C)    Temp:  [97.9 °F (36.6 °C)-98.9 °F (37.2 °C)] 97.9 °F (36.6 °C)  HR:  [76-99] 87  Resp:  [16-18] 18  BP: (125-137)/(63-65) 125/65  SpO2:  [96 %-97 %] 97 %  Body mass index is 18.11 kg/m².     Input and Output Summary (last 24 hours):       Intake/Output Summary (Last 24 hours) at 2024 1451  Last data filed at 2024 1201  Gross per 24 hour   Intake 2000 ml   Output 1 ml   Net 1999 ml       Physical Exam:     General: well appearing, no acute distress  HEENT: atraumatic, PERRLA, moist mucosa, normal pharynx, normal tonsils and adenoids, normal tongue, no fluid in sinuses  Neck: Trachea midline, no carotid bruit, no masses  Respiratory: normal chest wall expansion, CTA B, no r/r/w, no rubs  Cardiovascular: RRR, no m/r/g, Normal S1 and S2  Abdomen: Soft, non-tender, non-distended, normal bowel sounds in all quadrants, no hepatosplenomegaly, no tympany  Rectal: deferred  Musculoskeletal: normal ROM in upper and lower extremities  Integumentary: warm, dry, and pink, with no rash, purpura, or petechia  Heme/Lymph: no lymphadenopathy, no bruises  Neurological: Cranial Nerves II-XII grossly intact  Psychiatric: cooperative with normal mood, affect, and cognition      Additional Data:     Labs:    Results from last 7 days   Lab Units  09/14/24  0510 09/11/24  0622 09/10/24  2152   WBC Thousand/uL 5.46   < > 9.64   HEMOGLOBIN g/dL 8.9*   < > 11.0*   HEMATOCRIT % 25.4*   < > 32.5*   PLATELETS Thousands/uL 284   < > 382   LYMPHO PCT %  --   --  7*   MONO PCT %  --   --  4   EOS PCT %  --   --  0    < > = values in this interval not displayed.     Results from last 7 days   Lab Units 09/16/24  0539 09/11/24  0622 09/10/24  2152   POTASSIUM mmol/L 3.8   < > 3.4*   CHLORIDE mmol/L 103   < > 90*   CO2 mmol/L 24   < > 22   BUN mg/dL 6   < > 14   CREATININE mg/dL 0.57*   < > 1.02   CALCIUM mg/dL 7.8*   < > 8.2*   ALK PHOS U/L  --   --  157*   ALT U/L  --   --  8   AST U/L  --   --  11*    < > = values in this interval not displayed.           * I Have Reviewed All Lab Data Listed Above.  * Additional Pertinent Lab Tests Reviewed: All Labs For Current Hospital Admission Reviewed    Imaging:    Imaging Reports Reviewed Today Include: No new imaging  Imaging Personally Reviewed by Myself Includes: No new imaging    Recent Cultures (last 7 days):           Last 24 Hours Medication List:   Current Facility-Administered Medications   Medication Dose Route Frequency Provider Last Rate    acetaminophen  975 mg Oral Q8H PRN Victorino Walsh PA-C      aluminum-magnesium hydroxide-simethicone  30 mL Oral Q6H PRN Victorino Walsh PA-C      amLODIPine  5 mg Oral Daily Victorino Walsh PA-C      atorvastatin  40 mg Oral Daily With Dinner Victorino Walsh PA-C      bisacodyl  10 mg Rectal Daily PRN JORDIN Jaquez      cyanocobalamin  500 mcg Oral Daily Dwight Restrepo DO      enoxaparin  40 mg Subcutaneous Daily Victorino Walsh PA-C      furosemide  20 mg Oral QAM Victorino Walsh PA-C      insulin lispro  1-5 Units Subcutaneous 4x Daily (AC & HS) Victorino Walsh PA-C      lisinopril  20 mg Oral Daily Victorino Walsh PA-C      melatonin  3 mg Oral HS Victorino Walsh PA-C      ondansetron  4 mg Intravenous Q6H PRN Victorino Walsh PA-C      senna-docusate sodium  1 tablet Oral BID Lalita Campos,  CRNP         AM-PAC Basic Mobility:  Basic Mobility Inpatient Raw Score: 7    JH-HLM Achieved: 2: Bed activities/Dependent transfer  JH-HLM Goal: 2: Bed activities/Dependent transfer    HLM Goal listed above. Continue with multidisciplinary rounding and encourage appropriate mobility to improve upon HLM goals.     Today, Patient Was Seen By: Dwight Restrepo DO    ** Please Note: This note was completed in part utilizing [employer] Dragon One [employer] software dictation.  Grammatical errors, random word insertions, spelling mistakes, and incomplete sentences may be an occasional consequence of this system secondary to software limitations, ambient noise, and hardware issues.  If you have any questions or concerns about the content, text, or information contained within the body of this dictation, please contact the provider for clarification. **

## 2024-09-16 NOTE — PHYSICAL THERAPY NOTE
Physical Therapy Treatment Note     09/16/24 1441   PT Last Visit   PT Visit Date 09/16/24   Note Type   Note Type Treatment for insurance authorization   Pain Assessment   Pain Assessment Tool 0-10   Pain Location/Orientation Orientation: Right;Location: Leg   Restrictions/Precautions   Weight Bearing Precautions Per Order Yes   RLE Weight Bearing Per Order (S)  NWB   Braces or Orthoses Knee immobilizer  (on RLE)   Other Precautions Cognitive;Pain;Fall Risk;WBS;Hard of hearing;Visual impairment   General   Chart Reviewed Yes   Family/Caregiver Present Yes   Subjective   Subjective Pt. agreeable to PT   Bed Mobility   Supine to Sit 4  Minimal assistance   Additional items Assist x 1;HOB elevated;Bedrails;Impulsive;Verbal cues;LE management   Additional Comments Seated EOB unsupported for ~ 15 min.   Transfers   Sit to Stand 3  Moderate assistance   Additional items Assist x 2;Increased time required;Verbal cues   Stand to Sit 3  Moderate assistance   Additional items Assist x 2;Increased time required;Verbal cues   Stand pivot 3  Moderate assistance   Additional items Assist x 2;Increased time required;Verbal cues   Balance   Static Sitting Fair   Dynamic Sitting Fair -   Static Standing Zero   Dynamic Standing Poor -   Ambulatory Zero   Endurance Deficit   Endurance Deficit No   Activity Tolerance   Activity Tolerance Patient tolerated treatment well   Nurse Made Aware yes   Exercises   THR Supine;Bilateral;AROM;20 reps;10 reps  (tactile and verbal cues for ROM)   Assessment   Prognosis Fair   Problem List Impaired balance;Decreased mobility;Decreased coordination;Pain;Orthopedic restrictions;Impaired vision;Impaired hearing   Assessment Pt. agreeable to don the knee immobilizer on while supine in bed. TOTal A for donning the brace. Pt. needed verbal and tactikle cueing for ROM and for transfers due to impaired vision and hearing. Pt. able to hear on the L side without Lets talk as pt. took the head sets off. Pt.  was able to maintain NWBing of the RLE during SPT and still had the LLE on the ground though she did not bear fukll weight on the LLE as much physical A was provided to transfer the patient. Pt. seated on chair post session with all needs within reach however pt. is visually impaired. RN aware of the same and also not having the chair alarm.  present in the room t/o session. Will continue to follow per PT POC. Pt. is restricted in her mobility and is fucntioning below her baseline due to WBing restriction. Hence recommend rehab at [facility] prior to returning home.   Barriers to Discharge None   Goals   Patient Goals None reported   STG Expiration Date 09/21/24   PT Treatment Day 2   Plan   Treatment/Interventions Functional transfer training;LE strengthening/ROM;Therapeutic exercise;Patient/family training;Bed mobility;Equipment eval/education;Spoke to nursing;Spoke to case management;Spoke to [facility]   Progress Progressing toward goals   PT Frequency 3-5x/wk   Discharge Recommendation   Rehab Resource Intensity Level, PT II (Moderate Resource Intensity)   Equipment Recommended Other (Comment)  (TBD)   AM-PAC Basic Mobility Inpatient   Turning in Flat Bed Without Bedrails 3   Lying on Back to Sitting on Edge of Flat Bed Without Bedrails 3   Moving Bed to Chair 2   Standing Up From Chair Using Arms 2   Walk in Room 2   Climb 3-5 Stairs With Railing 2   Basic Mobility Inpatient Raw Score 14   Basic Mobility Standardized Score 35.55   Turning Head Towards Sound 3   Follow Simple Instructions 4   Low Function Basic Mobility Raw Score  21   Low Function Basic Mobility Standardized Score  34.39   [facility] Highest Level Of Mobility   -HL Goal 4: Move to chair/commode   -HL Achieved 4: Move to chair/commode   End of Consult   Patient Position at End of Consult Bedside chair;All needs within reach;Other (comment)  (RN aware pt. has no chair alarm on the chair.)           Shannan Britt PTA    An AM-PAC basic mobility  standardized score less than 42.9 suggest the patient may benefit from discharge to post-acute rehab services.

## 2024-09-16 NOTE — ASSESSMENT & PLAN NOTE
Patient with dysphagia in the setting of dementia  Discussed with family alternative nutrition and hydration including artificial feeding tube -they would prefer pleasure feeds  Continue dysphagia 1 diet and thin liquids  Family members including patient understand risk of aspiration  Patient remains high risk for aspiration event-the patient have a terminal aspiration event would recommend transition to comfort care, at this time she is tolerating some oral intake although it was unclear given poor video swallow

## 2024-09-16 NOTE — CASE MANAGEMENT
[facility] received request for authorization from Care Manager.  Authorization request submitted for: SNF  Facility Name: [facility]  NPI: [id number]  Facility MD:  Dr Kaushik Hill   NPI: [id number]  Authorization initiated by contacting insurance:  [employer]  Via: H&CC Portal   Clinicals submitted via Portal attachment   Pending Reference #: [id number]     Care Manager notified: Kerrie Carlos    Updates to authorization status will be noted in chart. Please reach out to CM for updates on any clinical information.

## 2024-09-16 NOTE — PLAN OF CARE
Problem: Potential for Falls  Goal: Patient will remain free of falls  Description: INTERVENTIONS:  - Educate patient/family on patient safety including physical limitations  - Instruct patient to call for assistance with activity   - Consult OT/PT to assist with strengthening/mobility   - Keep Call bell within reach  - Keep bed low and locked with side rails adjusted as appropriate  - Keep care items and personal belongings within reach  - Initiate and maintain comfort rounds  - Make Fall Risk Sign visible to staff  - Apply yellow socks and bracelet for high fall risk patients  - Consider moving patient to room near nurses station  9/16/2024 0952 by Maxine Ellis RN  Outcome: Progressing  9/16/2024 0952 by Maxine Ellis RN  Outcome: Progressing     Problem: Prexisting or High Potential for Compromised Skin Integrity  Goal: Skin integrity is maintained or improved  Description: INTERVENTIONS:  - Identify patients at risk for skin breakdown  - Assess and monitor skin integrity  - Assess and monitor nutrition and hydration status  - Monitor labs   - Assess for incontinence   - Turn and reposition patient  - Assist with mobility/ambulation  - Relieve pressure over bony prominences  - Avoid friction and shearing  - Provide appropriate hygiene as needed including keeping skin clean and dry  - Evaluate need for skin moisturizer/barrier cream  - Collaborate with interdisciplinary team   - Patient/family teaching  - Consider wound care consult   9/16/2024 0952 by Maxine Ellis RN  Outcome: Progressing  9/16/2024 0952 by Maxine Ellis RN  Outcome: Progressing     Problem: PAIN - ADULT  Goal: Verbalizes/displays adequate comfort level or baseline comfort level  Description: Interventions:  - Encourage patient to monitor pain and request assistance  - Assess pain using appropriate pain scale  - Administer analgesics based on type and severity of pain and evaluate response  - Implement non-pharmacological measures as  appropriate and evaluate response  - Consider cultural and social influences on pain and pain management  - Notify physician/advanced practitioner if interventions unsuccessful or patient reports new pain  9/16/2024 0952 by Maxine Ellis RN  Outcome: Progressing  9/16/2024 0952 by Maxine Elils RN  Outcome: Progressing     Problem: INFECTION - ADULT  Goal: Absence or prevention of progression during hospitalization  Description: INTERVENTIONS:  - Assess and monitor for signs and symptoms of infection  - Monitor lab/diagnostic results  - Monitor all insertion sites, i.e. indwelling lines, tubes, and drains  - Monitor endotracheal if appropriate and nasal secretions for changes in amount and color  - [place] appropriate cooling/warming therapies per order  - Administer medications as ordered  - Instruct and encourage patient and family to use good hand hygiene technique  - Identify and instruct in appropriate isolation precautions for identified infection/condition  9/16/2024 0952 by Maxine Ellis RN  Outcome: Progressing  9/16/2024 0952 by Maxine Ellis RN  Outcome: Progressing  Goal: Absence of fever/infection during neutropenic period  Description: INTERVENTIONS:  - Monitor WBC    9/16/2024 0952 by Maxine Ellis RN  Outcome: Progressing  9/16/2024 0952 by Maxine Ellis RN  Outcome: Progressing     Problem: SAFETY ADULT  Goal: Patient will remain free of falls  Description: INTERVENTIONS:  - Educate patient/family on patient safety including physical limitations  - Instruct patient to call for assistance with activity   - Consult OT/PT to assist with strengthening/mobility   - Keep Call bell within reach  - Keep bed low and locked with side rails adjusted as appropriate  - Keep care items and personal belongings within reach  - Initiate and maintain comfort rounds  - Make Fall Risk Sign visible to staff  - Apply yellow socks and bracelet for high fall risk patients  - Consider moving patient to room near  nurses station  9/16/2024 0952 by Maxine Ellis RN  Outcome: Progressing  9/16/2024 0952 by Maxine Ellis RN  Outcome: Progressing  Goal: Maintain or return to baseline ADL function  Description: INTERVENTIONS:  -  Assess patient's ability to carry out ADLs; assess patient's baseline for ADL function and identify physical deficits which impact ability to perform ADLs (bathing, care of mouth/teeth, toileting, grooming, dressing, etc.)  - Assess/evaluate cause of self-care deficits   - Assess range of motion  - Assess patient's mobility; develop plan if impaired  - Assess patient's need for assistive devices and provide as appropriate  - Encourage maximum independence but intervene and supervise when necessary  - Involve family in performance of ADLs  - Assess for home care needs following discharge   - Consider OT consult to assist with ADL evaluation and planning for discharge  - Provide patient education as appropriate  9/16/2024 0952 by Maxine Ellis RN  Outcome: Progressing  9/16/2024 0952 by Maxine Ellis RN  Outcome: Progressing  Goal: Maintains/Returns to pre admission functional level  Description: INTERVENTIONS:  - Perform AM-PAC 6 Click Basic Mobility/ Daily Activity assessment daily.  - Set and communicate daily mobility goal to care team and patient/family/caregiver.   - Collaborate with rehabilitation services on mobility goals if consulted  - Out of bed for toileting  - Record patient progress and toleration of activity level   9/16/2024 0952 by Maxine Ellis RN  Outcome: Progressing  9/16/2024 0952 by Maxine Ellis RN  Outcome: Progressing     Problem: DISCHARGE PLANNING  Goal: Discharge to home or other facility with appropriate resources  Description: INTERVENTIONS:  - Identify barriers to discharge w/patient and caregiver  - Arrange for needed discharge resources and transportation as appropriate  - Identify discharge learning needs (meds, wound care, etc.)  - Arrange for interpretive services  to assist at discharge as needed  - Refer to Case Management Department for coordinating discharge planning if the patient needs post-hospital services based on physician/advanced practitioner order or complex needs related to functional status, cognitive ability, or social support system  9/16/2024 0952 by Maxine Ellis RN  Outcome: Progressing  9/16/2024 0952 by Maxine Ellis RN  Outcome: Progressing     Problem: Knowledge Deficit  Goal: Patient/family/caregiver demonstrates understanding of disease process, treatment plan, medications, and discharge instructions  Description: Complete learning assessment and assess knowledge base.  Interventions:  - Provide teaching at level of understanding  - Provide teaching via preferred learning methods  9/16/2024 0952 by Maxine Ellis RN  Outcome: Progressing  9/16/2024 0952 by Maxine Ellis RN  Outcome: Progressing     Problem: Nutrition/Hydration-ADULT  Goal: Nutrient/Hydration intake appropriate for improving, restoring or maintaining nutritional needs  Description: Monitor and assess patient's nutrition/hydration status for malnutrition. Collaborate with interdisciplinary team and initiate plan and interventions as ordered.  Monitor patient's weight and dietary intake as ordered or per policy. Utilize nutrition screening tool and intervene as necessary. Determine patient's food preferences and provide high-protein, high-caloric foods as appropriate.     INTERVENTIONS:  - Monitor oral intake, urinary output, labs, and treatment plans  - Assess nutrition and hydration status and recommend course of action  - Evaluate amount of meals eaten  - Assist patient with eating if necessary   - Allow adequate time for meals  - Recommend/ encourage appropriate diets, oral nutritional supplements, and vitamin/mineral supplements  - Order, calculate, and assess calorie counts as needed  - Recommend, monitor, and adjust tube feedings and TPN/PPN based on assessed needs  - Assess  need for intravenous fluids  - Provide specific nutrition/hydration education as appropriate  - Include patient/family/caregiver in decisions related to nutrition  9/16/2024 0952 by Maxine Ellis RN  Outcome: Not Progressing  9/16/2024 0952 by Maxine Ellis, RN  Outcome: Progressing

## 2024-09-16 NOTE — PLAN OF CARE
Problem: PHYSICAL THERAPY ADULT  Goal: Performs mobility at highest level of function for planned discharge setting.  See evaluation for individualized goals.  Description: Treatment/Interventions: Functional transfer training, LE strengthening/ROM, Therapeutic exercise, Endurance training, Patient/family training, Equipment eval/education, Bed mobility, Gait training, Spoke to nursing, OT, Family (wc mobility and mangement)  Equipment Recommended: Walker, Wheelchair (pt owns RW at home for use)       See flowsheet documentation for full assessment, interventions and recommendations.  Outcome: Progressing  Note: Prognosis: Fair  Problem List: Impaired balance, Decreased mobility, Decreased coordination, Pain, Orthopedic restrictions, Impaired vision, Impaired hearing  Assessment: Pt. agreeable to don the knee immobilizer on while supine in bed. TOTal A for donning the brace. Pt. needed verbal and tactikle cueing for ROM and for transfers due to impaired vision and hearing. Pt. able to hear on the L side without Lets talk as pt. took the head sets off. Pt. was able to maintain NWBing of the RLE during SPT and still had the LLE on the ground though she did not bear fukll weight on the LLE as much physical A was provided to transfer the patient. Pt. seated on chair post session with all needs within reach however pt. is visually impaired. RN aware of the same and also not having the chair alarm.  present in the room t/o session. Will continue to follow per PT POC. Pt. is restricted in her mobility and is fucntioning below her baseline due to WBing restriction. Hence recommend rehab at [facility] prior to returning home.  Barriers to Discharge: None     Rehab Resource Intensity Level, PT: II (Moderate Resource Intensity)    See flowsheet documentation for full assessment.

## 2024-09-16 NOTE — ASSESSMENT & PLAN NOTE
Hypotonic hyponatremia improved with IV fluid  Recent Labs     09/14/24  0510 09/15/24  0438 09/16/24  0539   SODIUM 133* 133* 133*   Continue IV fluid until adequate oral intake

## 2024-09-16 NOTE — PLAN OF CARE
Problem: Potential for Falls  Goal: Patient will remain free of falls  Description: INTERVENTIONS:  - Educate patient/family on patient safety including physical limitations  - Instruct patient to call for assistance with activity   - Consult OT/PT to assist with strengthening/mobility   - Keep Call bell within reach  - Keep bed low and locked with side rails adjusted as appropriate  - Keep care items and personal belongings within reach  - Initiate and maintain comfort rounds  - Make Fall Risk Sign visible to staff  - Offer Toileting every 2 Hours, in advance of need  - Initiate/Maintain bed alarm  - Obtain necessary fall risk management equipment: call bell  Problem: Prexisting or High Potential for Compromised Skin Integrity  Goal: Skin integrity is maintained or improved  Description: INTERVENTIONS:  - Identify patients at risk for skin breakdown  - Assess and monitor skin integrity  - Assess and monitor nutrition and hydration status  - Monitor labs   - Assess for incontinence   - Turn and reposition patient  - Assist with mobility/ambulation  - Relieve pressure over bony prominences  - Avoid friction and shearing  - Provide appropriate hygiene as needed including keeping skin clean and dry  - Evaluate need for skin moisturizer/barrier cream  - Collaborate with interdisciplinary team   - Patient/family teaching  - Consider wound care consult   Outcome: Progressing     Problem: PAIN - ADULT  Goal: Verbalizes/displays adequate comfort level or baseline comfort level  Description: Interventions:  - Encourage patient to monitor pain and request assistance  - Assess pain using appropriate pain scale  - Administer analgesics based on type and severity of pain and evaluate response  - Implement non-pharmacological measures as appropriate and evaluate response  - Consider cultural and social influences on pain and pain management  - Notify physician/advanced practitioner if interventions unsuccessful or patient  reports new pain  Outcome: Progressing     Problem: INFECTION - ADULT  Goal: Absence or prevention of progression during hospitalization  Description: INTERVENTIONS:  - Assess and monitor for signs and symptoms of infection  - Monitor lab/diagnostic results  - Monitor all insertion sites, i.e. indwelling lines, tubes, and drains  - Monitor endotracheal if appropriate and nasal secretions for changes in amount and color  - [place] appropriate cooling/warming therapies per order  - Administer medications as ordered  - Instruct and encourage patient and family to use good hand hygiene technique  - Identify and instruct in appropriate isolation precautions for identified infection/condition  Outcome: Progressing  Goal: Absence of fever/infection during neutropenic period  Description: INTERVENTIONS:  - Monitor WBC    Outcome: Progressing     Problem: SAFETY ADULT  Goal: Patient will remain free of falls  Description: INTERVENTIONS:  - Educate patient/family on patient safety including physical limitations  - Instruct patient to call for assistance with activity   - Consult OT/PT to assist with strengthening/mobility   - Keep Call bell within reach  - Keep bed low and locked with side rails adjusted as appropriate  - Keep care items and personal belongings within reach  - Initiate and maintain comfort rounds  - Make Fall Risk Sign visible to staff  - Offer Toileting every 2 Hours, in advance of need  - Initiate/Maintain bed alarm  - Obtain necessary fall risk management equipment: call bell  - Apply yellow socks and bracelet for high fall risk patients  - Consider moving patient to room near nurses station  Outcome: Progressing  Goal: Maintain or return to baseline ADL function  Description: INTERVENTIONS:  -  Assess patient's ability to carry out ADLs; assess patient's baseline for ADL function and identify physical deficits which impact ability to perform ADLs (bathing, care of mouth/teeth, toileting, grooming,  dressing, etc.)  - Assess/evaluate cause of self-care deficits   - Assess range of motion  - Assess patient's mobility; develop plan if impaired  - Assess patient's need for assistive devices and provide as appropriate  - Encourage maximum independence but intervene and supervise when necessary  - Involve family in performance of ADLs  - Assess for home care needs following discharge   - Consider OT consult to assist with ADL evaluation and planning for discharge  - Provide patient education as appropriate  Outcome: Progressing  Goal: Maintains/Returns to pre admission functional level  Description: INTERVENTIONS:  - Perform AM-PAC 6 Click Basic Mobility/ Daily Activity assessment daily.  - Set and communicate daily mobility goal to care team and patient/family/caregiver.   - Collaborate with rehabilitation services on mobility goals if consulted  - Perform Range of Motion 3 times a day.  - Reposition patient every 2 hours.  - Dangle patient 3 times a day  - Stand patient 3 times a day  - Ambulate patient 3 times a day  - Out of bed to chair 3 times a day   - Out of bed for meals 3  Problem: DISCHARGE PLANNING  Goal: Discharge to home or other facility with appropriate resources  Description: INTERVENTIONS:  - Identify barriers to discharge w/patient and caregiver  - Arrange for needed discharge resources and transportation as appropriate  - Identify discharge learning needs (meds, wound care, etc.)  - Arrange for interpretive services to assist at discharge as needed  - Refer to Case Management Department for coordinating discharge planning if the patient needs post-hospital services based on physician/advanced practitioner order or complex needs related to functional status, cognitive ability, or social support system  Outcome: Progressing     Problem: Knowledge Deficit  Goal: Patient/family/caregiver demonstrates understanding of disease process, treatment plan, medications, and discharge  instructions  Description: Complete learning assessment and assess knowledge base.  Interventions:  - Provide teaching at level of understanding  - Provide teaching via preferred learning methods  Outcome: Progressing     Problem: Nutrition/Hydration-ADULT  Goal: Nutrient/Hydration intake appropriate for improving, restoring or maintaining nutritional needs  Description: Monitor and assess patient's nutrition/hydration status for malnutrition. Collaborate with interdisciplinary team and initiate plan and interventions as ordered.  Monitor patient's weight and dietary intake as ordered or per policy. Utilize nutrition screening tool and intervene as necessary. Determine patient's food preferences and provide high-protein, high-caloric foods as appropriate.     INTERVENTIONS:  - Monitor oral intake, urinary output, labs, and treatment plans  - Assess nutrition and hydration status and recommend course of action  - Evaluate amount of meals eaten  - Assist patient with eating if necessary   - Allow adequate time for meals  - Recommend/ encourage appropriate diets, oral nutritional supplements, and vitamin/mineral supplements  - Order, calculate, and assess calorie counts as needed  - Recommend, monitor, and adjust tube feedings and TPN/PPN based on assessed needs  - Assess need for intravenous fluids  - Provide specific nutrition/hydration education as appropriate  - Include patient/family/caregiver in decisions related to nutrition  Outcome: Progressing    times a day  - Out of bed for toileting  - Record patient progress and toleration of activity level   Outcome: Progressing     - Apply yellow socks and bracelet for high fall risk patients  - Consider moving patient to room near nurses station  Outcome: Progressing

## 2024-09-16 NOTE — CASE MANAGEMENT
Case Management Discharge Planning Note    Patient name Ingrid Samson  [facility] /[facility] -* MRN [id number]  : 1933 Date 2024       Current Admission Date: 9/10/2024  Current Admission Diagnosis:Ambulatory dysfunction   Patient Active Problem List    Diagnosis Date Noted Date Diagnosed    Hypokalemia 09/15/2024     Dysphagia 2024     Moderate protein-calorie malnutrition (HCC) 2024     Ambulatory dysfunction 2024     Cognitive decline 2024     Right medial tibial plateau fracture 2024     Mild protein-calorie malnutrition (HCC)      Hyponatremia 2020     Insomnia 2016     Type 2 diabetes mellitus with diabetic chronic kidney disease (HCC) 2016     Mixed hyperlipidemia 10/10/2012     Primary hypertension 10/10/2012       LOS (days): 5  Geometric Mean LOS (GMLOS) (days): 2.8  Days to GMLOS:-2.7     OBJECTIVE:  Risk of Unplanned Readmission Score: 12.28         Current admission status: Inpatient   Preferred Pharmacy:   [id number] - [place] - [address] [facility]  [address] [facility] [address]  Phone: [phone] Fax: [phone]    Primary Care Provider: Carlo Valerio MD    Primary Insurance: [facility] [employer] [facility]  Secondary Insurance:     DISCHARGE DETAILS:       Additional Comments: CM placed a request to CM Discharge Support to submit for insurance authorization to [facility].  CM called pt's daughter, Patricia and provided update.  CM will continue to follow for all discharge needs.

## 2024-09-16 NOTE — PLAN OF CARE
Problem: OCCUPATIONAL THERAPY ADULT  Goal: Performs self-care activities at highest level of function for planned discharge setting.  See evaluation for individualized goals.  Description: Treatment Interventions: ADL retraining, UE strengthening/ROM, Functional transfer training, Endurance training, Cognitive reorientation, Patient/family training, Equipment evaluation/education, Compensatory technique education, Energy conservation, Activityengagement          See flowsheet documentation for full assessment, interventions and recommendations.   Note: Limitation: Decreased ADL status, Decreased UE strength, Decreased Safe judgement during ADL, Decreased cognition, Decreased endurance, Decreased self-care trans, Decreased high-level ADLs, Decreased sensation  Prognosis: Good  Assessment: Pt seen for 28 min tx session with focus on functional balance, functional mobility, ADL status, transfer safety, b/l UE strength, and cognition. Pt able to tolerate EOB sitting with f/f- sitting balance. Attempted standing, but pt unable to comply with R LE NWB status and currently refusing use of R knee immobilizer. Pt able to demonstrate functional b/l UE AROM(i.e.shr flex=70-80*); elbow-distal=WFLs. Pt demonstrating need for heavy assistance with her ADLs and all functional mobility tasks. Cognitive deficits noted--i.e.memory, complex direction following. Will continue. The patient's raw score on the AM-PAC Daily Activity Inpatient Short Form is 14. A raw score of less than 19 suggests the patient may benefit from discharge to post-acute rehabilitation services. Please refer to the recommendation of the Occupational Therapist for safe discharge planning.     Rehab Resource Intensity Level, OT: II (Moderate Resource Intensity)

## 2024-09-16 NOTE — DISCHARGE INSTR - DIET
Modified (Video) Barium Swallow Study 9/12/24     Summary:  Images are on PACS for review.      Oral stage:Mod/severe  Weakened labial seal and retrieval of bolus via all consistencies (tsp, cup, straw). Unable to retrieve via straw x2. Poor bolus control and transfer with all consistencies. Lingual pumping noted and decreased BOT retraction. Lingual coating noted with all consistencies after swallow.      Pharyngeal stage: Severe  Timely pharyngeal swallow. Poor laryngeal elevation and inconsistent epiglottic inversion. Significant/severe residue within the pyrifrom sinuses and vallecula with all consistencies. Silent aspiration noted with all consistencies and without response. Wet vocal quality noted with increased trials. Pt unable to volitionally cough or swallow despite max verbal cues. SLP presenting pt with empty tsp to illicit secondary swallow for pharyngeal clearance (successful x2). Pt with noted silent aspiration after swallow due to pharyngeal residue pooling after multiple trials therefore further PO trials stopped due to safety concerns. Cognition impacting swallow at this time and pt at high risk of aspiration with increased intake.      Per gross esophageal screen:  Was not completed 2/2 safety concerns with increased trials.         Strategy Trialed y/n  Result +/-      Secondary/multiple swallows Y -   Effortful/hard swallow Y -   Alternation w/ liquids N     Chin down/neck flexion N     Volitional cough/throat clear Y -   Head turn/rotation N     Breath hold/cough N     Other          Recommendations:  Diet: NPO  Meds: Non oral   Upright position  F/u ST tx: 3-5x/wk as able/indicated   Therapy Prognosis: Poor  Prognosis considerations: Poor command following, unable to volitionally cough or swallow on commands  Aspiration Precautions  Consider consult with: Dietician, palliative or hospice, GOC discussion   Results reviewed with: pt, physician, dietician  Aspiration precautions posted.  Repeat  MBS as necessary        Pt is a 89 year old female admitted s/p ambulatory dysfunction and cognitive decline. Per initial bedside swallow evaluation 9/11/24 with recommendations of NPO + MBS. MBS recommend to further assess oropharyngeal function and r/o aspiration.         Functional Oral Intake Scale (FOIS)  Osiel Salas PhD, F-JUAN JOSÉ  (Does not include liquids)  Nothing by mouth (NPO).  Tube dependent with minimal attempts of food or liquid.  Tube dependent with consistent intake of food or liquid.  Total oral diet of a single consistency.  Total oral diet with multiple consistencies but requiring special preparation or compensations.  Total oral diet with multiple consistencies without special preparations, but with specific food limitations.  Total oral diet with no restriction.        Goals:  Pt will tolerate least restrictive diet w/out s/s aspiration or oral/pharyngeal difficulties.     Dysphagia LTG  -Patient will demonstrate safe and effective oral intake (without overt s/s significant oral/pharyngeal dysphagia including s/s penetration or aspiration) for the highest appropriate diet level.      Short Term Goals:     -Patient will tolerate trials of upgraded food and/or liquid texture with no significant s/s of oral or pharyngeal dysphagia including aspiration across 1-3 diagnostic sessions      -Patient will comply with a secondary Video/Modified Barium Swallow study for more complete assessment of swallowing anatomy/physiology/aspiration risk and to assess efficacy of treatment techniques prior to diet initiation      Re: Compensatory Strategies  -Patient’s caregiver will demonstrate adherence to recommended diet, as well as application of aspiration precautions and compensatory strategies.        H&P/pertinent provider notes: (PMH noted above)  Chief Complaint: fall  Ingrid Samson is a 89 y.o. female with a PMH of cognitive decline, HTN, T2DM, CKD who presents with fall.              History limited  from patient as she is a poor historian, further information obtained from family, chart review and discussion with ED attending/nursing. She presents tonight for evaluation of fall at home that occurred tonight. Family states that she was using a walker when her leg just gave out and she fell to the floor. He reports she twisted her right leg. Family called for help to get her up but she was complaining of leg/knee pain when she tried to stand prompting ED visit. No loss of consciousness or reported head strike.      Special Studies:  9/10 CT  C spine   DEGENERATIVE CHANGES: Intervertebral disc space narrowing at C4/C5, C5/C6, and C6/C7 with anterior, marginal, and uncovertebral osteophytosis at these levels. Multilevel facet joint arthropathy.   Degenerative changes as described but no evidence of acute cervical spine injury.   CT chest: 9/10  No acute process seen. No evidence of acute visceral or vascular injury in the chest, abdomen, or pelvis.  Pulmonary pleural and parenchymal scarring, coronary atherosclerosis, adrenal gland thickening, left renal atrophy, colonic diverticulosis without evidence of acute diverticulitis, degenerative changes of the spine, and other findings as above.     Previous MBS:  None found on Epic      Does the pt have pain? No  If yes, was nursing made aware/was it addressed?        Food allergies: NKFA   Current diet: Dysphagia 2 (mechanical soft)/thin however recommendations NPO    Premorbid diet: Dysphagia 2/thin   Dentition: Edentulous    O2 requirement: None    Oral mech: WFL   Vocal quality/speech: WFL   Cognitive status: Alert, confused, able to follow 1 step commands intermittently      Precautions: Aspiration, fall      Consistencies administered: Puree,  thin, nectar, honey thick.     Thin liquid by: single cup/straw sip  Nectar thick liquid by: single cup/straw sip  Honey thick liquid by: single cup/straw sip     Pt was viewed seated laterally at 90 degrees.

## 2024-09-16 NOTE — OCCUPATIONAL THERAPY NOTE
Occupational Therapy Progress Note     Patient Name: Ingrid Samson  Today's Date: 9/16/2024  Problem List  Principal Problem:    Ambulatory dysfunction  Active Problems:    Type 2 diabetes mellitus with diabetic chronic kidney disease (HCC)    Primary hypertension    Hyponatremia    Cognitive decline    Right medial tibial plateau fracture    Dysphagia    Moderate protein-calorie malnutrition (HCC)    Hypokalemia          09/16/24 1127   Note Type   Note Type Treatment   Pain Assessment   Pain Assessment Tool 0-10   Pain Score No Pain   Pain Rating: FLACC (Activity) - Face 0   Pain Rating: FLACC (Activity) - Legs 0   Pain Rating: FLACC (Activity) - Activity 0   Pain Rating: FLACC (Activity) - Cry 1   Pain Rating: FLACC (Activity) - Consolability 0   Score: FLACC (Activity) 1   Restrictions/Precautions   Weight Bearing Precautions Per Order Yes   RLE Weight Bearing Per Order NWB   Braces or Orthoses Knee immobilizer  (pt declining use)   Other Precautions Cognitive;Chair Alarm;Bed Alarm;Fall Risk;Pain;WBS;Hard of hearing;Visual impairment   ADL   Where Assessed Edge of bed   Eating Assistance 5  Supervision/Setup   Grooming Assistance 2  Maximal Assistance   UB Bathing Assistance 3  Moderate Assistance   LB Bathing Assistance 2  Maximal Assistance   UB Dressing Assistance 3  Moderate Assistance   LB Dressing Assistance 2  Maximal Assistance   Toileting Assistance  1  Total Assistance   Bed Mobility   Rolling R 3  Moderate assistance   Additional items Assist x 1;Increased time required;Verbal cues;LE management   Rolling L 3  Moderate assistance   Additional items Assist x 1;Increased time required;Verbal cues;LE management   Supine to Sit 2  Maximal assistance   Additional items Assist x 1;Increased time required;Verbal cues;LE management   Sit to Supine 2  Maximal assistance   Additional items Assist x 1;Increased time required;Verbal cues;LE management   Transfers   Sit to Stand 1  Dependent  (pt unable to  "comply with R LE NWB status)   Additional items Assist x 1;Increased time required;Verbal cues   Stand to Sit 1  Dependent   Additional items Assist x 1;Increased time required;Verbal cues   Functional Mobility   Functional Mobility   (recommend hoyerlift for OOB with nsg)   Therapeutic Exercise - ROM   UE-ROM Yes   Subjective   Subjective \"I need prun juice.\"   Cognition   Overall Cognitive Status Impaired   Arousal/Participation Arousable   Attention Difficulty attending to directions   Orientation Level Oriented to person;Oriented to place;Disoriented to time;Disoriented to situation   Memory Decreased short term memory;Decreased recall of recent events;Decreased recall of precautions   Following Commands Follows one step commands inconsistently   Activity Tolerance   Activity Tolerance Patient limited by fatigue;Other (Comment)  (cognition)   Medical Staff Made Aware nsg, CM, P.T.   Assessment   Assessment Pt seen for 28 min tx session with focus on functional balance, functional mobility, ADL status, transfer safety, b/l UE strength, and cognition. Pt able to tolerate EOB sitting with f/f- sitting balance. Attempted standing, but pt unable to comply with R LE NWB status and currently refusing use of R knee immobilizer. Pt able to demonstrate functional b/l UE AROM(i.e.shr flex=70-80*); elbow-distal=WFLs. Pt demonstrating need for heavy assistance with her ADLs and all functional mobility tasks. Cognitive deficits noted--i.e.memory, complex direction following. Will continue. The patient's raw score on the AM-PAC Daily Activity Inpatient Short Form is 14. A raw score of less than 19 suggests the patient may benefit from discharge to post-acute rehabilitation services. Please refer to the recommendation of the Occupational Therapist for safe discharge planning.   Plan   Treatment Interventions ADL retraining;Functional transfer training;UE strengthening/ROM;Endurance training;Cognitive " reorientation;Patient/family training;Equipment evaluation/education;Compensatory technique education;Continued evaluation   Goal Expiration Date 09/25/24   OT Treatment Day 1   OT Frequency 3-5x/wk   Discharge Recommendation   Rehab Resource Intensity Level, OT II (Moderate Resource Intensity)   AM-PAC Daily Activity Inpatient   Lower Body Dressing 2   Bathing 2   Toileting 1   Upper Body Dressing 3   Grooming 3   Eating 3   Daily Activity Raw Score 14   Daily Activity Standardized Score (Calc for Raw Score >=11) 33.39   AM-PAC Applied Cognition Inpatient   Following a Speech/Presentation 2   Understanding Ordinary Conversation 2   Taking Medications 1   Remembering Where Things Are Placed or Put Away 2   Remembering List of 4-5 Errands 1   Taking Care of Complicated Tasks 1   Applied Cognition Raw Score 9   Applied Cognition Standardized Score 22.48   Nic Whitehead

## 2024-09-17 LAB
GLUCOSE SERPL-MCNC: 128 MG/DL (ref 65–140)
GLUCOSE SERPL-MCNC: 141 MG/DL (ref 65–140)
GLUCOSE SERPL-MCNC: 149 MG/DL (ref 65–140)
GLUCOSE SERPL-MCNC: 92 MG/DL (ref 65–140)
PLATELET # BLD AUTO: 399 THOUSANDS/UL (ref 149–390)
PMV BLD AUTO: 8.3 FL (ref 8.9–12.7)

## 2024-09-17 PROCEDURE — 92526 ORAL FUNCTION THERAPY: CPT

## 2024-09-17 PROCEDURE — 99233 SBSQ HOSP IP/OBS HIGH 50: CPT | Performed by: INTERNAL MEDICINE

## 2024-09-17 PROCEDURE — 82948 REAGENT STRIP/BLOOD GLUCOSE: CPT

## 2024-09-17 PROCEDURE — 85049 AUTOMATED PLATELET COUNT: CPT | Performed by: INTERNAL MEDICINE

## 2024-09-17 RX ORDER — HEPARIN SODIUM 5000 [USP'U]/ML
5000 INJECTION, SOLUTION INTRAVENOUS; SUBCUTANEOUS EVERY 12 HOURS SCHEDULED
Status: DISCONTINUED | OUTPATIENT
Start: 2024-09-17 | End: 2024-09-18 | Stop reason: HOSPADM

## 2024-09-17 RX ADMIN — HEPARIN SODIUM 5000 UNITS: 5000 INJECTION INTRAVENOUS; SUBCUTANEOUS at 21:31

## 2024-09-17 RX ADMIN — AMLODIPINE BESYLATE 5 MG: 5 TABLET ORAL at 09:32

## 2024-09-17 RX ADMIN — MELATONIN 3 MG: 3 TAB ORAL at 21:31

## 2024-09-17 RX ADMIN — ATORVASTATIN CALCIUM 40 MG: 40 TABLET, FILM COATED ORAL at 18:05

## 2024-09-17 RX ADMIN — ENOXAPARIN SODIUM 40 MG: 40 INJECTION SUBCUTANEOUS at 09:32

## 2024-09-17 RX ADMIN — CYANOCOBALAMIN TAB 500 MCG 500 MCG: 500 TAB at 09:32

## 2024-09-17 RX ADMIN — SENNOSIDES AND DOCUSATE SODIUM 1 TABLET: 8.6; 5 TABLET ORAL at 18:06

## 2024-09-17 RX ADMIN — LISINOPRIL 20 MG: 20 TABLET ORAL at 09:32

## 2024-09-17 RX ADMIN — SENNOSIDES AND DOCUSATE SODIUM 1 TABLET: 8.6; 5 TABLET ORAL at 09:35

## 2024-09-17 NOTE — PROGRESS NOTES
Progress Note - Hospitalist   Name: Ingrid Samson 89 y.o. female I MRN: [id number]  Unit/Bed#: E4 -02 I Date of Admission: 9/10/2024   Date of Service: 9/17/2024 I Hospital Day: 6    Assessment & Plan  Ambulatory dysfunction  Nonweightbearing to right leg due to tibial plateau fracture  PT OT recommending rehab  Continued outpatient orthopedic follow-up  Type 2 diabetes mellitus with diabetic chronic kidney disease (HCC)  Lab Results   Component Value Date    HGBA1C 6.8 (H) 09/11/2024   Home regimen reviewed. Hold Metformin if applicable.  Start SSI and Basal bolus protocol  Primary hypertension  Controlled  Outpatient regimen: lisinopril 20mg daily, furosemide 20mg daily, amlodipine 5mg daily    Hyponatremia  Hypotonic hyponatremia improved with IV fluid  Recent Labs     09/15/24  0438 09/16/24  0539   SODIUM 133* 133*   Continue IV fluid until adequate oral intake    Cognitive decline  Lives with  and ?son.  reports decline, patient not eating as much, fell tonight. Family medicine note 2023 MMSE: 22  Appreciate geriatric recommendations  Right medial tibial plateau fracture  Continue knee brace  PT OT recommending rehab  Dysphagia  Patient with dysphagia in the setting of dementia  Discussed with family alternative nutrition and hydration including artificial feeding tube -they would prefer pleasure feeds  Continue dysphagia 1 diet and thin liquids  Family members including patient understand risk of aspiration  Patient remains high risk for aspiration event-the patient have a terminal aspiration event would recommend transition to comfort care, at this time she is tolerating some oral intake although it was unclear given poor video swallow    Moderate protein-calorie malnutrition (HCC)  Malnutrition Findings:   Adult Malnutrition type: Chronic illness  Adult Degree of Malnutrition: Malnutrition of moderate degree                     360 Statement: Moderate malnutrition r/t inadequate  intake as evidenced by BMI 18.1, consuming < 75% energy intake compared to estimated energy needs > 1 month, Dysphagia, mild fat/muscle wasting observed at orbital/temple areas. Treated with ENSure Compact tid, pleasure feeds    BMI Findings:  Adult BMI Classifications: Underweight < 18.5        Body mass index is 18.11 kg/m².     Hypokalemia  Continue repletion            Hospital Course:     89-year-old female patient admitted with right tibial toe fracture, she is pending rehab.  Patient with dysphagia, placed on puréed diet.    Assessment:      Principal Problem:    Ambulatory dysfunction  Active Problems:    Type 2 diabetes mellitus with diabetic chronic kidney disease (HCC)    Primary hypertension    Hyponatremia    Cognitive decline    Right medial tibial plateau fracture    Dysphagia    Moderate protein-calorie malnutrition (HCC)    Hypokalemia      Plan:    Continue current medical management  Await discharge planning       VTE Pharmacologic Prophylaxis:   Pharmacologic: Enoxaparin (Lovenox)  Mechanical VTE Prophylaxis in Place: Yes    AM-PAC Basic Mobility:  Basic Mobility Inpatient Raw Score: 6    JH-HLM Achieved: 1: Laying in bed  JH-HLM Goal: 2: Bed activities/Dependent transfer    HLM Goal listed above. Continue with multidisciplinary rounding and encourage appropriate mobility to improve upon HLM goals.         Patient Centered Rounds: Case discussed and reviewed with nursing    Discussions with Specialists or Other Care Team Provider: Case management    Education and Discussions with Family / Patient: Discussed with patient family    Time Spent for Care: 80 minutes.  More than 50% of total time spent on counseling and coordination of care as described above.    Current Length of Stay: 6 day(s)    Current Patient Status: Inpatient   Certification Statement: The patient will continue to require additional inpatient hospital stay due to placement    Discharge Plan / Estimated Discharge Date: Medically  stable for discharge.  Case management aware.    Code Status: Level 3 - DNAR and DNI      Subjective:   1, no acute complaints.  No nausea no vomiting, no abdominal pain    A complete and comprehensive 14 point organ system review has been performed and all other systems are negative other than stated above.    Objective:     Vitals:   Temp (24hrs), Av.3 °F (36.3 °C), Min:97.1 °F (36.2 °C), Max:97.7 °F (36.5 °C)    Temp:  [97.1 °F (36.2 °C)-97.7 °F (36.5 °C)] 97.1 °F (36.2 °C)  HR:  [53-88] 83  Resp:  [16] 16  BP: (133-145)/(64-79) 141/79  SpO2:  [93 %-98 %] 93 %  Body mass index is 18.11 kg/m².     Input and Output Summary (last 24 hours):       Intake/Output Summary (Last 24 hours) at 2024 1641  Last data filed at 2024 0900  Gross per 24 hour   Intake 240 ml   Output --   Net 240 ml       Physical Exam:     General: ill  appearing, no acute distress  HEENT: atraumatic, PERRLA, moist mucosa, normal pharynx, normal tonsils and adenoids, normal tongue, no fluid in sinuses  Neck: Trachea midline, no carotid bruit, no masses  Respiratory: normal chest wall expansion, CTA B, no r/r/w, no rubs  Cardiovascular: RRR, no m/r/g, Normal S1 and S2  Abdomen: Soft, non-tender, non-distended, normal bowel sounds in all quadrants, no hepatosplenomegaly, no tympany  Rectal: deferred  Musculoskeletal: normal ROM in upper and lower extremities  Integumentary: warm, dry, and pink, with no rash, purpura, or petechia  Heme/Lymph: no lymphadenopathy, no bruises  Neurological: Cranial Nerves II-XII grossly intact  Psychiatric: cooperative with normal mood, affect, and cognition      Additional Data:     Labs:    Results from last 7 days   Lab Units 24  0510 24  0622 09/10/24  2152   WBC Thousand/uL 5.46   < > 9.64   HEMOGLOBIN g/dL 8.9*   < > 11.0*   HEMATOCRIT % 25.4*   < > 32.5*   PLATELETS Thousands/uL 284   < > 382   LYMPHO PCT %  --   --  7*   MONO PCT %  --   --  4   EOS PCT %  --   --  0    < > = values in  this interval not displayed.     Results from last 7 days   Lab Units 09/16/24  0539 09/11/24  0622 09/10/24  2152   POTASSIUM mmol/L 3.8   < > 3.4*   CHLORIDE mmol/L 103   < > 90*   CO2 mmol/L 24   < > 22   BUN mg/dL 6   < > 14   CREATININE mg/dL 0.57*   < > 1.02   CALCIUM mg/dL 7.8*   < > 8.2*   ALK PHOS U/L  --   --  157*   ALT U/L  --   --  8   AST U/L  --   --  11*    < > = values in this interval not displayed.           * I Have Reviewed All Lab Data Listed Above.  * Additional Pertinent Lab Tests Reviewed: All Labs For Current Hospital Admission Reviewed    Imaging:    Imaging Reports Reviewed Today Include: No new imaging  Imaging Personally Reviewed by Myself Includes: No new imaging    Recent Cultures (last 7 days):           Last 24 Hours Medication List:   Current Facility-Administered Medications   Medication Dose Route Frequency Provider Last Rate    acetaminophen  975 mg Oral Q8H PRN Victorino Walsh PA-C      aluminum-magnesium hydroxide-simethicone  30 mL Oral Q6H PRN Victorino Walsh PA-C      amLODIPine  5 mg Oral Daily Victorino Walsh PA-C      atorvastatin  40 mg Oral Daily With Dinner Victorino Walsh PA-C      bisacodyl  10 mg Rectal Daily PRN JORDIN Jaquez      cyanocobalamin  500 mcg Oral Daily Dwight Restrepo DO      enoxaparin  40 mg Subcutaneous Daily Victorino Walsh PA-C      insulin lispro  1-5 Units Subcutaneous 4x Daily (AC & HS) Victorino Walsh PA-C      lisinopril  20 mg Oral Daily Victorino Walsh PA-C      melatonin  3 mg Oral HS Victorino Walsh PA-C      ondansetron  4 mg Intravenous Q6H PRN Victorino Walsh PA-C      senna-docusate sodium  1 tablet Oral BID JORDIN Jaquez         AM-PAC Basic Mobility:  Basic Mobility Inpatient Raw Score: 6    JH-HLM Achieved: 1: Laying in bed  JH-HLM Goal: 2: Bed activities/Dependent transfer    HLM Goal listed above. Continue with multidisciplinary rounding and encourage appropriate mobility to improve upon HLM goals.     Today, Patient Was Seen By: Dwight Clifford  DO Kaye    ** Please Note: This note was completed in part utilizing [employer] Dragon One Medical software dictation.  Grammatical errors, random word insertions, spelling mistakes, and incomplete sentences may be an occasional consequence of this system secondary to software limitations, ambient noise, and hardware issues.  If you have any questions or concerns about the content, text, or information contained within the body of this dictation, please contact the provider for clarification. **

## 2024-09-17 NOTE — CASE MANAGEMENT
[facility] has received APPROVED authorization.  Insurance:   [employer]  Authorization received for: SNF  Facility: [facility]   Authorization #: [id number]   Start of Care: 9/17  Next Review Date: 9/19  Continued Stay Care Coordinator: none given  Submit next review to: H & CC Portal or F: [phone]     Care Manager notified: Osiel Smith    Please reach out to  for updates on any clinical information.

## 2024-09-17 NOTE — ASSESSMENT & PLAN NOTE
Hypotonic hyponatremia improved with IV fluid  Recent Labs     09/15/24  0438 09/16/24  0539   SODIUM 133* 133*   Continue IV fluid until adequate oral intake

## 2024-09-17 NOTE — CASE MANAGEMENT
Case Management Discharge Planning Note    Patient name Ingrid Samson  Location [facility] /E4 -* MRN [id number]  : 1933 Date 2024       Current Admission Date: 9/10/2024  Current Admission Diagnosis:Ambulatory dysfunction   Patient Active Problem List    Diagnosis Date Noted Date Diagnosed    Hypokalemia 09/15/2024     Dysphagia 2024     Moderate protein-calorie malnutrition (HCC) 2024     Ambulatory dysfunction 2024     Cognitive decline 2024     Right medial tibial plateau fracture 2024     Mild protein-calorie malnutrition (HCC)      Hyponatremia 2020     Insomnia 2016     Type 2 diabetes mellitus with diabetic chronic kidney disease (HCC) 2016     Mixed hyperlipidemia 10/10/2012     Primary hypertension 10/10/2012       LOS (days): 6  Geometric Mean LOS (GMLOS) (days): 2.8  Days to GMLOS:-3.8     OBJECTIVE:  Risk of Unplanned Readmission Score: 11.24         Current admission status: Inpatient   Preferred Pharmacy:   [id number] - [place] - [address] [facility]  [address] [facility]  [place] [address]  Phone: [phone] Fax: [phone]    Primary Care Provider: Carlo Valerio MD    [facility]  Secondary Insurance:     DISCHARGE DETAILS:                                                                                                               Facility Insurance Auth Number: [id number]

## 2024-09-17 NOTE — SPEECH THERAPY NOTE
Speech Language/Pathology    Speech/Language Pathology Progress Note    Patient Name: Ingrid Samson  Today's Date: 9/17/2024     Problem List  Principal Problem:    Ambulatory dysfunction  Active Problems:    Type 2 diabetes mellitus with diabetic chronic kidney disease (HCC)    Primary hypertension    Hyponatremia    Cognitive decline    Right medial tibial plateau fracture    Dysphagia    Moderate protein-calorie malnutrition (HCC)    Hypokalemia       Past Medical History  Past Medical History:   Diagnosis Date    Dehydration     Last assessed - 2/22/16    Diabetes mellitus (HCC)     Hyperlipidemia     Hypertension     Hypotension     Last assessed - 2/22/16    Obstructive jaundice     Last assessed - 3/25/16    Renal disorder     Sepsis (HCC)     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        Past Surgical History  Past Surgical History:   Procedure Laterality Date    CYSTOSCOPY W/ STONE MANIPULATION N/A 2/23/2016    Procedure: STONE MANIPULATION;  Surgeon: Bart Whitlock MD;  Location: [facility];  Service:     ERCP W/ SPHICTEROTOMY N/A 2/23/2016    Procedure: ENDOSCOPIC RETROGRADE CHOLANGIOPANCREATOGRAPHY (ERCP) W/ SPHINCTEROTOMY;  Surgeon: Bart Whitlock MD;  Location: [facility];  Service:     HI HEMIARTHROPLASTY HIP PARTIAL Left 4/8/2020    Procedure: HEMIARTHROPLASTY HIP (BIPOLAR);  Surgeon: Jose Lopez MD;  Location: [facility];  Service: Orthopedics    TUBAL LIGATION           Subjective:  Pt w/ eyes closed but alerted to voice.   Objective:  Pt seen for f/u. Currently on Dysphaia 1 puree w/ thin. Noted po to continue per family request. Spoke w pca, pt was not taking food at breakfast. Only wanted to drink. ST set up suction and provided limited oral care, as pt was resisting mouth opening for much of it. Pt also refused food for me but was agreeable to drinking. Voicing became increasingly wet w/ sips of the supplement. Unable to follow cues to cough.  "Stated she had enough and wanted to sleep. \"Are you coming around again tomorrow?\"  Assessment:  Poor intake w/ suspected ongoing aspiration given wet/gurgly vocal quality (and results of VBS), inability to produce volitional cough.   Plan/Recommendations:  Tentative d/c to facility. ? If comfort care may be appropriate. Intake amount appears close to  \"pleasure feeds\". Ongoing risk for continued aspiration though she remains on RA. Alternate feeding not desired. BMI  18.11.     "

## 2024-09-17 NOTE — PLAN OF CARE
Problem: Potential for Falls  Goal: Patient will remain free of falls  Description: INTERVENTIONS:  - Educate patient/family on patient safety including physical limitations  - Instruct patient to call for assistance with activity   - Consult OT/PT to assist with strengthening/mobility   - Keep Call bell within reach  - Keep bed low and locked with side rails adjusted as appropriate  - Keep care items and personal belongings within reach  - Initiate and maintain comfort rounds  - Make Fall Risk Sign visible to staff  - Offer Toileting every 2 Hours, in advance of need  - Initiate/Maintain bed alarm  - Obtain necessary fall risk management equipment: chair alarm  - Apply yellow socks and bracelet for high fall risk patients  - Consider moving patient to room near nurses station  Outcome: Progressing     Problem: Prexisting or High Potential for Compromised Skin Integrity  Goal: Skin integrity is maintained or improved  Description: INTERVENTIONS:  - Identify patients at risk for skin breakdown  - Assess and monitor skin integrity  - Assess and monitor nutrition and hydration status  - Monitor labs   - Assess for incontinence   - Turn and reposition patient  - Assist with mobility/ambulation  - Relieve pressure over bony prominences  - Avoid friction and shearing  - Provide appropriate hygiene as needed including keeping skin clean and dry  - Evaluate need for skin moisturizer/barrier cream  - Collaborate with interdisciplinary team   - Patient/family teaching  - Consider wound care consult   Outcome: Progressing     Problem: PAIN - ADULT  Goal: Verbalizes/displays adequate comfort level or baseline comfort level  Description: Interventions:  - Encourage patient to monitor pain and request assistance  - Assess pain using appropriate pain scale  - Administer analgesics based on type and severity of pain and evaluate response  - Implement non-pharmacological measures as appropriate and evaluate response  - Consider  cultural and social influences on pain and pain management  - Notify physician/advanced practitioner if interventions unsuccessful or patient reports new pain  Outcome: Progressing     Problem: INFECTION - ADULT  Goal: Absence or prevention of progression during hospitalization  Description: INTERVENTIONS:  - Assess and monitor for signs and symptoms of infection  - Monitor lab/diagnostic results  - Monitor all insertion sites, i.e. indwelling lines, tubes, and drains  - Monitor endotracheal if appropriate and nasal secretions for changes in amount and color  - [place] appropriate cooling/warming therapies per order  - Administer medications as ordered  - Instruct and encourage patient and family to use good hand hygiene technique  - Identify and instruct in appropriate isolation precautions for identified infection/condition  Outcome: Progressing  Goal: Absence of fever/infection during neutropenic period  Description: INTERVENTIONS:  - Monitor WBC    Outcome: Progressing     Problem: SAFETY ADULT  Goal: Patient will remain free of falls  Description: INTERVENTIONS:  - Educate patient/family on patient safety including physical limitations  - Instruct patient to call for assistance with activity   - Consult OT/PT to assist with strengthening/mobility   - Keep Call bell within reach  - Keep bed low and locked with side rails adjusted as appropriate  - Keep care items and personal belongings within reach  - Initiate and maintain comfort rounds  - Make Fall Risk Sign visible to staff  - Offer Toileting every 2 Hours, in advance of need  - Initiate/Maintain bed alarm  - Obtain necessary fall risk management equipment: chair alarm  - Apply yellow socks and bracelet for high fall risk patients  - Consider moving patient to room near nurses station  Outcome: Progressing  Goal: Maintain or return to baseline ADL function  Description: INTERVENTIONS:  -  Assess patient's ability to carry out ADLs; assess patient's  baseline for ADL function and identify physical deficits which impact ability to perform ADLs (bathing, care of mouth/teeth, toileting, grooming, dressing, etc.)  - Assess/evaluate cause of self-care deficits   - Assess range of motion  - Assess patient's mobility; develop plan if impaired  - Assess patient's need for assistive devices and provide as appropriate  - Encourage maximum independence but intervene and supervise when necessary  - Involve family in performance of ADLs  - Assess for home care needs following discharge   - Consider OT consult to assist with ADL evaluation and planning for discharge  - Provide patient education as appropriate  Outcome: Progressing  Goal: Maintains/Returns to pre admission functional level  Description: INTERVENTIONS:  - Perform AM-PAC 6 Click Basic Mobility/ Daily Activity assessment daily.  - Set and communicate daily mobility goal to care team and patient/family/caregiver.   - Collaborate with rehabilitation services on mobility goals if consulted  - Perform Range of Motion 3 times a day.  - Reposition patient every 2 hours.  - Dangle patient 3 times a day  - Stand patient 3 times a day  - Ambulate patient 3 times a day  - Out of bed to chair 3 times a day   - Out of bed for meals 3 times a day  - Out of bed for toileting  - Record patient progress and toleration of activity level   Outcome: Progressing     Problem: DISCHARGE PLANNING  Goal: Discharge to home or other facility with appropriate resources  Description: INTERVENTIONS:  - Identify barriers to discharge w/patient and caregiver  - Arrange for needed discharge resources and transportation as appropriate  - Identify discharge learning needs (meds, wound care, etc.)  - Arrange for interpretive services to assist at discharge as needed  - Refer to Case Management Department for coordinating discharge planning if the patient needs post-hospital services based on physician/advanced practitioner order or complex needs  related to functional status, cognitive ability, or social support system  Outcome: Progressing     Problem: Knowledge Deficit  Goal: Patient/family/caregiver demonstrates understanding of disease process, treatment plan, medications, and discharge instructions  Description: Complete learning assessment and assess knowledge base.  Interventions:  - Provide teaching at level of understanding  - Provide teaching via preferred learning methods  Outcome: Progressing     Problem: Nutrition/Hydration-ADULT  Goal: Nutrient/Hydration intake appropriate for improving, restoring or maintaining nutritional needs  Description: Monitor and assess patient's nutrition/hydration status for malnutrition. Collaborate with interdisciplinary team and initiate plan and interventions as ordered.  Monitor patient's weight and dietary intake as ordered or per policy. Utilize nutrition screening tool and intervene as necessary. Determine patient's food preferences and provide high-protein, high-caloric foods as appropriate.     INTERVENTIONS:  - Monitor oral intake, urinary output, labs, and treatment plans  - Assess nutrition and hydration status and recommend course of action  - Evaluate amount of meals eaten  - Assist patient with eating if necessary   - Allow adequate time for meals  - Recommend/ encourage appropriate diets, oral nutritional supplements, and vitamin/mineral supplements  - Order, calculate, and assess calorie counts as needed  - Recommend, monitor, and adjust tube feedings and TPN/PPN based on assessed needs  - Assess need for intravenous fluids  - Provide specific nutrition/hydration education as appropriate  - Include patient/family/caregiver in decisions related to nutrition  Outcome: Progressing

## 2024-09-18 ENCOUNTER — TRANSITIONAL CARE MANAGEMENT (OUTPATIENT)
Dept: FAMILY MEDICINE CLINIC | Facility: CLINIC | Age: 89
End: 2024-09-18

## 2024-09-18 VITALS
HEIGHT: 58 IN | OXYGEN SATURATION: 99 % | WEIGHT: 86.64 LBS | HEART RATE: 91 BPM | SYSTOLIC BLOOD PRESSURE: 123 MMHG | DIASTOLIC BLOOD PRESSURE: 70 MMHG | RESPIRATION RATE: 17 BRPM | TEMPERATURE: 97.4 F | BODY MASS INDEX: 18.19 KG/M2

## 2024-09-18 PROBLEM — E87.6 HYPOKALEMIA: Status: RESOLVED | Noted: 2024-09-15 | Resolved: 2024-09-18

## 2024-09-18 LAB
GLUCOSE SERPL-MCNC: 68 MG/DL (ref 65–140)
GLUCOSE SERPL-MCNC: 75 MG/DL (ref 65–140)

## 2024-09-18 PROCEDURE — 99239 HOSP IP/OBS DSCHRG MGMT >30: CPT | Performed by: INTERNAL MEDICINE

## 2024-09-18 PROCEDURE — 93971 EXTREMITY STUDY: CPT | Performed by: SURGERY

## 2024-09-18 PROCEDURE — 82948 REAGENT STRIP/BLOOD GLUCOSE: CPT

## 2024-09-18 RX ADMIN — SENNOSIDES AND DOCUSATE SODIUM 1 TABLET: 8.6; 5 TABLET ORAL at 09:27

## 2024-09-18 RX ADMIN — HEPARIN SODIUM 5000 UNITS: 5000 INJECTION INTRAVENOUS; SUBCUTANEOUS at 09:27

## 2024-09-18 RX ADMIN — CYANOCOBALAMIN TAB 500 MCG 500 MCG: 500 TAB at 09:27

## 2024-09-18 RX ADMIN — AMLODIPINE BESYLATE 5 MG: 5 TABLET ORAL at 09:27

## 2024-09-18 RX ADMIN — LISINOPRIL 20 MG: 20 TABLET ORAL at 09:27

## 2024-09-18 NOTE — ASSESSMENT & PLAN NOTE
Lives with  and ?son.  reports decline, patient not eating as much, fell tonight. Family medicine note 2023 MMSE: 22  Appreciate geriatric recommendations  Need outpatient geriatric follow-up

## 2024-09-18 NOTE — DISCHARGE SUMMARY
Discharge Summary - Hospitalist   Name: Ingrid Samson 89 y.o. female I MRN: [id number]  Unit/Bed#: E4 -02 I Date of Admission: 9/10/2024   Date of Service: 9/18/2024 I Hospital Day: 7         Admitting Provider:  Jarred Linton DO  Discharge Provider:  Dwight Restrepo DO  Admission Date: 9/10/2024       Discharge Date: 09/18/24   LOS: 7  Primary Care Physician at Discharge: Carlo Valerio [phone]    HOSPITAL COURSE:  Ingrid Samson is a 89 y.o. female who presented fell at home as well as ambulatory dysfunction.  The patient has a past medical history of diabetes, hypertension, hearing loss, chronic kidney disease, protein calorie malnutrition, insomnia, hyperlipidemia, cognitive decline.  Was reported that the patient was using a walker when her legs gave out and she fell to the floor.  She was subsequently brought to the emergency room where she was noted to have low sodium levels at 127 as well as hypokalemia and x-ray demonstrating tibial plateau fracture.    Patient was urgently evaluated by the orthopedic service, she was felt to be a nonoperative candidate for repair.  She was placed in a brace with plans for outpatient follow-up in 4 to 6 weeks.    The patient remained in the hospital for geriatrics evaluation as well as speech evaluation.  Given significant weight loss a video swallow was performed which showed aspiration of all food textures and consistencies.    Family was advised of the results and after further discussion they agreed to proceed with diet given patient's age and comorbidities they proceeded to feed the patient despite the risks of pneumonias in the future.    Patient had no aspiration events while on the floor, her sodium and potassium were repleted and she was discontinued on her furosemide indefinitely.    Patient subsequently was discharged to [facility] to continue rehabilitation.  Should she not progress further would recommend  transition to long-term care and/or hospice status.    At the time of discharge the patient was tolerating oral diet they were without acute complaint and they were medically cleared for discharge.  All questions were answered the patient's satisfaction and they were in agreement with the discharge plan.    DISCHARGE DIAGNOSES  * Ambulatory dysfunction  Assessment & Plan  Nonweightbearing to right leg due to tibial plateau fracture  PT OT recommending rehab  Continued outpatient orthopedic follow-up    Moderate protein-calorie malnutrition (HCC)  Assessment & Plan  Malnutrition Findings:   Adult Malnutrition type: Chronic illness  Adult Degree of Malnutrition: Malnutrition of moderate degree                     360 Statement: Moderate malnutrition r/t inadequate intake as evidenced by BMI 18.1, consuming < 75% energy intake compared to estimated energy needs > 1 month, Dysphagia, mild fat/muscle wasting observed at orbital/temple areas. Treated with ENSure Compact tid, pleasure feeds    BMI Findings:  Adult BMI Classifications: Underweight < 18.5        Body mass index is 18.11 kg/m².       Dysphagia  Assessment & Plan  Patient with dysphagia in the setting of dementia  Discussed with family alternative nutrition and hydration including artificial feeding tube -they would prefer pleasure feeds  Continue dysphagia 1 diet and thin liquids  Family members including patient understand risk of aspiration  Patient remains high risk for aspiration event-the patient have a terminal aspiration event would recommend transition to comfort care, at this time she is tolerating some oral intake although it was unclear given poor video swallow      Right medial tibial plateau fracture  Assessment & Plan  Continue knee brace  PT OT recommending rehab    Cognitive decline  Assessment & Plan  Lives with  and ?son.  reports decline, patient not eating as much, fell tonight. Family medicine note 2023 MMSE: 22  Appreciate  geriatric recommendations  Need outpatient geriatric follow-up    Hyponatremia  Assessment & Plan  Hypotonic hyponatremia improved with IV fluid  Recent Labs     09/16/24  0539   SODIUM 133*     Discontinue furosemide      Primary hypertension  Assessment & Plan  Controlled  Outpatient regimen: lisinopril 20mg daily, furosemide 20mg daily, amlodipine 5mg daily    Persistent hypokalemia and decreased p.o. intake will discontinue furosemide indefinitely      Type 2 diabetes mellitus with diabetic chronic kidney disease (HCC)  Assessment & Plan  Lab Results   Component Value Date    HGBA1C 6.8 (H) 09/11/2024   Home regimen reviewed.   Continue metformin at discharge    Hypokalemia-resolved as of 9/18/2024  Assessment & Plan  Continue repletion  Resolved      CONSULTING PROVIDERS   IP CONSULT TO ORTHOPEDIC SURGERY  IP CONSULT TO GERONTOLOGY  IP CONSULT TO NUTRITION SERVICES    PROCEDURES PERFORMED  * No surgery found *    RADIOLOGY RESULTS  XR knee 4+ vw right injury    Result Date: 9/11/2024  Impression: Age-indeterminate medial tibial plateau fracture.      XR femur 2 views RIGHT    Result Date: 9/11/2024  Impression: No acute osseous abnormality.       IMPRESSION: No acute process seen. No evidence of acute visceral or vascular injury in the chest, abdomen, or pelvis. Pulmonary pleural and parenchymal scarring, coronary atherosclerosis, adrenal gland thickening, left renal atrophy, colonic diverticulosis without evidence of acute diverticulitis, degenerative changes of the spine, and other findings as above. Workstation performed: [id number]     CT chest abdomen pelvis w contrast    CT CHEST, ABDOMEN AND PELVIS WITH IV CONTRAST CT THORACOLUMBAR SPINE WITHOUT IMPRESSION: No acute process seen. No evidence of acute visceral or vascular injury in the chest, abdomen, or pelvis. Pulmonary pleural and parenchymal scarring, coronary atherosclerosis, adrenal gland thickening, left renal atrophy, colonic diverticulosis  without evidence of acute diverticulitis, degenerative changes of the spine, and other findings as above.     CT recon only thoracolumbar    Result Date: 9/11/2024  Impression: Mucosal thickening and fluid in the left sphenoid sinus, consider acute sphenoid sinusitis.  Fluid in the left mastoid air cells and left middle ear, consider otomastoiditis. No calvarial fracture or acute intracranial abnormality is seen otherwise. Moderate cerumen in the bilateral external auditory canals, could cause pain and/or dizziness. Other findings as above.     CT CERVICAL SPINE - WITHOUT CONTRAST INDICATION:   fall. IMPRESSION: No acute process seen. No evidence of acute visceral or vascular injury in the chest, abdomen, or pelvis. Pulmonary pleural and parenchymal scarring, coronary atherosclerosis, adrenal gland thickening, left renal atrophy, colonic diverticulosis without evidence of acute diverticulitis, degenerative changes of the spine, and other findings as above.       LABS  Results from last 7 days   Lab Units 09/17/24  1953 09/14/24  0510 09/13/24  0502 09/12/24  0437   WBC Thousand/uL  --  5.46 6.40 5.64   HEMOGLOBIN g/dL  --  8.9* 9.8* 9.1*   HEMATOCRIT %  --  25.4* 28.8* 26.5*   MCV fL  --  89 90 90   PLATELETS Thousands/uL 399* 284 301 283     Results from last 7 days   Lab Units 09/16/24  0539 09/15/24  0438 09/14/24  0510 09/12/24  0437   SODIUM mmol/L 133* 133* 133* 132*   POTASSIUM mmol/L 3.8 3.8 2.7* 3.2*   CHLORIDE mmol/L 103 103 99 99   CO2 mmol/L 24 23 27 26   BUN mg/dL 6 7 8 11   CREATININE mg/dL 0.57* 0.64 0.70 0.75   CALCIUM mg/dL 7.8* 7.8* 7.7* 7.7*   EGFR ml/min/1.73sq m 81 78 75 69   GLUCOSE RANDOM mg/dL 92 90 85 92                  Results from last 7 days   Lab Units 09/18/24  0839 09/17/24  2056 09/17/24  1623 09/17/24  1048 09/17/24  0747 09/16/24  2107 09/16/24  1619 09/16/24  1254 09/16/24  1232 09/16/24  0815   POC GLUCOSE mg/dl 68 149* 128 141* 92 140 97 78 60* 75                       Cultures:  "        Invalid input(s): \"URIBILINOGEN\"                    PHYSICAL EXAM:  Vitals:   Blood Pressure: 123/70 (09/18/24 0835)  Pulse: 91 (09/18/24 0835)  Temperature: (!) 97.4 °F (36.3 °C) (09/18/24 0835)  Temp Source: Temporal (09/18/24 0835)  Respirations: 17 (09/18/24 0835)  Height: 4' 10\" (147.3 cm) (09/12/24 1314)  Weight - Scale: 39.3 kg (86 lb 10.3 oz) (09/10/24 2037)  SpO2: 99 % (09/18/24 0835)      General: ill appearing, no acute distress  HEENT: atraumatic, PERRLA, moist mucosa, normal pharynx, normal tonsils and adenoids, normal tongue, no fluid in sinuses  Neck: Trachea midline, no carotid bruit, no masses  Respiratory: normal chest wall expansion, CTA B  Cardiovascular: RRR, no m/r/g, Normal S1 and S2  Abdomen: Soft, non-tender, non-distended, normal bowel sounds in all quadrants, no hepatosplenomegaly, no tympany  Rectal: deferred  Musculoskeletal: Moves all, right leg limited by fracture  Integumentary: warm, dry, and pink, with no visible rash, purpura, or petechia  Heme/Lymph: no lymphadenopathy, no bruises  Neurological: Cranial Nerves II-XII grossly intact  Psychiatric: cooperative     Discharge Disposition: Home with Home Health Care    AM-PAC Basic Mobility:  Basic Mobility Inpatient Raw Score: 6    -HLM Achieved: 1: Laying in bed  -HLM Goal: 2: Bed activities/Dependent transfer    HLM Goal listed above. Continue with ongoing physical therapy and encourage appropriate mobility to improve upon HLM goals.      Test Results Pending at Discharge:           Medications   Summary of Medication Adjustments made as a result of this hospitalization: See discharge summary and AVS for medication changes  Medication Dosing Tapers - Please refer to Discharge Medication List for details on any medication dosing tapers (if applicable to patient).  Discharge Medication List: See after visit summary for reconciled discharge medications.     Diet restrictions:         Diet Orders   (From admission, onward)    "              Start     Ordered    09/16/24 1202  Diet Todd/CHO Controlled; Consistent Carbohydrate Diet Level 2 (5 carb servings/75 grams CHO/meal); Dysphagia 1-Pureed; Thin Liquid  Diet effective now        References:    Adult Nutrition Support Algorithm    RD Therapeutic Diet Order Protocol   Question Answer Comment   Diet Type Todd/CHO Controlled    Todd/CHO Controlled Consistent Carbohydrate Diet Level 2 (5 carb servings/75 grams CHO/meal)    Other Restriction(s): Dysphagia 1-Pureed    Liquid Modifier Thin Liquid    RD to adjust diet per protocol? Yes        09/16/24 1201    09/12/24 1627  Dietary nutrition supplements  Once        Question Answer Comment   Select Supplement: Ensure Compact-Vanilla    Frequency Breakfast, Lunch, Dinner        09/12/24 1626                  Activity restrictions: No strenuous activity  Discharge Condition: good    Outpatient Follow-Up and Discharge Instructions  See after visit summary section titled Discharge Instructions for information provided to patient and family.      Code Status: Level 3 - DNAR and DNI  Discharge Statement   I spent 86 minutes discharging the patient. This time was spent on the day of discharge. Greater than 50% of total time was spent with the patient and / or family counseling and / or coordination of care.    ** Please Note: This note was completed in part utilizing N[employer] Dragon Medical One Software.  Grammatical errors, random word insertions, spelling mistakes, and incomplete sentences may be an occasional consequence of this system secondary to software limitations, ambient noise, and hardware issues.  If you have any questions or concerns about the content, text, or information contained within the body of this dictation, please contact the provider for clarification.**

## 2024-09-18 NOTE — ASSESSMENT & PLAN NOTE
Controlled  Outpatient regimen: lisinopril 20mg daily, furosemide 20mg daily, amlodipine 5mg daily    Persistent hypokalemia and decreased p.o. intake will discontinue furosemide indefinitely

## 2024-09-18 NOTE — CASE MANAGEMENT
Case Management Discharge Planning Note    Patient name Ingrid Samson  Location East 4 /E4 -* MRN [id number]  : 1933 Date 2024       Current Admission Date: 9/10/2024  Current Admission Diagnosis:Ambulatory dysfunction   Patient Active Problem List    Diagnosis Date Noted Date Diagnosed    Dysphagia 2024     Moderate protein-calorie malnutrition (HCC) 2024     Ambulatory dysfunction 2024     Cognitive decline 2024     Right medial tibial plateau fracture 2024     Mild protein-calorie malnutrition (HCC)      Hyponatremia 2020     Insomnia 2016     Type 2 diabetes mellitus with diabetic chronic kidney disease (HCC) 2016     Mixed hyperlipidemia 10/10/2012     Primary hypertension 10/10/2012       LOS (days): 7  Geometric Mean LOS (GMLOS) (days): 2.8  Days to GMLOS:-4.6     OBJECTIVE:  Risk of Unplanned Readmission Score: 11.35         Current admission status: Inpatient   Preferred Pharmacy:   [id number] [place] [facility] [address] [facility]  [address]  [facility] [address]  Phone: [phone] Fax: [phone]    Primary Care Provider: Carlo Valerio MD    Primary Insurance: [employer] [facility]  Secondary Insurance:     DISCHARGE DETAILS:                                                             Dispatcher Contacted: Yes  Number/Name of Dispatcher: SLETS  Transported by (Company and Unit #): [employer]  ETA of Transport (Date): 24  ETA of Transport (Time): 1200     Transfer Mode: Stretcher        IMM Given (Date):: 24  IMM Given to:: Family  Family notified:: message left for daughter Patricia  Additional Comments: [facility]  Transportation arranged and confirmed via [employer] at 12 noon via stretcher van.  Attempted to update patient, was not arousable.  Message left for daughter Patricia.  Provider, nursing and  facility aware.

## 2024-09-18 NOTE — DISCHARGE INSTR - AVS FIRST PAGE
Dear Ingrid Samson,     It was our pleasure to care for you here at [facility].  It is our hope that we were always able to exceed the expected standards for your care during your stay.  You were hospitalized due to failure to thrive, fall as well as right tibial plateau fracture..  You were cared for on the fourth floor by Dwight Restrepo DO with the [facility] who covers for your primary care physician (PCP), Carlo Valerio MD, while you were hospitalized.  If you have any questions or concerns related to this hospitalization, you may contact us at .  For follow up as well as any medication refills, we recommend that you follow up with your primary care physician.  A registered nurse will reach out to you by phone within a few days after your discharge to answer any additional questions that you may have after going home.  However, at this time we provide for you here, the most important instructions / recommendations at discharge:     Notable Medication Adjustments -   Discontinue furosemide  Testing Required after Discharge -   Recommend repeat imaging with orthopedic service in 4 to 6 weeks to ensure fracture healing  Important follow up information -   Continue speech therapy  Other Instructions -   Should you not improve I would recommend transition to hospice versus goals of care  Please review this entire after visit summary as additional general instructions including medication list, appointments, activity, diet, any pertinent wound care, and other additional recommendations from your care team that may be provided for you.      Sincerely,     Dwight Restrepo DO and Nurse Yola Peres

## 2024-09-18 NOTE — ASSESSMENT & PLAN NOTE
Hypotonic hyponatremia improved with IV fluid  Recent Labs     09/16/24  0539   SODIUM 133*     Discontinue furosemide

## 2024-09-18 NOTE — PROGRESS NOTES
Patient:    MRN:  [id number]    [place] Request ID:  [id number]    Level of care reserved:  Skilled Nursing Facility    Partner Reserved:  [facility], [place] [id number] [phone]    Clinical needs requested:  physical therapy, speech therapy, occupational therapy    Geography searched:  10 miles around [id number]    Start of Service:    Request sent:  3:02pm EDT on 9/12/2024 by Osiel Smith    Partner reserved:  4:45pm EDT on 9/14/2024 by Bren Bravo    Choice list shared:  2:23pm EDT on 9/13/2024 by Bren Bravo

## 2024-09-18 NOTE — ASSESSMENT & PLAN NOTE
Lab Results   Component Value Date    HGBA1C 6.8 (H) 09/11/2024   Home regimen reviewed.   Continue metformin at discharge

## 2024-09-18 NOTE — CASE MANAGEMENT
Case Management Discharge Planning Note    Patient name Ingrid Samson  Location East 4 /E4 -* MRN [id number]  : 1933 Date 2024       Current Admission Date: 9/10/2024  Current Admission Diagnosis:Ambulatory dysfunction   Patient Active Problem List    Diagnosis Date Noted Date Diagnosed    Hypokalemia 09/15/2024     Dysphagia 2024     Moderate protein-calorie malnutrition (HCC) 2024     Ambulatory dysfunction 2024     Cognitive decline 2024     Right medial tibial plateau fracture 2024     Mild protein-calorie malnutrition (HCC)      Hyponatremia 2020     Insomnia 2016     Type 2 diabetes mellitus with diabetic chronic kidney disease (HCC) 2016     Mixed hyperlipidemia 10/10/2012     Primary hypertension 10/10/2012       LOS (days): 6  Geometric Mean LOS (GMLOS) (days): 2.8  Days to GMLOS:-3.9     OBJECTIVE:  Risk of Unplanned Readmission Score: 11.27         Current admission status: Inpatient   Preferred Pharmacy:   [id number] - [place], [place] - [address] [facility]  [address] [facility] [address]  Phone: [phone] Fax: [phone]    Primary Care Provider: Carlo Valerio MD    Primary Insurance: [employer] [facility]  Secondary Insurance:     DISCHARGE DETAILS:                                                                                                 Additional Comments: Insurance auth obtained.  Pretty informed via [employer] waiting for reply to set up transport for 24.    Accepting Facility Name, City & State : [facility]  [address] [place] [address]  Receiving Facility/Agency Phone Number: [phone] for report  Facility/Agency Fax Number: [phone] fax

## 2024-09-18 NOTE — PLAN OF CARE
Problem: Potential for Falls  Goal: Patient will remain free of falls  Description: INTERVENTIONS:  - Educate patient/family on patient safety including physical limitations  - Instruct patient to call for assistance with activity   - Consult OT/PT to assist with strengthening/mobility   - Keep Call bell within reach  - Keep bed low and locked with side rails adjusted as appropriate  - Keep care items and personal belongings within reach  - Initiate and maintain comfort rounds  - Make Fall Risk Sign visible to staff  - Offer Toileting every 2 Hours, in advance of need  - Initiate/Maintain bed alarm  - Obtain necessary fall risk management equipment: bed alarm  - Apply yellow socks and bracelet for high fall risk patients  - Consider moving patient to room near nurses station  Outcome: Progressing     Problem: Prexisting or High Potential for Compromised Skin Integrity  Goal: Skin integrity is maintained or improved  Description: INTERVENTIONS:  - Identify patients at risk for skin breakdown  - Assess and monitor skin integrity  - Assess and monitor nutrition and hydration status  - Monitor labs   - Assess for incontinence   - Turn and reposition patient  - Assist with mobility/ambulation  - Relieve pressure over bony prominences  - Avoid friction and shearing  - Provide appropriate hygiene as needed including keeping skin clean and dry  - Evaluate need for skin moisturizer/barrier cream  - Collaborate with interdisciplinary team   - Patient/family teaching  - Consider wound care consult   Outcome: Progressing     Problem: PAIN - ADULT  Goal: Verbalizes/displays adequate comfort level or baseline comfort level  Description: Interventions:  - Encourage patient to monitor pain and request assistance  - Assess pain using appropriate pain scale  - Administer analgesics based on type and severity of pain and evaluate response  - Implement non-pharmacological measures as appropriate and evaluate response  - Consider  cultural and social influences on pain and pain management  - Notify physician/advanced practitioner if interventions unsuccessful or patient reports new pain  Outcome: Progressing     Problem: INFECTION - ADULT  Goal: Absence or prevention of progression during hospitalization  Description: INTERVENTIONS:  - Assess and monitor for signs and symptoms of infection  - Monitor lab/diagnostic results  - Monitor all insertion sites, i.e. indwelling lines, tubes, and drains  - Monitor endotracheal if appropriate and nasal secretions for changes in amount and color  - [place] appropriate cooling/warming therapies per order  - Administer medications as ordered  - Instruct and encourage patient and family to use good hand hygiene technique  - Identify and instruct in appropriate isolation precautions for identified infection/condition  Outcome: Progressing  Goal: Absence of fever/infection during neutropenic period  Description: INTERVENTIONS:  - Monitor WBC    Outcome: Progressing     Problem: SAFETY ADULT  Goal: Patient will remain free of falls  Description: INTERVENTIONS:  - Educate patient/family on patient safety including physical limitations  - Instruct patient to call for assistance with activity   - Consult OT/PT to assist with strengthening/mobility   - Keep Call bell within reach  - Keep bed low and locked with side rails adjusted as appropriate  - Keep care items and personal belongings within reach  - Initiate and maintain comfort rounds  - Make Fall Risk Sign visible to staff  - Offer Toileting every 2 Hours, in advance of need  - Initiate/Maintain bed alarm  - Obtain necessary fall risk management equipment: chair alarm  - Apply yellow socks and bracelet for high fall risk patients  - Consider moving patient to room near nurses station  Outcome: Progressing  Goal: Maintain or return to baseline ADL function  Description: INTERVENTIONS:  -  Assess patient's ability to carry out ADLs; assess patient's  baseline for ADL function and identify physical deficits which impact ability to perform ADLs (bathing, care of mouth/teeth, toileting, grooming, dressing, etc.)  - Assess/evaluate cause of self-care deficits   - Assess range of motion  - Assess patient's mobility; develop plan if impaired  - Assess patient's need for assistive devices and provide as appropriate  - Encourage maximum independence but intervene and supervise when necessary  - Involve family in performance of ADLs  - Assess for home care needs following discharge   - Consider OT consult to assist with ADL evaluation and planning for discharge  - Provide patient education as appropriate  Outcome: Progressing  Goal: Maintains/Returns to pre admission functional level  Description: INTERVENTIONS:  - Perform AM-PAC 6 Click Basic Mobility/ Daily Activity assessment daily.  - Set and communicate daily mobility goal to care team and patient/family/caregiver.   - Collaborate with rehabilitation services on mobility goals if consulted  - Perform Range of Motion 3 times a day.  - Reposition patient every 2 hours.  - Dangle patient 3 times a day  - Stand patient 3 times a day  - Ambulate patient 3 times a day  - Out of bed to chair 3 times a day   - Out of bed for meals 3 times a day  - Out of bed for toileting  - Record patient progress and toleration of activity level   Outcome: Progressing     Problem: DISCHARGE PLANNING  Goal: Discharge to home or other facility with appropriate resources  Description: INTERVENTIONS:  - Identify barriers to discharge w/patient and caregiver  - Arrange for needed discharge resources and transportation as appropriate  - Identify discharge learning needs (meds, wound care, etc.)  - Arrange for interpretive services to assist at discharge as needed  - Refer to Case Management Department for coordinating discharge planning if the patient needs post-hospital services based on physician/advanced practitioner order or complex needs  related to functional status, cognitive ability, or social support system  Outcome: Progressing     Problem: Knowledge Deficit  Goal: Patient/family/caregiver demonstrates understanding of disease process, treatment plan, medications, and discharge instructions  Description: Complete learning assessment and assess knowledge base.  Interventions:  - Provide teaching at level of understanding  - Provide teaching via preferred learning methods  Outcome: Progressing     Problem: Nutrition/Hydration-ADULT  Goal: Nutrient/Hydration intake appropriate for improving, restoring or maintaining nutritional needs  Description: Monitor and assess patient's nutrition/hydration status for malnutrition. Collaborate with interdisciplinary team and initiate plan and interventions as ordered.  Monitor patient's weight and dietary intake as ordered or per policy. Utilize nutrition screening tool and intervene as necessary. Determine patient's food preferences and provide high-protein, high-caloric foods as appropriate.     INTERVENTIONS:  - Monitor oral intake, urinary output, labs, and treatment plans  - Assess nutrition and hydration status and recommend course of action  - Evaluate amount of meals eaten  - Assist patient with eating if necessary   - Allow adequate time for meals  - Recommend/ encourage appropriate diets, oral nutritional supplements, and vitamin/mineral supplements  - Order, calculate, and assess calorie counts as needed  - Recommend, monitor, and adjust tube feedings and TPN/PPN based on assessed needs  - Assess need for intravenous fluids  - Provide specific nutrition/hydration education as appropriate  - Include patient/family/caregiver in decisions related to nutrition  Outcome: Progressing      independent

## 2024-09-19 NOTE — UTILIZATION REVIEW
NOTIFICATION OF ADMISSION DISCHARGE   This is a Notification of Discharge from [facility]. Please be advised that this patient has been discharge from our facility. Below you will find the admission and discharge date and time including the patient’s disposition.   UTILIZATION REVIEW CONTACT:  Jessica Gonzalez MA  Utilization   Network Utilization Review Department  Phone: [phone] x carefully listen to the prompts. All voicemails are confidential.  Email: [email or web address]     ADMISSION INFORMATION  PRESENTATION DATE: 9/10/2024  8:29 PM  OBERVATION ADMISSION DATE: N/A  INPATIENT ADMISSION DATE: 9/11/24  3:01 AM   DISCHARGE DATE: 9/18/2024  2:00 PM   [facility]    Network Utilization Review Department  ATTENTION: Please call with any questions or concerns to [phone] and carefully listen to the prompts so that you are directed to the right person. All voicemails are confidential.   For Discharge needs, contact Care Management DC Support Team at [phone] opt. 2  Send all requests for admission clinical reviews, approved or denied determinations and any other requests to dedicated fax number below belonging to the campus where the patient is receiving treatment. List of dedicated fax numbers for the Facilities:  FACILITY NAME UR FAX NUMBER   ADMISSION DENIALS (Administrative/Medical Necessity) [phone]   DISCHARGE SUPPORT TEAM (Health system) [phone]   PARENT CHILD HEALTH (Maternity/NICU/Pediatrics) [phone]   [facility] [phone]   [facility] [phone]   [facility] [phone]   [facility] [phone]   [facility] [phone]   [facility] [phone]   [facility] [phone]   [facility]  [phone]   [facility] [phone]   [facility] [phone]   [facility] [phone]   [facility] [phone]

## 2024-09-24 ENCOUNTER — VBI (OUTPATIENT)
Dept: ADMINISTRATIVE | Facility: OTHER | Age: 89
End: 2024-09-24

## 2024-09-24 NOTE — TELEPHONE ENCOUNTER
09/24/24 12:48 PM    Patient was flagged by a Third Party Payer report as being due for a refill on the following medications, Atorvastatin Calcium 40 mg. Patient was contacted to review these medications. There was no answer and a message was left.     Thank you.  Lizzie Fernandez MA  [facility]

## 2024-10-11 ENCOUNTER — HOME CARE VISIT (OUTPATIENT)
Dept: HOME HEALTH SERVICES | Facility: HOME HEALTHCARE | Age: 89
End: 2024-10-11
Payer: MEDICARE

## 2024-10-12 ENCOUNTER — HOME CARE VISIT (OUTPATIENT)
Dept: HOME HEALTH SERVICES | Facility: HOME HEALTHCARE | Age: 89
End: 2024-10-12
Payer: MEDICARE

## 2024-10-12 ENCOUNTER — HOME CARE VISIT (OUTPATIENT)
Dept: HOME HOSPICE | Facility: HOSPICE | Age: 89
End: 2024-10-12
Payer: MEDICARE

## 2024-10-12 VITALS — HEART RATE: 64 BPM | RESPIRATION RATE: 16 BRPM

## 2024-10-12 PROCEDURE — G0299 HHS/HOSPICE OF RN EA 15 MIN: HCPCS

## 2024-10-15 ENCOUNTER — TELEPHONE (OUTPATIENT)
Age: 89
End: 2024-10-15

## 2024-10-17 ENCOUNTER — HOME CARE VISIT (OUTPATIENT)
Dept: HOME HOSPICE | Facility: HOSPICE | Age: 89
End: 2024-10-17
Payer: MEDICARE

## 2024-10-17 NOTE — CASE COMMUNICATION
Ingrid Samson, Bereavement Final IDG 10/15/24 (1LR) Due: 24   : 1933  SOC: 10/12/24  DOD: 10/14/24  Diagnosis: Dementia  Ingrid was a 89 year old woman at [facility].     TOD: Family was at bedside at [facility] and coping appropriately.     Call Assignments:  GEETHA Cheek to reassess daughter Patricia Lopes at . Please request her address for bereavement follow-up, as able. (Due: 24)

## 2024-11-20 NOTE — PROGRESS NOTES
Assessment/Plan:    No problem-specific Assessment & Plan notes found for this encounter  Diagnoses and all orders for this visit:    Need for tetanus booster  -     tetanus-diphtheria-acellular pertussis (BOOSTRIX) injection; Inject 0 5 mL into the shoulder, thigh, or buttocks once for 1 dose    Type 2 diabetes mellitus with complication, unspecified long term insulin use status (HCC)  -     POCT hemoglobin A1c    Other orders  -     benzonatate (TESSALON PERLES) 100 mg capsule; Take by mouth  -     glucose blood test strip; by In Vitro route daily  -     Lancets (ONETOUCH ULTRASOFT) lancets; by Does not apply route daily  -     ramelteon (ROZEREM) 8 mg tablet; Take 1 tablet by mouth          Subjective:      Patient ID: Bon Mcmahon is a 80 y o  female  Here for AWV and follow up  The following portions of the patient's history were reviewed and updated as appropriate: allergies, current medications, past family history, past medical history, past social history, past surgical history and problem list     Review of Systems   Constitutional: Negative for activity change, chills, fatigue and fever  HENT: Negative for congestion, ear pain, sinus pressure and sore throat  Eyes: Negative for pain and visual disturbance  Respiratory: Negative for cough, chest tightness, shortness of breath and wheezing  Cardiovascular: Negative for chest pain, palpitations and leg swelling  Gastrointestinal: Negative for abdominal pain, blood in stool, constipation, diarrhea, nausea and vomiting  Endocrine: Negative for polydipsia and polyuria  Genitourinary: Negative for difficulty urinating, dysuria, frequency and urgency  Musculoskeletal: Negative for arthralgias, joint swelling and myalgias  Skin: Negative for rash  Neurological: Negative for dizziness, weakness, numbness and headaches  Hematological: Negative for adenopathy  Does not bruise/bleed easily     Psychiatric/Behavioral: Negative for dysphoric mood  The patient is not nervous/anxious  Objective:      /70   Pulse 92   Resp 16   Ht 5' (1 524 m)   Wt 66 7 kg (147 lb)   SpO2 94%   BMI 28 71 kg/m²          Physical Exam   Constitutional: She appears well-developed and well-nourished  Neck: Normal range of motion  Neck supple  Cardiovascular: Normal rate, regular rhythm and normal heart sounds  Pulmonary/Chest: Effort normal and breath sounds normal    Abdominal: Soft  Bowel sounds are normal    Musculoskeletal: Normal range of motion  Skin: Skin is warm and dry  HPI:  Sabrina Litten is a 80 y o  female here for her Subsequent Wellness Visit  Patient Active Problem List   Diagnosis    Calculus of bile duct with acute cholangitis with obstruction    Gallstone pancreatitis    Type 2 diabetes mellitus (RUST 75 )    Hyperlipidemia    Hypertension    Insomnia    Pancreatic cyst    Urinary incontinence    Vitamin D deficiency     Past Medical History:   Diagnosis Date    Dehydration     Last assessed - 2/22/16    Diabetes mellitus (RUST 75 )     Hyperlipidemia     Hypertension     Hypotension     Last assessed - 2/22/16    Obstructive jaundice     Last assessed - 3/25/16    Sepsis ([place] [address] )      Past Surgical History:   Procedure Laterality Date    CYSTOSCOPY W/ STONE MANIPULATION N/A 2/23/2016    Procedure: STONE MANIPULATION;  Surgeon: Sravan Hsu MD;  Location: [facility]; Service:     ERCP W/ SPHICTEROTOMY N/A 2/23/2016    Procedure: ENDOSCOPIC RETROGRADE CHOLANGIOPANCREATOGRAPHY (ERCP) W/ SPHINCTEROTOMY;  Surgeon: Sravan Hsu MD;  Location: [facility];   Service:     TUBAL LIGATION       Family History   Problem Relation Age of Onset    Diabetes Mother     Diabetes Father     Heart attack Sister     Pancreatic cancer Son      Adenocarcinoma     History   Smoking Status    Never Smoker   Smokeless Tobacco    Never Used     History   Alcohol Use No      History   Drug Use No /70   Pulse 92   Resp 16   Ht 5' (1 524 m)   Wt 66 7 kg (147 lb)   SpO2 94%   BMI 28 71 kg/m²       Current Outpatient Prescriptions   Medication Sig Dispense Refill    atorvastatin (LIPITOR) 40 mg tablet Take 40 mg by mouth daily   doxepin (SINEquan) 10 mg capsule Take 1 capsule (10 mg total) by mouth daily at bedtime 30 capsule 0    ergocalciferol (ERGOCALCIFEROL) 77619 UNITS capsule Take 1,000 Units by mouth once a week        glucose blood test strip by In Vitro route daily      Lancets (ONETOUCH ULTRASOFT) lancets by Does not apply route daily      lisinopril-hydrochlorothiazide (PRINZIDE,ZESTORETIC) 20-12 5 MG per tablet Take 1 tablet by mouth daily   metFORMIN (GLUCOPHAGE) 500 mg tablet Take 500 mg by mouth 2 (two) times a day with meals   ramelteon (ROZEREM) 8 mg tablet Take 1 tablet by mouth      benzonatate (TESSALON PERLES) 100 mg capsule Take by mouth       No current facility-administered medications for this visit        No Known Allergies  Immunization History   Administered Date(s) Administered    Influenza Split High Dose Preservative Free IM 10/10/2012, 12/16/2013, 11/19/2015, 11/03/2016, 09/14/2017    Pneumococcal Conjugate 13-Valent 11/19/2015    Pneumococcal Polysaccharide PPV23 09/29/2011       Patient Care Team:  Estephania Hill MD as PCP - General      Medicare Screening Tests and Risk Assessments:  AWV Clinical     ISAR:   Previous hospitalizations?:  No       Once in a Lifetime Medicare Screening:       Medicare Screening Tests and Risk Assessment:   AAA Risk Assessment    Osteoporosis Risk Assessment    HIV Risk Assessment        Drug and Alcohol Use:   Tobacco use    Cigarettes:  never smoker    Tobacco use duration    Tobacco Cessation Readiness    Alcohol use    Alcohol use:  never    Alcohol Treatment Readiness   Illicit Drug Use    Drug use:  never        Diet & Exercise:   Diet   How many servings a day of the following:   Fruits and Vegetables: 1-2 Meat:  1-2   Whole Grains:  1 Simple Carbs:  1   Dairy:  1 Soda:  2    Tea:  0   Exercise    Do you currently exercise?:  yes    Frequency:  daily    Minutes per day:  30    Type of exercise:  walking       Cognitive Impairment Screening:   Depression screening preformed:  Yes     PHQ-9 Depression scale score:  0   Depression screening results:  negative for symptoms   Cognitive Impairment Screening    Do you have difficulty learning or retaining new information?:  No Do you have difficulty handling new tasks?:  No   Do you have difficulty with reasoning?:  No Do you have difficulty with spatial ability and orientation?:  No   Do you have difficulty with language?:  No Do you have difficulty with behavior?:  No       Functional Ability/Level of Safety:   Hearing    Hearing difficulties:  No Bilateral:  normal   Hearing aid:  No    Hearing Impairment Assessment    Current Activities    Help needed with the folllowing:    Using the phone:  No Transportation:  Yes   Shopping:  Yes Preparing Meals:  No   Doing Housework:  No Doing Laundry:  No   Managing Medications:  No Managing Money:  Yes   ADL    Fall Risk   Have you fallen in the last 12 months?:  No    Injury History       Home Safety:   Home Safety Risk Factors   Unfamilar with surroundings:  No Uneven floors:  Yes   Stairs or handrail saftey risk:  No Loose rugs:  Yes   Household clutter:  No Poor household lighting:  No   No grab bars in bathroom:  Yes Further evaluation needed:  No       Advanced Directives:   Advanced Directives    Living Will:  No Durable POA for healthcare:  No   Advanced directive:  No    Patient's End of Life Decisions    Reviewed with patient:  Yes        Urinary Incontinence:   Do you have urinary incontinence?:  No        Glaucoma:            Provider Screening    No data filed        No exam data present  Reviewed Updated [facility] Prior Wellness Visits:   Last Medicare wellness visit information was reviewed, patient interviewed , no change since last AWVyes    Assessment and Plan:  1  Need for tetanus booster  tetanus-diphtheria-acellular pertussis (BOOSTRIX) injection   2   Type 2 diabetes mellitus with complication, unspecified long term insulin use status (HCC)  POCT hemoglobin A1c       Health Maintenance Due   Topic Date Due    DXA SCAN  1933    Depression Screening PHQ-9  01/31/1945    DTaP,Tdap,and Td Vaccines (1 - Tdap) 01/31/1954    GLAUCOMA SCREENING 67+ YR  01/31/2000    HEMOGLOBIN A1C  01/20/2017    URINE MICROALBUMIN  04/20/2017    OPHTHALMOLOGY EXAM  07/20/2017    Diabetic Foot Exam  11/03/2017 Patient

## (undated) DEVICE — THE SIMPULSE SOLO SYSTEM WITH ULTREX RETRACTABLE SPLASH SHIELD TIP: Brand: SIMPULSE SOLO

## (undated) DEVICE — CAPIT HIP UNIPOLAR HEAD POR PRIMARY

## (undated) DEVICE — TIBURON HIP DRAPE WITH POUCHES: Brand: CONVERTORS

## (undated) DEVICE — ELECTRODE BLADE E-Z CLEAN 6.5IN -0014

## (undated) DEVICE — BLADE SAGITTAL 63.0 X 19.5MM

## (undated) DEVICE — CAPIT HIP STEM POR PRIMARY

## (undated) DEVICE — HOOD: Brand: FLYTE

## (undated) DEVICE — IMPERVIOUS STOCKINETTE: Brand: DEROYAL

## (undated) DEVICE — INTENDED FOR TISSUE SEPARATION, AND OTHER PROCEDURES THAT REQUIRE A SHARP SURGICAL BLADE TO PUNCTURE OR CUT.: Brand: BARD-PARKER SAFETY BLADES SIZE 10, STERILE

## (undated) DEVICE — SUT VICRYL 1 CTX 36 IN J977H

## (undated) DEVICE — GLOVE SRG BIOGEL 7.5

## (undated) DEVICE — SUT ETHIBOND 5 V-37 30 IN MB66G

## (undated) DEVICE — GLOVE SRG BIOGEL 8

## (undated) DEVICE — COBAN 6 IN STERILE

## (undated) DEVICE — MEDI-VAC TUBING CONNECTOR 6-IN-1 STRAIGHT: Brand: CARDINAL HEALTH

## (undated) DEVICE — INTENDED FOR TISSUE SEPARATION, AND OTHER PROCEDURES THAT REQUIRE A SHARP SURGICAL BLADE TO PUNCTURE OR CUT.: Brand: BARD-PARKER ® CARBON RIB-BACK BLADES

## (undated) DEVICE — PROXIMATE SKIN STAPLERS (35 WIDE) CONTAINS 35 STAINLESS STEEL STAPLES (FIXED HEAD): Brand: PROXIMATE

## (undated) DEVICE — SCD SEQUENTIAL COMPRESSION COMFORT SLEEVE MEDIUM KNEE LENGTH: Brand: KENDALL SCD

## (undated) DEVICE — CHLORAPREP HI-LITE 26ML ORANGE

## (undated) DEVICE — NEEDLE 18 G X 1 1/2

## (undated) DEVICE — ADHESIVE SKIN HIGH VISCOSITY EXOFIN 1ML

## (undated) DEVICE — SUT VICRYL 2-0 CT-2 27 IN J269H

## (undated) DEVICE — GLOVE INDICATOR PI UNDERGLOVE SZ 8.5 BLUE

## (undated) DEVICE — STRL ALLENTOWN HIP SHOULDER PK: Brand: CARDINAL HEALTH

## (undated) DEVICE — 3M™ IOBAN™ 2 ANTIMICROBIAL INCISE DRAPE 6648EZ: Brand: IOBAN™ 2

## (undated) DEVICE — 3000CC GUARDIAN II: Brand: GUARDIAN

## (undated) DEVICE — PLUMEPEN PRO 10FT

## (undated) DEVICE — SURGICAL GOWN, XL SMARTSLEEVE: Brand: CONVERTORS

## (undated) DEVICE — TUBING SUCTION 5MM X 12 FT

## (undated) DEVICE — 3M™ STERI-DRAPE™ U-DRAPE 1015: Brand: STERI-DRAPE™

## (undated) DEVICE — SYRINGE 50ML LL

## (undated) DEVICE — DRESSING MEPILEX AG BORDER 4 X 8 IN